# Patient Record
Sex: MALE | Race: WHITE | NOT HISPANIC OR LATINO | Employment: OTHER | URBAN - METROPOLITAN AREA
[De-identification: names, ages, dates, MRNs, and addresses within clinical notes are randomized per-mention and may not be internally consistent; named-entity substitution may affect disease eponyms.]

---

## 2017-04-19 ENCOUNTER — TELEPHONE (OUTPATIENT)
Dept: FAMILY MEDICINE | Facility: OTHER | Age: 62
End: 2017-04-19

## 2017-04-19 ENCOUNTER — OFFICE VISIT (OUTPATIENT)
Dept: FAMILY MEDICINE | Facility: OTHER | Age: 62
End: 2017-04-19
Payer: COMMERCIAL

## 2017-04-19 VITALS
BODY MASS INDEX: 34.84 KG/M2 | TEMPERATURE: 98 F | RESPIRATION RATE: 16 BRPM | HEIGHT: 67 IN | DIASTOLIC BLOOD PRESSURE: 77 MMHG | WEIGHT: 222 LBS | SYSTOLIC BLOOD PRESSURE: 145 MMHG

## 2017-04-19 DIAGNOSIS — S01.01XA LACERATION OF SCALP WITHOUT FOREIGN BODY, INITIAL ENCOUNTER: ICD-10-CM

## 2017-04-19 DIAGNOSIS — S09.90XA HEAD INJURY, INITIAL ENCOUNTER: Primary | ICD-10-CM

## 2017-04-19 PROCEDURE — 99207 ZZC NO BILLABLE SERVICE THIS VISIT: CPT | Performed by: FAMILY MEDICINE

## 2017-04-19 PROCEDURE — 12001 RPR S/N/AX/GEN/TRNK 2.5CM/<: CPT | Performed by: FAMILY MEDICINE

## 2017-04-19 ASSESSMENT — PAIN SCALES - GENERAL: PAINLEVEL: NO PAIN (0)

## 2017-04-19 NOTE — PATIENT INSTRUCTIONS
Wound Care Instructions    1.  Keep Vaseline or antibiotic ointment on wound for a few days until the skin edges have come together.  Do not let it dry out and form a scab as this will make the resulting scar more noticeable.    2.  After 24 hours, may wash gently with soap and water.  After 48 hours, you can soak it, if needed.    3.  If desired, vitamin E oil for 2 weeks after antibiotic ointment may help to decrease scarring.    4.  Protect the area from sun for up to one year afterward as the scar is continuing to remodel.  Sun exposure will also make the resulting scar more noticeable.    5.  Call if the area is very red, tender, has a discharge or is very itchy while healing, or if you have any other questions.  These may be signs of early infection or allergy.    6.  Return for staple removal in 7-10 days.

## 2017-04-19 NOTE — MR AVS SNAPSHOT
After Visit Summary   4/19/2017    Bhavesh Lanedros    MRN: [id number]           Patient Information     Date Of Birth          1955        Visit Information        Provider Department      4/19/2017 3:30 PM López Mohr MD [facility]        Care Instructions    Wound Care Instructions    1.  Keep Vaseline or antibiotic ointment on wound for a few days until the skin edges have come together.  Do not let it dry out and form a scab as this will make the resulting scar more noticeable.    2.  After 24 hours, may wash gently with soap and water.  After 48 hours, you can soak it, if needed.    3.  If desired, vitamin E oil for 2 weeks after antibiotic ointment may help to decrease scarring.    4.  Protect the area from sun for up to one year afterward as the scar is continuing to remodel.  Sun exposure will also make the resulting scar more noticeable.    5.  Call if the area is very red, tender, has a discharge or is very itchy while healing, or if you have any other questions.  These may be signs of early infection or allergy.    6.  Return for staple removal in 7-10 days.             Follow-ups after your visit        Who to contact     If you have questions or need follow up information about today's clinic visit or your schedule please contact [facility] directly at [phone].  Normal or non-critical lab and imaging results will be communicated to you by MyChart, letter or phone within 4 business days after the clinic has received the results. If you do not hear from us within 7 days, please contact the clinic through MyChart or phone. If you have a critical or abnormal lab result, we will notify you by phone as soon as possible.  Submit refill requests through [employer] or call your pharmacy and they will forward the refill request to us. Please allow 3 business days for your refill to be completed.          Additional Information About Your Visit       "  MyChart Information     [employer] lets you send messages to your doctor, view your test results, renew your prescriptions, schedule appointments and more. To sign up, go to [email or web address] . Click on \"Log in\" on the left side of the screen, which will take you to the Welcome page. Then click on \"Sign up Now\" on the right side of the page.     You will be asked to enter the access code listed below, as well as some personal information. Please follow the directions to create your username and password.     Your access code is: [email or web address]  Expires: 2017  4:17 PM     Your access code will  in 90 days. If you need help or a new code, please call your [place] clinic or [phone].        Care EveryWhere ID     This is your Care EveryWhere ID. This could be used by other organizations to access your [place] medical records  [id number]        Your Vitals Were     Temperature Respirations Height BMI (Body Mass Index)          98  F (36.7  C) (Oral) 16 5' 7.25\" (1.708 m) 34.51 kg/m2         Blood Pressure from Last 3 Encounters:   17 145/77   16 137/89   16 128/80    Weight from Last 3 Encounters:   17 222 lb (100.7 kg)   16 218 lb 1.6 oz (98.9 kg)   10/27/16 215 lb (97.5 kg)              Today, you had the following     No orders found for display       Primary Care Provider Office Phone #    [facility] [phone]       No address on file        Thank you!     Thank you for choosing [facility]  for your care. Our goal is always to provide you with excellent care. Hearing back from our patients is one way we can continue to improve our services. Please take a few minutes to complete the written survey that you may receive in the mail after your visit with us. Thank you!             Your Updated Medication List - Protect others around you: Learn how to safely use, store and throw away your medicines at www.disposemymeds.org.        "   This list is accurate as of: 4/19/17  4:17 PM.  Always use your most recent med list.                   Brand Name Dispense Instructions for use    albuterol 108 (90 BASE) MCG/ACT Inhaler    PROAIR HFA/PROVENTIL HFA/VENTOLIN HFA    1 Inhaler    Inhale 2 puffs into the lungs every 6 hours as needed for shortness of breath / dyspnea or wheezing       IBUPROFEN PO          omeprazole 20 MG CR capsule    priLOSEC    90 capsule    Take 1 capsule (20 mg) by mouth daily

## 2017-04-19 NOTE — TELEPHONE ENCOUNTER
Spoke with daughter.  Working in motor home and hit head on metal.  Stopped bleeding. About an hour. On top of head. Slice with a little open spot.  Maybe two inches long.   Huddled with DJ.  Ok to add on end of day.    Felix Shen RN

## 2017-04-19 NOTE — PROGRESS NOTES
"  SUBJECTIVE:                                                    Bhavesh Landeros is a 61 year old male who presents to clinic today for the following health issues:    Laceration.    Head laceration      Duration: 3 hours    Description (location/character/radiation): Non-painful laceration of upper left head    Intensity:  mild    Accompanying signs and symptoms: None    History (similar episodes/previous evaluation): None    Precipitating or alleviating factors: None    Therapies tried and outcome: None     Mr. Landeros presents to clinic for a head laceration. This happened about 3 hours ago. He was taking the motor out of a motor home and hit his head on a grill as he took it out. He denies pain. He did not lose consciousness. He has not taken anything for it. He has not hurt his head before.    Reviewed and updated as needed this visit by clinical staff       Reviewed and updated as needed this visit by Provider       ROS:  Constitutional, HEENT, cardiovascular, pulmonary, , musculoskeletal, neuro, skin, endocrine and psych systems are negative, except as otherwise noted. He noticed some heartburn this morning.    OBJECTIVE:                                                    /77 (BP Location: Left arm, Patient Position: Chair, Cuff Size: Adult Large)  Temp 98  F (36.7  C) (Oral)  Resp 16  Ht 5' 7.25\" (1.708 m)  Wt 222 lb (100.7 kg)  BMI 34.51 kg/m2  Body mass index is 34.51 kg/(m^2).     GENERAL: healthy, alert and no distress  EYES: Eyes grossly normal to inspection, PERRL and conjunctivae and sclerae normal  HENT: 1.5-inch mildly bloody, nontender, transverse laceration on left parietal head       ASSESSMENT/PLAN:                                                      1. Head injury, initial encounter  - The patient is a 61-year-old man who presented with a 1.5-inch transverse laceration on left side of the top of his occiput. This occurred a few hours prior to presentation as he took the motor out " of a motor home and hit his head on the motor home grill. He denied loss of consciousness, denied pain and, in fact, did not have tenderness to the area on exam. However, it was bloody and the cut was felt to be deep, warranting closure. After cleaning the area with chlorhexidine and water, we injected 1% lidocaine at the laceration site. We closed about 1 inch of the deepest part of the laceration using 5 staples with a skin stapler, achieving good closure. We provided the patient with information on wound care before he went home and advised him to return in 7-10 days for staple removal. He was not due for another tetanus shot for another four years.    Wound Care Instructions    1.  Keep Vaseline or antibiotic ointment on wound for a few days until the skin edges have come together.  Do not let it dry out and form a scab as this will make the resulting scar more noticeable.    2.  After 24 hours, may wash gently with soap and water.  After 48 hours, you can soak it, if needed.    3.  If desired, vitamin E oil for 2 weeks after antibiotic ointment may help to decrease scarring.    4.  Protect the area from sun for up to one year afterward as the scar is continuing to remodel.  Sun exposure will also make the resulting scar more noticeable.    5.  Call if the area is very red, tender, has a discharge or is very itchy while healing, or if you have any other questions.  These may be signs of early infection or allergy.    6.  Return for staple removal in 7-10 days.    Scribed by Jeff Shaffer MS3.    This patient was seen and examined by myself as well as the medical student.  The medical student has scribed the note and I have reviewed it, edited it appropriately and agree with the final documentation. Electronically signed by MD López Nicholas MD, MD  [facility]

## 2017-04-19 NOTE — NURSING NOTE
"Chief Complaint   Patient presents with     Laceration     Panel Management       Initial /77 (BP Location: Left arm, Patient Position: Chair, Cuff Size: Adult Large)  Temp 98  F (36.7  C) (Oral)  Resp 16  Ht 5' 7.25\" (1.708 m)  Wt 222 lb (100.7 kg)  BMI 34.51 kg/m2 Estimated body mass index is 34.51 kg/(m^2) as calculated from the following:    Height as of this encounter: 5' 7.25\" (1.708 m).    Weight as of this encounter: 222 lb (100.7 kg).  Medication Reconciliation: complete  Milton Reardon, LUCY    "

## 2017-04-27 ENCOUNTER — OFFICE VISIT (OUTPATIENT)
Dept: FAMILY MEDICINE | Facility: OTHER | Age: 62
End: 2017-04-27
Payer: COMMERCIAL

## 2017-04-27 DIAGNOSIS — Z48.02 REMOVAL OF STAPLES: Primary | ICD-10-CM

## 2017-04-27 PROCEDURE — 99207 ZZC NO CHARGE NURSE ONLY: CPT

## 2017-04-27 NOTE — PROGRESS NOTES
Bhavesh Landeros presents to the clinic today for suture/staple removal.    The patient has had the sutures/staples placed on 04/19/2017  There has been no history of infection or drainage.  Tetanus status is up to date.     OBJECTIVE:   5 sutures/staples are seen located on the head.    The wound is healing well with no signs of infection.      ASSESSMENT:   Suture/Staple Removal.    PLAN:    All sutures were easily removed today. Routine wound care discussed.  The patient will follow up as needed.    Felix Shen RN

## 2017-04-27 NOTE — MR AVS SNAPSHOT
"              After Visit Summary   2017    Bhavesh Landeros    MRN: [id number]           Patient Information     Date Of Birth          1955        Visit Information        Provider Department      2017 3:00 PM NL RN [facility]        Today's Diagnoses     Removal of staples    -  1       Follow-ups after your visit        Who to contact     If you have questions or need follow up information about today's clinic visit or your schedule please contact [facility] directly at [phone].  Normal or non-critical lab and imaging results will be communicated to you by [employer], letter or phone within 4 business days after the clinic has received the results. If you do not hear from us within 7 days, please contact the clinic through [employer] or phone. If you have a critical or abnormal lab result, we will notify you by phone as soon as possible.  Submit refill requests through [employer] or call your pharmacy and they will forward the refill request to us. Please allow 3 business days for your refill to be completed.          Additional Information About Your Visit        MyChart Information     [employer] lets you send messages to your doctor, view your test results, renew your prescriptions, schedule appointments and more. To sign up, go to [email or web address] . Click on \"Log in\" on the left side of the screen, which will take you to the Welcome page. Then click on \"Sign up Now\" on the right side of the page.     You will be asked to enter the access code listed below, as well as some personal information. Please follow the directions to create your username and password.     Your access code is: [email or web address]  Expires: 2017  4:17 PM     Your access code will  in 90 days. If you need help or a new code, please call your [facility]3] or [phone].        Care EveryWhere ID     This is your Care EveryWhere ID. This could be used by other organizations to " access your [place] medical records  [id number]         Blood Pressure from Last 3 Encounters:   04/19/17 145/77   12/30/16 137/89   12/28/16 128/80    Weight from Last 3 Encounters:   04/19/17 222 lb (100.7 kg)   12/28/16 218 lb 1.6 oz (98.9 kg)   10/27/16 215 lb (97.5 kg)              Today, you had the following     No orders found for display       Primary Care Provider Office Phone #    [facility] [phone]       No address on file        Thank you!     Thank you for choosing [facility]  for your care. Our goal is always to provide you with excellent care. Hearing back from our patients is one way we can continue to improve our services. Please take a few minutes to complete the written survey that you may receive in the mail after your visit with us. Thank you!             Your Updated Medication List - Protect others around you: Learn how to safely use, store and throw away your medicines at www.disposemymeds.org.          This list is accurate as of: 4/27/17  3:01 PM.  Always use your most recent med list.                   Brand Name Dispense Instructions for use    albuterol 108 (90 BASE) MCG/ACT Inhaler    PROAIR HFA/PROVENTIL HFA/VENTOLIN HFA    1 Inhaler    Inhale 2 puffs into the lungs every 6 hours as needed for shortness of breath / dyspnea or wheezing       IBUPROFEN PO          omeprazole 20 MG CR capsule    priLOSEC    90 capsule    Take 1 capsule (20 mg) by mouth daily

## 2017-07-03 ENCOUNTER — OFFICE VISIT (OUTPATIENT)
Dept: FAMILY MEDICINE | Facility: OTHER | Age: 62
End: 2017-07-03
Payer: COMMERCIAL

## 2017-07-03 VITALS
HEART RATE: 84 BPM | BODY MASS INDEX: 34.69 KG/M2 | WEIGHT: 221 LBS | HEIGHT: 67 IN | RESPIRATION RATE: 16 BRPM | TEMPERATURE: 99.2 F | SYSTOLIC BLOOD PRESSURE: 134 MMHG | DIASTOLIC BLOOD PRESSURE: 76 MMHG

## 2017-07-03 DIAGNOSIS — L60.0 INGROWING LEFT GREAT TOENAIL: Primary | ICD-10-CM

## 2017-07-03 PROCEDURE — 99213 OFFICE O/P EST LOW 20 MIN: CPT | Performed by: FAMILY MEDICINE

## 2017-07-03 RX ORDER — CEPHALEXIN 500 MG/1
500 CAPSULE ORAL 4 TIMES DAILY
Qty: 40 CAPSULE | Refills: 0 | Status: SHIPPED | OUTPATIENT
Start: 2017-07-03 | End: 2018-11-07

## 2017-07-03 ASSESSMENT — PAIN SCALES - GENERAL: PAINLEVEL: NO PAIN (1)

## 2017-07-03 NOTE — PROGRESS NOTES
SUBJECTIVE:                                                    Bhavesh Landeros is a 61 year old male who presents to clinic today for the following health issues:      Concern - ingrown toe nail   Onset: 3 days ago     Description:   Patient has possible infection on ingrown toe nail on Left great toe    Intensity: moderate    Progression of Symptoms:  worsening    Accompanying Signs & Symptoms:  Swelling  Painful when toe is bumped    Previous history of similar problem:   Yes- many years ago     Precipitating factors:   Worsened by: bumping it, applying pressure    Alleviating factors:  Improved by: none    Therapies Tried and outcome: none        Problem list and histories reviewed & adjusted, as indicated.  Additional history: as documented    Patient Active Problem List   Diagnosis     Benign neoplasm of testis     GERD (gastroesophageal reflux disease)     CARDIOVASCULAR SCREENING; LDL GOAL LESS THAN 160     Cellulitis of leg     Leg edema, right     Advanced directives, counseling/discussion     SOB (shortness of breath)     Past Surgical History:   Procedure Laterality Date     COLONOSCOPY N/A 1/22/2016    Procedure: COLONOSCOPY;  Surgeon: Pb Rodriguez MD;  Location: [facility]     HC COLONOSCOPY W/WO BRUSH/WASH  03/27/06     HC KNEE SCOPE,MED/LAT MENISECTOMY  07/08/1999     HC REVISE MEDIAN N/CARPAL TUNNEL SURG  1986      UGI ENDOSCOPY DIAG W BIOPSY  10/26/09       Social History   Substance Use Topics     Smoking status: Never Smoker     Smokeless tobacco: Never Used     Alcohol use 0.0 oz/week     0 Standard drinks or equivalent per week      Comment: occ     Family History   Problem Relation Age of Onset     DIABETES Brother      Alzheimer Disease Father      DIABETES Sister          Current Outpatient Prescriptions   Medication Sig Dispense Refill     cephALEXin (KEFLEX) 500 MG capsule Take 1 capsule (500 mg) by mouth 4 times daily 40 capsule 0     omeprazole (PRILOSEC) 20 MG CR capsule Take 1 capsule  "(20 mg) by mouth daily (Patient not taking: Reported on 4/19/2017) 90 capsule 3     IBUPROFEN PO        albuterol (PROAIR HFA, PROVENTIL HFA, VENTOLIN HFA) 108 (90 BASE) MCG/ACT inhaler Inhale 2 puffs into the lungs every 6 hours as needed for shortness of breath / dyspnea or wheezing (Patient not taking: Reported on 4/19/2017) 1 Inhaler 1     Allergies   Allergen Reactions     No Known Allergies      BP Readings from Last 3 Encounters:   07/03/17 134/76   04/19/17 145/77   12/30/16 137/89    Wt Readings from Last 3 Encounters:   07/03/17 221 lb (100.2 kg)   04/19/17 222 lb (100.7 kg)   12/28/16 218 lb 1.6 oz (98.9 kg)                  Labs reviewed in EPIC    Reviewed and updated as needed this visit by clinical staff       Reviewed and updated as needed this visit by Provider         ROS:  C: NEGATIVE for fever, chills, change in weight  INTEGUMENTARY/SKIN: POSITIVE for left ingrown toenail   E/M: NEGATIVE for ear, mouth and throat problems  R: NEGATIVE for significant cough or SOB  CV: NEGATIVE for chest pain, palpitations or peripheral edema    OBJECTIVE:                                                    /76  Pulse 84  Temp 99.2  F (37.3  C) (Temporal)  Resp 16  Ht 5' 7.25\" (1.708 m)  Wt 221 lb (100.2 kg)  BMI 34.36 kg/m2  Body mass index is 34.36 kg/(m^2).       EXAM:  GENERAL:  Bhavesh Landeros presents in no apparent distress  VITALS: Blood pressure 134/76, pulse 84, temperature 99.2  F (37.3  C), temperature source Temporal, resp. rate 16, height 5' 7.25\" (1.708 m), weight 221 lb (100.2 kg).  Great Toe:  Inflammation is seen along the lateral aspect of the great toe with mild to moderate drainage and an obvious ingrown toenail.  No fluctuance is seen.  Slightly tender to palpation.  Diagnostic test results:  Diagnostic Test Results:  none      ASSESSMENT/PLAN:                                                    1. Ingrowing left great toenail  Plan:  Options were discussed with the " patient.  Keflex 500mg po qid x 10 days.  Warm water soaks recommended.  Patient to schedule partial toenail removal in the future if symptoms do not improve.    - cephALEXin (KEFLEX) 500 MG capsule; Take 1 capsule (500 mg) by mouth 4 times daily  Dispense: 40 capsule; Refill: 0  - PODIATRY/FOOT & ANKLE SURGERY REFERRAL      Follow up with Provider - lowell Castro MD, MD  [facility]    Patient Instructions     Ingrown Toenail, Not Infected (Home Treatment)  An ingrown toenail occurs when the nail grows sideways into the skin next to the nail. This can cause pain, especially when wearing tight shoes. It can also lead to an infection with redness, swelling, and pus drainage. Most people respond to the treatments described here. Sometimes surgery is needed, however.  Home care  The following guidelines will help you care for your toenail at home:    Soak the painful toe in warm water twice a day for 10 to 20 minutes each time. Wash the entire foot with an antibacterial soap.    If there is redness or swelling around the toenail, apply an antibiotic ointment three times a day.    Insert a small piece of rolled-up cotton under the corner of the nail to promote growth of the nail outward, away from the cuticle.    Wear shoes that do not put pressure on the toes, such as a sandal or open shoe. Closed shoes should be big enough in the toes so that there is no pressure on the painful toe.    You may use acetaminophen or ibuprofen for pain, unless another pain medicine was prescribed. Talk with your healthcare provider before using these medicines if you have chronic liver or kidney disease. Also tell your healthcare provider if you have ever had a stomach ulcer or GI bleeding.  Prevention  The following tips will help you prevent ingrown toenails:    Avoid pointed, tight, or narrow shoes.    Trim toenails once a month so they don t grow too long. Cut the nail straight across.  Follow-up  care  Follow up with your healthcare provider or as advised.  When to seek medical advice  Call your health care provider right away if any of these occur:    Increasing redness, pain, or swelling of the toe    Tender red streaks in the skin leading toward the ankle    Pus or fluid drainage from the toe    Fever of 100.4 F (38 C) or higher  Date Last Reviewed: 5/14/2015 2000-2017 [employer]. [address] [place] [address], [place] [address]. All rights reserved. This information is not intended as a substitute for professional medical care. Always follow your healthcare professional's instructions.        Ingrown Toenail, Not Infected (Home Treatment)  An ingrown toenail occurs when the nail grows sideways into the skin next to the nail. This can cause pain, especially when wearing tight shoes. It can also lead to an infection with redness, swelling, and pus drainage. Most people respond to the treatments described here. Sometimes surgery is needed, however.  Home care  The following guidelines will help you care for your toenail at home:    Soak the painful toe in warm water twice a day for 10 to 20 minutes each time. Wash the entire foot with an antibacterial soap.    If there is redness or swelling around the toenail, apply an antibiotic ointment three times a day.    Insert a small piece of rolled-up cotton under the corner of the nail to promote growth of the nail outward, away from the cuticle.    Wear shoes that do not put pressure on the toes, such as a sandal or open shoe. Closed shoes should be big enough in the toes so that there is no pressure on the painful toe.    You may use acetaminophen or ibuprofen for pain, unless another pain medicine was prescribed. Talk with your healthcare provider before using these medicines if you have chronic liver or kidney disease. Also tell your healthcare provider if you have ever had a stomach ulcer or GI bleeding.  Prevention  The following tips will help  you prevent ingrown toenails:    Avoid pointed, tight, or narrow shoes.    Trim toenails once a month so they don t grow too long. Cut the nail straight across.  Follow-up care  Follow up with your healthcare provider or as advised.  When to seek medical advice  Call your health care provider right away if any of these occur:    Increasing redness, pain, or swelling of the toe    Tender red streaks in the skin leading toward the ankle    Pus or fluid drainage from the toe    Fever of 100.4 F (38 C) or higher  Date Last Reviewed: 5/14/2015 2000-2017 [employer]. [address] [place] [address]. All rights reserved. This information is not intended as a substitute for professional medical care. Always follow your healthcare professional's instructions.

## 2017-07-03 NOTE — NURSING NOTE
"Chief Complaint   Patient presents with     Ingrown Toenail     Panel Management       Initial /76  Pulse 84  Temp 99.2  F (37.3  C) (Temporal)  Resp 16  Ht 5' 7.25\" (1.708 m)  Wt 221 lb (100.2 kg)  BMI 34.36 kg/m2 Estimated body mass index is 34.36 kg/(m^2) as calculated from the following:    Height as of this encounter: 5' 7.25\" (1.708 m).    Weight as of this encounter: 221 lb (100.2 kg).  Medication Reconciliation: complete     Tessie Siddiqui MA    "

## 2017-07-03 NOTE — PATIENT INSTRUCTIONS
Ingrown Toenail, Not Infected (Home Treatment)  An ingrown toenail occurs when the nail grows sideways into the skin next to the nail. This can cause pain, especially when wearing tight shoes. It can also lead to an infection with redness, swelling, and pus drainage. Most people respond to the treatments described here. Sometimes surgery is needed, however.  Home care  The following guidelines will help you care for your toenail at home:    Soak the painful toe in warm water twice a day for 10 to 20 minutes each time. Wash the entire foot with an antibacterial soap.    If there is redness or swelling around the toenail, apply an antibiotic ointment three times a day.    Insert a small piece of rolled-up cotton under the corner of the nail to promote growth of the nail outward, away from the cuticle.    Wear shoes that do not put pressure on the toes, such as a sandal or open shoe. Closed shoes should be big enough in the toes so that there is no pressure on the painful toe.    You may use acetaminophen or ibuprofen for pain, unless another pain medicine was prescribed. Talk with your healthcare provider before using these medicines if you have chronic liver or kidney disease. Also tell your healthcare provider if you have ever had a stomach ulcer or GI bleeding.  Prevention  The following tips will help you prevent ingrown toenails:    Avoid pointed, tight, or narrow shoes.    Trim toenails once a month so they don t grow too long. Cut the nail straight across.  Follow-up care  Follow up with your healthcare provider or as advised.  When to seek medical advice  Call your health care provider right away if any of these occur:    Increasing redness, pain, or swelling of the toe    Tender red streaks in the skin leading toward the ankle    Pus or fluid drainage from the toe    Fever of 100.4 F (38 C) or higher  Date Last Reviewed: 5/14/2015 2000-2017 [employer]. [address] [place] [address], [place]  [address]. All rights reserved. This information is not intended as a substitute for professional medical care. Always follow your healthcare professional's instructions.        Ingrown Toenail, Not Infected (Home Treatment)  An ingrown toenail occurs when the nail grows sideways into the skin next to the nail. This can cause pain, especially when wearing tight shoes. It can also lead to an infection with redness, swelling, and pus drainage. Most people respond to the treatments described here. Sometimes surgery is needed, however.  Home care  The following guidelines will help you care for your toenail at home:    Soak the painful toe in warm water twice a day for 10 to 20 minutes each time. Wash the entire foot with an antibacterial soap.    If there is redness or swelling around the toenail, apply an antibiotic ointment three times a day.    Insert a small piece of rolled-up cotton under the corner of the nail to promote growth of the nail outward, away from the cuticle.    Wear shoes that do not put pressure on the toes, such as a sandal or open shoe. Closed shoes should be big enough in the toes so that there is no pressure on the painful toe.    You may use acetaminophen or ibuprofen for pain, unless another pain medicine was prescribed. Talk with your healthcare provider before using these medicines if you have chronic liver or kidney disease. Also tell your healthcare provider if you have ever had a stomach ulcer or GI bleeding.  Prevention  The following tips will help you prevent ingrown toenails:    Avoid pointed, tight, or narrow shoes.    Trim toenails once a month so they don t grow too long. Cut the nail straight across.  Follow-up care  Follow up with your healthcare provider or as advised.  When to seek medical advice  Call your health care provider right away if any of these occur:    Increasing redness, pain, or swelling of the toe    Tender red streaks in the skin leading toward the ankle    Pus or  fluid drainage from the toe    Fever of 100.4 F (38 C) or higher  Date Last Reviewed: 5/14/2015 2000-2017 [employer]. [address] [place] [address], [place] [address]. All rights reserved. This information is not intended as a substitute for professional medical care. Always follow your healthcare professional's instructions.

## 2017-07-03 NOTE — MR AVS SNAPSHOT
After Visit Summary   7/3/2017    Bhavesh Landeros    MRN: [id number]           Patient Information     Date Of Birth          1955        Visit Information        Provider Department      7/3/2017 1:40 PM Meme Castro MD [facility]'s Diagnoses     Ingrowing left great toenail    -  1      Care Instructions      Ingrown Toenail, Not Infected (Home Treatment)  An ingrown toenail occurs when the nail grows sideways into the skin next to the nail. This can cause pain, especially when wearing tight shoes. It can also lead to an infection with redness, swelling, and pus drainage. Most people respond to the treatments described here. Sometimes surgery is needed, however.  Home care  The following guidelines will help you care for your toenail at home:    Soak the painful toe in warm water twice a day for 10 to 20 minutes each time. Wash the entire foot with an antibacterial soap.    If there is redness or swelling around the toenail, apply an antibiotic ointment three times a day.    Insert a small piece of rolled-up cotton under the corner of the nail to promote growth of the nail outward, away from the cuticle.    Wear shoes that do not put pressure on the toes, such as a sandal or open shoe. Closed shoes should be big enough in the toes so that there is no pressure on the painful toe.    You may use acetaminophen or ibuprofen for pain, unless another pain medicine was prescribed. Talk with your healthcare provider before using these medicines if you have chronic liver or kidney disease. Also tell your healthcare provider if you have ever had a stomach ulcer or GI bleeding.  Prevention  The following tips will help you prevent ingrown toenails:    Avoid pointed, tight, or narrow shoes.    Trim toenails once a month so they don t grow too long. Cut the nail straight across.  Follow-up care  Follow up with your healthcare provider or as advised.  When to seek  medical advice  Call your health care provider right away if any of these occur:    Increasing redness, pain, or swelling of the toe    Tender red streaks in the skin leading toward the ankle    Pus or fluid drainage from the toe    Fever of 100.4 F (38 C) or higher  Date Last Reviewed: 5/14/2015 2000-2017 [employer]. [address] [place] [address]. All rights reserved. This information is not intended as a substitute for professional medical care. Always follow your healthcare professional's instructions.        Ingrown Toenail, Not Infected (Home Treatment)  An ingrown toenail occurs when the nail grows sideways into the skin next to the nail. This can cause pain, especially when wearing tight shoes. It can also lead to an infection with redness, swelling, and pus drainage. Most people respond to the treatments described here. Sometimes surgery is needed, however.  Home care  The following guidelines will help you care for your toenail at home:    Soak the painful toe in warm water twice a day for 10 to 20 minutes each time. Wash the entire foot with an antibacterial soap.    If there is redness or swelling around the toenail, apply an antibiotic ointment three times a day.    Insert a small piece of rolled-up cotton under the corner of the nail to promote growth of the nail outward, away from the cuticle.    Wear shoes that do not put pressure on the toes, such as a sandal or open shoe. Closed shoes should be big enough in the toes so that there is no pressure on the painful toe.    You may use acetaminophen or ibuprofen for pain, unless another pain medicine was prescribed. Talk with your healthcare provider before using these medicines if you have chronic liver or kidney disease. Also tell your healthcare provider if you have ever had a stomach ulcer or GI bleeding.  Prevention  The following tips will help you prevent ingrown toenails:    Avoid pointed, tight, or narrow  shoes.    Trim toenails once a month so they don t grow too long. Cut the nail straight across.  Follow-up care  Follow up with your healthcare provider or as advised.  When to seek medical advice  Call your health care provider right away if any of these occur:    Increasing redness, pain, or swelling of the toe    Tender red streaks in the skin leading toward the ankle    Pus or fluid drainage from the toe    Fever of 100.4 F (38 C) or higher  Date Last Reviewed: 5/14/2015 2000-2017 [employer]. [address] [place] [address]. All rights reserved. This information is not intended as a substitute for professional medical care. Always follow your healthcare professional's instructions.                Follow-ups after your visit        Additional Services     PODIATRY/FOOT & ANKLE SURGERY REFERRAL       Your provider has referred you to: [facility]: [facility] [phone]   [email or web address]/    Please be aware that coverage of these services is subject to the terms and limitations of your health insurance plan.  Call member services at your health plan with any benefit or coverage questions.      Please bring the following to your appointment:  >>   Any x-rays, CTs or MRIs which have been performed.  Contact the facility where they were done to arrange for  prior to your scheduled appointment.    >>   List of current medications   >>   This referral request   >>   Any documents/labs given to you for this referral                  Who to contact     If you have questions or need follow up information about today's clinic visit or your schedule please contact [facility] directly at [phone].  Normal or non-critical lab and imaging results will be communicated to you by MyChart, letter or phone within 4 business days after the clinic has received the results. If you do not hear from us within 7 days, please contact the  "clinic through MyUnfoldt or phone. If you have a critical or abnormal lab result, we will notify you by phone as soon as possible.  Submit refill requests through [employer] or call your pharmacy and they will forward the refill request to us. Please allow 3 business days for your refill to be completed.          Additional Information About Your Visit        [employer] Information     Gravie lets you send messages to your doctor, view your test results, renew your prescriptions, schedule appointments and more. To sign up, go to [email or web address] . Click on \"Log in\" on the left side of the screen, which will take you to the Welcome page. Then click on \"Sign up Now\" on the right side of the page.     You will be asked to enter the access code listed below, as well as some personal information. Please follow the directions to create your username and password.     Your access code is: [email or web address]  Expires: 2017  4:17 PM     Your access code will  in 90 days. If you need help or a new code, please call your [facility] clinic or [phone].        Care EveryWhere ID     This is your Care EveryWhere ID. This could be used by other organizations to access your [facility] medical records  [id number]        Your Vitals Were     Pulse Temperature Respirations Height BMI (Body Mass Index)       84 99.2  F (37.3  C) (Temporal) 16 5' 7.25\" (1.708 m) 34.36 kg/m2        Blood Pressure from Last 3 Encounters:   17 134/76   17 145/77   16 137/89    Weight from Last 3 Encounters:   17 221 lb (100.2 kg)   17 222 lb (100.7 kg)   16 218 lb 1.6 oz (98.9 kg)              We Performed the Following     PODIATRY/FOOT & ANKLE SURGERY REFERRAL          Today's Medication Changes          These changes are accurate as of: 7/3/17  2:00 PM.  If you have any questions, ask your nurse or doctor.               Start taking these medicines.        Dose/Directions    cephALEXin 500 MG capsule   Commonly " known as:  KEFLEX   Used for:  Ingrowing left great toenail   Started by:  Meme Castro MD        Dose:  500 mg   Take 1 capsule (500 mg) by mouth 4 times daily   Quantity:  40 capsule   Refills:  0            Where to get your medicines      These medications were sent to [facility] [place], [place] - [address]   [address] [address]     Phone:  [phone]     cephALEXin 500 MG capsule                Primary Care Provider Office Phone #    [facility] [phone]       No address on file        Equal Access to Services     [facility]: Hadii aad ku hadasho Soomaali, waaxda luqadaha, qaybta kaalmada adeegyada, kristin gallegos . So [facility] [phone].    ATENCIÓN: Si habla español, tiene a moreno disposición servicios gratuitos de asistencia lingüística. Llame al [phone].    We comply with applicable federal civil rights laws and [place] laws. We do not discriminate on the basis of race, color, national origin, age, disability sex, sexual orientation or gender identity.            Thank you!     Thank you for choosing [facility]  for your care. Our goal is always to provide you with excellent care. Hearing back from our patients is one way we can continue to improve our services. Please take a few minutes to complete the written survey that you may receive in the mail after your visit with us. Thank you!             Your Updated Medication List - Protect others around you: Learn how to safely use, store and throw away your medicines at www.disposemymeds.org.          This list is accurate as of: 7/3/17  2:00 PM.  Always use your most recent med list.                   Brand Name Dispense Instructions for use Diagnosis    albuterol 108 (90 BASE) MCG/ACT Inhaler    PROAIR HFA/PROVENTIL HFA/VENTOLIN HFA    1 Inhaler    Inhale 2 puffs into the lungs every 6 hours as needed for shortness of breath / dyspnea  or wheezing    SOB (shortness of breath)       cephALEXin 500 MG capsule    KEFLEX    40 capsule    Take 1 capsule (500 mg) by mouth 4 times daily    Ingrowing left great toenail       IBUPROFEN PO           omeprazole 20 MG CR capsule    priLOSEC    90 capsule    Take 1 capsule (20 mg) by mouth daily    Gastroesophageal reflux disease with esophagitis

## 2017-07-06 ENCOUNTER — OFFICE VISIT (OUTPATIENT)
Dept: PODIATRY | Facility: CLINIC | Age: 62
End: 2017-07-06
Payer: COMMERCIAL

## 2017-07-06 VITALS — WEIGHT: 221 LBS | BODY MASS INDEX: 34.69 KG/M2 | HEIGHT: 67 IN | TEMPERATURE: 98 F

## 2017-07-06 DIAGNOSIS — L60.0 INGROWING NAIL: Primary | ICD-10-CM

## 2017-07-06 PROCEDURE — 99203 OFFICE O/P NEW LOW 30 MIN: CPT | Mod: 25 | Performed by: PODIATRIST

## 2017-07-06 PROCEDURE — 11750 EXCISION NAIL&NAIL MATRIX: CPT | Mod: TA | Performed by: PODIATRIST

## 2017-07-06 ASSESSMENT — PAIN SCALES - GENERAL: PAINLEVEL: NO PAIN (0)

## 2017-07-06 NOTE — LETTER
7/6/2017         RE: Bhavesh Landeros  [address] [place] [id number]        Dear Colleague,    Thank you for referring your patient, Bhavesh Landeros, to the [facility]. Please see a copy of my visit note below.    HPI:  Bhavesh Landeros is a 61 year old male who is seen in consultation at the request of Dr. Gloria MD.    Pt presents for eval of:   (Onset, Location, L/R, Character, Treatments, Injury if yes)     Mid June 2017, patient bumped Left great toe on a board, Ingrown medial Left great toenail. Sharp and stabbing pain if bumped or w/pressure, swelling, redness, drainage  7/3/2017 started Keflex 500mg 4 times daily for 10 days, soaking    Works as cement w/construction.    Weight management plan: Patient was referred to their PCP to discuss a diet and exercise plan.       Review of Systems:  Patient denies fever, chills, rash, wound, stiffness, limping, numbness, weakness, heart burn, blood in stool, chest pain with activity, calf pain when walking, shortness of breath with activity, chronic cough, easy bleeding/bruising, swelling of ankles, excessive thirst, fatigue, depression, anxiety.  Pt admits to the symptoms noted in history above.     PAST MEDICAL HISTORY:   Past Medical History:   Diagnosis Date     MEDICAL HISTORY OF -     Leg & Wrist fxs     Multiple sclerosis (H)         PAST SURGICAL HISTORY:   Past Surgical History:   Procedure Laterality Date     COLONOSCOPY N/A 1/22/2016    Procedure: COLONOSCOPY;  Surgeon: Pb Rodriguez MD;  Location:  GI     HC COLONOSCOPY W/WO BRUSH/WASH  03/27/06     HC KNEE SCOPE,MED/LAT MENISECTOMY  07/08/1999     HC REVISE MEDIAN N/CARPAL TUNNEL SURG  1986      UGI ENDOSCOPY DIAG W BIOPSY  10/26/09        MEDICATIONS:   Current Outpatient Prescriptions:      cephALEXin (KEFLEX) 500 MG capsule, Take 1 capsule (500 mg) by mouth 4 times daily, Disp: 40 capsule, Rfl: 0     omeprazole (PRILOSEC) 20 MG CR capsule, Take 1 capsule (20 mg)  "by mouth daily, Disp: 90 capsule, Rfl: 3     IBUPROFEN PO, , Disp: , Rfl:      albuterol (PROAIR HFA, PROVENTIL HFA, VENTOLIN HFA) 108 (90 BASE) MCG/ACT inhaler, Inhale 2 puffs into the lungs every 6 hours as needed for shortness of breath / dyspnea or wheezing, Disp: 1 Inhaler, Rfl: 1     ALLERGIES:    Allergies   Allergen Reactions     No Known Allergies         SOCIAL HISTORY:   Social History     Social History     Marital status:      Spouse name: Fred     Number of children: Maira     Years of education: 12     Occupational History      [employer]     Social History Main Topics     Smoking status: Never Smoker     Smokeless tobacco: Never Used     Alcohol use 0.0 oz/week     0 Standard drinks or equivalent per week      Comment: occ     Drug use: No     Sexual activity: Not Currently     Partners: Female     Other Topics Concern     Caffeine Concern No     Sleep Concern No     Stress Concern No     Weight Concern No     Exercise Yes     Seat Belt Yes     Social History Narrative        FAMILY HISTORY:   Family History   Problem Relation Age of Onset     DIABETES Brother      Alzheimer Disease Father      DIABETES Sister         EXAM:Vitals: Temp 98  F (36.7  C) (Temporal)  Ht 5' 7.25\" (1.708 m)  Wt 221 lb (100.2 kg)  BMI 34.36 kg/m2  BMI= Body mass index is 34.36 kg/(m^2).    General appearance: Patient is alert and fully cooperative with history & exam.  No sign of distress is noted during the visit.     Psychiatric: Affect is pleasant & appropriate.  Patient appears motivated to improve health.     Respiratory: Breathing is regular & unlabored while sitting.     HEENT: Hearing is intact to spoken word.  Speech is clear.  No gross evidence of visual impairment that would impact ambulation.     Vascular: DP & PT pulses are intact & regular, CFT immediate, positive digital hair growth bilaterally.  No significant edema or varicosities noted and skin temperature is normal to both lower " extremities.     Neurologic: Lower extremity sensation is intact to light touch.  No evidence of weakness or contracture in the lower extremities.  No evidence of neuropathy.    Dermatologic: Adequate texture, turgor and tone about the integument.  No discoloration or thickening of the toenail however the left lateral hallux nail border(s) are ingrown with localized erythema, discomfort and purulent drainage.     Musculoskeletal: Patient is ambulatory without assistive device or brace.  No gross ankle deformity noted.  No foot or ankle joint effusion is noted.     ASSESSMENT:     No diagnosis found.    Plan: We reviewed medical history and EPIC chart.  After obtaining informed consent to permanently remove the left lateral hallux nail(s), I utilized 3 cc of lidocaine plain to achieve local anesthesia per digit.  The toenails were then prepped with Betadine in usual fashion.  A quarter inch Penrose drain was then utilized for hemostasis.  100% of the toenail border was avulsed utilizing a nail elevator, english anvil, 6100 blade and hemostat.  The proximal root portion of the nail was confirmed to be removed atraumatically.  Three applications of 89% phenol were applied to the surgical site for 30 seconds each followed by a curette after each application.  Surgical site was then flushed with alcohol and dressed with bacitracin and a nonadherent compression dressing.  Tourniquet was removed after approximately 3 minutes followed by immediate hyperemia to the distal aspect of the hallux.  Written postoperative instructions were dispensed and patient instructed to follow up in 1-2 weeks with any questions, pain,drainage, delayed healing or concerns.  I answered all questions to patients satisfaction.     If patient calls in the next 1-3 weeks with continued redness, pain or drainage I would recommend beginning oral Keflex 500 mg, 4 times a day ×10 days, after confirming there is no allergy.      Francisco Rowland,  HORTENCIA      Again, thank you for allowing me to participate in the care of your patient.        Sincerely,        Francisco Rowland DPM

## 2017-07-06 NOTE — MR AVS SNAPSHOT
After Visit Summary   7/6/2017    Bhavesh Landeros    MRN: [id number]           Patient Information     Date Of Birth          1955        Visit Information        Provider Department      7/6/2017 2:00 PM Francisco Rowland DPM [facility]        Today's Diagnoses     Ingrowing nail    -  1      Care Instructions    INGROWN TOENAIL POSTOPERATIVE INSTRUCTIONS   (Nail avulsion or chemical matrixectomy)   1.  Go directly home and elevate the affected foot on one or two pillows for the remainder of the day & evening. Your toe may stay numb for 2-8 hours.   2.  Take Tylenol, ibuprofen or another anti-inflammatory as needed for pain.   3.  Use oral antibiotic if that was prescribed at your doctor visit. Take the entire prescription even if your symptoms have improved.   4.  Keep dressing dry and intact the day of the procedure. The morning after the procedure, remove entire dressing and soak or wash the affected area in lukewarm water for 5-10 minutes.  You may add Epsom salt to soothe the area and help it become drier. Do this twice a day or more until the surgical site remains dry without drainage.  This may take 1-2 weeks if a small part of your nail was removed or 4-8 weeks if the entire nail was removed. You may count showering or bathing as one soak.  After each soak, pat the area dry with a clean towel or gauze and then allow to air dry for a few minutes. You may apply topical antibiotics, 3-4 drops of Cortisporin if it was prescribed at your visit or apply a very small amount of ointment like Bacitracin or Neosporin to the area.  Then cover with a cloth or fabric bandaid.    5.  You may walk and pursue everyday activities as tolerated with either an open toe shoe or cut-out shoe as needed. You may wear regular roomy shoes if no pain is noted.  No swimming in the lake, river, pool or hot tub until the wound has been dry for 3 days.   7.  Watch for any signs or symptoms of  "infection such as: red streaks going up the foot/leg, swelling, pus or foul odor. For patients that have had a phenol procedure, the toe will drain longer and may look similar to infection because it is a chemical burn.  Please call with questions.  8.  Follow up in my office in 1-2 weeks if the surgical site has not become dry or if other complications occur.  9.  There is 5-10% chance of complications such as infection or formation of another nail or a thick scared nail.              Follow-ups after your visit        Who to contact     If you have questions or need follow up information about today's clinic visit or your schedule please contact [facility] directly at [phone].  Normal or non-critical lab and imaging results will be communicated to you by Certushart, letter or phone within 4 business days after the clinic has received the results. If you do not hear from us within 7 days, please contact the clinic through Certushart or phone. If you have a critical or abnormal lab result, we will notify you by phone as soon as possible.  Submit refill requests through TellmeGen or call your pharmacy and they will forward the refill request to us. Please allow 3 business days for your refill to be completed.          Additional Information About Your Visit        CertusharCrowdZone Information     TellmeGen lets you send messages to your doctor, view your test results, renew your prescriptions, schedule appointments and more. To sign up, go to [email or web address] . Click on \"Log in\" on the left side of the screen, which will take you to the Welcome page. Then click on \"Sign up Now\" on the right side of the page.     You will be asked to enter the access code listed below, as well as some personal information. Please follow the directions to create your username and password.     Your access code is: [email or web address]  Expires: 2017  4:17 PM     Your access code will  in 90 days. If you need help or a new " "code, please call your [place] clinic or [phone].        Care EveryWhere ID     This is your Care EveryWhere ID. This could be used by other organizations to access your [facility] medical records  [id number]        Your Vitals Were     Temperature Height BMI (Body Mass Index)             98  F (36.7  C) (Temporal) 5' 7.25\" (1.708 m) 34.36 kg/m2          Blood Pressure from Last 3 Encounters:   07/03/17 134/76   04/19/17 145/77   12/30/16 137/89    Weight from Last 3 Encounters:   07/06/17 221 lb (100.2 kg)   07/03/17 221 lb (100.2 kg)   04/19/17 222 lb (100.7 kg)              We Performed the Following     REMOVAL NAIL/NAIL BED, PARTIAL OR COMPLETE        Primary Care Provider Office Phone #    [facility] [phone]       No address on file        Equal Access to Services     CHAO VILLAVICENCIO : Hadii chu skinnero Sopanda, waaxda luqadaha, qaybta kaalmada adeegyada, kristin gallegos . So [facility] [phone].    ATENCIÓN: Si habla español, tiene a moreno disposición servicios gratuitos de asistencia lingüística. [facility] [phone].    We comply with applicable federal civil rights laws and [place] laws. We do not discriminate on the basis of race, color, national origin, age, disability sex, sexual orientation or gender identity.            Thank you!     Thank you for choosing [facility]  for your care. Our goal is always to provide you with excellent care. Hearing back from our patients is one way we can continue to improve our services. Please take a few minutes to complete the written survey that you may receive in the mail after your visit with us. Thank you!             Your Updated Medication List - Protect others around you: Learn how to safely use, store and throw away your medicines at www.disposemymeds.org.          This list is accurate as of: 7/6/17  2:04 PM.  Always use your most recent med list.                   Brand Name Dispense " Instructions for use Diagnosis    albuterol 108 (90 BASE) MCG/ACT Inhaler    PROAIR HFA/PROVENTIL HFA/VENTOLIN HFA    1 Inhaler    Inhale 2 puffs into the lungs every 6 hours as needed for shortness of breath / dyspnea or wheezing    SOB (shortness of breath)       cephALEXin 500 MG capsule    KEFLEX    40 capsule    Take 1 capsule (500 mg) by mouth 4 times daily    Ingrowing left great toenail       IBUPROFEN PO           omeprazole 20 MG CR capsule    priLOSEC    90 capsule    Take 1 capsule (20 mg) by mouth daily    Gastroesophageal reflux disease with esophagitis

## 2017-07-06 NOTE — PROGRESS NOTES
HPI:  Bhavesh Landeros is a 61 year old male who is seen in consultation at the request of Dr. Gloria MD.    Pt presents for eval of:   (Onset, Location, L/R, Character, Treatments, Injury if yes)     Mid June 2017, patient bumped Left great toe on a board, Ingrown medial Left great toenail. Sharp and stabbing pain if bumped or w/pressure, swelling, redness, drainage  7/3/2017 started Keflex 500mg 4 times daily for 10 days, soaking    Works as cement w/construction.    Weight management plan: Patient was referred to their PCP to discuss a diet and exercise plan.       Review of Systems:  Patient denies fever, chills, rash, wound, stiffness, limping, numbness, weakness, heart burn, blood in stool, chest pain with activity, calf pain when walking, shortness of breath with activity, chronic cough, easy bleeding/bruising, swelling of ankles, excessive thirst, fatigue, depression, anxiety.  Pt admits to the symptoms noted in history above.     PAST MEDICAL HISTORY:   Past Medical History:   Diagnosis Date     MEDICAL HISTORY OF -     Leg & Wrist fxs     Multiple sclerosis (H)         PAST SURGICAL HISTORY:   Past Surgical History:   Procedure Laterality Date     COLONOSCOPY N/A 1/22/2016    Procedure: COLONOSCOPY;  Surgeon: Pb Rodriguez MD;  Location: [facility]     HC COLONOSCOPY W/WO BRUSH/WASH  03/27/06     HC KNEE SCOPE,MED/LAT MENISECTOMY  07/08/1999     HC REVISE MEDIAN N/CARPAL TUNNEL SURG  1986      UGI ENDOSCOPY DIAG W BIOPSY  10/26/09        MEDICATIONS:   Current Outpatient Prescriptions:      cephALEXin (KEFLEX) 500 MG capsule, Take 1 capsule (500 mg) by mouth 4 times daily, Disp: 40 capsule, Rfl: 0     omeprazole (PRILOSEC) 20 MG CR capsule, Take 1 capsule (20 mg) by mouth daily, Disp: 90 capsule, Rfl: 3     IBUPROFEN PO, , Disp: , Rfl:      albuterol (PROAIR HFA, PROVENTIL HFA, VENTOLIN HFA) 108 (90 BASE) MCG/ACT inhaler, Inhale 2 puffs into the lungs every 6 hours as needed for shortness of breath /  "dyspnea or wheezing, Disp: 1 Inhaler, Rfl: 1     ALLERGIES:    Allergies   Allergen Reactions     No Known Allergies         SOCIAL HISTORY:   Social History     Social History     Marital status:      Spouse name: Fred     Number of children: 1     Years of education: 12     Occupational History      [employer]     Social History Main Topics     Smoking status: Never Smoker     Smokeless tobacco: Never Used     Alcohol use 0.0 oz/week     0 Standard drinks or equivalent per week      Comment: occ     Drug use: No     Sexual activity: Not Currently     Partners: Female     Other Topics Concern     Caffeine Concern No     Sleep Concern No     Stress Concern No     Weight Concern No     Exercise Yes     Seat Belt Yes     Social History Narrative        FAMILY HISTORY:   Family History   Problem Relation Age of Onset     DIABETES Brother      Alzheimer Disease Father      DIABETES Sister         EXAM:Vitals: Temp 98  F (36.7  C) (Temporal)  Ht 5' 7.25\" (1.708 m)  Wt 221 lb (100.2 kg)  BMI 34.36 kg/m2  BMI= Body mass index is 34.36 kg/(m^2).    General appearance: Patient is alert and fully cooperative with history & exam.  No sign of distress is noted during the visit.     Psychiatric: Affect is pleasant & appropriate.  Patient appears motivated to improve health.     Respiratory: Breathing is regular & unlabored while sitting.     HEENT: Hearing is intact to spoken word.  Speech is clear.  No gross evidence of visual impairment that would impact ambulation.     Vascular: DP & PT pulses are intact & regular, CFT immediate, positive digital hair growth bilaterally.  No significant edema or varicosities noted and skin temperature is normal to both lower extremities.     Neurologic: Lower extremity sensation is intact to light touch.  No evidence of weakness or contracture in the lower extremities.  No evidence of neuropathy.    Dermatologic: Adequate texture, turgor and tone about the integument.  " No discoloration or thickening of the toenail however the left lateral hallux nail border(s) are ingrown with localized erythema, discomfort and purulent drainage.     Musculoskeletal: Patient is ambulatory without assistive device or brace.  No gross ankle deformity noted.  No foot or ankle joint effusion is noted.     ASSESSMENT:     No diagnosis found.    Plan: We reviewed medical history and EPIC chart.  After obtaining informed consent to permanently remove the left lateral hallux nail(s), I utilized 3 cc of lidocaine plain to achieve local anesthesia per digit.  The toenails were then prepped with Betadine in usual fashion.  A quarter inch Penrose drain was then utilized for hemostasis.  100% of the toenail border was avulsed utilizing a nail elevator, english anvil, 6100 blade and hemostat.  The proximal root portion of the nail was confirmed to be removed atraumatically.  Three applications of 89% phenol were applied to the surgical site for 30 seconds each followed by a curette after each application.  Surgical site was then flushed with alcohol and dressed with bacitracin and a nonadherent compression dressing.  Tourniquet was removed after approximately 3 minutes followed by immediate hyperemia to the distal aspect of the hallux.  Written postoperative instructions were dispensed and patient instructed to follow up in 1-2 weeks with any questions, pain,drainage, delayed healing or concerns.  I answered all questions to patients satisfaction.     If patient calls in the next 1-3 weeks with continued redness, pain or drainage I would recommend beginning oral Keflex 500 mg, 4 times a day ×10 days, after confirming there is no allergy.      Francisco Rowland DPM

## 2017-07-06 NOTE — NURSING NOTE
"Chief Complaint   Patient presents with     Consult     Ingrown medial Left great toenail, onset mid June 2017; new pt       Initial Temp 98  F (36.7  C) (Temporal)  Ht 5' 7.25\" (1.708 m)  Wt 221 lb (100.2 kg)  BMI 34.36 kg/m2 Estimated body mass index is 34.36 kg/(m^2) as calculated from the following:    Height as of this encounter: 5' 7.25\" (1.708 m).    Weight as of this encounter: 221 lb (100.2 kg).  BP completed using cuff size: NA (Not Taken)  Medication Reconciliation: complete    Paris Ng CMA, July 6, 2017    "

## 2017-12-20 ENCOUNTER — NURSE TRIAGE (OUTPATIENT)
Dept: NURSING | Facility: CLINIC | Age: 62
End: 2017-12-20

## 2017-12-20 NOTE — TELEPHONE ENCOUNTER
"Patient calling reporting his dog had a seizure 3 days ago. Stating while holding him his tooth hit into patient's jaw \"hard.\" Reporting area bled initially. \"1/2 inch\" cut that has closed over. Reporting new numbness in jaw today.   Patient stating he plans to talk to the dentist first stating he had a toothache after it happened.     Reason for Disposition    [1] Cut (length > 1/8 inch or 3 mm) or skin tear AND[2] any animal    Additional Information    Negative: [1] Major bleeding (e.g., actively dripping or spurting) AND [2] can't be stopped    Negative: Sounds like a life-threatening emergency to the triager    Negative: Snake bite    Negative: Bite, wound, or sting from fish    Negative: [1] Any break in skin (e.g., cut, puncture or scratch) AND[2] wild animal at risk for RABIES (e.g., bat, raccoon, kelly, skunk, coyote, other carnivores)    Negative: [1] Any break in skin (e.g., cut, puncture or scratch) AND[2] dog, cat, or ferret at risk for RABIES (e.g., sick, stray, unprovoked bite, developing country)    Negative: [1] Any break in skin (e.g., cut, puncture or scratch) AND[2] monkey    Protocols used: ANIMAL BITE-ADULT-    "

## 2018-01-23 ENCOUNTER — TELEPHONE (OUTPATIENT)
Dept: FAMILY MEDICINE | Facility: OTHER | Age: 63
End: 2018-01-23

## 2018-01-23 NOTE — TELEPHONE ENCOUNTER
Influenza-Like Illness (SHELLI) Protocol    Bhavesh Landeros      Age: 62 year old     YOB: 1955    Are you currently sick or have you had close contact with someone who is currently sick?   Yes, this patient is currently sick.     Adult Clinical Evaluation    Is this patient experiencing ANY of the following?  Unconsciousness or unresponsiveness No   Difficulty breathing or swallowing No   Blue or dusky lips, skin, or nail beds No   Chest pain No   Severe confusion or delirium No   Seizure activity: ongoing or stopped No   Severe dehydration or signs of shock No   Patient sounds very sick on the phone No     Is this patient experiencing ANY of the following?  Fever > 104 or shaking chills No   Wheezing with minimal response to usual wheezing medications or new wheezing No   Repeated vomiting or diarrhea with signs of dehydration (no urination within last 12 hours) No   Flu-like symptoms that initially improved but returned with fever and a worse cough No   Stiff or painful neck No   Severe headache No     Does the patient have any of the following?  Measured fever > 100 degrees Yes   Chills or feels very warm to the touch Yes   Cough Yes   Sore throat No   Muscle/ body aches Yes   Headaches No   Fatigue (tiredness) Yes     Nursing Plan      Below are conditions which place adults at increased risk for the more severe complications of influenza.    Does this patient have ANY of the following conditions?  Chronic pulmonary disease such as asthma or COPD No   Heart disease (CHF, CAD, anticoag due to arrhythmia) No   Liver disease (hepatitis, liver failure, cirrhosis) No   Kidney disease (renal failure, insufficiency or dialysis) No   Metabolic disorder (e.g. diabetes) No   Neuromuscular disorder (SHANI BAINS MD, myasthenia gravis) No   Compromised ability to handle respiratory secretions No   Hematologic disorder (e.g. sickle cell disease) No   HIV / AIDS No   Chemotherapy or radiation within the last 3 months  No   Received an organ or a bone marrow transplant No   Taking Prednisone in excess of 20mg daily No   Is age 65 years or older No   Is pregnant, thinks she may be pregnant or is within two weeks after delivery No   Is a resident of a chronic care facility No   Is patient  or [place] native  No     Patient does not fall into high risk category.  Offered Home Care Education.  If no improvement in 3-5 days, patient should be seen by a provider.    If further questions/concerns or if new symptoms develop, call your PCP or [facility] as soon as possible.        Provided home care instructions    General home care instruction:      Avoid contact with people in your household who are at increased risk for more severe complications of influenza (such as pregnant women or people who have a chronic health condition, for example diabetes, heart disease, asthma, or emphysema).    Stay home from work, school, childcare or other public places until your fever (37.8 degrees Celsius [100 degrees Fahrenheit]) has been gone for at least 24 hours, except to seek medical care. (Fever should be gone without the use of fever-reducing medications.) Use a surgical mask if available, or cover your mouth and nose with a tissue if possible if you need to seek medical care. Contact your work place, school, or  as they may have longer exclusion times.    You may continue to shed virus after your fever is gone. Limit your contact with high-risk individuals for 10 days after your symptoms started and be especially careful to cover your coughs/sneezes and wash your hands.    Cover your cough and wash your hands often, and especially after coughing, sneezing, blowing your nose.    Drink plenty of fluids (such as water, broth, sports drinks, electrolyte beverages for children) to prevent dehydration.    Avoid tobacco and second hand smoke.    Get plenty of rest.    Use over-the-counter pain relievers as needed per   instructions.    Do not give aspirin (acetylsalicylic acid) or products that contain aspirin (e.g. bismuth subsalicylate - Pepto Bismol) to children or teenagers 18 years or younger.    A small number of people with influenza do not have fever. If you have respiratory symptoms and are at increased risk for complications of influenza, contact your health care provider to discuss these symptoms.    For parents of infants:    If possible, only family members who are not sick should care for infants.    Wash your hands with soap and water, or an alcohol-based hand rub (if your hands are not visibly soiled) before caring for your infant.    Cover your mouth and nose with a tissue when coughing or sneezing, and clean your hands.    Contact a health care provider to discuss your illness within 1-2 days if you are    Pregnant    Immunocompromised    Call 911 if you experience:    Difficulty breathing or shortness of breath    Pain or pressure in the chest    Confusion or less responsive than normal    Seizure activity: ongoing or stopped    Severe dehydration or signs of shock     Blue or dusky lips, skin, or nail beds    If further questions/concerns or if new symptoms develop, call your PCP or [facility] as soon as possible.    When to seek medical attention    Contact your health care provider right away if you experience:    A painful sore throat accompanied by fever persists for more than 48 hours    Ear pain, sinus pain, persistent vomiting and/or diarrhea    Oral temperature greater than 104  Fahrenheit (40  Celsius)    Dehydration (e.g., mouth feeling dry, dizzy when sitting/standing, decreased urine output)    Severe or persistent vomiting; unable to keep fluids down    Improvement in flu-like symptoms (fever and cough or sore throat) but then return of fever and worse cough or sore throat    Not drinking enough fluid  Any other concerns not stated above      Additional educational  resources include:    http://www.[employer].com    http://www.cdc.gov/flu/  Nory Broderick RN

## 2018-01-23 NOTE — TELEPHONE ENCOUNTER
Reason for call:  Patient reporting a symptom    Symptom or request: cough, fever and body aches    Duration (how long have symptoms been present): yesterday    Have you been treated for this before? No    Additional comments: Patient thinks he might have the flu    Phone Number patient can be reached at:  Home number on file [phone] (home)    Best Time:  any    Can we leave a detailed message on this number:  YES    Call taken on 1/23/2018 at 8:01 AM by Franchesca Bates

## 2018-01-24 ENCOUNTER — TELEPHONE (OUTPATIENT)
Dept: FAMILY MEDICINE | Facility: OTHER | Age: 63
End: 2018-01-24

## 2018-01-24 NOTE — TELEPHONE ENCOUNTER
Reason for call:  Symptom  Reason for call:  Patient reporting a symptom    Symptom or request: coughing, fever, shakes    Duration (how long have symptoms been present): 2 days    Have you been treated for this before? No    Additional comments: pt would like something called into the [facility] pharmacy    Phone Number patient can be reached at:  Home number on file [phone] (home)    Best Time:  asap    Can we leave a detailed message on this number:  YES    Call taken on 1/24/2018 at 9:54 AM by Renetta Crawford

## 2018-01-24 NOTE — TELEPHONE ENCOUNTER
Bhavesh Landeros is a 62 year old male who calls with cough.    NURSING ASSESSMENT:  Description:  Pt wants something prescribed to him for his cough.  He is asking about Tamiflu.  I offered to go through the influenza questions with him to see if he is at risk where we can prescribe him Tamiflu.  He states he answered these questions already.  Spoke with pt yesterday for the same symptoms and we determined he is not at risk.  Onset/duration:  Pt states symptoms started yesterday but yesterday he states he had it the day prior.  Precip. factors:  none  Associated symptoms:  Cough, achey, fever. Denies runny nose, sore throat.  Improves/worsens symptoms:  none  Pain scale (0-10)   0/10    Allergies:   Allergies   Allergen Reactions     No Known Allergies          RECOMMENDED DISPOSITION:  Home care advice - fluids, rest.  Will comply with recommendation: Yes  If further questions/concerns or if symptoms do not improve, worsen or new symptoms develop, call your PCP or [facility] as soon as possible.      Guideline used: cough  Telephone Triage Protocols for Nurses, Fifth Edition, Shandra Broderick RN

## 2018-11-06 ENCOUNTER — TELEPHONE (OUTPATIENT)
Dept: FAMILY MEDICINE | Facility: OTHER | Age: 63
End: 2018-11-06

## 2018-11-06 NOTE — TELEPHONE ENCOUNTER
Spoke with patient.      Hx of hiatal hernia. Started about a week ago. Lower right below ribs are tender.  Little SOB>  Little tender with deep breath  No issues sleeping.  Stomach feels bubbly.  Has all kinds of pains: hips, knees for long time.     Has been taking aleve.  Helps a little.      RECOMMENDED DISPOSITION:  See in 24 hours -   Will comply with recommendation: yes   If further questions/concerns or if Sx do not improve, worsen or new Sx develop, call your PCP or [facility] as soon as possible.    NOTES:  Disposition was determined by the first positive assessment question, therefore all previous assessment questions were negative.     Guideline used:  Telephone Triage Protocols for Nurses, Fifth Edition, Shandra Shen, GILLIAN, BSN

## 2018-11-06 NOTE — PROGRESS NOTES
SUBJECTIVE:   Bhavesh Landeros is a 63 year old male who presents to clinic today for the following health issues:    HPI     Patient is here today to discuss discomfort and tightening in the center of his chest that occurs every morning.  When he wakes up he feels like he cannot take a deep breath.  The discomfort radiates into the right side of his ribs.  The discomfort is worsened by lying on his right side and lessened when he lays on his left side. Once he gets up and moving the discomfort goes away.  He does not have overt symptoms of reflux and stopped taking omeprazole a long time ago.  He does not have these symptoms throughout the day or with activity.  They only occur at night when he is lying down in bed.  He has had a couple times where he wakes up and it is difficult to catch his breath.  He states this is been going on for a long time and his daughter strongly encouraged him to see someone about it.  He had a stress echocardiogram 2 years ago that was normal.  He is a non-smoker.  His wife  6 years ago and he lives alone.  He is retired.      GERD/Heartburn  Onset: 2 weeks    Description:     Burning in chest: no    Intensity: 1/10    Progression of Symptoms: same    Accompanying Signs & Symptoms:  Does it feel like food gets stuck: no  Nausea: no  Vomiting (bloody?): no  Abdominal Pain: YES  Black-Tarry stools: no:  Bloody stools: no    History:   Previous ulcers: no    Precipitating factors:   Caffeine use: no  Alcohol use: no  NSAID/Aspirin use: no  Tobacco use: no  Worse with sugar.    Alleviating factors:  Water, milk    Therapies Tried and outcome:none    Joint Pain    Onset: 2 weeks    Description:   Location: Right side  Character: Dull ache    Intensity: 1/10    Progression of Symptoms: same    Accompanying Signs & Symptoms:  Other symptoms: none    History:   Previous similar pain: no       Precipitating factors:   Trauma or overuse: no     Alleviating factors:  Improved by:  "nothing    Therapies Tried and outcome: none      Concern - Shortness of breath  Onset: 1 month    Description:    shortness of breath when taking a deep breath    Intensity: mild    Progression of Symptoms:  same    Accompanying Signs & Symptoms:  Little cough at night. \"I feel tight, I can a live with it, but I feel tight\",    Previous history of similar problem:   None    Precipitating factors:   Worsened by: Mornings, taking deep breath    Alleviating factors:  Improved by: None    Therapies Tried and outcome: None    Patient states he had a hiatal hernia 6 years ago.      Problem list and histories reviewed & adjusted, as indicated.  Additional history: as documented    Patient Active Problem List   Diagnosis     Benign neoplasm of testis     GERD (gastroesophageal reflux disease)     CARDIOVASCULAR SCREENING; LDL GOAL LESS THAN 160     Cellulitis of leg     Leg edema, right     Advanced directives, counseling/discussion     SOB (shortness of breath)     Newly recognized murmur     Past Surgical History:   Procedure Laterality Date     COLONOSCOPY N/A 1/22/2016    Procedure: COLONOSCOPY;  Surgeon: Pb Rodriguez MD;  Location: [facility]     HC COLONOSCOPY W/WO BRUSH/WASH  03/27/06     HC KNEE SCOPE,MED/LAT MENISECTOMY  07/08/1999     HC REVISE MEDIAN N/CARPAL TUNNEL SURG  1986      UGI ENDOSCOPY DIAG W BIOPSY  10/26/09       Social History   Substance Use Topics     Smoking status: Never Smoker     Smokeless tobacco: Never Used     Alcohol use 0.0 oz/week     0 Standard drinks or equivalent per week      Comment: occ     Family History   Problem Relation Age of Onset     Diabetes Brother      Alzheimer Disease Father      Diabetes Sister          Current Outpatient Prescriptions   Medication Sig Dispense Refill     aspirin 81 MG chewable tablet Take 1 tablet (81 mg) by mouth daily       IBUPROFEN PO        Allergies   Allergen Reactions     No Known Allergies      Recent Labs   Lab Test  12/28/16   0912  " "01/12/16   0920 09/14/14 09/12/14   0843   LDL  116*  97   --   133*   HDL  60  62   --   64   TRIG  95  109   --   66   ALT  25  28  28   --    CR  0.88  0.79  0.84   --    GFRESTIMATED  88  >90  Non  GFR Calc    >60   --    GFRESTBLACK  >90   GFR Calc    >90   GFR Calc     --    --    POTASSIUM  4.3  4.4  4.0   --       BP Readings from Last 3 Encounters:   11/07/18 142/76   07/03/17 134/76   04/19/17 145/77    Wt Readings from Last 3 Encounters:   11/07/18 214 lb (97.1 kg)   07/06/17 221 lb (100.2 kg)   07/03/17 221 lb (100.2 kg)               Labs reviewed in EPIC    ROS:  Constitutional, HEENT, cardiovascular, pulmonary, GI, , musculoskeletal, neuro, skin, endocrine and psych systems are negative, except as otherwise noted.    OBJECTIVE:     /76  Pulse 58  Temp 97.4  F (36.3  C) (Temporal)  Resp 12  Ht 5' 6.14\" (1.68 m)  Wt 214 lb (97.1 kg)  SpO2 99%  BMI 34.39 kg/m2  Body mass index is 34.39 kg/(m^2).  GENERAL: alert and no acute distress  EYES: Eyes grossly normal to inspection, PERRL and conjunctivae and sclerae normal  HENT: ear canals and TM's normal, nose and mouth without ulcers or lesions  NECK: no adenopathy, no asymmetry, masses, or scars and thyroid normal to palpation  RESP: lungs clear to auscultation - no rales, rhonchi or wheezes  CV: crescendoing systolic murmur over aortic region 2/6, regular rate and rhythm, normal S1 S2, no S3 or S4, no click or rub, +1 pitting edema bilaterally  ABDOMEN: soft, nontender, no hepatosplenomegaly, no masses and bowel sounds normal  MS: no gross musculoskeletal defects noted, no edema  SKIN: no suspicious lesions or rashes  NEURO: Normal strength and tone, mentation intact and speech normal  PSYCH: mentation appears normal, affect normal/bright  LYMPH: no cervical, supraclavicular adenopathy    Diagnostic Test Results:  No results found for this or any previous visit (from the past 24 " hour(s)).  Echocardiogram scheduled  CXR - pending  EKG - unremarkable    ASSESSMENT/PLAN:     1. Newly recognized murmur  Patient has new murmur in the area of the aortic valve and arch.  This does coincide with the area that he is experiencing discomfort every morning.  His EKG is normal.  We scheduled him for an echocardiogram tomorrow.  We will get some basic blood work today and also check TSH, a troponin and CK.  I recommended that he sleep propped up on several pillows at night until we know exactly what is causing the symptoms.  I also recommended that he start a baby aspirin every day.  He does have a history of GERD and stopped taking omeprazole couple years ago.  He does not have overt symptoms of reflux but may have some underlying reflux that is contributing to his symptoms.  I will see him back in clinic on Friday to discuss the results.  - Echocardiogram Complete; Future  - EKG 12-lead complete w/read - Clinics    2. SOB (shortness of breath)  - XR Chest 2 Views; Future  - Echocardiogram Complete; Future  - EKG 12-lead complete w/read - Clinics    3. Chest pain, unspecified type  - Comprehensive metabolic panel (BMP + Alb, Alk Phos, ALT, AST, Total. Bili, TP)  - CBC with platelets and differential  - Troponin I  - CK total  - TSH with free T4 reflex    4. Preventive measure  - aspirin 81 MG chewable tablet; Take 1 tablet (81 mg) by mouth daily    PERLA [facility]

## 2018-11-07 ENCOUNTER — RADIANT APPOINTMENT (OUTPATIENT)
Dept: GENERAL RADIOLOGY | Facility: OTHER | Age: 63
End: 2018-11-07
Attending: STUDENT IN AN ORGANIZED HEALTH CARE EDUCATION/TRAINING PROGRAM
Payer: COMMERCIAL

## 2018-11-07 ENCOUNTER — OFFICE VISIT (OUTPATIENT)
Dept: FAMILY MEDICINE | Facility: OTHER | Age: 63
End: 2018-11-07
Payer: COMMERCIAL

## 2018-11-07 VITALS
BODY MASS INDEX: 34.39 KG/M2 | OXYGEN SATURATION: 99 % | HEIGHT: 66 IN | DIASTOLIC BLOOD PRESSURE: 76 MMHG | TEMPERATURE: 97.4 F | WEIGHT: 214 LBS | SYSTOLIC BLOOD PRESSURE: 142 MMHG | HEART RATE: 58 BPM | RESPIRATION RATE: 12 BRPM

## 2018-11-07 DIAGNOSIS — Z29.9 PREVENTIVE MEASURE: ICD-10-CM

## 2018-11-07 DIAGNOSIS — R06.02 SOB (SHORTNESS OF BREATH): ICD-10-CM

## 2018-11-07 DIAGNOSIS — R07.9 CHEST PAIN, UNSPECIFIED TYPE: ICD-10-CM

## 2018-11-07 DIAGNOSIS — K21.00 GASTROESOPHAGEAL REFLUX DISEASE WITH ESOPHAGITIS: ICD-10-CM

## 2018-11-07 DIAGNOSIS — R01.1 NEWLY RECOGNIZED MURMUR: Primary | ICD-10-CM

## 2018-11-07 LAB
ALBUMIN SERPL-MCNC: 3.7 G/DL (ref 3.4–5)
ALP SERPL-CCNC: 70 U/L (ref 40–150)
ALT SERPL W P-5'-P-CCNC: 26 U/L (ref 0–70)
ANION GAP SERPL CALCULATED.3IONS-SCNC: 9 MMOL/L (ref 3–14)
AST SERPL W P-5'-P-CCNC: 16 U/L (ref 0–45)
BASOPHILS # BLD AUTO: 0 10E9/L (ref 0–0.2)
BASOPHILS NFR BLD AUTO: 0.4 %
BILIRUB SERPL-MCNC: 0.5 MG/DL (ref 0.2–1.3)
BUN SERPL-MCNC: 18 MG/DL (ref 7–30)
CALCIUM SERPL-MCNC: 9.2 MG/DL (ref 8.5–10.1)
CHLORIDE SERPL-SCNC: 108 MMOL/L (ref 94–109)
CK SERPL-CCNC: 101 U/L (ref 30–300)
CO2 SERPL-SCNC: 24 MMOL/L (ref 20–32)
CREAT SERPL-MCNC: 0.8 MG/DL (ref 0.66–1.25)
DIFFERENTIAL METHOD BLD: NORMAL
EOSINOPHIL # BLD AUTO: 0.3 10E9/L (ref 0–0.7)
EOSINOPHIL NFR BLD AUTO: 5 %
ERYTHROCYTE [DISTWIDTH] IN BLOOD BY AUTOMATED COUNT: 13.4 % (ref 10–15)
GFR SERPL CREATININE-BSD FRML MDRD: >90 ML/MIN/1.7M2
GLUCOSE SERPL-MCNC: 87 MG/DL (ref 70–99)
HCT VFR BLD AUTO: 42.9 % (ref 40–53)
HGB BLD-MCNC: 14.5 G/DL (ref 13.3–17.7)
LYMPHOCYTES # BLD AUTO: 1.8 10E9/L (ref 0.8–5.3)
LYMPHOCYTES NFR BLD AUTO: 35.9 %
MCH RBC QN AUTO: 28.7 PG (ref 26.5–33)
MCHC RBC AUTO-ENTMCNC: 33.8 G/DL (ref 31.5–36.5)
MCV RBC AUTO: 85 FL (ref 78–100)
MONOCYTES # BLD AUTO: 0.6 10E9/L (ref 0–1.3)
MONOCYTES NFR BLD AUTO: 11.8 %
NEUTROPHILS # BLD AUTO: 2.3 10E9/L (ref 1.6–8.3)
NEUTROPHILS NFR BLD AUTO: 46.9 %
PLATELET # BLD AUTO: 204 10E9/L (ref 150–450)
POTASSIUM SERPL-SCNC: 4.3 MMOL/L (ref 3.4–5.3)
PROT SERPL-MCNC: 7.2 G/DL (ref 6.8–8.8)
RBC # BLD AUTO: 5.06 10E12/L (ref 4.4–5.9)
SODIUM SERPL-SCNC: 141 MMOL/L (ref 133–144)
TROPONIN I SERPL-MCNC: <0.015 UG/L (ref 0–0.04)
TSH SERPL DL<=0.005 MIU/L-ACNC: 2.59 MU/L (ref 0.4–4)
WBC # BLD AUTO: 5 10E9/L (ref 4–11)

## 2018-11-07 PROCEDURE — 99214 OFFICE O/P EST MOD 30 MIN: CPT | Performed by: STUDENT IN AN ORGANIZED HEALTH CARE EDUCATION/TRAINING PROGRAM

## 2018-11-07 PROCEDURE — 36415 COLL VENOUS BLD VENIPUNCTURE: CPT | Performed by: STUDENT IN AN ORGANIZED HEALTH CARE EDUCATION/TRAINING PROGRAM

## 2018-11-07 PROCEDURE — 82550 ASSAY OF CK (CPK): CPT | Performed by: STUDENT IN AN ORGANIZED HEALTH CARE EDUCATION/TRAINING PROGRAM

## 2018-11-07 PROCEDURE — 84443 ASSAY THYROID STIM HORMONE: CPT | Performed by: STUDENT IN AN ORGANIZED HEALTH CARE EDUCATION/TRAINING PROGRAM

## 2018-11-07 PROCEDURE — 85025 COMPLETE CBC W/AUTO DIFF WBC: CPT | Performed by: STUDENT IN AN ORGANIZED HEALTH CARE EDUCATION/TRAINING PROGRAM

## 2018-11-07 PROCEDURE — 84484 ASSAY OF TROPONIN QUANT: CPT | Performed by: STUDENT IN AN ORGANIZED HEALTH CARE EDUCATION/TRAINING PROGRAM

## 2018-11-07 PROCEDURE — 71046 X-RAY EXAM CHEST 2 VIEWS: CPT | Mod: FY

## 2018-11-07 PROCEDURE — 80053 COMPREHEN METABOLIC PANEL: CPT | Performed by: STUDENT IN AN ORGANIZED HEALTH CARE EDUCATION/TRAINING PROGRAM

## 2018-11-07 PROCEDURE — 93000 ELECTROCARDIOGRAM COMPLETE: CPT | Performed by: STUDENT IN AN ORGANIZED HEALTH CARE EDUCATION/TRAINING PROGRAM

## 2018-11-07 RX ORDER — ASPIRIN 81 MG/1
81 TABLET, CHEWABLE ORAL DAILY
COMMUNITY
Start: 2018-11-07

## 2018-11-07 ASSESSMENT — PAIN SCALES - GENERAL: PAINLEVEL: NO PAIN (1)

## 2018-11-07 NOTE — PROGRESS NOTES
"  SUBJECTIVE:   Bhavesh Landeros is a 63 year old male who presents to clinic today for the following health issues:      HPI   Patient is here to follow up on echo results and heart murmur that was discovered on his last visit.  The echocardiogram showed mild aortic valve stenosis, normal ejection fraction.  He continues to have some substernal pain in the mornings.  He states the pain is different than when he drinks a Pepsi which causes more of a burning sensation.  The past 2 nights he has not woken up in the morning with the pain in his chest.    Blood pressure -he does not check his blood pressure outside of the clinic.  He is not currently taking any medication.  His wife  6 years ago.  She was a  and the cook for their household.  Since she passed away he does not eat as healthy.  He states he will go to the Chinese restaurant to get some food with vegetables and it occasionally.  He also gives his kids [employer] card to buy groceries for him.  He reports his mother had heart problems and \"did just fine for 20 years with her heart problems\".    He is also like to discuss bilateral knee pain and right hip pain that intermittently flares.  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  He is wondering about cortisone injections as a possible treatment option.    Hernia - he has a hernia on mid-abdomen that he had evaluated in the past and was told he did not have to have repaired unless it became bothersome. He is now interested in having it repaired.     Problem list and histories reviewed & adjusted, as indicated.  Additional history: as documented    Patient Active Problem List   Diagnosis     Benign neoplasm of testis     GERD (gastroesophageal reflux disease)     CARDIOVASCULAR SCREENING; LDL GOAL LESS THAN 160     Cellulitis of leg     Leg edema, right     Advanced directives, counseling/discussion     SOB (shortness of breath)     Newly recognized murmur     Past Surgical History: " "  Procedure Laterality Date     COLONOSCOPY N/A 1/22/2016    Procedure: COLONOSCOPY;  Surgeon: Pb Rodriguez MD;  Location: [facility]     HC COLONOSCOPY W/WO BRUSH/WASH  03/27/06     HC KNEE SCOPE,MED/LAT MENISECTOMY  07/08/1999     HC REVISE MEDIAN N/CARPAL TUNNEL SURG  1986     HC UGI ENDOSCOPY DIAG W BIOPSY  10/26/09       Social History   Substance Use Topics     Smoking status: Never Smoker     Smokeless tobacco: Never Used     Alcohol use 0.0 oz/week     0 Standard drinks or equivalent per week      Comment: occ     Family History   Problem Relation Age of Onset     Diabetes Brother      Alzheimer Disease Father      Diabetes Sister          Current Outpatient Prescriptions   Medication Sig Dispense Refill     aspirin 81 MG chewable tablet Take 1 tablet (81 mg) by mouth daily       IBUPROFEN PO        Recent Labs   Lab Test  11/07/18   0929  12/28/16   0912 01/12/16   0920   09/12/14   0843   LDL   --   116*  97   --   133*   HDL   --   60  62   --   64   TRIG   --   95  109   --   66   ALT  26  25  28   < >   --    CR  0.80  0.88  0.79   < >   --    GFRESTIMATED  >90  88  >90  Non  GFR Calc     < >   --    GFRESTBLACK  >90  >90  African American GFR Calc    >90   GFR Calc     --    --    POTASSIUM  4.3  4.3  4.4   < >   --    TSH  2.59   --    --    --    --     < > = values in this interval not displayed.      BP Readings from Last 3 Encounters:   11/09/18 152/84   11/07/18 142/76   07/03/17 134/76    Wt Readings from Last 3 Encounters:   11/09/18 215 lb 3.2 oz (97.6 kg)   11/07/18 214 lb (97.1 kg)   07/06/17 221 lb (100.2 kg)                  Labs reviewed in EPIC    ROS:  Constitutional, HEENT, cardiovascular, pulmonary, GI, , musculoskeletal, neuro, skin, endocrine and psych systems are negative, except as otherwise noted.    OBJECTIVE:     /84  Pulse 64  Temp 97.2  F (36.2  C) (Temporal)  Resp 12  Ht 5' 6.14\" (1.68 m)  Wt 215 lb 3.2 oz (97.6 kg)  BMI 34.59 " kg/m2  Body mass index is 34.59 kg/(m^2).  GENERAL: alert and no distress  EYES: Eyes grossly normal to inspection, PERRL and conjunctivae and sclerae normal  RESP: lungs clear to auscultation - no rales, rhonchi or wheezes  CV: regular rate and rhythm, normal S1 S2, no S3 or S4, no murmur, click or rub, +1  bilateral pitting edema  ABD: ventral hernia upper mid abdomen that is reducible  MS: no tenderness to palpation of knees, crepitus appreciated bilaterally, FROM, mildly antalgic gait  SKIN: no suspicious lesions or rashes  NEURO: Normal strength and tone, mentation intact and speech normal  PSYCH: mentation appears normal, affect normal/bright    Diagnostic Test Results:  Lab Results   Component Value Date    WBC 5.0 11/07/2018     Lab Results   Component Value Date    RBC 5.06 11/07/2018     Lab Results   Component Value Date    HGB 14.5 11/07/2018     Lab Results   Component Value Date    HCT 42.9 11/07/2018     Lab Results   Component Value Date    MCV 85 11/07/2018     Lab Results   Component Value Date    MCH 28.7 11/07/2018     Lab Results   Component Value Date    MCHC 33.8 11/07/2018     Lab Results   Component Value Date    RDW 13.4 11/07/2018     Lab Results   Component Value Date     11/07/2018     Lab Results   Component Value Date    TSH 2.59 11/07/2018     Troponin I ES   Date Value Ref Range Status   11/07/2018 <0.015 0.000 - 0.045 ug/L Final     Comment:     The 99th percentile for upper reference range is 0.045 ug/L.  Troponin values   in the range of 0.045 - 0.120 ug/L may be associated with risks of adverse   clinical events.       Last Comprehensive Metabolic Panel:  Sodium   Date Value Ref Range Status   11/07/2018 141 133 - 144 mmol/L Final     Potassium   Date Value Ref Range Status   11/07/2018 4.3 3.4 - 5.3 mmol/L Final     Chloride   Date Value Ref Range Status   11/07/2018 108 94 - 109 mmol/L Final     Carbon Dioxide   Date Value Ref Range Status   11/07/2018 24 20 - 32 mmol/L  Final     Anion Gap   Date Value Ref Range Status   2018 9 3 - 14 mmol/L Final     Glucose   Date Value Ref Range Status   2018 87 70 - 99 mg/dL Final     Urea Nitrogen   Date Value Ref Range Status   2018 18 7 - 30 mg/dL Final     Creatinine   Date Value Ref Range Status   2018 0.80 0.66 - 1.25 mg/dL Final     GFR Estimate   Date Value Ref Range Status   2018 >90 >60 mL/min/1.7m2 Final     Comment:     Non  GFR Calc     Calcium   Date Value Ref Range Status   2018 9.2 8.5 - 10.1 mg/dL Final     Bilirubin Total   Date Value Ref Range Status   2018 0.5 0.2 - 1.3 mg/dL Final     Alkaline Phosphatase   Date Value Ref Range Status   2018 70 40 - 150 U/L Final     ALT   Date Value Ref Range Status   2018 26 0 - 70 U/L Final     AST   Date Value Ref Range Status   2018 16 0 - 45 U/L Final         Results for orders placed or performed during the hospital encounter of 18   ECHO COMPLETE WITH OPTISON    Narrative    [id number]  [id number]  [id number]  [id number]^[email or web address]^PRASANTH           [facility]  Echocardiography Laboratory  [address] [place] [address]        Name: TOM TAYLOR  MRN: [id number]  : 1955  Study Date: 2018 10:13 AM  Age: 63 yrs  Gender: Male  Patient Location: [facility]  Reason For Study: Newly recognized murmur, SOB (shortness of breath)  History: Obesity  Ordering Physician: RAFAEL MAJANO  Referring Physician: RAFAEL MAJANO  Performed By: Lora Euceda     BSA: 2.1 m2  Height: 67 in  Weight: 212 lb  HR: 59  BP: 152/90 mmHg  _____________________________________________________________________________  __        Procedure  Complete Echo Adult. Contrast Optison.  _____________________________________________________________________________  __        Interpretation Summary     Technically difficult study with some improvement after contrast use.     The visual  ejection fraction is estimated at 55-60%.  Mild valvular aortic stenosis.  IVC size is upper normal at 2.1 cms but collapsible.  _____________________________________________________________________________  __        Left Ventricle  The left ventricle is normal in size. Left ventricular systolic function is  normal. The visual ejection fraction is estimated at 55-60%. Left ventricular  diastolic function is indeterminate. Normal left ventricular wall motion.     Right Ventricle  The right ventricle is normal in size and function.     Atria  The left atrium is mildly dilated. Right atrial size is normal.     Mitral Valve  There is physiologic mitral regurgitation.        Tricuspid Valve  There is physiologic tricuspid regurgitation. Right ventricular systolic  pressure could not be approximated due to inadequate tricuspid regurgitation.     Aortic Valve  The aortic valve is not well visualized. There is trace aortic regurgitation.  Mild valvular aortic stenosis.     Pulmonic Valve  The pulmonic valve is not well visualized. There is trace pulmonic valvular  regurgitation.     Vessels  The aortic root is normal size. The IVC is normal in size and reactivity with  respiration, suggesting normal central venous pressure.     Pericardium  There is no pericardial effusion.        Rhythm  Sinus rhythm was noted.  _____________________________________________________________________________  __  MMode/2D Measurements & Calculations  IVSd: 1.0 cm     LVIDd: 4.8 cm  LVIDs: 3.1 cm  LVPWd: 0.96 cm  FS: 36.5 %  LV mass(C)d: 169.9 grams  LV mass(C)dI: 82.0 grams/m2  Ao root diam: 3.2 cm  LA dimension: 3.7 cm  asc Aorta Diam: 3.6 cm  LA/Ao: 1.2  LVOT diam: 2.2 cm  LVOT area: 3.8 cm2  LA Volume (BP): 76.9 ml  LA Volume Index (BP): 37.1 ml/m2  RWT: 0.40           Doppler Measurements & Calculations  MV E max perla: 66.2 cm/sec  MV A max perla: 89.2 cm/sec  MV E/A: 0.74  MV dec time: 0.24 sec  Ao V2 max: 244.9 cm/sec  Ao max P.0  mmHg  Ao V2 mean: 157.3 cm/sec  Ao mean P.3 mmHg  Ao V2 VTI: 53.2 cm  J CARLOS(I,D): 1.6 cm2  J CARLOS(V,D): 1.6 cm2  LV V1 max P.5 mmHg  LV V1 max: 106.1 cm/sec  LV V1 VTI: 22.3 cm  SV(LVOT): 84.3 ml  SI(LVOT): 40.7 ml/m2  PA acc time: 0.15 sec  TR max basim: 228.0 cm/sec  TR max P.8 mmHg  AV Basim Ratio (DI): 0.43  J CARLOS Index (cm2/m2): 0.76  E/E' av.8  Lateral E/e': 7.3  Medial E/e': 8.2              _____________________________________________________________________________  __        Report approved by: Stevie aFrfan 2018 12:02 PM          ASSESSMENT/PLAN:       1. Benign essential hypertension  We will start losartan daily and see patient back in a week and a half for recheck of blood pressure.  Reviewed possible side effects of medication and to call if any concerns.  - losartan (COZAAR) 25 MG tablet; Take 1 tablet (25 mg) by mouth daily  Dispense: 30 tablet; Refill: 0    2. Aortic valve stenosis, etiology of cardiac valve disease unspecified  Cardiology consult  to discuss substernal chest pain in the mornings and murmur with finding of mild aortic valve stenosis on echocardiogram.  Patient scheduled with Dr. Santos in December.  - CARDIOLOGY EVAL ADULT REFERRAL  - losartan (COZAAR) 25 MG tablet; Take 1 tablet (25 mg) by mouth daily  Dispense: 30 tablet; Refill: 0    3. Primary osteoarthritis of both knees  6. Hip pain, right  Consult with orthopedics for possible cortisone injection as treatment option.  He is going to hold off on seeing orthopedics for now but knows that the referral is in place he can make an appointment in the future if he decides to do so.  - ORTHO  REFERRAL    4. Newly recognized murmur  - CARDIOLOGY EVAL ADULT REFERRAL    5. SOB (shortness of breath)  - CARDIOLOGY EVAL ADULT REFERRAL    7. Ventral hernia without obstruction or gangrene  Patient would like to consult on surgical repair of hernia. Referral placed.  - GENERAL SURG ADULT REFERRAL        Radha  PERLA [facility]

## 2018-11-07 NOTE — MR AVS SNAPSHOT
After Visit Summary   11/7/2018    Bhavesh Landeros    MRN: [id number]           Patient Information     Date Of Birth          1955        Visit Information        Provider Department      11/7/2018 8:40 AM Radha Gonzalez APRN CNP [facility]        Today's Diagnoses     SOB (shortness of breath)    -  1    Newly recognized murmur        Chest pain, unspecified type        Preventive measure          Care Instructions    You have a new heart murmur so we are going to evaluate this further with an echocardiogram, EKG, blood work and chest x-ray.     I will see you back on Friday to discuss the findings. In the meantime I recommend sleeping propped up on a couple of pillows and avoiding any overexertion until we have evaluated this further.    I also want you to start taking a baby aspirin 81 mg every day.  You can  a bottle of aspirin at the pharmacy or grocery store.    DANYA Torres           Follow-ups after your visit        Follow-up notes from your care team     Return in about 3 days (around 11/10/2018) for follow up on echocardiogram.      Your next 10 appointments already scheduled     Nov 08, 2018 10:00 AM CST   Ech Complete with [address] [facility] ([facility])    [address] [place] [address] Dr Fuad [place]BY [address] [phone]           1.  Please bring or wear a comfortable two-piece outfit. 2.  You may eat, drink and take your normal medicines. 3.  For any questions that cannot be answered, please contact the ordering physician 4.  Please do not wear perfumes or scented lotions on the day of your exam.            Nov 09, 2018  7:40 AM CST   [facility] with PERLA Armstrong CNP   [facility]    [address]  [place] [address] [phone]           Bring a current list of meds and any records pertaining to this visit. For Physicals,  "please bring immunization records and any forms needing to be filled out. Please arrive 10 minutes early to complete paperwork.              Future tests that were ordered for you today     Open Future Orders        Priority Expected Expires Ordered    Echocardiogram Complete Routine  2019            Who to contact     If you have questions or need follow up information about today's clinic visit or your schedule please contact [facility] directly at [phone].  Normal or non-critical lab and imaging results will be communicated to you by [employer], letter or phone within 4 business days after the clinic has received the results. If you do not hear from us within 7 days, please contact the clinic through [employer] or phone. If you have a critical or abnormal lab result, we will notify you by phone as soon as possible.  Submit refill requests through [employer] or call your pharmacy and they will forward the refill request to us. Please allow 3 business days for your refill to be completed.          Additional Information About Your Visit        [employer] Information     [employer] lets you send messages to your doctor, view your test results, renew your prescriptions, schedule appointments and more. To sign up, go to [email or web address] . Click on \"Log in\" on the left side of the screen, which will take you to the Welcome page. Then click on \"Sign up Now\" on the right side of the page.     You will be asked to enter the access code listed below, as well as some personal information. Please follow the directions to create your username and password.     Your access code is: [email or web address]  Expires: 2019 11:15 AM     Your access code will  in 90 days. If you need help or a new code, please call your [facility] or [phone].        Care EveryWhere ID     This is your Care EveryWhere ID. This could be used by other organizations to access your [place] medical " "records  [id number]        Your Vitals Were     Pulse Temperature Respirations Height Pulse Oximetry BMI (Body Mass Index)    58 97.4  F (36.3  C) (Temporal) 12 5' 6.14\" (1.68 m) 99% 34.39 kg/m2       Blood Pressure from Last 3 Encounters:   11/07/18 142/76   07/03/17 134/76   04/19/17 145/77    Weight from Last 3 Encounters:   11/07/18 214 lb (97.1 kg)   07/06/17 221 lb (100.2 kg)   07/03/17 221 lb (100.2 kg)              We Performed the Following     CBC with platelets and differential     CK total     Comprehensive metabolic panel (BMP + Alb, Alk Phos, ALT, AST, Total. Bili, TP)     EKG 12-lead complete w/read - Clinics     Troponin I     TSH with free T4 reflex          Today's Medication Changes          These changes are accurate as of 11/7/18  9:29 AM.  If you have any questions, ask your nurse or doctor.               Start taking these medicines.        Dose/Directions    aspirin 81 MG chewable tablet   Used for:  Preventive measure   Started by:  Radha Gonzalez APRN CNP        Dose:  81 mg   Take 1 tablet (81 mg) by mouth daily   Refills:  0            Where to get your medicines      Some of these will need a paper prescription and others can be bought over the counter.  Ask your nurse if you have questions.     You don't need a prescription for these medications     aspirin 81 MG chewable tablet                Primary Care Provider Office Phone # Fax #    [facility] [phone] [address] [facility] [address]        Equal Access to Services     MARGIE VILLAVICENCIO AH: Hadii chu pratt Sopanda, waaxda luqadaha, qaybta kaalmada adejael, waxay idiekaterina hylton. So [facility] [phone].    ATENCIÓN: Si habla español, tiene a moreno disposición servicios gratuitos de asistencia lingüística. Llame al [phone].    We comply with applicable federal civil rights laws and [place] laws. We do not discriminate on the basis of race, color, " national origin, age, disability, sex, sexual orientation, or gender identity.            Thank you!     Thank you for choosing [facility]  for your care. Our goal is always to provide you with excellent care. Hearing back from our patients is one way we can continue to improve our services. Please take a few minutes to complete the written survey that you may receive in the mail after your visit with us. Thank you!             Your Updated Medication List - Protect others around you: Learn how to safely use, store and throw away your medicines at www.disposemymeds.org.          This list is accurate as of 11/7/18  9:29 AM.  Always use your most recent med list.                   Brand Name Dispense Instructions for use Diagnosis    albuterol 108 (90 Base) MCG/ACT inhaler    PROAIR HFA/PROVENTIL HFA/VENTOLIN HFA    1 Inhaler    Inhale 2 puffs into the lungs every 6 hours as needed for shortness of breath / dyspnea or wheezing    SOB (shortness of breath)       aspirin 81 MG chewable tablet      Take 1 tablet (81 mg) by mouth daily    Preventive measure       IBUPROFEN PO           omeprazole 20 MG CR capsule    priLOSEC    90 capsule    Take 1 capsule (20 mg) by mouth daily    Gastroesophageal reflux disease with esophagitis

## 2018-11-08 ENCOUNTER — HOSPITAL ENCOUNTER (OUTPATIENT)
Dept: CARDIOLOGY | Facility: CLINIC | Age: 63
Discharge: HOME | End: 2018-11-08
Attending: STUDENT IN AN ORGANIZED HEALTH CARE EDUCATION/TRAINING PROGRAM | Admitting: STUDENT IN AN ORGANIZED HEALTH CARE EDUCATION/TRAINING PROGRAM
Payer: COMMERCIAL

## 2018-11-08 DIAGNOSIS — R01.1 NEWLY RECOGNIZED MURMUR: ICD-10-CM

## 2018-11-08 DIAGNOSIS — R06.02 SOB (SHORTNESS OF BREATH): ICD-10-CM

## 2018-11-08 PROCEDURE — 93306 TTE W/DOPPLER COMPLETE: CPT | Mod: 26 | Performed by: INTERNAL MEDICINE

## 2018-11-08 PROCEDURE — 93306 TTE W/DOPPLER COMPLETE: CPT

## 2018-11-08 PROCEDURE — 25500064 ZZH RX 255 OP 636: Performed by: INTERNAL MEDICINE

## 2018-11-08 RX ADMIN — HUMAN ALBUMIN MICROSPHERES AND PERFLUTREN 3 ML: 10; .22 INJECTION, SOLUTION INTRAVENOUS at 10:43

## 2018-11-09 ENCOUNTER — TELEPHONE (OUTPATIENT)
Dept: FAMILY MEDICINE | Facility: OTHER | Age: 63
End: 2018-11-09

## 2018-11-09 ENCOUNTER — OFFICE VISIT (OUTPATIENT)
Dept: FAMILY MEDICINE | Facility: OTHER | Age: 63
End: 2018-11-09
Payer: COMMERCIAL

## 2018-11-09 VITALS
WEIGHT: 215.2 LBS | HEIGHT: 66 IN | SYSTOLIC BLOOD PRESSURE: 152 MMHG | RESPIRATION RATE: 12 BRPM | HEART RATE: 64 BPM | DIASTOLIC BLOOD PRESSURE: 84 MMHG | TEMPERATURE: 97.2 F | BODY MASS INDEX: 34.58 KG/M2

## 2018-11-09 DIAGNOSIS — R01.1 NEWLY RECOGNIZED MURMUR: ICD-10-CM

## 2018-11-09 DIAGNOSIS — M17.0 PRIMARY OSTEOARTHRITIS OF BOTH KNEES: ICD-10-CM

## 2018-11-09 DIAGNOSIS — I35.0 AORTIC VALVE STENOSIS, ETIOLOGY OF CARDIAC VALVE DISEASE UNSPECIFIED: ICD-10-CM

## 2018-11-09 DIAGNOSIS — K43.9 VENTRAL HERNIA WITHOUT OBSTRUCTION OR GANGRENE: ICD-10-CM

## 2018-11-09 DIAGNOSIS — I10 BENIGN ESSENTIAL HYPERTENSION: Primary | ICD-10-CM

## 2018-11-09 DIAGNOSIS — R06.02 SOB (SHORTNESS OF BREATH): ICD-10-CM

## 2018-11-09 DIAGNOSIS — M25.551 HIP PAIN, RIGHT: ICD-10-CM

## 2018-11-09 PROCEDURE — 99214 OFFICE O/P EST MOD 30 MIN: CPT | Performed by: STUDENT IN AN ORGANIZED HEALTH CARE EDUCATION/TRAINING PROGRAM

## 2018-11-09 RX ORDER — LOSARTAN POTASSIUM 25 MG/1
25 TABLET ORAL DAILY
Qty: 30 TABLET | Refills: 0 | Status: SHIPPED | OUTPATIENT
Start: 2018-11-09 | End: 2018-11-19

## 2018-11-09 ASSESSMENT — PAIN SCALES - GENERAL: PAINLEVEL: NO PAIN (0)

## 2018-11-09 NOTE — PATIENT INSTRUCTIONS
Start losartan 25 mg daily for blood pressure.     Schedule appointment with cardiologist to discuss your symptoms.    DANYA Torres

## 2018-11-09 NOTE — TELEPHONE ENCOUNTER
Reason for call:  Pt was wanting to speak with EM in regards to what they recently talked about the hernia procedure. He would ivana to go over things before the end of the year when his benefits end.

## 2018-11-09 NOTE — TELEPHONE ENCOUNTER
Spoke to patient, patient would like hernia procedure done before the end of the year. Will flag for provider to review. Please advise.

## 2018-11-09 NOTE — MR AVS SNAPSHOT
After Visit Summary   11/9/2018    Bhavesh Landeros    MRN: [id number]           Patient Information     Date Of Birth          1955        Visit Information        Provider Department      11/9/2018 7:40 AM Radha Gonzalez APRN CNP [facility]        Today's Diagnoses     Newly recognized murmur    -  1    Need for prophylactic vaccination and inoculation against influenza        SOB (shortness of breath)        Benign essential hypertension        Aortic valve stenosis, etiology of cardiac valve disease unspecified          Care Instructions    Start losartan 25 mg daily for blood pressure.     Schedule appointment with cardiologist to discuss your symptoms.    TYESHA TorresC              Follow-ups after your visit        Additional Services     CARDIOLOGY EVAL ADULT REFERRAL       Preferred location:  [facility] [phone]   [email or web address]    Please be aware that coverage of these services is subject to the terms and limitations of your health insurance plan.  Call member services at your health plan with any benefit or coverage questions.      Please bring the following to your appointment:  Any x-rays, CTs or MRIs which have been performed. Contact the facility where they were done to arrange for  prior to your scheduled appointment.    List of current medications  This referral request   Any documents/labs given to you for this referral                  Follow-up notes from your care team     Return in about 2 weeks (around 11/23/2018) for BP Recheck.      Your next 10 appointments already scheduled     Nov 19, 2018  7:20 AM CST   [facility] with PERLA Armstrong CNP   [facility]    [address] [facility] [address] [phone]            Dec 11, 2018 10:00 AM CST   New Visit with Candelario TORRES  "MD Tom   [facility] ([facility])    [address] [place] [address] [phone]              Future tests that were ordered for you today     Open Future Orders        Priority Expected Expires Ordered    ECHO COMPLETE WITH OPTISON Routine  2019            Who to contact     If you have questions or need follow up information about today's clinic visit or your schedule please contact [facility] directly at [phone].  Normal or non-critical lab and imaging results will be communicated to you by Pepscanhart, letter or phone within 4 business days after the clinic has received the results. If you do not hear from us within 7 days, please contact the clinic through [employer] or phone. If you have a critical or abnormal lab result, we will notify you by phone as soon as possible.  Submit refill requests through [email or web address] or call your pharmacy and they will forward the refill request to us. Please allow 3 business days for your refill to be completed.          Additional Information About Your Visit        [email or web address] Information     [email or web address] lets you send messages to your doctor, view your test results, renew your prescriptions, schedule appointments and more. To sign up, go to [email or web address] . Click on \"Log in\" on the left side of the screen, which will take you to the Welcome page. Then click on \"Sign up Now\" on the right side of the page.     You will be asked to enter the access code listed below, as well as some personal information. Please follow the directions to create your username and password.     Your access code is: [email or web address]  Expires: 2019 11:15 AM     Your access code will  in 90 days. If you need help or a new code, please call your [facility] or [phone].        Care EveryWhere ID     This is your Care EveryWhere ID. This could be used by other organizations to access your " "[place] medical records  [id number]        Your Vitals Were     Pulse Temperature Respirations Height BMI (Body Mass Index)       64 97.2  F (36.2  C) (Temporal) 12 5' 6.14\" (1.68 m) 34.59 kg/m2        Blood Pressure from Last 3 Encounters:   11/09/18 152/84   11/07/18 142/76   07/03/17 134/76    Weight from Last 3 Encounters:   11/09/18 215 lb 3.2 oz (97.6 kg)   11/07/18 214 lb (97.1 kg)   07/06/17 221 lb (100.2 kg)              We Performed the Following     CARDIOLOGY EVAL ADULT REFERRAL          Today's Medication Changes          These changes are accurate as of 11/9/18  8:10 AM.  If you have any questions, ask your nurse or doctor.               Start taking these medicines.        Dose/Directions    losartan 25 MG tablet   Commonly known as:  COZAAR   Used for:  Benign essential hypertension, Aortic valve stenosis, etiology of cardiac valve disease unspecified   Started by:  Radha Gonzalez APRN CNP        Dose:  25 mg   Take 1 tablet (25 mg) by mouth daily   Quantity:  30 tablet   Refills:  0            Where to get your medicines      These medications were sent to [facility] - [address]   [address] [place] [address]     Phone:  [phone]     losartan 25 MG tablet                Primary Care Provider Office Phone # Fax #    PERLA Armstrong [phone] [address] [place] [address]        Equal Access to Services     MARGIE VILLAVICENCIO AH: Hadii chu ku hadasho Soomaali, waaxda luqadaha, qaybta kaalmada adeegyada, kristin idiin hayaan adefemi hylton. So [facility] [phone].    ATENCIÓN: Si habla español, tiene a moreno disposición servicios gratuitos de asistencia lingüística. Llame al [phone].    We comply with applicable federal civil rights laws and [place] laws. We do not discriminate on the basis of race, color, national origin, age, disability, sex, sexual orientation, or gender " identity.            Thank you!     Thank you for choosing [facility]  for your care. Our goal is always to provide you with excellent care. Hearing back from our patients is one way we can continue to improve our services. Please take a few minutes to complete the written survey that you may receive in the mail after your visit with us. Thank you!             Your Updated Medication List - Protect others around you: Learn how to safely use, store and throw away your medicines at www.disposemymeds.org.          This list is accurate as of 11/9/18  8:10 AM.  Always use your most recent med list.                   Brand Name Dispense Instructions for use Diagnosis    aspirin 81 MG chewable tablet      Take 1 tablet (81 mg) by mouth daily    Preventive measure       IBUPROFEN PO           losartan 25 MG tablet    COZAAR    30 tablet    Take 1 tablet (25 mg) by mouth daily    Benign essential hypertension, Aortic valve stenosis, etiology of cardiac valve disease unspecified

## 2018-11-11 NOTE — TELEPHONE ENCOUNTER
I placed a general surgery referral so he can call [phone] to set up a consult.  TYESHA TorresC

## 2018-11-12 NOTE — PROGRESS NOTES
SUBJECTIVE:   Bhavesh Landeros is a 63 year old male who presents to clinic today for the following health issues:    The 10-year ASCVD risk score (Mirian DC Jr, et al., 2013) is: 16.1%    Values used to calculate the score:      Age: 63 years      Sex: Male      Is Non- : No      Diabetic: No      Tobacco smoker: No      Systolic Blood Pressure: 152 mmHg      Is BP treated: Yes      HDL Cholesterol: 60 mg/dL      Total Cholesterol: 195 mg/dL  Patient is eligible for use of low-dose aspirin for primary prevention of heart attack and stroke.  Provider has discussed aspirin with patient and our decision was:     Prescribe:  Daily low-dose aspirin recommended for primary prevention, patient agrees with plan.    Losartan 25 mg started 11//9/18 and it is going well. No side effects. He would like to know more about the results of the echo which showed aortic valve stenosis, mild and mild mitral regurgitation. He has no chest pain, shortness of breath or activity intolerance.     He would also like to cancel the appointment with the surgeon to discuss his hernia. It is not bothering him and he has had it for years. He would like to hold off on fixing it.    HPI  Hypertension Follow-up      Outpatient blood pressures are not being checked.    Low Salt Diet: not monitoring salt      Problem list and histories reviewed & adjusted, as indicated.  Additional history: as documented    Patient Active Problem List   Diagnosis     Benign neoplasm of testis     GERD (gastroesophageal reflux disease)     CARDIOVASCULAR SCREENING; LDL GOAL LESS THAN 160     Cellulitis of leg     Leg edema, right     Advanced directives, counseling/discussion     SOB (shortness of breath)     Newly recognized murmur     Ventral hernia without obstruction or gangrene     Past Surgical History:   Procedure Laterality Date     COLONOSCOPY N/A 1/22/2016    Procedure: COLONOSCOPY;  Surgeon: Pb Rodriguez MD;  [facility]: [facility]  "COLONOSCOPY W/WO BRUSH/WASH  03/27/06     HC KNEE SCOPE,MED/LAT MENISECTOMY  07/08/1999     HC REVISE MEDIAN N/CARPAL TUNNEL SURG  1986     HC UGI ENDOSCOPY DIAG W BIOPSY  10/26/09       Social History   Substance Use Topics     Smoking status: Never Smoker     Smokeless tobacco: Never Used     Alcohol use 0.0 oz/week     0 Standard drinks or equivalent per week      Comment: occ     Family History   Problem Relation Age of Onset     Diabetes Brother      Alzheimer Disease Father      Diabetes Sister          Current Outpatient Prescriptions   Medication Sig Dispense Refill     aspirin 81 MG chewable tablet Take 1 tablet (81 mg) by mouth daily       IBUPROFEN PO        losartan (COZAAR) 25 MG tablet Take 1 tablet (25 mg) by mouth daily 90 tablet 3     [DISCONTINUED] losartan (COZAAR) 25 MG tablet Take 1 tablet (25 mg) by mouth daily 30 tablet 0     Allergies   Allergen Reactions     No Known Allergies      BP Readings from Last 3 Encounters:   11/19/18 130/72   11/13/18 147/85   11/09/18 152/84    Wt Readings from Last 3 Encounters:   11/19/18 213 lb 4.8 oz (96.8 kg)   11/13/18 215 lb (97.5 kg)   11/09/18 215 lb 3.2 oz (97.6 kg)                  Labs reviewed in EPIC    ROS:  Constitutional, HEENT, cardiovascular, pulmonary, gi and gu systems are negative, except as otherwise noted.    OBJECTIVE:     /72  Pulse 56  Temp 97.7  F (36.5  C) (Temporal)  Resp 12  Ht 5' 6.14\" (1.68 m)  Wt 213 lb 4.8 oz (96.8 kg)  BMI 34.28 kg/m2  Body mass index is 34.28 kg/(m^2).  GENERAL: healthy, alert and no distress  RESP: lungs clear to auscultation - no rales, rhonchi or wheezes  CV: regular rate and rhythm, normal S1 S2, no S3 or S4, systolic 2/6 murmur, click or rub, no peripheral edema  MS: no gross musculoskeletal defects noted, no edema  PSYCH: mentation appears normal, affect normal/bright    Diagnostic Test Results:  Future fasting lipids    ASSESSMENT/PLAN:     1. Benign essential hypertension  Improved. " Continue current medication. Follow up in 6-12 months.   - losartan (COZAAR) 25 MG tablet; Take 1 tablet (25 mg) by mouth daily  Dispense: 90 tablet; Refill: 3    2. Aortic valve stenosis, etiology of cardiac valve disease unspecified  4. Mitral regurgitation and aortic stenosis  Discussed the results and that because it is mild, we will continue to monitor and follow up closer if he becomes symptomatic.  - losartan (COZAAR) 25 MG tablet; Take 1 tablet (25 mg) by mouth daily  Dispense: 90 tablet; Refill: 3    3. Mixed hyperlipidemia  Recommended starting statin given ASCVD risk is 16%. He is hesitant to start another medication. We discussed the potential side effects, benefits and risks of statins. He will decide after he gets updated fasting cholesterol.   - Lipid panel reflex to direct LDL Fasting; Future    PERLA [facility]

## 2018-11-13 ENCOUNTER — RADIANT APPOINTMENT (OUTPATIENT)
Dept: GENERAL RADIOLOGY | Facility: CLINIC | Age: 63
End: 2018-11-13
Attending: FAMILY MEDICINE
Payer: COMMERCIAL

## 2018-11-13 ENCOUNTER — OFFICE VISIT (OUTPATIENT)
Dept: ORTHOPEDICS | Facility: CLINIC | Age: 63
End: 2018-11-13
Payer: COMMERCIAL

## 2018-11-13 VITALS
OXYGEN SATURATION: 98 % | BODY MASS INDEX: 34.55 KG/M2 | DIASTOLIC BLOOD PRESSURE: 85 MMHG | HEIGHT: 66 IN | WEIGHT: 215 LBS | HEART RATE: 60 BPM | SYSTOLIC BLOOD PRESSURE: 147 MMHG

## 2018-11-13 DIAGNOSIS — M25.561 CHRONIC PAIN OF BOTH KNEES: Primary | ICD-10-CM

## 2018-11-13 DIAGNOSIS — M25.561 CHRONIC PAIN OF BOTH KNEES: ICD-10-CM

## 2018-11-13 DIAGNOSIS — M25.562 CHRONIC PAIN OF BOTH KNEES: ICD-10-CM

## 2018-11-13 DIAGNOSIS — M25.551 RIGHT HIP PAIN: ICD-10-CM

## 2018-11-13 DIAGNOSIS — G89.29 CHRONIC PAIN OF BOTH KNEES: ICD-10-CM

## 2018-11-13 DIAGNOSIS — M25.562 CHRONIC PAIN OF BOTH KNEES: Primary | ICD-10-CM

## 2018-11-13 DIAGNOSIS — G89.29 CHRONIC PAIN OF BOTH KNEES: Primary | ICD-10-CM

## 2018-11-13 PROCEDURE — 73562 X-RAY EXAM OF KNEE 3: CPT | Mod: RT | Performed by: RADIOLOGY

## 2018-11-13 PROCEDURE — 20610 DRAIN/INJ JOINT/BURSA W/O US: CPT | Mod: 50 | Performed by: FAMILY MEDICINE

## 2018-11-13 PROCEDURE — 99213 OFFICE O/P EST LOW 20 MIN: CPT | Mod: 25 | Performed by: FAMILY MEDICINE

## 2018-11-13 PROCEDURE — 73522 X-RAY EXAM HIPS BI 3-4 VIEWS: CPT | Performed by: RADIOLOGY

## 2018-11-13 RX ORDER — TRIAMCINOLONE ACETONIDE 40 MG/ML
40 INJECTION, SUSPENSION INTRA-ARTICULAR; INTRAMUSCULAR ONCE
Qty: 1 ML | Refills: 0 | OUTPATIENT
Start: 2018-11-13 | End: 2018-11-13

## 2018-11-13 ASSESSMENT — PAIN SCALES - GENERAL: PAINLEVEL: MILD PAIN (2)

## 2018-11-13 NOTE — NURSING NOTE
"Bhavesh Landeros's chief complaint for this visit includes:  Chief Complaint   Patient presents with     Consult     bilateral knee weakness with slight pain. pt is requesting injection. right hip pain.      PCP: Radha Gonzalez    Referring Provider:  Radha Gonzalez, PERLA CNP  [address] [place] [address]    /85  Pulse 60  Ht 1.68 m (5' 6.14\")  Wt 97.5 kg (215 lb)  SpO2 98%  BMI 34.55 kg/m2  Mild Pain (2)     Do you need any medication refills at today's visit? No        "

## 2018-11-13 NOTE — LETTER
11/13/2018         RE: Bhavesh Landeros  [address] [facility] [address]        Dear Colleague,    Thank you for referring your patient, Bhavesh Landeros, to the [facility]. Please see a copy of my visit note below.    CHIEF COMPLAINT:  No chief complaint on file.       HISTORY OF PRESENT ILLNESS  Mr. Landeros is a pleasant 63 year old year old male who presents to clinic today with bilateral knee pain.  He's seen at the request of Dr. Gonzalez.    Bhavesh explains that his knees have been bothering him for a couple of years, possibly more.  No clear event that he can recall that incited his pain.  His right knee bothers him slightly more than his left, although both hurt him throughout the day.  They hurt him initially in the morning but bother him less as the day goes on.  He feels an achy pain, sharp, feels like it is deep inside the knee.  He has tried relative rest and over-the-counter analgesics.    His right hip has also been a problem for a couple of years.  He points to the lateral and anterior portion of his right hip.  Pain is not in the groin, pain does not radiate down the leg.  He has no numbness, tingling, or weakness.  This pain is also worse with standing, will subside as he initiates walking and is absent while walking    Additional history: as documented    MEDICAL HISTORY  Patient Active Problem List   Diagnosis     Benign neoplasm of testis     GERD (gastroesophageal reflux disease)     CARDIOVASCULAR SCREENING; LDL GOAL LESS THAN 160     Cellulitis of leg     Leg edema, right     Advanced directives, counseling/discussion     SOB (shortness of breath)     Newly recognized murmur     Ventral hernia without obstruction or gangrene       Current Outpatient Prescriptions   Medication Sig Dispense Refill     aspirin 81 MG chewable tablet Take 1 tablet (81 mg) by mouth daily       IBUPROFEN PO        losartan (COZAAR) 25 MG tablet Take 1 tablet (25 mg) by mouth daily 30  "tablet 0       Allergies   Allergen Reactions     No Known Allergies        Family History   Problem Relation Age of Onset     Diabetes Brother      Alzheimer Disease Father      Diabetes Sister        Additional medical/Social/Surgical histories reviewed in [facility] and updated as appropriate.     REVIEW OF SYSTEMS (11/13/2018)  CONSTITUTIONAL: Denies fever and weight loss  EYES: Denies acute vision changes  ENT: Denies hearing changes or difficulty swallowing  CARDIAC: Denies chest pain or edema  RESPIRATORY: Denies dyspnea, cough or wheeze  GASTROINTESTINAL: Denies abdominal pain, nausea, vomiting  MUSCULOSKELETAL: See HPI  SKIN: Denies any recent rash or lesion  NEUROLOGICAL: Denies numbness or focal weakness  PSYCHIATRIC: No history of psychiatric symptoms or problems  ENDOCRINE: Denies current diagnosis of diabetes  HEMATOLOGY: Denies episodes of easy bleeding      PHYSICAL EXAM  Vitals:    11/13/18 1045   BP: 147/85   Pulse: 60   SpO2: 98%   Weight: 97.5 kg (215 lb)   Height: 1.68 m (5' 6.14\")     General  - normal appearance, in no obvious distress  CV  - normal popliteal pulse  Pulm  - normal respiratory pattern, non-labored  Musculoskeletal - right and left knee  - stance: mildly antalgic gait, genu varum  - inspection: generalized swelling, trace effusion  - palpation: medial joint line tenderness, patella and patellar tendon non-tender, normal popliteal pulse  - ROM: 100 degrees flexion, 0 degrees extension, painful active ROM    Musculoskeletal - right hip  - inspection: no swelling or effusion, normal bone and joint alignment, no obvious deformity  - palpation: mildly tender over the anterior hip at the ASIS  - ROM: normal and painless flexion, extension, IR, ER, adduction  - strength: 5/5 in all planes  - special tests:  (-) ZORA  (-) FADIR    Neuro  - no sensory or motor deficit, grossly normal coordination, normal muscle tone  Skin  - no ecchymosis, erythema, warmth, or induration, no obvious " rash  Psych  - interactive, appropriate, normal mood and affect             ASSESSMENT & PLAN  Mr. Landeros is a 63 year old year old male who presents to clinic today with bilateral knee and right hip pain.    Ordered and reviewed plain x-rays of his knees, hips, and pelvis.  His knee x-rays do show bilateral osteoarthritis which is tricompartmental and worse in the medial compartment.  His hip x-rays do show mild osteoarthritis with enthesopathic changes of the iliac crest region.    Bhavesh and I had a good discussion centering around the spectrum of treatment options for osteoarthritis that preclude surgery.  We discussed nonoperative treatment with pain relievers, icing, low impact exercise, and weight loss.  We also talked about the role of injection therapy with corticosteroids and hyaluronic acid, as well as the potential role of biologics.    I did inject his knees today (see procedure note).  If his injections do not help his pain we should revisit in the next few weeks.    We also discussed physical activity which will benefit him greatly, I gave him some home exercises to do.    It was a pleasure seeing Bhavesh today.    PROCEDURE    Knee Injection - Intraarticular  The patient was informed of the risks and the benefits of the procedure and a written consent was signed.  The patient s left knee was prepped with chlorhexidine in sterile fashion.   40 mg of triamcinolone suspension was drawn up into a 5 mL syringe with 2 mL of 1% lidocaine and 2 mL of 0.5% marcaine.  Injection was performed using substerile technique.  A 1.5-inch 25-gauge needle was used to enter the lateral aspect of the left knee.  Injection performed successfully without difficulty.  There were no complications. The patient tolerated the procedure well. There was negligible bleeding.   The patient s right knee was prepped with chlorhexidine in sterile fashion.   40 mg of triamcinolone suspension was drawn up into a 5 mL syringe with 2 mL of  1% lidocaine and 2 mL of 0.5% marcaine.  Injection was performed using substerile technique.  A 1.5-inch 25-gauge needle was used to enter the lateral aspect of the right knee.  Injection performed successfully without difficulty.  There were no complications. The patient tolerated the procedure well. There was negligible bleeding.   The patient was instructed to ice the knee upon leaving clinic and refrain from overuse over the next 3 days.   The patient was instructed to call or go to the emergency room with any unusual pain, swelling, redness, or if otherwise concerned.  Kenalog: NDC 0971-6948-63        Carlos Gutierrez DO, [facility]  Primary Care Sports Medicine      Again, thank you for allowing me to participate in the care of your patient.        Sincerely,        Carlos Gutierrez DO

## 2018-11-13 NOTE — MR AVS SNAPSHOT
After Visit Summary   2018    Bhavesh Landeros    MRN: [id number]           Patient Information     Date Of Birth          1955        Visit Information        Provider Department      2018 11:00 AM Carlos Gutierrez DO [facility]        Today's Diagnoses     Chronic pain of both knees    -  1    Right hip pain          Care Instructions    Thanks for coming today.  Ortho/Sports Medicine Clinic  [address] [place] [address]    To schedule future appointments in Ortho Clinic, you may call [phone].    To schedule ordered imaging by your provider:   Call Central Imaging [phone]    To schedule an injection ordered by your provider:  Call Central Imaging Injection scheduling line: [phone]  MyChart available online at:  [email or web address]    Please call if any further questions or concerns ([phone]).  Clinic hours 8 am to 5 pm.    Return to clinic (call) if symptoms worsen or fail to improve.            Follow-ups after your visit        Additional Services     PHYSICAL THERAPY REFERRAL (Internal)       Physical Therapy Referral                  Your next 10 appointments already scheduled     2018  7:20 AM CST   SHORT with [facility] ([facility])    [address] [address] [phone]            2018  8:00 AM CST   New Visit with Chad Lomax MD   [facility]    [address] [address] [phone]            Dec 11, 2018 10:00 AM CST   New Visit with Candelario Santos MD   [facility]    [address] [id number] [phone]              Future tests that were ordered for you today     Open Future Orders        Priority Expected Expires Ordered     "PHYSICAL THERAPY REFERRAL (Internal) Routine  2019            Who to contact     If you have questions or need follow up information about today's clinic visit or your schedule please contact [facility] directly at [phone].  Normal or non-critical lab and imaging results will be communicated to you by [employer] Inchart, letter or phone within 4 business days after the clinic has received the results. If you do not hear from us within 7 days, please contact the clinic through [employer] Inchart or phone. If you have a critical or abnormal lab result, we will notify you by phone as soon as possible.  Submit refill requests through [employer] or call your pharmacy and they will forward the refill request to us. Please allow 3 business days for your refill to be completed.          Additional Information About Your Visit        [employer] Information     [employer] is an electronic gateway that provides easy, online access to your medical records. With [employer], you can request a clinic appointment, read your test results, renew a prescription or communicate with your care team.     To sign up for [employer] visit the website at [email or web address]   You will be asked to enter the access code listed below, as well as some personal information. Please follow the directions to create your username and password.     Your access code is: [email or web address]  Expires: 2019 11:15 AM     Your access code will  in 90 days. If you need help or a new code, please contact your [facility] or call [phone] for assistance.        Care EveryWhere ID     This is your Care EveryWhere ID. This could be used by other organizations to access your [place] medical records  [id number]        Your Vitals Were     Pulse Height Pulse Oximetry BMI (Body Mass Index)          60 1.68 m (5' 6.14\") 98% 34.55 kg/m2         Blood Pressure from Last 3 Encounters:   18 147/85   18 152/84 "   11/07/18 142/76    Weight from Last 3 Encounters:   11/13/18 97.5 kg (215 lb)   11/09/18 97.6 kg (215 lb 3.2 oz)   11/07/18 97.1 kg (214 lb)              We Performed the Following     DRAIN/INJECT LARGE JOINT/BURSA     DRAIN/INJECT LARGE JOINT/BURSA          Today's Medication Changes          These changes are accurate as of 11/13/18  4:54 PM.  If you have any questions, ask your nurse or doctor.               Start taking these medicines.        Dose/Directions    * triamcinolone acetonide 40 MG/ML injection   Commonly known as:  KENALOG   Used for:  Chronic pain of both knees   Started by:  Carlos Gutierrez DO        Dose:  40 mg   1 mL (40 mg) by INTRA-ARTICULAR route once for 1 dose   Quantity:  1 mL   Refills:  0       * triamcinolone acetonide 40 MG/ML injection   Commonly known as:  KENALOG   Used for:  Chronic pain of both knees   Started by:  Carlos Gutierrez DO        Dose:  40 mg   1 mL (40 mg) by INTRA-ARTICULAR route once for 1 dose   Quantity:  1 mL   Refills:  0       * triamcinolone acetonide 40 MG/ML injection   Commonly known as:  KENALOG   Used for:  Chronic pain of both knees   Started by:  Carlos Gutierrez DO        Dose:  40 mg   1 mL (40 mg) by INTRA-ARTICULAR route once for 1 dose   Quantity:  1 mL   Refills:  0       * triamcinolone acetonide 40 MG/ML injection   Commonly known as:  KENALOG   Used for:  Chronic pain of both knees   Started by:  Carlos Gutierrez DO        Dose:  40 mg   1 mL (40 mg) by INTRA-ARTICULAR route once for 1 dose   Quantity:  1 mL   Refills:  0       * Notice:  This list has 4 medication(s) that are the same as other medications prescribed for you. Read the directions carefully, and ask your doctor or other care provider to review them with you.         Where to get your medicines      Some of these will need a paper prescription and others can be bought over the counter.  Ask your nurse if you have questions.     You don't need a prescription for these  medications     triamcinolone acetonide 40 MG/ML injection    triamcinolone acetonide 40 MG/ML injection    triamcinolone acetonide 40 MG/ML injection    triamcinolone acetonide 40 MG/ML injection                Primary Care Provider Office Phone # Fax #    Radha Gonzalez, PERLA [phone]       [address] [place] [address]        Equal Access to Services     [facility]: Hadii aad ku hadasho Soomaali, waaxda luqadaha, qaybta kaalmada adeegyada, waxay idiin hayaan adeeg kharash la'aan . So [facility] [phone].    ATENCIÓN: Si habla español, tiene a moreno disposición servicios gratuitos de asistencia lingüística. Llame al [phone].    We comply with applicable federal civil rights laws and [place] laws. We do not discriminate on the basis of race, color, national origin, age, disability, sex, sexual orientation, or gender identity.            Thank you!     Thank you for choosing [facility]  for your care. Our goal is always to provide you with excellent care. Hearing back from our patients is one way we can continue to improve our services. Please take a few minutes to complete the written survey that you may receive in the mail after your visit with us. Thank you!             Your Updated Medication List - Protect others around you: Learn how to safely use, store and throw away your medicines at www.disposemymeds.org.          This list is accurate as of 11/13/18  4:54 PM.  Always use your most recent med list.                   Brand Name Dispense Instructions for use Diagnosis    aspirin 81 MG chewable tablet      Take 1 tablet (81 mg) by mouth daily    Preventive measure       IBUPROFEN PO           losartan 25 MG tablet    COZAAR    30 tablet    Take 1 tablet (25 mg) by mouth daily    Benign essential hypertension, Aortic valve stenosis, etiology of cardiac valve disease unspecified       * triamcinolone acetonide 40 MG/ML injection    KENALOG    1 mL     1 mL (40 mg) by INTRA-ARTICULAR route once for 1 dose    Chronic pain of both knees       * triamcinolone acetonide 40 MG/ML injection    KENALOG    1 mL    1 mL (40 mg) by INTRA-ARTICULAR route once for 1 dose    Chronic pain of both knees       * triamcinolone acetonide 40 MG/ML injection    KENALOG    1 mL    1 mL (40 mg) by INTRA-ARTICULAR route once for 1 dose    Chronic pain of both knees       * triamcinolone acetonide 40 MG/ML injection    KENALOG    1 mL    1 mL (40 mg) by INTRA-ARTICULAR route once for 1 dose    Chronic pain of both knees       * Notice:  This list has 4 medication(s) that are the same as other medications prescribed for you. Read the directions carefully, and ask your doctor or other care provider to review them with you.

## 2018-11-13 NOTE — PROGRESS NOTES
CHIEF COMPLAINT:  No chief complaint on file.       HISTORY OF PRESENT ILLNESS  Mr. Landeros is a pleasant 63 year old year old male who presents to clinic today with bilateral knee pain.  He's seen at the request of Dr. Gonzalez.    Bhavesh explains that his knees have been bothering him for a couple of years, possibly more.  No clear event that he can recall that incited his pain.  His right knee bothers him slightly more than his left, although both hurt him throughout the day.  They hurt him initially in the morning but bother him less as the day goes on.  He feels an achy pain, sharp, feels like it is deep inside the knee.  He has tried relative rest and over-the-counter analgesics.    His right hip has also been a problem for a couple of years.  He points to the lateral and anterior portion of his right hip.  Pain is not in the groin, pain does not radiate down the leg.  He has no numbness, tingling, or weakness.  This pain is also worse with standing, will subside as he initiates walking and is absent while walking    Additional history: as documented    MEDICAL HISTORY  Patient Active Problem List   Diagnosis     Benign neoplasm of testis     GERD (gastroesophageal reflux disease)     CARDIOVASCULAR SCREENING; LDL GOAL LESS THAN 160     Cellulitis of leg     Leg edema, right     Advanced directives, counseling/discussion     SOB (shortness of breath)     Newly recognized murmur     Ventral hernia without obstruction or gangrene       Current Outpatient Prescriptions   Medication Sig Dispense Refill     aspirin 81 MG chewable tablet Take 1 tablet (81 mg) by mouth daily       IBUPROFEN PO        losartan (COZAAR) 25 MG tablet Take 1 tablet (25 mg) by mouth daily 30 tablet 0       Allergies   Allergen Reactions     No Known Allergies        Family History   Problem Relation Age of Onset     Diabetes Brother      Alzheimer Disease Father      Diabetes Sister        Additional medical/Social/Surgical histories  "reviewed in [facility] and updated as appropriate.     REVIEW OF SYSTEMS (11/13/2018)  CONSTITUTIONAL: Denies fever and weight loss  EYES: Denies acute vision changes  ENT: Denies hearing changes or difficulty swallowing  CARDIAC: Denies chest pain or edema  RESPIRATORY: Denies dyspnea, cough or wheeze  GASTROINTESTINAL: Denies abdominal pain, nausea, vomiting  MUSCULOSKELETAL: See HPI  SKIN: Denies any recent rash or lesion  NEUROLOGICAL: Denies numbness or focal weakness  PSYCHIATRIC: No history of psychiatric symptoms or problems  ENDOCRINE: Denies current diagnosis of diabetes  HEMATOLOGY: Denies episodes of easy bleeding      PHYSICAL EXAM  Vitals:    11/13/18 1045   BP: 147/85   Pulse: 60   SpO2: 98%   Weight: 97.5 kg (215 lb)   Height: 1.68 m (5' 6.14\")     General  - normal appearance, in no obvious distress  CV  - normal popliteal pulse  Pulm  - normal respiratory pattern, non-labored  Musculoskeletal - right and left knee  - stance: mildly antalgic gait, genu varum  - inspection: generalized swelling, trace effusion  - palpation: medial joint line tenderness, patella and patellar tendon non-tender, normal popliteal pulse  - ROM: 100 degrees flexion, 0 degrees extension, painful active ROM    Musculoskeletal - right hip  - inspection: no swelling or effusion, normal bone and joint alignment, no obvious deformity  - palpation: mildly tender over the anterior hip at the ASIS  - ROM: normal and painless flexion, extension, IR, ER, adduction  - strength: 5/5 in all planes  - special tests:  (-) ZORA  (-) FADIR    Neuro  - no sensory or motor deficit, grossly normal coordination, normal muscle tone  Skin  - no ecchymosis, erythema, warmth, or induration, no obvious rash  Psych  - interactive, appropriate, normal mood and affect             ASSESSMENT & PLAN  Mr. Landeros is a 63 year old year old male who presents to clinic today with bilateral knee and right hip pain.    Ordered and reviewed plain x-rays of his " knees, hips, and pelvis.  His knee x-rays do show bilateral osteoarthritis which is tricompartmental and worse in the medial compartment.  His hip x-rays do show mild osteoarthritis with enthesopathic changes of the iliac crest region.    Bhavesh and I had a good discussion centering around the spectrum of treatment options for osteoarthritis that preclude surgery.  We discussed nonoperative treatment with pain relievers, icing, low impact exercise, and weight loss.  We also talked about the role of injection therapy with corticosteroids and hyaluronic acid, as well as the potential role of biologics.    I did inject his knees today (see procedure note).  If his injections do not help his pain we should revisit in the next few weeks.    We also discussed physical activity which will benefit him greatly, I gave him some home exercises to do.    It was a pleasure seeing Bhavesh today.    PROCEDURE    Knee Injection - Intraarticular  The patient was informed of the risks and the benefits of the procedure and a written consent was signed.  The patient s left knee was prepped with chlorhexidine in sterile fashion.   40 mg of triamcinolone suspension was drawn up into a 5 mL syringe with 2 mL of 1% lidocaine and 2 mL of 0.5% marcaine.  Injection was performed using substerile technique.  A 1.5-inch 25-gauge needle was used to enter the lateral aspect of the left knee.  Injection performed successfully without difficulty.  There were no complications. The patient tolerated the procedure well. There was negligible bleeding.   The patient s right knee was prepped with chlorhexidine in sterile fashion.   40 mg of triamcinolone suspension was drawn up into a 5 mL syringe with 2 mL of 1% lidocaine and 2 mL of 0.5% marcaine.  Injection was performed using substerile technique.  A 1.5-inch 25-gauge needle was used to enter the lateral aspect of the right knee.  Injection performed successfully without difficulty.  There were no  complications. The patient tolerated the procedure well. There was negligible bleeding.   The patient was instructed to ice the knee upon leaving clinic and refrain from overuse over the next 3 days.   The patient was instructed to call or go to the emergency room with any unusual pain, swelling, redness, or if otherwise concerned.  Kenalog: NDC 6447-5398-08        Carlos Gutierrez DO, [facility]  Primary Care Sports Medicine

## 2018-11-13 NOTE — PATIENT INSTRUCTIONS
Thanks for coming today.  Ortho/Sports Medicine Clinic  [address] [place] [address]    To schedule future appointments in Ortho Clinic, you may call [phone].    To schedule ordered imaging by your provider:   Call Central Imaging [phone]    To schedule an injection ordered by your provider:  Call Central Imaging Injection scheduling line: [phone]  [employer] available online at:  [email or web address]    Please call if any further questions or concerns ([phone]).  Clinic hours 8 am to 5 pm.    Return to clinic (call) if symptoms worsen or fail to improve.

## 2018-11-19 ENCOUNTER — OFFICE VISIT (OUTPATIENT)
Dept: FAMILY MEDICINE | Facility: OTHER | Age: 63
End: 2018-11-19
Payer: COMMERCIAL

## 2018-11-19 VITALS
BODY MASS INDEX: 34.28 KG/M2 | DIASTOLIC BLOOD PRESSURE: 72 MMHG | HEART RATE: 56 BPM | SYSTOLIC BLOOD PRESSURE: 130 MMHG | HEIGHT: 66 IN | TEMPERATURE: 97.7 F | WEIGHT: 213.3 LBS | RESPIRATION RATE: 12 BRPM

## 2018-11-19 DIAGNOSIS — I35.0 AORTIC VALVE STENOSIS, ETIOLOGY OF CARDIAC VALVE DISEASE UNSPECIFIED: ICD-10-CM

## 2018-11-19 DIAGNOSIS — E78.2 MIXED HYPERLIPIDEMIA: ICD-10-CM

## 2018-11-19 DIAGNOSIS — I10 BENIGN ESSENTIAL HYPERTENSION: Primary | ICD-10-CM

## 2018-11-19 DIAGNOSIS — I08.0 MITRAL REGURGITATION AND AORTIC STENOSIS: ICD-10-CM

## 2018-11-19 PROCEDURE — 99213 OFFICE O/P EST LOW 20 MIN: CPT | Performed by: STUDENT IN AN ORGANIZED HEALTH CARE EDUCATION/TRAINING PROGRAM

## 2018-11-19 RX ORDER — LOSARTAN POTASSIUM 25 MG/1
25 TABLET ORAL DAILY
Qty: 90 TABLET | Refills: 3 | Status: SHIPPED | OUTPATIENT
Start: 2018-11-19 | End: 2019-11-25

## 2018-11-19 NOTE — MR AVS SNAPSHOT
After Visit Summary   11/19/2018    Bhavesh Landeros    MRN: [id number]           Patient Information     Date Of Birth          1955        Visit Information        Provider Department      11/19/2018 7:20 AM Radha Gonzalez APRN [facility]        Today's Diagnoses     Mixed hyperlipidemia    -  1    Screening for HIV (human immunodeficiency virus)        Need for prophylactic vaccination and inoculation against influenza        Benign essential hypertension        Aortic valve stenosis, etiology of cardiac valve disease unspecified          Care Instructions    Come in for fasting cholesterol check with a lab-only appointment.     Continue losartan 25 mg daily. I sent in refills to the pharmacy    DANYA Torres    Below is detailed information on changes you can make to lower your cholesterol:    The best way to bring down your cholesterol is through exercise and watching your diet. Limit foods high in cholesterolsuch as egg yolks, cheese, fatty meats such as odom, ribs, steak, ground beef, hot dogs, sausage, butter, margarine, snack crackers, and fast food.     Increasing fiber in your diet. Foods high in water-soluble fiber are best for helping decreasing cholesterol and include food such as fruits, vegetables, seeds, oat bran, ground flaxseed and barley. If you have difficulty increasing fiber by changing your diet, you may consider adding a fiber supplement such as Metamucil to your daily routine.    Increasing Omega-3 fatty acids can also help decrease your cholesterol.  Omega-3 supplements are also available over-the-counter. Foods high in Omega-3 include fish, nuts, flax and soy.    Other foods that may lower cholesterol include garlic, artichokes, grapes, berries, and green or black tea.     DANYA Torres            Follow-ups after your visit        Your next 10 appointments already scheduled     Nov 20, 2018  8:45 AM CST   LAB with NL  "[facility]   [facility]    [address]  [place] [address] [phone]           Please do not eat 10-12 hours before your appointment if you are coming in fasting for labs on lipids, cholesterol, or glucose (sugar). This does not apply to pregnant women. Water, hot tea and black coffee (with nothing added) are okay. Do not drink other fluids, diet soda or chew gum.            Dec 11, 2018 10:00 AM CST   New Visit with Candelario Santos MD   [facility] ([facility])    [address] [address] [phone]              Future tests that were ordered for you today     Open Future Orders        Priority Expected Expires Ordered    Lipid panel reflex to direct LDL Fasting Routine 11/19/2018 11/19/2019 11/19/2018            Who to contact     If you have questions or need follow up information about today's clinic visit or your schedule please contact [facility] directly at [phone].  Normal or non-critical lab and imaging results will be communicated to you by Scimetrikahart, letter or phone within 4 business days after the clinic has received the results. If you do not hear from us within 7 days, please contact the clinic through Scimetrikahart or phone. If you have a critical or abnormal lab result, we will notify you by phone as soon as possible.  Submit refill requests through PEER or call your pharmacy and they will forward the refill request to us. Please allow 3 business days for your refill to be completed.          Additional Information About Your Visit        PEER Information     PEER lets you send messages to your doctor, view your test results, renew your prescriptions, schedule appointments and more. To sign up, go to [email or web address] . Click on \"Log in\" on the left side of the screen, which will take you to the Welcome page. Then click on \"Sign up " "Now\" on the right side of the page.     You will be asked to enter the access code listed below, as well as some personal information. Please follow the directions to create your username and password.     Your access code is: [email or web address]  Expires: 2019 11:15 AM     Your access code will  in 90 days. If you need help or a new code, please call your [facility] clinic or [phone].        Care EveryWhere ID     This is your Care EveryWhere ID. This could be used by other organizations to access your [facility] medical records  [id number]        Your Vitals Were     Pulse Temperature Respirations Height BMI (Body Mass Index)       56 97.7  F (36.5  C) (Temporal) 12 5' 6.14\" (1.68 m) 34.28 kg/m2        Blood Pressure from Last 3 Encounters:   18 130/72   18 147/85   18 152/84    Weight from Last 3 Encounters:   18 213 lb 4.8 oz (96.8 kg)   18 215 lb (97.5 kg)   18 215 lb 3.2 oz (97.6 kg)                 Where to get your medicines      These medications were sent to [facility] - [address]   [address] [place] [address]     Phone:  [phone]     losartan 25 MG tablet          Primary Care Provider Office Phone # Fax #    Radha Judieddy Gonzalez, APRN [phone] [address] [place] [address]        Equal Access to Services     [facility]: Hadii aad ku hadasho Soomaali, waaxda luqadaha, qaybta kaalmada babatundeegyakane, kristin hylton. So [facility] [phone].    ATENCIÓN: Si habla español, tiene a moreno disposición servicios gratuitos de asistencia lingüística. Llame al [phone].    We comply with applicable federal civil rights laws and [place] laws. We do not discriminate on the basis of race, color, national origin, age, disability, sex, sexual orientation, or gender identity.            Thank you!     Thank you for choosing [facility]  for your " care. Our goal is always to provide you with excellent care. Hearing back from our patients is one way we can continue to improve our services. Please take a few minutes to complete the written survey that you may receive in the mail after your visit with us. Thank you!             Your Updated Medication List - Protect others around you: Learn how to safely use, store and throw away your medicines at www.disposemymeds.org.          This list is accurate as of 11/19/18  7:50 AM.  Always use your most recent med list.                   Brand Name Dispense Instructions for use Diagnosis    aspirin 81 MG chewable tablet      Take 1 tablet (81 mg) by mouth daily    Preventive measure       IBUPROFEN PO           losartan 25 MG tablet    COZAAR    90 tablet    Take 1 tablet (25 mg) by mouth daily    Benign essential hypertension, Aortic valve stenosis, etiology of cardiac valve disease unspecified

## 2018-11-19 NOTE — PATIENT INSTRUCTIONS
Come in for fasting cholesterol check with a lab-only appointment.     Continue losartan 25 mg daily. I sent in refills to the pharmacy    DANYA Torres    Below is detailed information on changes you can make to lower your cholesterol:    The best way to bring down your cholesterol is through exercise and watching your diet. Limit foods high in cholesterolsuch as egg yolks, cheese, fatty meats such as odom, ribs, steak, ground beef, hot dogs, sausage, butter, margarine, snack crackers, and fast food.     Increasing fiber in your diet. Foods high in water-soluble fiber are best for helping decreasing cholesterol and include food such as fruits, vegetables, seeds, oat bran, ground flaxseed and barley. If you have difficulty increasing fiber by changing your diet, you may consider adding a fiber supplement such as Metamucil to your daily routine.    Increasing Omega-3 fatty acids can also help decrease your cholesterol.  Omega-3 supplements are also available over-the-counter. Foods high in Omega-3 include fish, nuts, flax and soy.    Other foods that may lower cholesterol include garlic, artichokes, grapes, berries, and green or black tea.     TYESHA TorresC

## 2018-11-20 DIAGNOSIS — E78.2 MIXED HYPERLIPIDEMIA: ICD-10-CM

## 2018-11-20 LAB
CHOLEST SERPL-MCNC: 186 MG/DL
HDLC SERPL-MCNC: 53 MG/DL
LDLC SERPL CALC-MCNC: 119 MG/DL
NONHDLC SERPL-MCNC: 133 MG/DL
TRIGL SERPL-MCNC: 71 MG/DL

## 2018-11-20 PROCEDURE — 36415 COLL VENOUS BLD VENIPUNCTURE: CPT | Performed by: STUDENT IN AN ORGANIZED HEALTH CARE EDUCATION/TRAINING PROGRAM

## 2018-11-20 PROCEDURE — 80061 LIPID PANEL: CPT | Performed by: STUDENT IN AN ORGANIZED HEALTH CARE EDUCATION/TRAINING PROGRAM

## 2018-11-21 ENCOUNTER — TELEPHONE (OUTPATIENT)
Dept: FAMILY MEDICINE | Facility: OTHER | Age: 63
End: 2018-11-21

## 2018-11-21 DIAGNOSIS — E78.2 MIXED HYPERLIPIDEMIA: Primary | ICD-10-CM

## 2018-11-21 RX ORDER — ATORVASTATIN CALCIUM 20 MG/1
20 TABLET, FILM COATED ORAL DAILY
Qty: 90 TABLET | Refills: 3 | Status: SHIPPED | OUTPATIENT
Start: 2018-11-21 | End: 2019-11-25

## 2018-11-21 NOTE — TELEPHONE ENCOUNTER
Spoke to patient, patient had concerns about how long his medication will be prescribed for as he will have to buy new insurance in the new year. Explained to patient that if provider wrote a prescription for a year, how he gets it at the pharmacy (30-day supply, 3-month supply) is up to his insurance.    Patient would like to try medication.

## 2018-11-21 NOTE — TELEPHONE ENCOUNTER
Spoke to patient and relayed message. Patient states he is willing to try medication.      Notes Recorded by Radha Gonzalez APRN CNP on 11/21/2018 at 8:54 AM  His cholesterol is similar to where it was last year. I re-calculated his risk for heart attack and stroke over the next 10 years and it is 13%. I recommend starting a cholesterol medication when this risk is over 7.5% and with his stiffened heart valve, I would recommend it even more as we discussed at his last office visit. I would start him on a low dose of atorvastatin if he would like to start this.  Flag for me if he is agreeable to starting.  Radha Gonzalez, JARROD-C

## 2018-11-21 NOTE — TELEPHONE ENCOUNTER
Reason for Call:  Other call back    Detailed comments: Patient called clinic again. He is asking to speak with Edna regarding what they were just speaking about. Please call back.     Phone Number Patient can be reached at: Cell number on file:    Telephone Information:   Mobile [phone]       Best Time: any    Can we leave a detailed message on this number? YES    Call taken on 11/21/2018 at 1:30 PM by Helen Gutierrez

## 2018-11-21 NOTE — TELEPHONE ENCOUNTER
Called and talked to patient. Sent in 90 day supply and reviewed potential side effects again with patient. He verbalized understanding.  Radha Gonzalez, JARROD-C

## 2018-11-24 ENCOUNTER — HOSPITAL ENCOUNTER (EMERGENCY)
Facility: CLINIC | Age: 63
Discharge: HOME | End: 2018-11-24
Attending: FAMILY MEDICINE | Admitting: FAMILY MEDICINE
Payer: COMMERCIAL

## 2018-11-24 ENCOUNTER — APPOINTMENT (OUTPATIENT)
Dept: GENERAL RADIOLOGY | Facility: CLINIC | Age: 63
End: 2018-11-24
Attending: FAMILY MEDICINE
Payer: COMMERCIAL

## 2018-11-24 VITALS
BODY MASS INDEX: 34.94 KG/M2 | SYSTOLIC BLOOD PRESSURE: 121 MMHG | OXYGEN SATURATION: 94 % | DIASTOLIC BLOOD PRESSURE: 73 MMHG | WEIGHT: 217.4 LBS | TEMPERATURE: 97.8 F | RESPIRATION RATE: 18 BRPM

## 2018-11-24 DIAGNOSIS — R10.13 EPIGASTRIC PAIN: ICD-10-CM

## 2018-11-24 DIAGNOSIS — R07.89 ATYPICAL CHEST PAIN: ICD-10-CM

## 2018-11-24 LAB
ALBUMIN SERPL-MCNC: 3.4 G/DL (ref 3.4–5)
ALP SERPL-CCNC: 77 U/L (ref 40–150)
ALT SERPL W P-5'-P-CCNC: 30 U/L (ref 0–70)
ANION GAP SERPL CALCULATED.3IONS-SCNC: 9 MMOL/L (ref 3–14)
AST SERPL W P-5'-P-CCNC: 15 U/L (ref 0–45)
BASOPHILS # BLD AUTO: 0.1 10E9/L (ref 0–0.2)
BASOPHILS NFR BLD AUTO: 0.8 %
BILIRUB SERPL-MCNC: 0.2 MG/DL (ref 0.2–1.3)
BUN SERPL-MCNC: 19 MG/DL (ref 7–30)
CALCIUM SERPL-MCNC: 8.5 MG/DL (ref 8.5–10.1)
CHLORIDE SERPL-SCNC: 107 MMOL/L (ref 94–109)
CO2 SERPL-SCNC: 26 MMOL/L (ref 20–32)
CREAT SERPL-MCNC: 1.11 MG/DL (ref 0.66–1.25)
DIFFERENTIAL METHOD BLD: NORMAL
EOSINOPHIL NFR BLD AUTO: 1.9 %
ERYTHROCYTE [DISTWIDTH] IN BLOOD BY AUTOMATED COUNT: 13 % (ref 10–15)
GFR SERPL CREATININE-BSD FRML MDRD: 67 ML/MIN/1.7M2
GLUCOSE SERPL-MCNC: 93 MG/DL (ref 70–99)
HCT VFR BLD AUTO: 40.2 % (ref 40–53)
HGB BLD-MCNC: 13.4 G/DL (ref 13.3–17.7)
IMM GRANULOCYTES # BLD: 0 10E9/L (ref 0–0.4)
IMM GRANULOCYTES NFR BLD: 0.4 %
LIPASE SERPL-CCNC: 119 U/L (ref 73–393)
LYMPHOCYTES # BLD AUTO: 2.4 10E9/L (ref 0.8–5.3)
LYMPHOCYTES NFR BLD AUTO: 31.9 %
MCH RBC QN AUTO: 29.1 PG (ref 26.5–33)
MCHC RBC AUTO-ENTMCNC: 33.3 G/DL (ref 31.5–36.5)
MCV RBC AUTO: 87 FL (ref 78–100)
MONOCYTES # BLD AUTO: 0.6 10E9/L (ref 0–1.3)
MONOCYTES NFR BLD AUTO: 7.7 %
NEUTROPHILS # BLD AUTO: 4.2 10E9/L (ref 1.6–8.3)
NEUTROPHILS NFR BLD AUTO: 57.3 %
NRBC # BLD AUTO: 0 10*3/UL
NRBC BLD AUTO-RTO: 0 /100
PLATELET # BLD AUTO: 212 10E9/L (ref 150–450)
POTASSIUM SERPL-SCNC: 4 MMOL/L (ref 3.4–5.3)
PROT SERPL-MCNC: 6.7 G/DL (ref 6.8–8.8)
RBC # BLD AUTO: 4.61 10E12/L (ref 4.4–5.9)
SODIUM SERPL-SCNC: 142 MMOL/L (ref 133–144)
TROPONIN I SERPL-MCNC: <0.015 UG/L (ref 0–0.04)
TROPONIN I SERPL-MCNC: <0.015 UG/L (ref 0–0.04)
WBC # BLD AUTO: 7.4 10E9/L (ref 4–11)

## 2018-11-24 PROCEDURE — 25000132 ZZH RX MED GY IP 250 OP 250 PS 637: Performed by: FAMILY MEDICINE

## 2018-11-24 PROCEDURE — 71046 X-RAY EXAM CHEST 2 VIEWS: CPT | Mod: TC

## 2018-11-24 PROCEDURE — 85025 COMPLETE CBC W/AUTO DIFF WBC: CPT | Performed by: FAMILY MEDICINE

## 2018-11-24 PROCEDURE — 80053 COMPREHEN METABOLIC PANEL: CPT | Performed by: FAMILY MEDICINE

## 2018-11-24 PROCEDURE — 99285 EMERGENCY DEPT VISIT HI MDM: CPT | Mod: 25 | Performed by: FAMILY MEDICINE

## 2018-11-24 PROCEDURE — 93005 ELECTROCARDIOGRAM TRACING: CPT | Performed by: FAMILY MEDICINE

## 2018-11-24 PROCEDURE — 93010 ELECTROCARDIOGRAM REPORT: CPT | Mod: Z6 | Performed by: FAMILY MEDICINE

## 2018-11-24 PROCEDURE — 84484 ASSAY OF TROPONIN QUANT: CPT | Performed by: FAMILY MEDICINE

## 2018-11-24 PROCEDURE — 83690 ASSAY OF LIPASE: CPT | Performed by: FAMILY MEDICINE

## 2018-11-24 PROCEDURE — 36415 COLL VENOUS BLD VENIPUNCTURE: CPT | Performed by: FAMILY MEDICINE

## 2018-11-24 RX ORDER — ASPIRIN 81 MG/1
324 TABLET, CHEWABLE ORAL ONCE
Status: COMPLETED | OUTPATIENT
Start: 2018-11-24 | End: 2018-11-24

## 2018-11-24 RX ADMIN — ASPIRIN 81 MG 324 MG: 81 TABLET ORAL at 19:24

## 2018-11-24 NOTE — ED AVS SNAPSHOT
[facility] Emergency Department    [address] [place] [address]    Phone:  [phone]    Fax:  [phone]                                       Bhavesh Landeros   MRN: [id number]    Department:  [facility] Emergency Department   Date of Visit:  11/24/2018           Patient Information     Date Of Birth          1955        Your diagnoses for this visit were:     Atypical chest pain     Epigastric pain        You were seen by Catracho May MD.      Follow-up Information     Follow up with Radha Gonzalez APRN CNP.    Specialty:  Nurse Practitioner - Family    Contact information:    [address] [place] [id number] [phone]          Discharge Instructions       Your EKG and troponin x2 were normal which is reassuring.  You do have several cardiac risk factors so I encourage you to keep your cardiology appointment on December 11, 2018 as scheduled.  Start the Prilosec 20 mg daily to help decrease acid in your stomach.  If this helps, continue on that for at least 6-8 weeks.  If you are still having problems, they may need to look more closely at your gallbladder.  Your primary provider can order that for you and follow-up on the results.  If you still have problems, they may need to look down your esophagus and stomach with a camera.  Recheck in the clinic in 1-2 weeks.  It was nice visiting with both of you this evening.  I hope this all settles down quickly for you.    Thank you for choosing [facility]. We appreciate the opportunity to meet your urgent medical needs. Please let us know if we could have done anything to make your stay more satisfying.    After discharge, please closely monitor for any new or worsening symptoms. Return to the Emergency Department if you develop any acute worsening signs or symptoms.    If you had lab work, cultures or imaging studies done during your stay, the final results may still be pending.  We will call you if your plan of care needs to change. However, if you are not improving as expected, please follow up with your primary care provider or clinic.     Start any prescription medications that were prescribed to you and take them as directed.     Please see additional handouts that may be pertinent to your condition.        24 Hour Appointment Hotline       To make an appointment at any [facility] clinic, call [phone] ([phone]). If you don't have a family doctor or clinic, we will help you find one. [facility] clinics are conveniently located to serve the needs of you and your family.             Review of your medicines      START taking        Dose / Directions Last dose taken    omeprazole 20 MG CR capsule   Commonly known as:  priLOSEC   Dose:  20 mg   Quantity:  30 capsule        Take 1 capsule (20 mg) by mouth daily   Refills:  0          Our records show that you are taking the medicines listed below. If these are incorrect, please call your family doctor or clinic.        Dose / Directions Last dose taken    aspirin 81 MG chewable tablet   Commonly known as:  ASA   Dose:  81 mg        Take 1 tablet (81 mg) by mouth daily   Refills:  0        atorvastatin 20 MG tablet   Commonly known as:  LIPITOR   Dose:  20 mg   Quantity:  90 tablet        Take 1 tablet (20 mg) by mouth daily   Refills:  3        IBUPROFEN PO        Refills:  0        losartan 25 MG tablet   Commonly known as:  COZAAR   Dose:  25 mg   Quantity:  90 tablet        Take 1 tablet (25 mg) by mouth daily   Refills:  3                Prescriptions were sent or printed at these locations (1 Prescription)                   [facility] - [address] [address]    Telephone:  [phone]   Fax:  [phone]   Hours:                  E-Prescribed (1 of 1)         omeprazole (PRILOSEC) 20 MG CR capsule                Procedures and tests performed during  your visit     Procedure/Test Number of Times Performed    CBC with platelets differential 1    Cardiac Continuous Monitoring 1    Comprehensive metabolic panel 1    EKG 12-lead, tracing only 1    Lipase 1    Peripheral IV: Standard 1    Pulse oximetry nursing 1    Troponin I 2    XR Chest 2 Views 1      Orders Needing Specimen Collection     None      Pending Results     No orders found from 11/22/2018 to 11/25/2018.            Pending Culture Results     No orders found from 11/22/2018 to 11/25/2018.            Pending Results Instructions     If you had any lab results that were not finalized at the time of your Discharge, you can call the ED Lab Result RN at [phone]. You will be contacted by this team for any positive Lab results or changes in treatment. The nurses are available 7 days a week from 10A to 6:30P.  You can leave a message 24 hours per day and they will return your call.        Thank you for choosing [facility]       Thank you for choosing [facility] for your care. Our goal is always to provide you with excellent care. Hearing back from our patients is one way we can continue to improve our services. Please take a few minutes to complete the written survey that you may receive in the mail after you visit with us. Thank you!        Care EveryWhere ID     This is your Care EveryWhere ID. This could be used by other organizations to access your [facility] medical records  [id number]        Equal Access to Services     CHAO VILLAVICENCIO : Francisco Javier Olsen, waroberto fuller, qaybta kaalmakane concepcion, kristin hylton. So [facility] [phone].    ATENCIÓN: Si habla español, tiene a moreno disposición servicios gratuitos de asistencia lingüística. Llame al [phone].    We comply with applicable federal civil rights laws and [place] laws. We do not discriminate on the basis of race, color, national origin, age, disability, sex, sexual orientation, or gender identity.             After Visit Summary       This is your record. Keep this with you and show to your community pharmacist(s) and doctor(s) at your next visit.

## 2018-11-24 NOTE — ED AVS SNAPSHOT
[facility] Emergency Department    [address] [place] [address]    Phone:  [phone]    Fax:  [phone]                                       Bhavesh Landeros   MRN: [id number]    Department:  [facility] Emergency Department   Date of Visit:  11/24/2018           After Visit Summary Signature Page     I have received my discharge instructions, and my questions have been answered. I have discussed any challenges I see with this plan with the nurse or doctor.    ..........................................................................................................................................  Patient/Patient Representative Signature      ..........................................................................................................................................  Patient Representative Print Name and Relationship to Patient    ..................................................               ................................................  Date                                   Time    ..........................................................................................................................................  Reviewed by Signature/Title    ...................................................              ..............................................  Date                                               Time          22EPIC Rev 08/18

## 2018-11-25 NOTE — DISCHARGE INSTRUCTIONS
Your EKG and troponin x2 were normal which is reassuring.  You do have several cardiac risk factors so I encourage you to keep your cardiology appointment on December 11, 2018 as scheduled.  Start the Prilosec 20 mg daily to help decrease acid in your stomach.  If this helps, continue on that for at least 6-8 weeks.  If you are still having problems, they may need to look more closely at your gallbladder.  Your primary provider can order that for you and follow-up on the results.  If you still have problems, they may need to look down your esophagus and stomach with a camera.  Recheck in the clinic in 1-2 weeks.  It was nice visiting with both of you this evening.  I hope this all settles down quickly for you.    Thank you for choosing [facility]. We appreciate the opportunity to meet your urgent medical needs. Please let us know if we could have done anything to make your stay more satisfying.    After discharge, please closely monitor for any new or worsening symptoms. Return to the Emergency Department if you develop any acute worsening signs or symptoms.    If you had lab work, cultures or imaging studies done during your stay, the final results may still be pending. We will call you if your plan of care needs to change. However, if you are not improving as expected, please follow up with your primary care provider or clinic.     Start any prescription medications that were prescribed to you and take them as directed.     Please see additional handouts that may be pertinent to your condition.

## 2018-11-25 NOTE — ED PROVIDER NOTES
"  History     Chief Complaint   Patient presents with     Chest Pain     The history is provided by the patient and a relative.     Bhavesh Landeros is a 63 year old male who presents to the emergency department with complaints of chest pain. The patient started to have some epigastric pain around 1600 tonight. He also felt lightheaded and dizzy at the onset of this pain. He states \"it feels like someone is taking a finger and jabbing me\". He thought that he had bad heart burn so went outside to get some fresh air and took some Pepto Bismol and Tums, but did not have much relief. He says these normally help for his heart burn. Patient has no history of heart problems. His mom has had heart problems for many years. Patient is not a smoker.    The patient reports being seen in the clinic a couple of weeks ago for similar chest pain. He had a normal troponin and EKG, had an Echo and is slated to see cardiology in a few weeks.    He was noted to have a heart murmur and an echo showed some mild aortic stenosis.  Ejection fraction normal at 55-60%.    The patient started taking Lipitor, Losartan, and Aspirin all within the last couple of weeks. His last blood pressure was 130/72 after starting his new medications. His daughter says that he has never taken any medications in his life until this last week. Patient is not diabetic.     Cardiac risk factors: No diabetes, recently diagnosed with hypertension and started meds, recently diagnosed with hyperlipidemia and started on meds, mom with heart disease but was a heavy smoker and lived into her 80s, he is a non-smoker.      Problem List:    Patient Active Problem List    Diagnosis Date Noted     Cellulitis of leg 08/16/2011     Priority: High     Aortic valve stenosis, etiology of cardiac valve disease unspecified 11/19/2018     Priority: Medium     Mitral regurgitation and aortic stenosis 11/19/2018     Priority: Medium     Ventral hernia without obstruction or gangrene " 11/09/2018     Priority: Medium     Newly recognized murmur 11/07/2018     Priority: Medium     Advanced directives, counseling/discussion 09/23/2014     Priority: Medium     Pt was given info       SOB (shortness of breath) 09/23/2014     Priority: Medium     Leg edema, right 05/16/2013     Priority: Medium     GERD (gastroesophageal reflux disease) 10/27/2009     Priority: Medium     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     Priority: Medium        Past Medical History:    Past Medical History:   Diagnosis Date     MEDICAL HISTORY OF -      Multiple sclerosis (H)        Past Surgical History:    Past Surgical History:   Procedure Laterality Date     COLONOSCOPY N/A 1/22/2016    Procedure: COLONOSCOPY;  Surgeon: Pb Rodriguez MD;  Location: [facility]     HC COLONOSCOPY W/WO BRUSH/WASH  03/27/06     HC KNEE SCOPE,MED/LAT MENISECTOMY  07/08/1999     HC REVISE MEDIAN N/CARPAL TUNNEL SURG  1986      UGI ENDOSCOPY DIAG W BIOPSY  10/26/09       Family History:    Family History   Problem Relation Age of Onset     Diabetes Brother      Alzheimer Disease Father      Diabetes Sister        Social History:  Marital Status:   [5]  Social History   Substance Use Topics     Smoking status: Never Smoker     Smokeless tobacco: Never Used     Alcohol use 0.0 oz/week     0 Standard drinks or equivalent per week      Comment: occ        Medications:      aspirin 81 MG chewable tablet   omeprazole (PRILOSEC) 20 MG CR capsule   atorvastatin (LIPITOR) 20 MG tablet   IBUPROFEN PO   losartan (COZAAR) 25 MG tablet         Review of Systems   All other systems reviewed and are negative.      Physical Exam   BP: 151/80  Heart Rate: 79  Temp: 97.8  F (36.6  C)  Resp: 16  Weight: 98.6 kg (217 lb 6.4 oz)  SpO2: 100 %      Physical Exam   Constitutional: He is oriented to person, place, and time. He appears well-developed and well-nourished. No distress.   HENT:   Mouth/Throat: Oropharynx is clear and moist.   Eyes: EOM are normal.    Neck: Neck supple.   Cardiovascular: Normal rate and regular rhythm.    Murmur heard.   Systolic murmur is present with a grade of 2/6   Pulmonary/Chest: Effort normal and breath sounds normal. No respiratory distress.   Abdominal: Soft. Bowel sounds are normal. There is no tenderness.   Musculoskeletal: Normal range of motion. He exhibits no edema or tenderness.   Neurological: He is alert and oriented to person, place, and time. No cranial nerve deficit.   Skin: Skin is warm and dry. No rash noted.   Psychiatric: He has a normal mood and affect.       ED Course  (with Medical Decision Making)    33-year-old with a 3-hour history of mid epigastric discomfort and he tried some Pepto-Bismol and Tums with little relief.  Recently started on statin and Cozaar for hyperlipidemia and hypertension.  EKG showed no acute ischemic changes.  Initial troponin was undetectable.  Chest x-ray was unremarkable.  After I was palpating his abdomen, he states that his pain resolved.  We will check a 2-hour delta troponin which will be about 5 hours after the onset of his pain just to be certain that he may need his gallbladder or stomach looked at.  It would certainly be reasonable to do a cardiac stress test at some time as well.  We will start on omeprazole and consider an outpatient right upper quadrant ultrasound.  He has an appointment to see cardiology.  He is not at all excited about another stress test.  He can discuss further evaluation with cardiology when he sees them next month.    Second troponin was also undetectable.  We will start him on Prilosec and if he does not respond to that, consider right upper quadrant ultrasound and possible EGD.  I encouraged him to keep his cardiology appointment.       ED Course     Procedures               EKG Interpretation:      Interpreted by Catracho May  Time reviewed: 1909  Symptoms at time of EKG: epigastric pain   Rhythm: normal sinus   Rate: 76  Axis: normal  Ectopy:  none  Conduction: normal  ST Segments/ T Waves: No ST-T wave changes  Q Waves: none  Comparison to prior: Unchanged from 11/7/2018    Clinical Impression: no acute ischemic changes          Critical Care time:  none               Results for orders placed or performed during the hospital encounter of 11/24/18 (from the past 24 hour(s))   CBC with platelets differential   Result Value Ref Range    WBC 7.4 4.0 - 11.0 10e9/L    RBC Count 4.61 4.4 - 5.9 10e12/L    Hemoglobin 13.4 13.3 - 17.7 g/dL    Hematocrit 40.2 40.0 - 53.0 %    MCV 87 78 - 100 fl    MCH 29.1 26.5 - 33.0 pg    MCHC 33.3 31.5 - 36.5 g/dL    RDW 13.0 10.0 - 15.0 %    Platelet Count 212 150 - 450 10e9/L    Diff Method Automated Method     % Neutrophils 57.3 %    % Lymphocytes 31.9 %    % Monocytes 7.7 %    % Eosinophils 1.9 %    % Basophils 0.8 %    % Immature Granulocytes 0.4 %    Nucleated RBCs 0 0 /100    Absolute Neutrophil 4.2 1.6 - 8.3 10e9/L    Absolute Lymphocytes 2.4 0.8 - 5.3 10e9/L    Absolute Monocytes 0.6 0.0 - 1.3 10e9/L    Absolute Basophils 0.1 0.0 - 0.2 10e9/L    Abs Immature Granulocytes 0.0 0 - 0.4 10e9/L    Absolute Nucleated RBC 0.0    Troponin I   Result Value Ref Range    Troponin I ES <0.015 0.000 - 0.045 ug/L   Comprehensive metabolic panel   Result Value Ref Range    Sodium 142 133 - 144 mmol/L    Potassium 4.0 3.4 - 5.3 mmol/L    Chloride 107 94 - 109 mmol/L    Carbon Dioxide 26 20 - 32 mmol/L    Anion Gap 9 3 - 14 mmol/L    Glucose 93 70 - 99 mg/dL    Urea Nitrogen 19 7 - 30 mg/dL    Creatinine 1.11 0.66 - 1.25 mg/dL    GFR Estimate 67 >60 mL/min/1.7m2    GFR Estimate If Black 81 >60 mL/min/1.7m2    Calcium 8.5 8.5 - 10.1 mg/dL    Bilirubin Total 0.2 0.2 - 1.3 mg/dL    Albumin 3.4 3.4 - 5.0 g/dL    Protein Total 6.7 (L) 6.8 - 8.8 g/dL    Alkaline Phosphatase 77 40 - 150 U/L    ALT 30 0 - 70 U/L    AST 15 0 - 45 U/L   Lipase   Result Value Ref Range    Lipase 119 73 - 393 U/L   XR Chest 2 Views    Narrative    CHEST TWO VIEWS   11/24/2018 7:51 PM     HISTORY: Lower chest/epigastric pain.     COMPARISON: 11/7/2018      Impression    IMPRESSION: Normal.    JALEN LOVE MD   Troponin I   Result Value Ref Range    Troponin I ES <0.015 0.000 - 0.045 ug/L       Medications   aspirin (ASA) chewable tablet 324 mg (324 mg Oral Given 11/24/18 1924)       Assessments & Plan     I have reviewed the nursing notes.    I have reviewed the findings, diagnosis, plan and need for follow up with the patient.       New Prescriptions    OMEPRAZOLE (PRILOSEC) 20 MG CR CAPSULE    Take 1 capsule (20 mg) by mouth daily       Final diagnoses:   Atypical chest pain   Epigastric pain     This document serves as a record of services personally performed by Catracho May,*. It was created on their behalf by Lary Cavazos, a trained medical scribe. The creation of this record is based on the provider's personal observations and the statements of the patient. This document has been checked and approved by the attending provider.  Note: Chart documentation done in part with Dragon Voice Recognition software. Although reviewed after completion, some word and grammatical errors may remain.    11/24/2018   [facility] EMERGENCY DEPARTMENT     Catracho May MD  11/24/18 6939

## 2018-11-25 NOTE — ED TRIAGE NOTES
Pt comes in with mid-epigastric pain that started about 2 hours ago. Pt denies n/v, diaphoresis, or radiation.

## 2018-12-19 ENCOUNTER — OFFICE VISIT (OUTPATIENT)
Dept: CARDIOLOGY | Facility: CLINIC | Age: 63
End: 2018-12-19
Payer: COMMERCIAL

## 2018-12-19 VITALS
HEART RATE: 68 BPM | HEIGHT: 66 IN | WEIGHT: 217.5 LBS | DIASTOLIC BLOOD PRESSURE: 78 MMHG | BODY MASS INDEX: 34.96 KG/M2 | OXYGEN SATURATION: 97 % | SYSTOLIC BLOOD PRESSURE: 120 MMHG

## 2018-12-19 DIAGNOSIS — R07.89 OTHER CHEST PAIN: ICD-10-CM

## 2018-12-19 DIAGNOSIS — I35.0 AORTIC VALVE STENOSIS, ETIOLOGY OF CARDIAC VALVE DISEASE UNSPECIFIED: ICD-10-CM

## 2018-12-19 DIAGNOSIS — K21.9 GASTROESOPHAGEAL REFLUX DISEASE WITHOUT ESOPHAGITIS: Primary | ICD-10-CM

## 2018-12-19 PROCEDURE — 99203 OFFICE O/P NEW LOW 30 MIN: CPT | Performed by: INTERNAL MEDICINE

## 2018-12-19 ASSESSMENT — MIFFLIN-ST. JEOR: SCORE: 1721.54

## 2018-12-19 NOTE — PROGRESS NOTES
Interpretation Summary     Technically difficult study with some improvement after contrast use.     The visual ejection fraction is estimated at 55-60%.  Mild valvular aortic stenosis.  IVC size is upper normal at 2.1 cms but collapsible.    Ao V2 max: 244.9 cm/sec  Ao max P.0 mmHg  Ao V2 mean: 157.3 cm/sec  Ao mean P.3 mmHg  Ao V2 VTI: 53.2 cm  J CARLOS(I,D): 1.6 cm2  J CARLOS(V,D): 1.6 cm2      HPI and Plan:   See dictation          No orders of the defined types were placed in this encounter.    No orders of the defined types were placed in this encounter.    There are no discontinued medications.      Encounter Diagnoses   Name Primary?     Gastroesophageal reflux disease without esophagitis Yes     Other chest pain      Aortic valve stenosis, etiology of cardiac valve disease unspecified        CURRENT MEDICATIONS:  Current Outpatient Medications   Medication Sig Dispense Refill     aspirin 81 MG chewable tablet Take 1 tablet (81 mg) by mouth daily       atorvastatin (LIPITOR) 20 MG tablet Take 1 tablet (20 mg) by mouth daily 90 tablet 3     losartan (COZAAR) 25 MG tablet Take 1 tablet (25 mg) by mouth daily 90 tablet 3     omeprazole (PRILOSEC) 20 MG CR capsule Take 1 capsule (20 mg) by mouth daily 30 capsule 0     IBUPROFEN PO          ALLERGIES     Allergies   Allergen Reactions     No Known Allergies        PAST MEDICAL HISTORY:  Past Medical History:   Diagnosis Date     MEDICAL HISTORY OF -     Leg & Wrist fxs     Multiple sclerosis (H)        PAST SURGICAL HISTORY:  Past Surgical History:   Procedure Laterality Date     COLONOSCOPY N/A 2016    Procedure: COLONOSCOPY;  Surgeon: Pb Rodriguez MD;  Location: [facility]     HC COLONOSCOPY W/WO BRUSH/WASH  06     HC KNEE SCOPE,MED/LAT MENISECTOMY  1999     HC REVISE MEDIAN N/CARPAL TUNNEL SURG        UGI ENDOSCOPY DIAG W BIOPSY  10/26/09       FAMILY HISTORY:  Family History   Problem Relation Age of Onset     Diabetes Brother       Alzheimer Disease Father      Diabetes Sister        SOCIAL HISTORY:  Social History     Socioeconomic History     Marital status:      Spouse name: Fred     Number of children: Maira     Years of education: 12     Highest education level: Not on file   Social Needs     Financial resource strain: Not on file     Food insecurity - worry: Not on file     Food insecurity - inability: Not on file     Transportation needs - medical: Not on file     Transportation needs - non-medical: Not on file   Occupational History     Employer: [employer]   Tobacco Use     Smoking status: Never Smoker     Smokeless tobacco: Never Used   Substance and Sexual Activity     Alcohol use: Yes     Alcohol/week: 0.0 oz     Comment: occ     Drug use: No     Sexual activity: Not Currently     Partners: Female   Other Topics Concern      Service Not Asked     Blood Transfusions Not Asked     Caffeine Concern No     Occupational Exposure Not Asked     Hobby Hazards Not Asked     Sleep Concern No     Stress Concern No     Weight Concern No     Special Diet Not Asked     Back Care Not Asked     Exercise Yes     Bike Helmet Not Asked     Seat Belt Yes     Self-Exams Not Asked     Parent/sibling w/ CABG, MI or angioplasty before 65F 55M? Not Asked   Social History Narrative     Not on file         Review of Systems:  Skin:  Negative       Eyes:  Positive for glasses    ENT:  Negative      Respiratory:  Positive for dyspnea on exertion     Cardiovascular:  Negative for;lightheadedness;dizziness;fatigue chest pain;Positive for little chest discomfort mid sternum  Gastroenterology: Positive for heartburn;reflux    Genitourinary:  Negative      Musculoskeletal:  Negative      Neurologic:  Negative      Psychiatric:  Negative      Heme/Lymph/Imm:  Negative      Endocrine:  Negative        Physical Exam:  Vitals: /78 (BP Location: Right arm, Patient Position: Fowlers, Cuff Size: Adult Large)   Pulse 68   Ht 1.68 m (5'  "6.14\")   Wt 98.7 kg (217 lb 8 oz)   SpO2 97%   BMI 34.96 kg/m      Constitutional:  cooperative;well developed;well nourished overweight      Skin:  warm and dry to the touch          Head:  normocephalic        Eyes:  pupils equal and round;sclera white        Lymph:      ENT:  no pallor or cyanosis        Neck:  carotid pulses are full and equal bilaterally;JVP normal;no carotid bruit        Respiratory:  clear to auscultation         Cardiac: regular rhythm       systolic ejection murmur;RUSB;grade 1        pulses full and equal             right dorsalis pedis artery;2+             left dorsalis pedis artery;2+            GI:  no masses;non-tender obese      Extremities and Muscular Skeletal:  no edema;no deformities, clubbing, cyanosis, erythema observed              Neurological:  affect appropriate        Psych:  oriented to time, person and place        Recent Lab Results:  LIPID RESULTS:  Lab Results   Component Value Date    CHOL 186 11/20/2018    HDL 53 11/20/2018     (H) 11/20/2018    TRIG 71 11/20/2018    CHOLHDLRATIO 3.3 09/12/2014       LIVER ENZYME RESULTS:  Lab Results   Component Value Date    AST 15 11/24/2018    ALT 30 11/24/2018       CBC RESULTS:  Lab Results   Component Value Date    WBC 7.4 11/24/2018    RBC 4.61 11/24/2018    HGB 13.4 11/24/2018    HCT 40.2 11/24/2018    MCV 87 11/24/2018    MCH 29.1 11/24/2018    MCHC 33.3 11/24/2018    RDW 13.0 11/24/2018     11/24/2018       BMP RESULTS:  Lab Results   Component Value Date     11/24/2018    POTASSIUM 4.0 11/24/2018    CHLORIDE 107 11/24/2018    CO2 26 11/24/2018    ANIONGAP 9 11/24/2018    GLC 93 11/24/2018    BUN 19 11/24/2018    CR 1.11 11/24/2018    GFRESTIMATED 67 11/24/2018    GFRESTBLACK 81 11/24/2018    SHANNON 8.5 11/24/2018        A1C RESULTS:  No results found for: A1C    INR RESULTS:  No results found for: INR        CC  Radha Gonzalez, APRN CNP  [address] [place] " [id number]

## 2018-12-19 NOTE — LETTER
2018      Radha Gonzalez, PERLA [facility]  [address] [place] [address]      RE: Bhavesh FAUSTIN Leti       Dear Colleague,    I had the pleasure of seeing Bhavesh Landeros in the [facility].    Service Date: 2018      HISTORY OF PRESENT ILLNESS:  I met Bhavesh Landeros today.  I have seen him in the past, I believe when I was involved in the care of his mother.  For all that, he looked familiar to me.      Bhavesh has been a relatively healthy doreen.  He has a history of mild low sternal discomfort that comes and goes unpredictably.  He had a stress echo 2 years ago that was normal and negative for inducible ischemia which pushed him towards a diagnosis of GERD.      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  He works mainly in the summers and does not work in the winter.      He denies any change in his breathing in the past 6 months to a year.  He denies any pattern of tightness, burning pressure, squeezing or heaviness in the chest with exertion.  No palpitations, dizziness or syncope.  He was noted to have a new cardiac murmur.  I also heard a soft systolic murmur in the aortic area.  This led to an echocardiogram dated 2018.  This revealed normal LV and RV systolic function.  There was no evidence of LVH.  EF was 55%-60%.  The left atrium was mildly dilated.  The right atrium normal.  Mitral valve was normal.  Tricuspid valve normal.  Aortic valve showed trace aortic insufficiency and mild aortic stenosis with a mean aortic transvalvular gradient of 11 mmHg and a peak of 24 mmHg with an aortic valve area approximated at 1.6 cm2.  This would indeed be mild AS.  His right heart pressures were normal at 20 mmHg.        SOCIAL HISTORY: Bhavesh is a .  His wife  of ovarian cancer.  He lives alone in a house.  He works during the summers when he wants to.  He is looking forward to possibly retiring  completely after this coming year.  He does not smoke.  He does not drink.  He is active but does not exercise.      Laboratory work done last month showed normal electrolytes, creatinine 1.1, BUN 19, albumin borderline at 3.4.  Liver profile normal.  Troponins were normal.  Total cholesterol 186, , HDL 53, triglycerides 171.      CURRENT MEDICATIONS:   1.  Omeprazole 20 mg a day for the low sternal discomfort.   2.  Losartan 25 mg a day.   3.  Atorvastatin 20 mg at bedtime for primary prevention.   4.  Aspirin 81 mg a day.      FAMILY HISTORY:  Positive for a sister and a brother with diabetes.      REVIEW OF SYSTEMS:  Chronicled in Epic.  It is positive for chronic stable dyspnea on exertion.  He has atypical low sternal chest discomfort that sounds noncardiac.  He has a history of heartburn which has been suggested by his negative stress test and the fact that he gets discomfort unpredictably.  It is low sternal and can last for hours.      PHYSICAL EXAMINATION:   GENERAL:  Well-developed, well-nourished, mild to moderately overweight male.  He is 5 feet 6 inches, weighs 217 pounds.   VITAL SIGNS:  Blood pressure 120/80, heart rate 70 and regular.   HEAD:  Normal.   NECK:  Free of neck vein distention, mass, bruit or goiter noted.  No nodes noted.   HEART:  Regular with a 1/6 soft systolic ejection murmur in aortic area without an S3.  No apical murmur heard.   LUNGS:  Clear to auscultation.   ABDOMEN:  Firm, rotund, nontender without pain, mass or guarding.   EXTREMITIES:  Show +2 pedal pulses, no edema.        Bhavesh Landeros is 63.  He has mild aortic stenosis by echo. I reviewed calcific aortic stenosis and its tendency to progress as years go by.  Currently there is nothing that needs to be done with this valve.  His valve area is 1.6.  His mean gradient 11 and peak gradient 24 mmHg.  I reviewed this and the mild extra burden that is placed on the heart in the context of the fact that his blood pressure  is normal.  I think maintaining normal blood pressure would have good long-term relevance.      I discussed briefly diet, weight loss, exercise.  I discussed his hyperlipidemia.      His medication program is really rather gentle with the Prilosec, losartan, atorvastatin and aspirin.      I discussed the fact that should the aortic stenosis become both severe and symptomatic, that aortic valve replacement might be required and I briefly reviewed surgery and TAVR approaches currently.  As years go by, we will get better at aortic valve replacement and I would predict that percutaneous placement will become a standard.      IMPRESSION:   1.  Mild aortic stenosis, currently asymptomatic as it should be.   2.  Hypertension.   3.  Hyperlipidemia, on atorvastatin.  LDLs have been in the 116-119 range recently.   4.  Low sternal discomfort.  I agree with the clinical impression that this is likely noncardiac discomfort and more likely related to musculoskeletal or GERD.        PLAN:  I ordered an echo and a visit in a year and will get a feeling for whether or not his valve is  progressing.  An echo every year or 2 might be of value in the long run.      I reviewed these issues with him.  He understands.      Over an hour was spent doing the consult.      cc:      Radha Gonzalez[place] [address]         PAT MILLER MD, [facility]             D: 2018   T: 2018   MT: JUWAN      Name:     TOM TAYLOR   MRN:      [id number]        Account:      [id number]   :      1955           Service Date: 2018      Document: [id number]         Outpatient Encounter Medications as of 2018   Medication Sig Dispense Refill     aspirin 81 MG chewable tablet Take 1 tablet (81 mg) by mouth daily       atorvastatin (LIPITOR) 20 MG tablet Take 1 tablet (20 mg) by mouth daily 90 tablet 3     losartan (COZAAR) 25 MG tablet Take 1 tablet (25 mg) by mouth daily  90 tablet 3     [] omeprazole (PRILOSEC) 20 MG CR capsule Take 1 capsule (20 mg) by mouth daily 30 capsule 0     IBUPROFEN PO        No facility-administered encounter medications on file as of 2018.        Again, thank you for allowing me to participate in the care of your patient.      Sincerely,    PAT MILLER MD     [facility]

## 2018-12-19 NOTE — PROGRESS NOTES
Service Date: 2018      HISTORY OF PRESENT ILLNESS:  I met Bhavesh Landeros today.  I have seen him in the past, I believe when I was involved in the care of his mother.  For all that, he looked familiar to me.      Bhavesh has been a relatively healthy doreen.  He has a history of mild low sternal discomfort that comes and goes unpredictably.  He had a stress echo 2 years ago that was normal and negative for inducible ischemia which pushed him towards a diagnosis of GERD.      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  He works mainly in the summers and does not work in the winter.      He denies any change in his breathing in the past 6 months to a year.  He denies any pattern of tightness, burning pressure, squeezing or heaviness in the chest with exertion.  No palpitations, dizziness or syncope.  He was noted to have a new cardiac murmur.  I also heard a soft systolic murmur in the aortic area.  This led to an echocardiogram dated 2018.  This revealed normal LV and RV systolic function.  There was no evidence of LVH.  EF was 55%-60%.  The left atrium was mildly dilated.  The right atrium normal.  Mitral valve was normal.  Tricuspid valve normal.  Aortic valve showed trace aortic insufficiency and mild aortic stenosis with a mean aortic transvalvular gradient of 11 mmHg and a peak of 24 mmHg with an aortic valve area approximated at 1.6 cm2.  This would indeed be mild AS.  His right heart pressures were normal at 20 mmHg.        SOCIAL HISTORY: Bhavesh is a .  His wife  of ovarian cancer.  He lives alone in a house.  He works during the summers when he wants to.  He is looking forward to possibly retiring completely after this coming year.  He does not smoke.  He does not drink.  He is active but does not exercise.      Laboratory work done last month showed normal electrolytes, creatinine 1.1, BUN 19, albumin borderline at 3.4.  Liver  profile normal.  Troponins were normal.  Total cholesterol 186, , HDL 53, triglycerides 171.      CURRENT MEDICATIONS:   1.  Omeprazole 20 mg a day for the low sternal discomfort.   2.  Losartan 25 mg a day.   3.  Atorvastatin 20 mg at bedtime for primary prevention.   4.  Aspirin 81 mg a day.      FAMILY HISTORY:  Positive for a sister and a brother with diabetes.      REVIEW OF SYSTEMS:  Chronicled in Epic.  It is positive for chronic stable dyspnea on exertion.  He has atypical low sternal chest discomfort that sounds noncardiac.  He has a history of heartburn which has been suggested by his negative stress test and the fact that he gets discomfort unpredictably.  It is low sternal and can last for hours.      PHYSICAL EXAMINATION:   GENERAL:  Well-developed, well-nourished, mild to moderately overweight male.  He is 5 feet 6 inches, weighs 217 pounds.   VITAL SIGNS:  Blood pressure 120/80, heart rate 70 and regular.   HEAD:  Normal.   NECK:  Free of neck vein distention, mass, bruit or goiter noted.  No nodes noted.   HEART:  Regular with a 1/6 soft systolic ejection murmur in aortic area without an S3.  No apical murmur heard.   LUNGS:  Clear to auscultation.   ABDOMEN:  Firm, rotund, nontender without pain, mass or guarding.   EXTREMITIES:  Show +2 pedal pulses, no edema.        Bhavesh Landeros is 63.  He has mild aortic stenosis by echo. I reviewed calcific aortic stenosis and its tendency to progress as years go by.  Currently there is nothing that needs to be done with this valve.  His valve area is 1.6.  His mean gradient 11 and peak gradient 24 mmHg.  I reviewed this and the mild extra burden that is placed on the heart in the context of the fact that his blood pressure is normal.  I think maintaining normal blood pressure would have good long-term relevance.      I discussed briefly diet, weight loss, exercise.  I discussed his hyperlipidemia.      His medication program is really rather gentle with  the Prilosec, losartan, atorvastatin and aspirin.      I discussed the fact that should the aortic stenosis become both severe and symptomatic, that aortic valve replacement might be required and I briefly reviewed surgery and TAVR approaches currently.  As years go by, we will get better at aortic valve replacement and I would predict that percutaneous placement will become a standard.      IMPRESSION:   1.  Mild aortic stenosis, currently asymptomatic as it should be.   2.  Hypertension.   3.  Hyperlipidemia, on atorvastatin.  LDLs have been in the 116-119 range recently.   4.  Low sternal discomfort.  I agree with the clinical impression that this is likely noncardiac discomfort and more likely related to musculoskeletal or GERD.        PLAN:  I ordered an echo and a visit in a year and will get a feeling for whether or not his valve is  progressing.  An echo every year or 2 might be of value in the long run.      I reviewed these issues with him.  He understands.      Over an hour was spent doing the consult.      cc:      Radha Gonzalez, CNP   [address] [address]         PAT MILLER MD, FACC             D: 2018   T: 2018   MT: JUWAN      Name:     TOM TAYLOR   MRN:      [id number]        Account:      [id number]   :      1955           Service Date: 2018      Document: [id number]

## 2018-12-19 NOTE — LETTER
2018    Radha Gonzalez, APRN CNP  [address] [place] [address]    RE: Bhavesh Landeros       Dear Colleague,    I had the pleasure of seeing Bhavesh Landeros in the [facility].    Interpretation Summary     Technically difficult study with some improvement after contrast use.     The visual ejection fraction is estimated at 55-60%.  Mild valvular aortic stenosis.  IVC size is upper normal at 2.1 cms but collapsible.    Ao V2 max: 244.9 cm/sec  Ao max P.0 mmHg  Ao V2 mean: 157.3 cm/sec  Ao mean P.3 mmHg  Ao V2 VTI: 53.2 cm  J CARLOS(I,D): 1.6 cm2  J CARLOS(V,D): 1.6 cm2      HPI and Plan:   See dictation          No orders of the defined types were placed in this encounter.    No orders of the defined types were placed in this encounter.    There are no discontinued medications.      Encounter Diagnoses   Name Primary?     Gastroesophageal reflux disease without esophagitis Yes     Other chest pain      Aortic valve stenosis, etiology of cardiac valve disease unspecified        CURRENT MEDICATIONS:  Current Outpatient Medications   Medication Sig Dispense Refill     aspirin 81 MG chewable tablet Take 1 tablet (81 mg) by mouth daily       atorvastatin (LIPITOR) 20 MG tablet Take 1 tablet (20 mg) by mouth daily 90 tablet 3     losartan (COZAAR) 25 MG tablet Take 1 tablet (25 mg) by mouth daily 90 tablet 3     omeprazole (PRILOSEC) 20 MG CR capsule Take 1 capsule (20 mg) by mouth daily 30 capsule 0     IBUPROFEN PO          ALLERGIES     Allergies   Allergen Reactions     No Known Allergies        PAST MEDICAL HISTORY:  Past Medical History:   Diagnosis Date     MEDICAL HISTORY OF -     Leg & Wrist fxs     Multiple sclerosis (H)        PAST SURGICAL HISTORY:  Past Surgical History:   Procedure Laterality Date     COLONOSCOPY N/A 2016    Procedure: COLONOSCOPY;  Surgeon: Pb Rodriguez MD;  Location: [facility]     HC COLONOSCOPY W/WO BRUSH/WASH  06     HC KNEE  SCOPE,MED/LAT MENISECTOMY  07/08/1999     HC REVISE MEDIAN N/CARPAL TUNNEL SURG  1986      UGI ENDOSCOPY DIAG W BIOPSY  10/26/09       FAMILY HISTORY:  Family History   Problem Relation Age of Onset     Diabetes Brother      Alzheimer Disease Father      Diabetes Sister        SOCIAL HISTORY:  Social History     Socioeconomic History     Marital status:      Spouse name: Fred     Number of children: 1     Years of education: 12     Highest education level: Not on file   Social Needs     Financial resource strain: Not on file     Food insecurity - worry: Not on file     Food insecurity - inability: Not on file     Transportation needs - medical: Not on file     Transportation needs - non-medical: Not on file   Occupational History     Employer: [employer]   Tobacco Use     Smoking status: Never Smoker     Smokeless tobacco: Never Used   Substance and Sexual Activity     Alcohol use: Yes     Alcohol/week: 0.0 oz     Comment: occ     Drug use: No     Sexual activity: Not Currently     Partners: Female   Other Topics Concern      Service Not Asked     Blood Transfusions Not Asked     Caffeine Concern No     Occupational Exposure Not Asked     Hobby Hazards Not Asked     Sleep Concern No     Stress Concern No     Weight Concern No     Special Diet Not Asked     Back Care Not Asked     Exercise Yes     Bike Helmet Not Asked     Seat Belt Yes     Self-Exams Not Asked     Parent/sibling w/ CABG, MI or angioplasty before 65F 55M? Not Asked   Social History Narrative     Not on file         Review of Systems:  Skin:  Negative       Eyes:  Positive for glasses    ENT:  Negative      Respiratory:  Positive for dyspnea on exertion     Cardiovascular:  Negative for;lightheadedness;dizziness;fatigue chest pain;Positive for little chest discomfort mid sternum  Gastroenterology: Positive for heartburn;reflux    Genitourinary:  Negative      Musculoskeletal:  Negative      Neurologic:  Negative     "  Psychiatric:  Negative      Heme/Lymph/Imm:  Negative      Endocrine:  Negative        Physical Exam:  Vitals: /78 (BP Location: Right arm, Patient Position: Fowlers, Cuff Size: Adult Large)   Pulse 68   Ht 1.68 m (5' 6.14\")   Wt 98.7 kg (217 lb 8 oz)   SpO2 97%   BMI 34.96 kg/m       Constitutional:  cooperative;well developed;well nourished overweight      Skin:  warm and dry to the touch          Head:  normocephalic        Eyes:  pupils equal and round;sclera white        Lymph:      ENT:  no pallor or cyanosis        Neck:  carotid pulses are full and equal bilaterally;JVP normal;no carotid bruit        Respiratory:  clear to auscultation         Cardiac: regular rhythm       systolic ejection murmur;RUSB;grade 1        pulses full and equal             right dorsalis pedis artery;2+             left dorsalis pedis artery;2+            GI:  no masses;non-tender obese      Extremities and Muscular Skeletal:  no edema;no deformities, clubbing, cyanosis, erythema observed              Neurological:  affect appropriate        Psych:  oriented to time, person and place        Recent Lab Results:  LIPID RESULTS:  Lab Results   Component Value Date    CHOL 186 11/20/2018    HDL 53 11/20/2018     (H) 11/20/2018    TRIG 71 11/20/2018    CHOLHDLRATIO 3.3 09/12/2014       LIVER ENZYME RESULTS:  Lab Results   Component Value Date    AST 15 11/24/2018    ALT 30 11/24/2018       CBC RESULTS:  Lab Results   Component Value Date    WBC 7.4 11/24/2018    RBC 4.61 11/24/2018    HGB 13.4 11/24/2018    HCT 40.2 11/24/2018    MCV 87 11/24/2018    MCH 29.1 11/24/2018    MCHC 33.3 11/24/2018    RDW 13.0 11/24/2018     11/24/2018       BMP RESULTS:  Lab Results   Component Value Date     11/24/2018    POTASSIUM 4.0 11/24/2018    CHLORIDE 107 11/24/2018    CO2 26 11/24/2018    ANIONGAP 9 11/24/2018    GLC 93 11/24/2018    BUN 19 11/24/2018    CR 1.11 11/24/2018    GFRESTIMATED 67 11/24/2018    " GFRESTBLACK 81 11/24/2018    SHANNON 8.5 11/24/2018        A1C RESULTS:  No results found for: A1C    INR RESULTS:  No results found for: INR        CC  PERLA Armstrong CNP  [address] [place] [address]                    Thank you for allowing me to participate in the care of your patient.      Sincerely,     PAT MILLER MD     [facility]    cc:   PERLA Armstrong CNP  [address] [place] [address]

## 2019-07-01 ENCOUNTER — ALLIED HEALTH/NURSE VISIT (OUTPATIENT)
Dept: FAMILY MEDICINE | Facility: OTHER | Age: 64
End: 2019-07-01
Payer: COMMERCIAL

## 2019-07-01 VITALS — DIASTOLIC BLOOD PRESSURE: 74 MMHG | SYSTOLIC BLOOD PRESSURE: 122 MMHG

## 2019-07-01 DIAGNOSIS — I10 BENIGN ESSENTIAL HYPERTENSION: Primary | ICD-10-CM

## 2019-07-01 PROCEDURE — 99207 ZZC NO CHARGE NURSE ONLY: CPT | Performed by: STUDENT IN AN ORGANIZED HEALTH CARE EDUCATION/TRAINING PROGRAM

## 2019-07-01 NOTE — PROGRESS NOTES
Bhavesh Mirandareinierjailyn was evaluated at [facility] on July 1, 2019 at which time his blood pressure was:    BP Readings from Last 3 Encounters:   07/01/19 122/74   12/19/18 120/78   11/24/18 121/73     Pulse Readings from Last 3 Encounters:   12/19/18 68   11/19/18 56   11/13/18 60       Reviewed lifestyle modifications for blood pressure control and reduction: including making healthy food choices, managing weight, getting regular exercise, smoking cessation, reducing alcohol consumption, monitoring blood pressure regularly.     Symptoms: None    BP Goal:< 140/90 mmHg    BP Assessment:  BP at goal    Potential Reasons for BP too high: NA - Not applicable    BP Follow-Up Plan: Recheck BP in 6 months at pharmacy    Recommendation to Provider: none    Note completed by: Wander Martell  [facility]   [phone]

## 2019-08-12 ENCOUNTER — OFFICE VISIT (OUTPATIENT)
Dept: FAMILY MEDICINE | Facility: OTHER | Age: 64
End: 2019-08-12

## 2019-08-12 ENCOUNTER — ALLIED HEALTH/NURSE VISIT (OUTPATIENT)
Dept: FAMILY MEDICINE | Facility: OTHER | Age: 64
End: 2019-08-12

## 2019-08-12 VITALS — SYSTOLIC BLOOD PRESSURE: 118 MMHG | DIASTOLIC BLOOD PRESSURE: 70 MMHG

## 2019-08-12 VITALS — DIASTOLIC BLOOD PRESSURE: 60 MMHG | SYSTOLIC BLOOD PRESSURE: 136 MMHG

## 2019-08-12 DIAGNOSIS — I10 BENIGN ESSENTIAL HYPERTENSION: Primary | ICD-10-CM

## 2019-08-12 DIAGNOSIS — I35.0 AORTIC VALVE STENOSIS, ETIOLOGY OF CARDIAC VALVE DISEASE UNSPECIFIED: ICD-10-CM

## 2019-08-12 DIAGNOSIS — I08.0 MITRAL REGURGITATION AND AORTIC STENOSIS: ICD-10-CM

## 2019-08-12 DIAGNOSIS — K21.00 GASTROESOPHAGEAL REFLUX DISEASE WITH ESOPHAGITIS: Primary | ICD-10-CM

## 2019-08-12 DIAGNOSIS — R10.13 EPIGASTRIC PAIN: ICD-10-CM

## 2019-08-12 PROCEDURE — 99207 ZZC NO CHARGE NURSE ONLY: CPT | Performed by: STUDENT IN AN ORGANIZED HEALTH CARE EDUCATION/TRAINING PROGRAM

## 2019-08-12 PROCEDURE — 99214 OFFICE O/P EST MOD 30 MIN: CPT | Performed by: NURSE PRACTITIONER

## 2019-08-12 PROCEDURE — 93000 ELECTROCARDIOGRAM COMPLETE: CPT | Performed by: NURSE PRACTITIONER

## 2019-08-12 RX ORDER — OMEPRAZOLE 20 MG/1
20 TABLET, DELAYED RELEASE ORAL DAILY
Qty: 90 TABLET | Refills: 1 | Status: SHIPPED | OUTPATIENT
Start: 2019-08-12 | End: 2019-12-28

## 2019-08-12 ASSESSMENT — MIFFLIN-ST. JEOR: SCORE: 1726.56

## 2019-08-12 NOTE — PROGRESS NOTES
"Subjective     Bhavesh Landeros is a 63 year old male who presents to clinic today for the following health issues:    HPI   GERD/Heartburn-possible hernia-fast belching, not currently having acid reflux.   Onset: about three days     Description:     Burning in chest: YES- when he is eating    Intensity: 4/10, not much for pain just annoying     Progression of Symptoms: same    Accompanying Signs & Symptoms:  Does it feel like food gets stuck: no   Nausea: no  Vomiting (bloody?): no  Abdominal Pain: YES-\"rolling\"  Black-Tarry stools: no:  Bloody stools: no  Dizziness/lightheadedness: when he looks down and then looks up    History:   Previous ulcers: no    Precipitating factors:   Caffeine use: YES- drinks about a pot a day   Alcohol use: no  NSAID/Aspirin use: YES- ASA  Tobacco use: no  Worse with fatty foods, spicy foods and coffee(caffeine doesn't really bother him).    Alleviating factors:  OTC Zantac, avoiding pepsi      Therapies Tried and outcome: See above, pepsi     Patient last seen by cardiology 12/18 with similar symptoms.  Has aortic stenosis last echo was 12/18.  He is on asa and a statin.  Did have a large dinner last night with a lee house steak.  He drinks coffee.  Has a hiatal hernia.  He did start ranitidine instead of hte omeprazole.  About a week or two ago.        Patient Active Problem List   Diagnosis     Benign neoplasm of testis     GERD (gastroesophageal reflux disease)     Cellulitis of leg     Leg edema, right     Advanced directives, counseling/discussion     SOB (shortness of breath)     Newly recognized murmur     Ventral hernia without obstruction or gangrene     Aortic valve stenosis, etiology of cardiac valve disease unspecified     Mitral regurgitation and aortic stenosis     Past Surgical History:   Procedure Laterality Date     COLONOSCOPY N/A 1/22/2016    Procedure: COLONOSCOPY;  Surgeon: Pb Rodriguez MD;  Location:  GI     HC COLONOSCOPY W/WO BRUSH/WASH  03/27/06     " "HC KNEE SCOPE,MED/LAT MENISECTOMY  07/08/1999     HC REVISE MEDIAN N/CARPAL TUNNEL SURG  1986     HC UGI ENDOSCOPY DIAG W BIOPSY  10/26/09       Social History     Tobacco Use     Smoking status: Never Smoker     Smokeless tobacco: Never Used   Substance Use Topics     Alcohol use: Not Currently     Alcohol/week: 0.0 oz     Comment: occ     Family History   Problem Relation Age of Onset     Diabetes Brother      Alzheimer Disease Father      Diabetes Sister          Current Outpatient Medications   Medication Sig Dispense Refill     aspirin 81 MG chewable tablet Take 1 tablet (81 mg) by mouth daily       atorvastatin (LIPITOR) 20 MG tablet Take 1 tablet (20 mg) by mouth daily 90 tablet 3     IBUPROFEN PO        losartan (COZAAR) 25 MG tablet Take 1 tablet (25 mg) by mouth daily 90 tablet 3     omeprazole (PRILOSEC OTC) 20 MG EC tablet Take 1 tablet (20 mg) by mouth daily 90 tablet 1     omeprazole (PRILOSEC) 20 MG DR capsule TAKE ONE CAPSULE BY MOUTH ONCE DAILY 90 capsule 3     Allergies   Allergen Reactions     No Known Allergies        Reviewed and updated as needed this visit by Provider  Tobacco  Meds  Problems  Med Hx  Surg Hx  Fam Hx         Review of Systems   ROS COMP: Constitutional, HEENT, cardiovascular, pulmonary, gi and gu systems are negative, except as otherwise noted.      Objective    /70   Pulse (P) 52   Temp (P) 98.3  F (36.8  C) (Temporal)   Resp (P) 14   Ht (P) 1.68 m (5' 6.14\")   Wt (P) 99.2 kg (218 lb 9.6 oz)   SpO2 (P) 98%   BMI (P) 35.13 kg/m    Body mass index is 35.13 kg/m  (pended).  Physical Exam   GENERAL: healthy, alert and no distress  EYES: Eyes grossly normal to inspection, PERRL and conjunctivae and sclerae normal  HENT: ear canals and TM's normal, nose and mouth without ulcers or lesions  NECK: no adenopathy, no asymmetry, masses, or scars and thyroid normal to palpation  RESP: lungs clear to auscultation - no rales, rhonchi or wheezes  CV: regular rate and " "rhythm, normal S1 S2, no S3 or S4, systolic murmur 2/6, click or rub, no peripheral edema and peripheral pulses strong  ABDOMEN: soft, nontender, no hepatosplenomegaly, no masses and bowel sounds normal  MS: no gross musculoskeletal defects noted, no edema    Diagnostic Test Results:  Labs reviewed in Epic        Assessment & Plan     1. Gastroesophageal reflux disease with esophagitis  Suspect symptoms are due to change from prilosec to ranitidine.  Will get him back on omeprazole.  Discussed dietary and lifestyle changes.  Follow up in clinic with PCP in two weeks for recheck- sooner if concerns.    - omeprazole (PRILOSEC OTC) 20 MG EC tablet; Take 1 tablet (20 mg) by mouth daily  Dispense: 90 tablet; Refill: 1    2. Epigastric pain  Did due EKG due to history.  This was normal and unchanged.    - EKG 12-lead complete w/read - Clinics    3. Mitral regurgitation and aortic stenosis  Last echo 12/18 and seen by cardiology- monitor    4. Aortic valve stenosis, etiology of cardiac valve disease unspecified  As above.         BMI:   Estimated body mass index is 35.13 kg/m  (pended) as calculated from the following:    Height as of this encounter: (P) 1.68 m (5' 6.14\").    Weight as of this encounter: (P) 99.2 kg (218 lb 9.6 oz).   Weight management plan: Discussed healthy diet and exercise guidelines        Return in about 4 weeks (around 9/9/2019).    Josy Duarte NP  [facility]    "

## 2019-08-12 NOTE — PROGRESS NOTES
Bhavesh Mirandareinierjailyn was evaluated at [facility] on August 12, 2019 at which time his blood pressure was:    BP Readings from Last 3 Encounters:   08/12/19 136/60   07/01/19 122/74   12/19/18 120/78     Pulse Readings from Last 3 Encounters:   12/19/18 68   11/19/18 56   11/13/18 60       Reviewed lifestyle modifications for blood pressure control and reduction: including making healthy food choices, managing weight, getting regular exercise, smoking cessation, reducing alcohol consumption, monitoring blood pressure regularly.     Symptoms: Lightheadedness    BP Goal:< 140/90 mmHg    BP Assessment:  BP at goal    Potential Reasons for BP too high: NA - Not applicable    BP Follow-Up Plan: Referral to PCP    Recommendation to Provider: No BP recommendation.  Patient complains of lightheadedness and upset stomach -- referred to clinic.

## 2019-08-12 NOTE — Clinical Note
No BP recommendation, but patient complains of lightheadedness and stomach upset.  Referred to clinic.

## 2019-10-23 ENCOUNTER — OFFICE VISIT (OUTPATIENT)
Dept: ORTHOPEDICS | Facility: CLINIC | Age: 64
End: 2019-10-23
Payer: COMMERCIAL

## 2019-10-23 ENCOUNTER — ANCILLARY PROCEDURE (OUTPATIENT)
Dept: GENERAL RADIOLOGY | Facility: CLINIC | Age: 64
End: 2019-10-23
Attending: ORTHOPAEDIC SURGERY
Payer: COMMERCIAL

## 2019-10-23 ENCOUNTER — TELEPHONE (OUTPATIENT)
Dept: ORTHOPEDICS | Facility: CLINIC | Age: 64
End: 2019-10-23

## 2019-10-23 VITALS
DIASTOLIC BLOOD PRESSURE: 80 MMHG | HEIGHT: 66 IN | SYSTOLIC BLOOD PRESSURE: 149 MMHG | WEIGHT: 218 LBS | BODY MASS INDEX: 35.03 KG/M2

## 2019-10-23 DIAGNOSIS — M17.12 PRIMARY OSTEOARTHRITIS OF LEFT KNEE: Primary | ICD-10-CM

## 2019-10-23 DIAGNOSIS — M25.562 LEFT KNEE PAIN: ICD-10-CM

## 2019-10-23 PROCEDURE — 20610 DRAIN/INJ JOINT/BURSA W/O US: CPT | Mod: LT | Performed by: ORTHOPAEDIC SURGERY

## 2019-10-23 PROCEDURE — 73564 X-RAY EXAM KNEE 4 OR MORE: CPT | Mod: TC

## 2019-10-23 PROCEDURE — 99203 OFFICE O/P NEW LOW 30 MIN: CPT | Mod: 25 | Performed by: ORTHOPAEDIC SURGERY

## 2019-10-23 RX ORDER — TRIAMCINOLONE ACETONIDE 40 MG/ML
40 INJECTION, SUSPENSION INTRA-ARTICULAR; INTRAMUSCULAR ONCE
Status: COMPLETED | OUTPATIENT
Start: 2019-10-23 | End: 2019-10-23

## 2019-10-23 RX ORDER — TRIAMCINOLONE ACETONIDE 40 MG/ML
40 INJECTION, SUSPENSION INTRA-ARTICULAR; INTRAMUSCULAR ONCE
Status: CANCELLED | OUTPATIENT
Start: 2019-10-23 | End: 2019-10-23

## 2019-10-23 RX ADMIN — TRIAMCINOLONE ACETONIDE 40 MG: 40 INJECTION, SUSPENSION INTRA-ARTICULAR; INTRAMUSCULAR at 13:53

## 2019-10-23 ASSESSMENT — MIFFLIN-ST. JEOR: SCORE: 1718.81

## 2019-10-23 NOTE — PROGRESS NOTES
ORTHOPEDIC CONSULT      Chief Complaint: Bhavesh Landeros is a 64 year old male who is being seen for   Chief Complaint   Patient presents with     Knee Pain     left knee pain     Consult       History of Present Illness:   Presents with 6 months worth of generalized left knee pain.  History of similar pain in the past.  He received a steroid injection a little over a year ago which helped until the summer.  No physical therapy.  Describes it as achy.  Weak when he tries to get up off the ground.  He takes occasional ibuprofen.  No specific injuries.  No swelling.  No traumas.        Patient's past medical, surgical, social and family histories reviewed.     Past Medical History:   Diagnosis Date     MEDICAL HISTORY OF -     Leg & Wrist fxs     Multiple sclerosis (H)        Past Surgical History:   Procedure Laterality Date     COLONOSCOPY N/A 1/22/2016    Procedure: COLONOSCOPY;  Surgeon: Pb Rodriguez MD;  Location:  GI     HC COLONOSCOPY W/WO BRUSH/WASH  03/27/06     HC KNEE SCOPE,MED/LAT MENISECTOMY  07/08/1999     HC REVISE MEDIAN N/CARPAL TUNNEL SURG  1986      UGI ENDOSCOPY DIAG W BIOPSY  10/26/09       Medications:  atorvastatin (LIPITOR) 20 MG tablet, Take 1 tablet (20 mg) by mouth daily  losartan (COZAAR) 25 MG tablet, Take 1 tablet (25 mg) by mouth daily  omeprazole (PRILOSEC OTC) 20 MG EC tablet, Take 1 tablet (20 mg) by mouth daily  aspirin 81 MG chewable tablet, Take 1 tablet (81 mg) by mouth daily  IBUPROFEN PO,   omeprazole (PRILOSEC) 20 MG DR capsule, TAKE ONE CAPSULE BY MOUTH ONCE DAILY    No current facility-administered medications on file prior to visit.       Allergies   Allergen Reactions     No Known Allergies        Social History     Occupational History     Employer: [employer]   Tobacco Use     Smoking status: Never Smoker     Smokeless tobacco: Never Used   Substance and Sexual Activity     Alcohol use: Not Currently     Alcohol/week: 0.0 standard drinks     Comment: occ  "    Drug use: No     Sexual activity: Not Currently     Partners: Female       Family History   Problem Relation Age of Onset     Diabetes Brother      Alzheimer Disease Father      Diabetes Sister        REVIEW OF SYSTEMS  10 point review systems performed otherwise negative as noted as per history of present illness.    Physical Exam:  Vitals: BP (!) 149/80   Ht 1.68 m (5' 6.14\")   Wt 98.9 kg (218 lb)   BMI 35.04 kg/m    BMI= Body mass index is 35.04 kg/m .  Constitutional: healthy, alert and no acute distress   Psychiatric: mentation appears normal and affect normal/bright  NEURO: no focal deficits  RESP: Normal with easy respirations and no use of accessory muscles to breathe, no audible wheezing or retractions  CV: LLE:  no edema         Regular rate and rhythm by palpation  SKIN: No erythema, rashes, excoriation, or breakdown. No evidence of infection.   JOINT/EXTREMITIES:left knee: 0-125 degrees active motion.  Pseudolaxity medial with valgus stressing.  Collateral ligaments are intact.  Small effusion.  Medial joint line tenderness.  Patella tracks midline.  Extensor mechanism intact     GAIT: not tested     Diagnostic Modalities:  left knee X-ray: Degenerative arthritis tricompartmental.  Bone-on-bone arthritis medial compartment.  Rimming osteophytes lateral compartment.  Independent visualization of the images was performed.      Impression: left knee primary osteoarthritis    Plan:  All of the above pertinent physical exam and imaging modalities findings was reviewed with Bhavesh.                                          CONSERVATIVE CARE:  I recommend conservative care for the patient to include focused self directed physical therapy, formal physical therapy, steroid injections. Today I provided or dispensed physical therapy.                                        INJECTION PROCEDURE:  The patient was counseled about an  injection, including discussion of risks (including infection), contents of the " injection, rationale for performing the injection, and expected benefits of the injection. The skin was prepped with alcohol and betadine and then utilizing sterile technique an injection of the left knee joint from the anterolateral approach in the seated position was performed. The injection consisted 1ml of Kenalog (40mg per 1 ml) mixed with 3ml of 0.5% Marcaine. The patient tolerated the injection well, and there were no complications. The injection site was covered with a Band-Aid. The injection was performed by PERLA Alarcon, CNP, DNP    Previous injection provided good relief.  Recommend repeat injection.  Also recommend adding physical therapy.    Return to clinic 4-6 , week(s), PRN, or sooner as needed for changes.  Re-x-ray on return: No    Marshal Holguin D.O.

## 2019-10-23 NOTE — TELEPHONE ENCOUNTER
Reason for Call:  Other call back    Detailed comments: pt declined to give a message, stated he would like to just speak with Dr. Holguin he has a quick questions. Thank You Cathy      Phone Number Patient can be reached at: Cell number on file:    Telephone Information:   Mobile [phone]       Best Time: any time     Can we leave a detailed message on this number? YES    Call taken on 10/23/2019 at 2:33 PM by Cathy Choe

## 2019-10-23 NOTE — PROGRESS NOTES
Clinic Administered Medication Documentation    MEDICATION LIST:   Injection Documentation     Injection was administered by provider (please see MAR for given by information). Please see MAR and medication order for additional information.     Site: Joint injection   Medication Used: Kenalog 40mg  Exp: 4/2021    The following medication was given by PERLA Bañuelos, CNP, DNP:     MEDICATION: Kenalog 40mg/1ml  ROUTE: Joint Injection  SITE: left knee  DOSE: 1 mL  LOT #: [id number]  : momondo  EXPIRATION DATE:  4/2021  NDC: [address]-2

## 2019-10-23 NOTE — LETTER
10/23/2019         RE: Bhavesh Landeros  [address] [facility] [address]        Dear Colleague,    Thank you for referring your patient, Bhavesh Landeros, to the [facility]. Please see a copy of my visit note below.    Clinic Administered Medication Documentation    MEDICATION LIST:   Injection Documentation     Injection was administered by provider (please see MAR for given by information). Please see MAR and medication order for additional information.     Site: Joint injection   Medication Used: Kenalog 40mg  Exp: 4/2021    The following medication was given by Carlos Pierre, APRN, CNP, DNP:     MEDICATION: Kenalog 40mg/1ml  ROUTE: Joint Injection  SITE: left knee  DOSE: 1 mL  LOT #: [id number]  : [employer]  EXPIRATION DATE:  4/2021  NDC: [address]-3                            ORTHOPEDIC CONSULT      Chief Complaint: Bhavesh Landeros is a 64 year old male who is being seen for   Chief Complaint   Patient presents with     Knee Pain     left knee pain     Consult       History of Present Illness:   Presents with 6 months worth of generalized left knee pain.  History of similar pain in the past.  He received a steroid injection a little over a year ago which helped until the summer.  No physical therapy.  Describes it as achy.  Weak when he tries to get up off the ground.  He takes occasional ibuprofen.  No specific injuries.  No swelling.  No traumas.        Patient's past medical, surgical, social and family histories reviewed.     Past Medical History:   Diagnosis Date     MEDICAL HISTORY OF -     Leg & Wrist fxs     Multiple sclerosis (H)        Past Surgical History:   Procedure Laterality Date     COLONOSCOPY N/A 1/22/2016    Procedure: COLONOSCOPY;  Surgeon: Pb Rodriguez MD;  Location: [facility]     HC COLONOSCOPY W/WO BRUSH/WASH  03/27/06     HC KNEE SCOPE,MED/LAT MENISECTOMY  07/08/1999     HC REVISE MEDIAN N/CARPAL TUNNEL SURG  1986     HC UGI  "ENDOSCOPY DIAG W BIOPSY  10/26/09       Medications:  atorvastatin (LIPITOR) 20 MG tablet, Take 1 tablet (20 mg) by mouth daily  losartan (COZAAR) 25 MG tablet, Take 1 tablet (25 mg) by mouth daily  omeprazole (PRILOSEC OTC) 20 MG EC tablet, Take 1 tablet (20 mg) by mouth daily  aspirin 81 MG chewable tablet, Take 1 tablet (81 mg) by mouth daily  IBUPROFEN PO,   omeprazole (PRILOSEC) 20 MG DR capsule, TAKE ONE CAPSULE BY MOUTH ONCE DAILY    No current facility-administered medications on file prior to visit.       Allergies   Allergen Reactions     No Known Allergies        Social History     Occupational History     Employer: [employer]   Tobacco Use     Smoking status: Never Smoker     Smokeless tobacco: Never Used   Substance and Sexual Activity     Alcohol use: Not Currently     Alcohol/week: 0.0 standard drinks     Comment: occ     Drug use: No     Sexual activity: Not Currently     Partners: Female       Family History   Problem Relation Age of Onset     Diabetes Brother      Alzheimer Disease Father      Diabetes Sister        REVIEW OF SYSTEMS  10 point review systems performed otherwise negative as noted as per history of present illness.    Physical Exam:  Vitals: BP (!) 149/80   Ht 1.68 m (5' 6.14\")   Wt 98.9 kg (218 lb)   BMI 35.04 kg/m     BMI= Body mass index is 35.04 kg/m .  Constitutional: healthy, alert and no acute distress   Psychiatric: mentation appears normal and affect normal/bright  NEURO: no focal deficits  RESP: Normal with easy respirations and no use of accessory muscles to breathe, no audible wheezing or retractions  CV: LLE:  no edema         Regular rate and rhythm by palpation  SKIN: No erythema, rashes, excoriation, or breakdown. No evidence of infection.   JOINT/EXTREMITIES:left knee: 0-125 degrees active motion.  Pseudolaxity medial with valgus stressing.  Collateral ligaments are intact.  Small effusion.  Medial joint line tenderness.  Patella tracks midline.  " Extensor mechanism intact     GAIT: not tested     Diagnostic Modalities:  left knee X-ray: Degenerative arthritis tricompartmental.  Bone-on-bone arthritis medial compartment.  Rimming osteophytes lateral compartment.  Independent visualization of the images was performed.      Impression: left knee primary osteoarthritis    Plan:  All of the above pertinent physical exam and imaging modalities findings was reviewed with Bhavesh.                                          CONSERVATIVE CARE:  I recommend conservative care for the patient to include focused self directed physical therapy, formal physical therapy, steroid injections. Today I provided or dispensed physical therapy.                                        INJECTION PROCEDURE:  The patient was counseled about an  injection, including discussion of risks (including infection), contents of the injection, rationale for performing the injection, and expected benefits of the injection. The skin was prepped with alcohol and betadine and then utilizing sterile technique an injection of the left knee joint from the anterolateral approach in the seated position was performed. The injection consisted 1ml of Kenalog (40mg per 1 ml) mixed with 3ml of 0.5% Marcaine. The patient tolerated the injection well, and there were no complications. The injection site was covered with a Band-Aid. The injection was performed by PERLA Alarcon, CNP, DNP    Previous injection provided good relief.  Recommend repeat injection.  Also recommend adding physical therapy.    Return to clinic 4-6 , week(s), PRN, or sooner as needed for changes.  Re-x-ray on return: No    Marshal Holguin D.O.    Again, thank you for allowing me to participate in the care of your patient.        Sincerely,        Candelario Holguin, DO

## 2019-10-24 NOTE — TELEPHONE ENCOUNTER
"I talked to Bhavesh and he is just wondering \"what is the end of the line look?\"  Is he going to end up with a knee replacement or can he just keep getting injections.    Chris/LEXII   "

## 2019-10-24 NOTE — TELEPHONE ENCOUNTER
Patient calling back anxious to speak with Dr. Holguin.  Again did not want to leave a message only speak with the doctor

## 2019-10-24 NOTE — TELEPHONE ENCOUNTER
Patient has bone on bone arthritis.  If the injections provide good relief and good quality of life for at least 3 months then I would proceed with injections.  Could do up to 3-4 injections a year.  If the injections are not working and having a poor quality of life due to the knee would consider knee replacement at that time. Will likely need a knee replacement at some point in the future     PERLA Bañuelos, CNP  Orthopedic Surgery

## 2019-11-20 ENCOUNTER — ANCILLARY PROCEDURE (OUTPATIENT)
Dept: GENERAL RADIOLOGY | Facility: CLINIC | Age: 64
End: 2019-11-20
Attending: ORTHOPAEDIC SURGERY
Payer: COMMERCIAL

## 2019-11-20 ENCOUNTER — OFFICE VISIT (OUTPATIENT)
Dept: ORTHOPEDICS | Facility: CLINIC | Age: 64
End: 2019-11-20
Payer: COMMERCIAL

## 2019-11-20 VITALS
HEIGHT: 66 IN | BODY MASS INDEX: 36 KG/M2 | SYSTOLIC BLOOD PRESSURE: 142 MMHG | DIASTOLIC BLOOD PRESSURE: 70 MMHG | WEIGHT: 224 LBS

## 2019-11-20 DIAGNOSIS — M17.11 PRIMARY OSTEOARTHRITIS OF RIGHT KNEE: Primary | ICD-10-CM

## 2019-11-20 DIAGNOSIS — M25.561 RIGHT KNEE PAIN: ICD-10-CM

## 2019-11-20 PROCEDURE — 73560 X-RAY EXAM OF KNEE 1 OR 2: CPT | Mod: TC

## 2019-11-20 PROCEDURE — 20610 DRAIN/INJ JOINT/BURSA W/O US: CPT | Mod: RT | Performed by: ORTHOPAEDIC SURGERY

## 2019-11-20 PROCEDURE — 99213 OFFICE O/P EST LOW 20 MIN: CPT | Mod: 25 | Performed by: ORTHOPAEDIC SURGERY

## 2019-11-20 RX ORDER — TRIAMCINOLONE ACETONIDE 40 MG/ML
40 INJECTION, SUSPENSION INTRA-ARTICULAR; INTRAMUSCULAR ONCE
Status: COMPLETED | OUTPATIENT
Start: 2019-11-20 | End: 2019-11-20

## 2019-11-20 RX ADMIN — TRIAMCINOLONE ACETONIDE 40 MG: 40 INJECTION, SUSPENSION INTRA-ARTICULAR; INTRAMUSCULAR at 12:05

## 2019-11-20 ASSESSMENT — MIFFLIN-ST. JEOR: SCORE: 1746.03

## 2019-11-20 ASSESSMENT — PAIN SCALES - GENERAL: PAINLEVEL: MILD PAIN (2)

## 2019-11-20 NOTE — LETTER
"    11/20/2019         RE: Bhavesh Landeros  [address] [facility] [address]        Dear Colleague,    Thank you for referring your patient, Bhavesh Landeros, to the [facility]. Please see a copy of my visit note below.    Bhavesh to follow up with Primary Care provider regarding elevated blood pressure.    Clinic Administered Medication Documentation    MEDICATION LIST:   Injection Documentation     Injection was administered by provider (please see MAR for given by information). Please see MAR and medication order for additional information.     Site: Joint injection   Medication Used: Kenolog    Exp:     The following medication was given by Carlos Pierre, APRN, CNP, DNP:     MEDICATION: Kenalog 40mg/1ml  ROUTE: Joint Injection  SITE: right knee  DOSE: 1 mL  LOT #: [id number]  : [employer]  EXPIRATION DATE:    NDC: [address]-9                        Office Visit-Follow up    Chief Complaint: Bhavesh Landeros is a 64 year old male who is being seen for   Chief Complaint   Patient presents with     Knee Pain     right knee pain       History of Present Illness:   Today's visit:  On his last visit he was seen for his left knee.  \"I should have had this one done as well\".  Same pain.  Medial side.  Sharp pain.  Worse with weightbearing and twisting.  Rates it as moderate to severe at times.  I would like an injection\".    October 23, 2019 visit:  Presents with 6 months worth of generalized left knee pain.  History of similar pain in the past.  He received a steroid injection a little over a year ago which helped until the summer.  No physical therapy.  Describes it as achy.  Weak when he tries to get up off the ground.  He takes occasional ibuprofen.  No specific injuries.  No swelling.  No traumas.    REVIEW OF SYSTEMS  General: negative for, night sweats, dizziness, fatigue  Resp: No shortness of breath and no cough  CV: negative for chest pain, syncope " "or near-syncope  GI: negative for nausea, vomiting and diarrhea  : negative for dysuria and hematuria  Musculoskeletal: as above  Neurologic: negative for syncope   Hematologic: negative for bleeding disorder    Physical Exam:  Vitals: BP (!) 142/70   Ht 1.68 m (5' 6.14\")   Wt 101.6 kg (224 lb)   BMI 36.00 kg/m     BMI= Body mass index is 36 kg/m .  Constitutional: healthy, alert and no acute distress   Psychiatric: mentation appears normal and affect normal/bright  NEURO: no focal deficits  RESP: Normal with easy respirations and no use of accessory muscles to breathe, no audible wheezing or retractions  CV: RLE: midcalf and down-  non-pitting edema         SKIN: No erythema, rashes, excoriation, or breakdown. No evidence of infection.   JOINT/EXTREMITIES:right knee: Varus deformity.  Tenderness along medial joint line.  Small effusion.  Knee is not fully correctable valgus stressing.  Full motion.  Patella tracks midline  GAIT: not tested             Diagnostic Modalities:  right knee X-ray: Bone-on-bone arthritis medial compartment.  Lateral compartment fairly well-preserved although some minimal wearing.  Patellofemoral shows narrowing lateral patellar facet with some osteophytes rimming.  Independent visualization of the images was performed.      Impression: right knee primary arthritis     Plan:  All of the above pertinent physical exam and imaging modalities findings was reviewed with Bhavesh.                                          INJECTION PROCEDURE:  The patient was counseled about an  injection, including discussion of risks (including infection), contents of the injection, rationale for performing the injection, and expected benefits of the injection. The skin was prepped with alcohol and betadine and then utilizing sterile technique an injection of the right knee joint from the anterolateral approach in the seated position was performed. The injection consisted 1ml of Kenalog (40mg per 1 ml) mixed " with 3ml of 0.5% Marcaine. The patient tolerated the injection well, and there were no complications. The injection site was covered with a Band-Aid. The injection was performed by Larry Pierre, APRN, CNP, DNP    Options discussed.  Left knee injection is done well.  He would like a right knee.  All questions answered      Return to clinic PRN, or sooner as needed for changes.  Re-x-ray on return: No    Marshal Holguin D.O.          Again, thank you for allowing me to participate in the care of your patient.        Sincerely,        Candelario Holguin, DO

## 2019-11-20 NOTE — PROGRESS NOTES
"Office Visit-Follow up    Chief Complaint: Bhavesh Landeros is a 64 year old male who is being seen for   Chief Complaint   Patient presents with     Knee Pain     right knee pain       History of Present Illness:   Today's visit:  On his last visit he was seen for his left knee.  \"I should have had this one done as well\".  Same pain.  Medial side.  Sharp pain.  Worse with weightbearing and twisting.  Rates it as moderate to severe at times.  I would like an injection\".    October 23, 2019 visit:  Presents with 6 months worth of generalized left knee pain.  History of similar pain in the past.  He received a steroid injection a little over a year ago which helped until the summer.  No physical therapy.  Describes it as achy.  Weak when he tries to get up off the ground.  He takes occasional ibuprofen.  No specific injuries.  No swelling.  No traumas.    REVIEW OF SYSTEMS  General: negative for, night sweats, dizziness, fatigue  Resp: No shortness of breath and no cough  CV: negative for chest pain, syncope or near-syncope  GI: negative for nausea, vomiting and diarrhea  : negative for dysuria and hematuria  Musculoskeletal: as above  Neurologic: negative for syncope   Hematologic: negative for bleeding disorder    Physical Exam:  Vitals: BP (!) 142/70   Ht 1.68 m (5' 6.14\")   Wt 101.6 kg (224 lb)   BMI 36.00 kg/m    BMI= Body mass index is 36 kg/m .  Constitutional: healthy, alert and no acute distress   Psychiatric: mentation appears normal and affect normal/bright  NEURO: no focal deficits  RESP: Normal with easy respirations and no use of accessory muscles to breathe, no audible wheezing or retractions  CV: RLE: midcalf and down-  non-pitting edema         SKIN: No erythema, rashes, excoriation, or breakdown. No evidence of infection.   JOINT/EXTREMITIES:right knee: Varus deformity.  Tenderness along medial joint line.  Small effusion.  Knee is not fully correctable valgus stressing.  Full motion.  Patella " tracks midline  GAIT: not tested             Diagnostic Modalities:  right knee X-ray: Bone-on-bone arthritis medial compartment.  Lateral compartment fairly well-preserved although some minimal wearing.  Patellofemoral shows narrowing lateral patellar facet with some osteophytes rimming.  Independent visualization of the images was performed.      Impression: right knee primary arthritis     Plan:  All of the above pertinent physical exam and imaging modalities findings was reviewed with Bhavesh.                                          INJECTION PROCEDURE:  The patient was counseled about an  injection, including discussion of risks (including infection), contents of the injection, rationale for performing the injection, and expected benefits of the injection. The skin was prepped with alcohol and betadine and then utilizing sterile technique an injection of the right knee joint from the anterolateral approach in the seated position was performed. The injection consisted 1ml of Kenalog (40mg per 1 ml) mixed with 3ml of 0.5% Marcaine. The patient tolerated the injection well, and there were no complications. The injection site was covered with a Band-Aid. The injection was performed by Larry Pierre, APRN, CNP, DNP    Options discussed.  Left knee injection is done well.  He would like a right knee.  All questions answered      Return to clinic PRN, or sooner as needed for changes.  Re-x-ray on return: No    Marshal Holguin D.O.

## 2019-11-20 NOTE — PROGRESS NOTES
Clinic Administered Medication Documentation    MEDICATION LIST:   Injection Documentation     Injection was administered by provider (please see MAR for given by information). Please see MAR and medication order for additional information.     Site: Joint injection   Medication Used: Kenolog    Exp:     The following medication was given by PERLA Bañuelos, CNP, DNP:     MEDICATION: Kenalog 40mg/1ml  ROUTE: Joint Injection  SITE: right knee  DOSE: 1 mL  LOT #: [id number]  : [employer]  EXPIRATION DATE:    NDC: [address]-6

## 2019-11-22 DIAGNOSIS — I35.0 AORTIC VALVE STENOSIS, ETIOLOGY OF CARDIAC VALVE DISEASE UNSPECIFIED: ICD-10-CM

## 2019-11-22 DIAGNOSIS — I10 BENIGN ESSENTIAL HYPERTENSION: ICD-10-CM

## 2019-11-22 DIAGNOSIS — E78.2 MIXED HYPERLIPIDEMIA: ICD-10-CM

## 2019-11-25 ENCOUNTER — TELEPHONE (OUTPATIENT)
Dept: FAMILY MEDICINE | Facility: OTHER | Age: 64
End: 2019-11-25

## 2019-11-25 ENCOUNTER — ANCILLARY PROCEDURE (OUTPATIENT)
Dept: GENERAL RADIOLOGY | Facility: OTHER | Age: 64
End: 2019-11-25
Attending: NURSE PRACTITIONER
Payer: COMMERCIAL

## 2019-11-25 ENCOUNTER — OFFICE VISIT (OUTPATIENT)
Dept: FAMILY MEDICINE | Facility: OTHER | Age: 64
End: 2019-11-25
Payer: COMMERCIAL

## 2019-11-25 VITALS
BODY MASS INDEX: 35.68 KG/M2 | WEIGHT: 222 LBS | TEMPERATURE: 98.1 F | OXYGEN SATURATION: 98 % | RESPIRATION RATE: 16 BRPM | SYSTOLIC BLOOD PRESSURE: 142 MMHG | HEART RATE: 63 BPM | DIASTOLIC BLOOD PRESSURE: 84 MMHG | HEIGHT: 66 IN

## 2019-11-25 DIAGNOSIS — R10.13 ABDOMINAL PAIN, EPIGASTRIC: Primary | ICD-10-CM

## 2019-11-25 DIAGNOSIS — I35.0 AORTIC VALVE STENOSIS, ETIOLOGY OF CARDIAC VALVE DISEASE UNSPECIFIED: ICD-10-CM

## 2019-11-25 DIAGNOSIS — R10.13 ABDOMINAL PAIN, EPIGASTRIC: ICD-10-CM

## 2019-11-25 DIAGNOSIS — I10 BENIGN ESSENTIAL HYPERTENSION: ICD-10-CM

## 2019-11-25 DIAGNOSIS — E78.2 MIXED HYPERLIPIDEMIA: ICD-10-CM

## 2019-11-25 DIAGNOSIS — E66.01 MORBID OBESITY (H): ICD-10-CM

## 2019-11-25 LAB
ALBUMIN SERPL-MCNC: 4.2 G/DL (ref 3.4–5)
ALBUMIN UR-MCNC: NEGATIVE MG/DL
ALP SERPL-CCNC: 93 U/L (ref 40–150)
ALT SERPL W P-5'-P-CCNC: 31 U/L (ref 0–70)
AMYLASE SERPL-CCNC: 41 U/L (ref 30–110)
ANION GAP SERPL CALCULATED.3IONS-SCNC: 4 MMOL/L (ref 3–14)
APPEARANCE UR: CLEAR
AST SERPL W P-5'-P-CCNC: 11 U/L (ref 0–45)
BILIRUB SERPL-MCNC: 0.4 MG/DL (ref 0.2–1.3)
BILIRUB UR QL STRIP: NEGATIVE
BUN SERPL-MCNC: 15 MG/DL (ref 7–30)
CALCIUM SERPL-MCNC: 9.3 MG/DL (ref 8.5–10.1)
CHLORIDE SERPL-SCNC: 106 MMOL/L (ref 94–109)
CO2 SERPL-SCNC: 31 MMOL/L (ref 20–32)
COLOR UR AUTO: YELLOW
CREAT SERPL-MCNC: 0.9 MG/DL (ref 0.66–1.25)
ERYTHROCYTE [DISTWIDTH] IN BLOOD BY AUTOMATED COUNT: 13.6 % (ref 10–15)
GFR SERPL CREATININE-BSD FRML MDRD: 89 ML/MIN/{1.73_M2}
GLUCOSE SERPL-MCNC: 89 MG/DL (ref 70–99)
GLUCOSE UR STRIP-MCNC: NEGATIVE MG/DL
HCT VFR BLD AUTO: 45.2 % (ref 40–53)
HGB BLD-MCNC: 14.8 G/DL (ref 13.3–17.7)
HGB UR QL STRIP: NEGATIVE
KETONES UR STRIP-MCNC: NEGATIVE MG/DL
LEUKOCYTE ESTERASE UR QL STRIP: NEGATIVE
LIPASE SERPL-CCNC: 136 U/L (ref 73–393)
MCH RBC QN AUTO: 28.4 PG (ref 26.5–33)
MCHC RBC AUTO-ENTMCNC: 32.7 G/DL (ref 31.5–36.5)
MCV RBC AUTO: 87 FL (ref 78–100)
NITRATE UR QL: NEGATIVE
PH UR STRIP: 7 PH (ref 5–7)
PLATELET # BLD AUTO: 238 10E9/L (ref 150–450)
POTASSIUM SERPL-SCNC: 4.8 MMOL/L (ref 3.4–5.3)
PROT SERPL-MCNC: 7.5 G/DL (ref 6.8–8.8)
RBC # BLD AUTO: 5.22 10E12/L (ref 4.4–5.9)
SODIUM SERPL-SCNC: 141 MMOL/L (ref 133–144)
SOURCE: NORMAL
SP GR UR STRIP: 1.01 (ref 1–1.03)
UROBILINOGEN UR STRIP-ACNC: 0.2 EU/DL (ref 0.2–1)
WBC # BLD AUTO: 10.5 10E9/L (ref 4–11)

## 2019-11-25 PROCEDURE — 81003 URINALYSIS AUTO W/O SCOPE: CPT | Performed by: NURSE PRACTITIONER

## 2019-11-25 PROCEDURE — 83690 ASSAY OF LIPASE: CPT | Performed by: NURSE PRACTITIONER

## 2019-11-25 PROCEDURE — 74019 RADEX ABDOMEN 2 VIEWS: CPT | Mod: FY

## 2019-11-25 PROCEDURE — 85027 COMPLETE CBC AUTOMATED: CPT | Performed by: NURSE PRACTITIONER

## 2019-11-25 PROCEDURE — 99214 OFFICE O/P EST MOD 30 MIN: CPT | Performed by: NURSE PRACTITIONER

## 2019-11-25 PROCEDURE — 93000 ELECTROCARDIOGRAM COMPLETE: CPT | Performed by: NURSE PRACTITIONER

## 2019-11-25 PROCEDURE — 80053 COMPREHEN METABOLIC PANEL: CPT | Performed by: NURSE PRACTITIONER

## 2019-11-25 PROCEDURE — 36415 COLL VENOUS BLD VENIPUNCTURE: CPT | Performed by: NURSE PRACTITIONER

## 2019-11-25 PROCEDURE — 82150 ASSAY OF AMYLASE: CPT | Performed by: NURSE PRACTITIONER

## 2019-11-25 RX ORDER — LOSARTAN POTASSIUM 25 MG/1
25 TABLET ORAL DAILY
Qty: 90 TABLET | Refills: 3 | Status: SHIPPED | OUTPATIENT
Start: 2019-11-25 | End: 2020-03-30 | Stop reason: DRUGHIGH

## 2019-11-25 RX ORDER — LOSARTAN POTASSIUM 25 MG/1
TABLET ORAL
Qty: 90 TABLET | Refills: 3 | OUTPATIENT
Start: 2019-11-25

## 2019-11-25 RX ORDER — ATORVASTATIN CALCIUM 20 MG/1
20 TABLET, FILM COATED ORAL DAILY
Qty: 90 TABLET | Refills: 3 | Status: SHIPPED | OUTPATIENT
Start: 2019-11-25 | End: 2020-11-12

## 2019-11-25 RX ORDER — ATORVASTATIN CALCIUM 20 MG/1
TABLET, FILM COATED ORAL
Qty: 90 TABLET | Refills: 3 | OUTPATIENT
Start: 2019-11-25

## 2019-11-25 ASSESSMENT — MIFFLIN-ST. JEOR: SCORE: 1736.99

## 2019-11-25 NOTE — PATIENT INSTRUCTIONS
Try miralax in clear fluid or prune juice or prunes    Avoid fatty, spicy, acidic foods for the next few days.     I will call with lab results.

## 2019-11-25 NOTE — PROGRESS NOTES
Subjective     Bhavesh Landeros is a 64 year old male who presents to clinic today for the following health issues:    HPI   ABDOMINAL   PAIN     Onset: 1 week    Description:   Character: Fullness  Location: epigastric region  Radiation: None    Intensity: 3/10    Progression of Symptoms:  same    Accompanying Signs & Symptoms:  Fever/Chills?: no   Gas/Bloating: YES- gas  Nausea: no   Vomitting: no   Diarrhea?: no   Constipation:no   Dysuria or Hematuria: no    History:   Trauma: no   Previous similar pain: no    Previous tests done: Colonoscopy    Precipitating factors:   Does the pain change with:     Food: no      BM: no     Urination: no not applicableNone    Therapies Tried and outcome: None    LMP:  not applicable         Has been going on for a week.  Stopped omeprazole two days ago.  Feels like he is full all the time.  Had bm this am - was was pretty large.  No fevers, chills, sweats. No diarrhea. Has not worsened.  Feels constant bussing in epigastric and abdomen.  Passing gas or having bowel movement does not help it. Feels can deep breath- but so bloated is uncomfortable.  Has been eating normally.  Feels voiding more- not drank more than usual.  He states his daughter made him come in.  She is really    2016 colonoscopy- diverticulosis and internal hemorrhoids.  Denies bloody stools or after wiping.      Patient Active Problem List   Diagnosis     Benign neoplasm of testis     GERD (gastroesophageal reflux disease)     Cellulitis of leg     Leg edema, right     Advanced directives, counseling/discussion     SOB (shortness of breath)     Newly recognized murmur     Ventral hernia without obstruction or gangrene     Aortic valve stenosis, etiology of cardiac valve disease unspecified     Mitral regurgitation and aortic stenosis     Primary osteoarthritis of right knee     Obesity (BMI 35.0-39.9) with comorbidity (H)     Past Surgical History:   Procedure Laterality Date     COLONOSCOPY N/A 1/22/2016     "Procedure: COLONOSCOPY;  Surgeon: Pb Rodriguez MD;  Location: [facility]     HC COLONOSCOPY W/WO BRUSH/WASH  03/27/06     HC KNEE SCOPE,MED/LAT MENISECTOMY  07/08/1999     HC REVISE MEDIAN N/CARPAL TUNNEL SURG  1986     HC UGI ENDOSCOPY DIAG W BIOPSY  10/26/09       Social History     Tobacco Use     Smoking status: Never Smoker     Smokeless tobacco: Never Used   Substance Use Topics     Alcohol use: Not Currently     Alcohol/week: 0.0 standard drinks     Comment: occ     Family History   Problem Relation Age of Onset     Diabetes Brother      Alzheimer Disease Father      Diabetes Sister          Current Outpatient Medications   Medication Sig Dispense Refill     aspirin 81 MG chewable tablet Take 1 tablet (81 mg) by mouth daily       atorvastatin (LIPITOR) 20 MG tablet Take 1 tablet (20 mg) by mouth daily 90 tablet 3     IBUPROFEN PO        losartan (COZAAR) 25 MG tablet Take 1 tablet (25 mg) by mouth daily 90 tablet 3     omeprazole (PRILOSEC) 20 MG DR capsule TAKE ONE CAPSULE BY MOUTH ONCE DAILY 90 capsule 3     Allergies   Allergen Reactions     No Known Allergies        Reviewed and updated as needed this visit by Provider         Review of Systems   ROS COMP: Constitutional, HEENT, cardiovascular, pulmonary, gi and gu systems are negative, except as otherwise noted.      Objective    BP (!) 142/84   Pulse 63   Temp 98.1  F (36.7  C) (Temporal)   Resp 16   Ht 1.68 m (5' 6.14\")   Wt 100.7 kg (222 lb)   SpO2 98%   BMI 35.68 kg/m    Body mass index is 35.68 kg/m .  Physical Exam   GENERAL: healthy, alert and no distress  NECK: no adenopathy, no asymmetry, masses, or scars and thyroid normal to palpation  RESP: lungs clear to auscultation - no rales, rhonchi or wheezes  CV: regular rate and rhythm, normal S1 S2, no S3 or S4, no murmur, click or rub, no peripheral edema and peripheral pulses strong  ABDOMEN: tenderness epigastric and RUQ, no organomegaly or masses, bowel sounds normal and no palpable or " "pulsatile masses  MS: no gross musculoskeletal defects noted, no edema  SKIN: no suspicious lesions or rashes    Diagnostic Test Results:  Labs reviewed in Epic        Assessment & Plan     1. Abdominal pain, epigastric  Xray with moderate gas and stool.  CBC is normal.  CMP is pending.  EKG is NSR no ectopy and no ischemia.  UA is negative.  Will have him restart his omeprazole.  Will add miralax.  Have him keep a food and symptom diary and return in 1-2 weeks if no improvement or new concerns.    - UA reflex to Microscopic  - XR Abdomen 2 Views; Future  - Comprehensive metabolic panel (BMP + Alb, Alk Phos, ALT, AST, Total. Bili, TP)  - Lipase  - Amylase  - CBC with platelets  - EKG 12-lead complete w/read - Clinics    2. Morbid obesity (H)  Advised dietary and lifestyle changes    3. Mixed hyperlipidemia  Renew meds today  - atorvastatin (LIPITOR) 20 MG tablet; Take 1 tablet (20 mg) by mouth daily  Dispense: 90 tablet; Refill: 3    4. Benign essential hypertension  Recheck lytes- slightly elevated in office  - losartan (COZAAR) 25 MG tablet; Take 1 tablet (25 mg) by mouth daily  Dispense: 90 tablet; Refill: 3    5. Aortic valve stenosis, etiology of cardiac valve disease unspecified  Followed by cards.  Stable.   - losartan (COZAAR) 25 MG tablet; Take 1 tablet (25 mg) by mouth daily  Dispense: 90 tablet; Refill: 3     BMI:   Estimated body mass index is 35.68 kg/m  as calculated from the following:    Height as of this encounter: 1.68 m (5' 6.14\").    Weight as of this encounter: 100.7 kg (222 lb).   Weight management plan: Discussed healthy diet and exercise guidelines    Return in about 4 weeks (around 12/23/2019) for not improving or new concerns.    Josy Duarte NP  [facility]    "

## 2019-11-25 NOTE — TELEPHONE ENCOUNTER
Pending Prescriptions:                       Disp   Refills    losartan (COZAAR) 25 MG tablet [Pharmacy *90 tab*3            Sig: TAKE ONE TABLET BY MOUTH ONCE DAILY    atorvastatin (LIPITOR) 20 MG tablet [Phar*90 tab*3            Sig: TAKE ONE TABLET BY MOUTH ONCE DAILY    Next 5 appointments (look out 90 days)    Nov 25, 2019  1:30 PM CST  [facility]arte NP  [facility] [address] [facility] [address] [phone]        Pended in todays OV encounter    Em Pitt, RN, BSN

## 2019-11-26 ENCOUNTER — TELEPHONE (OUTPATIENT)
Dept: FAMILY MEDICINE | Facility: OTHER | Age: 64
End: 2019-11-26

## 2019-11-26 RX ORDER — LOSARTAN POTASSIUM 25 MG/1
TABLET ORAL
Qty: 30 TABLET | Refills: 0 | Status: SHIPPED | OUTPATIENT
Start: 2019-11-26 | End: 2020-01-06

## 2019-11-26 RX ORDER — ATORVASTATIN CALCIUM 20 MG/1
TABLET, FILM COATED ORAL
Qty: 30 TABLET | Refills: 0 | Status: SHIPPED | OUTPATIENT
Start: 2019-11-26 | End: 2020-03-30

## 2019-11-26 NOTE — TELEPHONE ENCOUNTER
Blood pressures from last couple visits have been not at goal. I recommend he is seen in clinic by provider of choice for follow up on BP and is due for fasting labs at that appointment. I will send in one month refill of BP medication and cholesterol medication.  Almita Gonzalez, CNP

## 2019-11-26 NOTE — TELEPHONE ENCOUNTER
Per huddle with Radha Gonzalez cancel the refills that were sent by her, Josy Gerald sent refills for 90days with 3 refills these are good, I have called and spoke with Mame at [facility] pharmacy she will delete EM's refills  Closing encounter  Yanet Cotton RT (R)

## 2019-11-26 NOTE — TELEPHONE ENCOUNTER
Pending Prescriptions:                       Disp   Refills    losartan (COZAAR) 25 MG tablet [Pharmacy *90 tab*3            Sig: TAKE ONE TABLET BY MOUTH ONCE DAILY    atorvastatin (LIPITOR) 20 MG tablet [Phar*90 tab*3            Sig: TAKE ONE TABLET BY MOUTH ONCE DAILY    BP Readings from Last 3 Encounters:   11/25/19 (!) 142/84   11/20/19 (!) 142/70   10/23/19 (!) 149/80     Routing refill request to provider for review/approval because:  Labs out of range:  BP  Lipids overdue  Creatinine and potassium out of range  LOV 11/25/2019    Em Pitt, RN, BSN

## 2019-12-02 PROBLEM — E78.2 MIXED HYPERLIPIDEMIA: Status: ACTIVE | Noted: 2019-12-02

## 2019-12-28 ENCOUNTER — HOSPITAL ENCOUNTER (EMERGENCY)
Facility: CLINIC | Age: 64
Discharge: HOME | End: 2019-12-28
Attending: NURSE PRACTITIONER | Admitting: NURSE PRACTITIONER
Payer: COMMERCIAL

## 2019-12-28 VITALS
HEART RATE: 66 BPM | OXYGEN SATURATION: 99 % | RESPIRATION RATE: 16 BRPM | WEIGHT: 225 LBS | DIASTOLIC BLOOD PRESSURE: 73 MMHG | TEMPERATURE: 98.1 F | BODY MASS INDEX: 36.16 KG/M2 | SYSTOLIC BLOOD PRESSURE: 132 MMHG

## 2019-12-28 DIAGNOSIS — R55 VASOVAGAL SYNCOPE: ICD-10-CM

## 2019-12-28 LAB
ALBUMIN SERPL-MCNC: 3.5 G/DL (ref 3.4–5)
ALBUMIN UR-MCNC: NEGATIVE MG/DL
ALP SERPL-CCNC: 73 U/L (ref 40–150)
ALT SERPL W P-5'-P-CCNC: 26 U/L (ref 0–70)
ANION GAP SERPL CALCULATED.3IONS-SCNC: 4 MMOL/L (ref 3–14)
APPEARANCE UR: ABNORMAL
AST SERPL W P-5'-P-CCNC: 15 U/L (ref 0–45)
BASOPHILS # BLD AUTO: 0 10E9/L (ref 0–0.2)
BASOPHILS NFR BLD AUTO: 0.4 %
BILIRUB SERPL-MCNC: 0.3 MG/DL (ref 0.2–1.3)
BILIRUB UR QL STRIP: NEGATIVE
BUN SERPL-MCNC: 15 MG/DL (ref 7–30)
CALCIUM SERPL-MCNC: 8.4 MG/DL (ref 8.5–10.1)
CHLORIDE SERPL-SCNC: 109 MMOL/L (ref 94–109)
CO2 SERPL-SCNC: 27 MMOL/L (ref 20–32)
COLOR UR AUTO: YELLOW
CREAT SERPL-MCNC: 0.88 MG/DL (ref 0.66–1.25)
DIFFERENTIAL METHOD BLD: NORMAL
EOSINOPHIL NFR BLD AUTO: 0.7 %
ERYTHROCYTE [DISTWIDTH] IN BLOOD BY AUTOMATED COUNT: 13.1 % (ref 10–15)
GFR SERPL CREATININE-BSD FRML MDRD: >90 ML/MIN/{1.73_M2}
GLUCOSE SERPL-MCNC: 126 MG/DL (ref 70–99)
GLUCOSE UR STRIP-MCNC: NEGATIVE MG/DL
HCT VFR BLD AUTO: 41.3 % (ref 40–53)
HGB BLD-MCNC: 13.7 G/DL (ref 13.3–17.7)
HGB UR QL STRIP: NEGATIVE
IMM GRANULOCYTES # BLD: 0 10E9/L (ref 0–0.4)
IMM GRANULOCYTES NFR BLD: 0.4 %
KETONES UR STRIP-MCNC: NEGATIVE MG/DL
LEUKOCYTE ESTERASE UR QL STRIP: NEGATIVE
LYMPHOCYTES # BLD AUTO: 1.1 10E9/L (ref 0.8–5.3)
LYMPHOCYTES NFR BLD AUTO: 10.9 %
MCH RBC QN AUTO: 28.8 PG (ref 26.5–33)
MCHC RBC AUTO-ENTMCNC: 33.2 G/DL (ref 31.5–36.5)
MCV RBC AUTO: 87 FL (ref 78–100)
MONOCYTES # BLD AUTO: 0.5 10E9/L (ref 0–1.3)
MONOCYTES NFR BLD AUTO: 4.9 %
MUCOUS THREADS #/AREA URNS LPF: PRESENT /LPF
NEUTROPHILS # BLD AUTO: 8.2 10E9/L (ref 1.6–8.3)
NEUTROPHILS NFR BLD AUTO: 82.7 %
NITRATE UR QL: NEGATIVE
NRBC # BLD AUTO: 0 10*3/UL
NRBC BLD AUTO-RTO: 0 /100
PH UR STRIP: 6 PH (ref 5–7)
PLATELET # BLD AUTO: 211 10E9/L (ref 150–450)
POTASSIUM SERPL-SCNC: 3.9 MMOL/L (ref 3.4–5.3)
PROT SERPL-MCNC: 6.5 G/DL (ref 6.8–8.8)
RBC # BLD AUTO: 4.75 10E12/L (ref 4.4–5.9)
RBC #/AREA URNS AUTO: <1 /HPF (ref 0–2)
SODIUM SERPL-SCNC: 140 MMOL/L (ref 133–144)
SOURCE: ABNORMAL
SP GR UR STRIP: 1.01 (ref 1–1.03)
SQUAMOUS #/AREA URNS AUTO: <1 /HPF (ref 0–1)
TROPONIN I SERPL-MCNC: <0.015 UG/L (ref 0–0.04)
TSH SERPL DL<=0.005 MIU/L-ACNC: 3.27 MU/L (ref 0.4–4)
UROBILINOGEN UR STRIP-MCNC: 2 MG/DL (ref 0–2)
WBC # BLD AUTO: 9.9 10E9/L (ref 4–11)
WBC #/AREA URNS AUTO: 1 /HPF (ref 0–5)

## 2019-12-28 PROCEDURE — 96360 HYDRATION IV INFUSION INIT: CPT | Performed by: NURSE PRACTITIONER

## 2019-12-28 PROCEDURE — 84443 ASSAY THYROID STIM HORMONE: CPT | Performed by: NURSE PRACTITIONER

## 2019-12-28 PROCEDURE — 85025 COMPLETE CBC W/AUTO DIFF WBC: CPT | Performed by: NURSE PRACTITIONER

## 2019-12-28 PROCEDURE — 99284 EMERGENCY DEPT VISIT MOD MDM: CPT | Mod: 25 | Performed by: NURSE PRACTITIONER

## 2019-12-28 PROCEDURE — 93010 ELECTROCARDIOGRAM REPORT: CPT | Mod: Z6 | Performed by: NURSE PRACTITIONER

## 2019-12-28 PROCEDURE — 84484 ASSAY OF TROPONIN QUANT: CPT | Performed by: NURSE PRACTITIONER

## 2019-12-28 PROCEDURE — 25800030 ZZH RX IP 258 OP 636: Performed by: NURSE PRACTITIONER

## 2019-12-28 PROCEDURE — 93005 ELECTROCARDIOGRAM TRACING: CPT | Performed by: NURSE PRACTITIONER

## 2019-12-28 PROCEDURE — 81001 URINALYSIS AUTO W/SCOPE: CPT | Performed by: NURSE PRACTITIONER

## 2019-12-28 PROCEDURE — 80053 COMPREHEN METABOLIC PANEL: CPT | Performed by: NURSE PRACTITIONER

## 2019-12-28 RX ADMIN — SODIUM CHLORIDE 1000 ML: 9 INJECTION, SOLUTION INTRAVENOUS at 14:01

## 2019-12-28 NOTE — ED TRIAGE NOTES
"Brought to ED via EMS after having a syncopal episode about 1145 this morning. He reports his \"teeth were hurting\" prior to EMS arrival and took ASA for that. Nika Wilson RN   Patient reports feeling abdominal cramps and was getting up to go to restroom when he became dizzy and light headed. He passed out and was incontinent of stool. Nika Wilson RN    "

## 2019-12-28 NOTE — ED AVS SNAPSHOT
[facility] Emergency Department  [address] [place] [address]  Phone:  [phone]  Fax:  [phone]                                    Bhavesh Landeros   MRN: [id number]    Department:  [facility] Emergency Department   Date of Visit:  12/28/2019           After Visit Summary Signature Page    I have received my discharge instructions, and my questions have been answered. I have discussed any challenges I see with this plan with the nurse or doctor.    ..........................................................................................................................................  Patient/Patient Representative Signature      ..........................................................................................................................................  Patient Representative Print Name and Relationship to Patient    ..................................................               ................................................  Date                                   Time    ..........................................................................................................................................  Reviewed by Signature/Title    ...................................................              ..............................................  Date                                               Time          22EPIC Rev 08/18

## 2019-12-28 NOTE — ED PROVIDER NOTES
History     Chief Complaint   Patient presents with     Loss of Consciousness     The history is provided by the patient.     Bhavesh Landeros is a 64 year old male who presents to the emergency department via EMS for loss of consciousness. Patient reports having a syncopal episode around 1145 this morning. He states he was at his grandson's wrestling match when this happened. He was sitting on the bleacher when he felt his stomach cramp and pain in his teeth, got up to go to the bathroom, felt lightheaded and passed out. He denies any injuries from his syncopal episode. He denies any chest pain, headache or recent illness. He states this has never happened before. He states he does not drink a lot of water. He does drink about 1 pot of coffee a day.    Allergies:  Allergies   Allergen Reactions     No Known Allergies        Problem List:    Patient Active Problem List    Diagnosis Date Noted     Cellulitis of leg 08/16/2011     Priority: High     Mixed hyperlipidemia 12/02/2019     Priority: Medium     Benign essential hypertension 12/02/2019     Priority: Medium     Obesity (BMI 35.0-39.9) with comorbidity (H) 11/25/2019     Priority: Medium     Primary osteoarthritis of right knee 11/20/2019     Priority: Medium     Aortic valve stenosis, etiology of cardiac valve disease unspecified 11/19/2018     Priority: Medium     Mitral regurgitation and aortic stenosis 11/19/2018     Priority: Medium     Ventral hernia without obstruction or gangrene 11/09/2018     Priority: Medium     Newly recognized murmur 11/07/2018     Priority: Medium     Advanced directives, counseling/discussion 09/23/2014     Priority: Medium     Pt was given info       SOB (shortness of breath) 09/23/2014     Priority: Medium     Leg edema, right 05/16/2013     Priority: Medium     GERD (gastroesophageal reflux disease) 10/27/2009     Priority: Medium     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     Priority: Medium        Past Medical History:     Past Medical History:   Diagnosis Date     MEDICAL HISTORY OF -      Multiple sclerosis (H)        Past Surgical History:    Past Surgical History:   Procedure Laterality Date     COLONOSCOPY N/A 1/22/2016    Procedure: COLONOSCOPY;  Surgeon: Pb Rodriguez MD;  Location: [facility]     HC COLONOSCOPY W/WO BRUSH/WASH  03/27/06     HC KNEE SCOPE,MED/LAT MENISECTOMY  07/08/1999     HC REVISE MEDIAN N/CARPAL TUNNEL SURG  1986      UGI ENDOSCOPY DIAG W BIOPSY  10/26/09       Family History:    Family History   Problem Relation Age of Onset     Diabetes Brother      Alzheimer Disease Father      Diabetes Sister        Social History:  Marital Status:   [5]  Social History     Tobacco Use     Smoking status: Never Smoker     Smokeless tobacco: Never Used   Substance Use Topics     Alcohol use: Not Currently     Alcohol/week: 0.0 standard drinks     Comment: occ     Drug use: No        Medications:    aspirin 81 MG chewable tablet  atorvastatin (LIPITOR) 20 MG tablet  atorvastatin (LIPITOR) 20 MG tablet  IBUPROFEN PO  losartan (COZAAR) 25 MG tablet  losartan (COZAAR) 25 MG tablet  omeprazole (PRILOSEC) 20 MG DR capsule          Review of Systems   All other systems reviewed and are negative.      Physical Exam   BP: 129/76  Pulse: 66  Temp: 98.1  F (36.7  C)  Resp: 20  Weight: 102.1 kg (225 lb)(Bed scale)  SpO2: 99 %      Physical Exam  Vitals signs and nursing note reviewed.   Constitutional:       Appearance: He is well-developed.   HENT:      Head: Normocephalic.      Nose: Nose normal.   Eyes:      General: No scleral icterus.     Conjunctiva/sclera: Conjunctivae normal.   Neck:      Musculoskeletal: Normal range of motion and neck supple.   Cardiovascular:      Rate and Rhythm: Normal rate and regular rhythm.      Heart sounds: Normal heart sounds.   Pulmonary:      Effort: Pulmonary effort is normal.      Breath sounds: Normal breath sounds.   Abdominal:      Palpations: Abdomen is soft.      Tenderness: There  is no abdominal tenderness.   Musculoskeletal: Normal range of motion.   Skin:     General: Skin is warm and dry.      Coloration: Skin is not pale.   Neurological:      Mental Status: He is alert.      Motor: No abnormal muscle tone.   Psychiatric:         Behavior: Behavior normal.         ED Course        Procedures         EKG Interpretation:      Interpreted by PERLA Walsh CNP  Time reviewed: 1356  Symptoms at time of EKG: Post Syncopal Episode   Rhythm: normal sinus   Rate: normal  Axis: normal  Ectopy: none  Conduction: normal  ST Segments/ T Waves: No ST-T wave changes  Q Waves: none  Comparison to prior: Unchanged  Clinical Impression: normal EKG      Results for orders placed or performed during the hospital encounter of 12/28/19 (from the past 24 hour(s))   CBC with platelets differential   Result Value Ref Range    WBC 9.9 4.0 - 11.0 10e9/L    RBC Count 4.75 4.4 - 5.9 10e12/L    Hemoglobin 13.7 13.3 - 17.7 g/dL    Hematocrit 41.3 40.0 - 53.0 %    MCV 87 78 - 100 fl    MCH 28.8 26.5 - 33.0 pg    MCHC 33.2 31.5 - 36.5 g/dL    RDW 13.1 10.0 - 15.0 %    Platelet Count 211 150 - 450 10e9/L    Diff Method Automated Method     % Neutrophils 82.7 %    % Lymphocytes 10.9 %    % Monocytes 4.9 %    % Eosinophils 0.7 %    % Basophils 0.4 %    % Immature Granulocytes 0.4 %    Nucleated RBCs 0 0 /100    Absolute Neutrophil 8.2 1.6 - 8.3 10e9/L    Absolute Lymphocytes 1.1 0.8 - 5.3 10e9/L    Absolute Monocytes 0.5 0.0 - 1.3 10e9/L    Absolute Basophils 0.0 0.0 - 0.2 10e9/L    Abs Immature Granulocytes 0.0 0 - 0.4 10e9/L    Absolute Nucleated RBC 0.0    Comprehensive metabolic panel   Result Value Ref Range    Sodium 140 133 - 144 mmol/L    Potassium 3.9 3.4 - 5.3 mmol/L    Chloride 109 94 - 109 mmol/L    Carbon Dioxide 27 20 - 32 mmol/L    Anion Gap 4 3 - 14 mmol/L    Glucose 126 (H) 70 - 99 mg/dL    Urea Nitrogen 15 7 - 30 mg/dL    Creatinine 0.88 0.66 - 1.25 mg/dL    GFR Estimate >90 >60  mL/min/[1.73_m2]    GFR Estimate If Black >90 >60 mL/min/[1.73_m2]    Calcium 8.4 (L) 8.5 - 10.1 mg/dL    Bilirubin Total 0.3 0.2 - 1.3 mg/dL    Albumin 3.5 3.4 - 5.0 g/dL    Protein Total 6.5 (L) 6.8 - 8.8 g/dL    Alkaline Phosphatase 73 40 - 150 U/L    ALT 26 0 - 70 U/L    AST 15 0 - 45 U/L   Troponin I   Result Value Ref Range    Troponin I ES <0.015 0.000 - 0.045 ug/L   TSH with free T4 reflex   Result Value Ref Range    TSH 3.27 0.40 - 4.00 mU/L   UA with Microscopic   Result Value Ref Range    Color Urine Yellow     Appearance Urine Slightly Cloudy     Glucose Urine Negative NEG^Negative mg/dL    Bilirubin Urine Negative NEG^Negative    Ketones Urine Negative NEG^Negative mg/dL    Specific Gravity Urine 1.014 1.003 - 1.035    Blood Urine Negative NEG^Negative    pH Urine 6.0 5.0 - 7.0 pH    Protein Albumin Urine Negative NEG^Negative mg/dL    Urobilinogen mg/dL 2.0 0.0 - 2.0 mg/dL    Nitrite Urine Negative NEG^Negative    Leukocyte Esterase Urine Negative NEG^Negative    Source Midstream Urine     WBC Urine 1 0 - 5 /HPF    RBC Urine <1 0 - 2 /HPF    Squamous Epithelial /HPF Urine <1 0 - 1 /HPF    Mucous Urine Present (A) NEG^Negative /LPF       Medications   0.9% sodium chloride BOLUS (0 mLs Intravenous Stopped 12/28/19 1442)       Assessments & Plan (with Medical Decision Making)  Vasovagal Syncope  Work up today is unremarkable and reassuring.  No signs of acute cardiac involvement.  EKG normal with no ectopy or arrhythmia.  No arrhythmia on monitor here.   Blood work today is reassuring, no significant electrolyte abnormalities, blood sugar mildly elevated at 126, no infectious etiology, hemoglobin and white count are stable, urinalysis is negative for infection.  TSH is normal as well.  Patient drinks absolutely no water throughout the day but roughly a pot of coffee every day certainly this could be playing a role in his syncopal episode today.  He is completely asymptomatic here and had no return of  lightheadedness or dizziness and I feel he is stable to be discharged home.  The importance of hydration was discussed with patient.  Certainly if this becomes more frequent in occurrence I would recommend following up with his primary care provider as a Zio patch or Holter monitor may be beneficial.  Patient is agreeable to plan of care and discharged in stable condition.     I have reviewed the nursing notes.    I have reviewed the findings, diagnosis, plan and need for follow up with the patient.    New Prescriptions    No medications on file       Final diagnoses:   Vasovagal syncope     This document serves as a record of services personally performed by Madeline Carpenter CNP. It was created on their behalf by Natali Chacko, a trained medical scribe. The creation of this record is based on the provider's personal observations and the statements of the patient. This document has been checked and approved by the attending provider.    Note: Chart documentation done in part with Dragon Voice Recognition software. Although reviewed after completion, some word and grammatical errors may remain.    12/28/2019   [facility] EMERGENCY DEPARTMENT     Luci Carpenter APRN CNP  12/28/19 5663

## 2020-01-03 ENCOUNTER — OFFICE VISIT (OUTPATIENT)
Dept: SURGERY | Facility: CLINIC | Age: 65
End: 2020-01-03
Payer: COMMERCIAL

## 2020-01-03 VITALS
WEIGHT: 226.9 LBS | BODY MASS INDEX: 36.47 KG/M2 | DIASTOLIC BLOOD PRESSURE: 88 MMHG | SYSTOLIC BLOOD PRESSURE: 150 MMHG | HEIGHT: 66 IN | TEMPERATURE: 98 F

## 2020-01-03 DIAGNOSIS — R06.02 SOB (SHORTNESS OF BREATH): ICD-10-CM

## 2020-01-03 DIAGNOSIS — M62.08 DIASTASIS OF RECTUS ABDOMINIS: Primary | ICD-10-CM

## 2020-01-03 DIAGNOSIS — I10 BENIGN ESSENTIAL HYPERTENSION: ICD-10-CM

## 2020-01-03 DIAGNOSIS — E66.01 MORBID OBESITY (H): ICD-10-CM

## 2020-01-03 PROCEDURE — 99203 OFFICE O/P NEW LOW 30 MIN: CPT | Performed by: SURGERY

## 2020-01-03 ASSESSMENT — MIFFLIN-ST. JEOR: SCORE: 1756.96

## 2020-01-03 NOTE — PROGRESS NOTES
General Surgery Consultation    Bhavesh Landeros MRN# [id number]   Age: 64 year old YOB: 1955     Reason for consult: Epigastric hernia                        Assessment and Plan:   I was asked to see this patient at the request of Josy Duarte NP for evaluation of epigastric hernia.  Bhavesh Landeros is a 64 year old male who presented with history, exam, laboratory and imaging most consistent with .       ICD-10-CM    1. Diastasis of rectus abdominis M62.08    2. SOB (shortness of breath) R06.02    3. Obesity (BMI 35.0-39.9) with comorbidity (H) E66.01    4. Benign essential hypertension I10      I reassured the patient that he does not have a ventral hernia.  What he has is called a diastases rectus of the abdominis muscle.  I referenced this back to his CT of the abdomen and pelvis which showed that he has diastases rectus back in 2014.  He does not have any other hernias elsewhere.  Thus I recommend that he be seen by his primary care provider for his shortness of breath as his diastases rectus would not be the cause of his shortness of breath.  All of his questions were answered to his satisfaction.      I thank Josy Duarte NP for the opportunity to participate in the patient's care.           Chief Complaint:   Ventral heria and SOB     History is obtained from the patient         History of Present Illness:   This patient is a 64 year old  male with a significant past medical history of asthma, hypertension and morbid obesity who presents with SOB and bulge at the superior aspect of the midline abdomen.    Patient stated that this bulge of his abdomen has been there for many many years.  However he started having shortness of breath several months ago and had a vasovagal moment that prompted an ED visit last month.  Cardiac work-up and everything else was negative at this particular ED visit.  However he still has this bulge and he is concerned that his shortness of breath is related  to this bulge.  He was told that he has a ventral hernia and should be seen by a surgeon.  Stated he does not have pain with this area.  He does not remember when it first came about.  There was no popping or tearing or inciting incident that made this bulge occurred.  Denies any nausea, vomiting.  No issues with passing gas and having bowel movements.  Denies any abdominal surgeries in the past.  Denies history of MI, CVA.  Denies any fevers or chills.  Denies being on any medications except the fact that he has history of asthma.          Past Medical History:    has a past medical history of MEDICAL HISTORY OF - and Multiple sclerosis (H).          Past Surgical History:     Past Surgical History:   Procedure Laterality Date     COLONOSCOPY N/A 1/22/2016    Procedure: COLONOSCOPY;  Surgeon: Pb Rodriguez MD;  Location:  GI     HC COLONOSCOPY W/WO BRUSH/WASH  03/27/06     HC KNEE SCOPE,MED/LAT MENISECTOMY  07/08/1999     HC REVISE MEDIAN N/CARPAL TUNNEL SURG  1986      UGI ENDOSCOPY DIAG W BIOPSY  10/26/09           Medications:   aspirin 81 MG chewable tablet, Take 1 tablet (81 mg) by mouth daily  atorvastatin (LIPITOR) 20 MG tablet, TAKE ONE TABLET BY MOUTH ONCE DAILY  atorvastatin (LIPITOR) 20 MG tablet, Take 1 tablet (20 mg) by mouth daily  IBUPROFEN PO,   losartan (COZAAR) 25 MG tablet, Take 1 tablet (25 mg) by mouth daily  omeprazole (PRILOSEC) 20 MG DR capsule, TAKE ONE CAPSULE BY MOUTH ONCE DAILY (Patient not taking: Reported on 1/3/2020)    No current facility-administered medications on file prior to visit.         Allergies:      Allergies   Allergen Reactions     No Known Allergies             Social History:   Bhavesh Mirandaemir  reports that he has never smoked. He has never used smokeless tobacco. He reports previous alcohol use. He reports that he does not use drugs.          Family History:   The patient has no family history of any bleeding, clotting or anesthesia problems.          Review of  "Systems:     Constitutional: Denies fever or chills   Eyes: Denies change in visual acuity   HENT: Denies nasal congestion or sore throat   Respiratory: Denies cough; +shortness of breath   Cardiovascular: Denies chest pain or edema   GI: Denies abdominal pain, nausea, vomiting, bloody stools or diarrhea   : Denies dysuria   Musculoskeletal: Denies back pain or joint pain   Integument: Denies rash   Neurologic: Denies headache, focal weakness or sensory changes   Endocrine: Denies polyuria or polydipsia   Lymphatic: Denies swollen glands   Psychiatric: Denies depression or anxiety          Physical Exam:     Vitals: BP (!) 150/88   Temp 98  F (36.7  C) (Temporal)   Ht 1.676 m (5' 6\")   Wt 102.9 kg (226 lb 14.4 oz)   BMI 36.62 kg/m    BMI= Body mass index is 36.62 kg/m .  Constitutional: Awake, alert, no acute distress.  Eyes:  No scleral icterus.  Conjunctiva are without injection.  ENMT: Mucous membranes moist, dentition and gums are intact.   Neck: Soft, supple, trachea midline.    Endocrine: n/a  Lymphatic: There is no cervical, submandibular, or supraclavicular adenopathy.  Respiratory: No audible wheezes, no acute distress  Cardiovascular: Regular; S1, S2    Abdomen: Noted large diastasis rectus from the epigastrium down the umbilicus.  Non-distended, non-tender, normoactive bowel sounds present, No masses, no ventral hernia or umbilical hernia, or flank tenderness. No hepatosplenomegaly.   Musculoskeletal: No spinal or CVA tenderness. Full range of motion in the upper and lower extremities.    Skin: No skin rashes or lesions to inspection.  No petechia.    Neurologic: Cranial nerves II through XII are grossly intact and symmetric.  Psychiatric: The patient is alert and oriented times 3.  The patient's affect is not blunted and mood is appropriate.          Data:   Vital Signs:  BP (!) 150/88   Temp 98  F (36.7  C) (Temporal)   Ht 1.676 m (5' 6\")   Wt 102.9 kg (226 lb 14.4 oz)   BMI 36.62 kg/m   "     WBC -   WBC   Date Value Ref Range Status   12/28/2019 9.9 4.0 - 11.0 10e9/L Final   ], HgB -   Hemoglobin   Date Value Ref Range Status   12/28/2019 13.7 13.3 - 17.7 g/dL Final   ]   Liver Function Studies -   Recent Labs   Lab Test 12/28/19  1344   PROTTOTAL 6.5*   ALBUMIN 3.5   BILITOTAL 0.3   ALKPHOS 73   AST 15   ALT 26     No results found for this or any previous visit (from the past 744 hour(s)).     [facility] Mota, DO 1/3/2020 2:55 PM

## 2020-01-03 NOTE — LETTER
1/3/2020         RE: Bhavesh Landeros  [address]  [place] [address]        Dear Colleague,    Thank you for referring your patient, Bhavesh Landeros, to the [facility]. Please see a copy of my visit note below.    Bhavesh to follow up with Primary Care provider regarding elevated blood pressure.    150/88    General Surgery Consultation    Bhavesh Landeros MRN# [id number]   Age: 64 year old YOB: 1955     Reason for consult: Epigastric hernia                        Assessment and Plan:   I was asked to see this patient at the request of Josy Duarte NP for evaluation of epigastric hernia.  Bhavesh Landeros is a 64 year old male who presented with history, exam, laboratory and imaging most consistent with .       ICD-10-CM    1. Diastasis of rectus abdominis M62.08    2. SOB (shortness of breath) R06.02    3. Obesity (BMI 35.0-39.9) with comorbidity (H) E66.01    4. Benign essential hypertension I10      I reassured the patient that he does not have a ventral hernia.  What he has is called a diastases rectus of the abdominis muscle.  I referenced this back to his CT of the abdomen and pelvis which showed that he has diastases rectus back in 2014.  He does not have any other hernias elsewhere.  Thus I recommend that he be seen by his primary care provider for his shortness of breath as his diastases rectus would not be the cause of his shortness of breath.  All of his questions were answered to his satisfaction.      I thank Josy Duarte NP for the opportunity to participate in the patient's care.           Chief Complaint:   Ventral heria and SOB     History is obtained from the patient         History of Present Illness:   This patient is a 64 year old  male with a significant past medical history of asthma, hypertension and morbid obesity who presents with SOB and bulge at the superior aspect of the midline abdomen.    Patient stated that this bulge of his abdomen  has been there for many many years.  However he started having shortness of breath several months ago and had a vasovagal moment that prompted an ED visit last month.  Cardiac work-up and everything else was negative at this particular ED visit.  However he still has this bulge and he is concerned that his shortness of breath is related to this bulge.  He was told that he has a ventral hernia and should be seen by a surgeon.  Stated he does not have pain with this area.  He does not remember when it first came about.  There was no popping or tearing or inciting incident that made this bulge occurred.  Denies any nausea, vomiting.  No issues with passing gas and having bowel movements.  Denies any abdominal surgeries in the past.  Denies history of MI, CVA.  Denies any fevers or chills.  Denies being on any medications except the fact that he has history of asthma.          Past Medical History:    has a past medical history of MEDICAL HISTORY OF - and Multiple sclerosis (H).          Past Surgical History:     Past Surgical History:   Procedure Laterality Date     COLONOSCOPY N/A 1/22/2016    Procedure: COLONOSCOPY;  Surgeon: Pb Rodriguez MD;  Location:  GI     HC COLONOSCOPY W/WO BRUSH/WASH  03/27/06     HC KNEE SCOPE,MED/LAT MENISECTOMY  07/08/1999     HC REVISE MEDIAN N/CARPAL TUNNEL SURG  1986      UGI ENDOSCOPY DIAG W BIOPSY  10/26/09           Medications:   aspirin 81 MG chewable tablet, Take 1 tablet (81 mg) by mouth daily  atorvastatin (LIPITOR) 20 MG tablet, TAKE ONE TABLET BY MOUTH ONCE DAILY  atorvastatin (LIPITOR) 20 MG tablet, Take 1 tablet (20 mg) by mouth daily  IBUPROFEN PO,   losartan (COZAAR) 25 MG tablet, Take 1 tablet (25 mg) by mouth daily  omeprazole (PRILOSEC) 20 MG DR capsule, TAKE ONE CAPSULE BY MOUTH ONCE DAILY (Patient not taking: Reported on 1/3/2020)    No current facility-administered medications on file prior to visit.         Allergies:      Allergies   Allergen Reactions      "No Known Allergies             Social History:   Bhavesh Landeros  reports that he has never smoked. He has never used smokeless tobacco. He reports previous alcohol use. He reports that he does not use drugs.          Family History:   The patient has no family history of any bleeding, clotting or anesthesia problems.          Review of Systems:     Constitutional: Denies fever or chills   Eyes: Denies change in visual acuity   HENT: Denies nasal congestion or sore throat   Respiratory: Denies cough; +shortness of breath   Cardiovascular: Denies chest pain or edema   GI: Denies abdominal pain, nausea, vomiting, bloody stools or diarrhea   : Denies dysuria   Musculoskeletal: Denies back pain or joint pain   Integument: Denies rash   Neurologic: Denies headache, focal weakness or sensory changes   Endocrine: Denies polyuria or polydipsia   Lymphatic: Denies swollen glands   Psychiatric: Denies depression or anxiety          Physical Exam:     Vitals: BP (!) 150/88   Temp 98  F (36.7  C) (Temporal)   Ht 1.676 m (5' 6\")   Wt 102.9 kg (226 lb 14.4 oz)   BMI 36.62 kg/m     BMI= Body mass index is 36.62 kg/m .  Constitutional: Awake, alert, no acute distress.  Eyes:  No scleral icterus.  Conjunctiva are without injection.  ENMT: Mucous membranes moist, dentition and gums are intact.   Neck: Soft, supple, trachea midline.    Endocrine: n/a  Lymphatic: There is no cervical, submandibular, or supraclavicular adenopathy.  Respiratory: No audible wheezes, no acute distress  Cardiovascular: Regular; S1, S2    Abdomen: Noted large diastasis rectus from the epigastrium down the umbilicus.  Non-distended, non-tender, normoactive bowel sounds present, No masses, no ventral hernia or umbilical hernia, or flank tenderness. No hepatosplenomegaly.   Musculoskeletal: No spinal or CVA tenderness. Full range of motion in the upper and lower extremities.    Skin: No skin rashes or lesions to inspection.  No petechia.    Neurologic: " "Cranial nerves II through XII are grossly intact and symmetric.  Psychiatric: The patient is alert and oriented times 3.  The patient's affect is not blunted and mood is appropriate.          Data:   Vital Signs:  BP (!) 150/88   Temp 98  F (36.7  C) (Temporal)   Ht 1.676 m (5' 6\")   Wt 102.9 kg (226 lb 14.4 oz)   BMI 36.62 kg/m        WBC -   WBC   Date Value Ref Range Status   12/28/2019 9.9 4.0 - 11.0 10e9/L Final   ], HgB -   Hemoglobin   Date Value Ref Range Status   12/28/2019 13.7 13.3 - 17.7 g/dL Final   ]   Liver Function Studies -   Recent Labs   Lab Test 12/28/19  1344   PROTTOTAL 6.5*   ALBUMIN 3.5   BILITOTAL 0.3   ALKPHOS 73   AST 15   ALT 26     No results found for this or any previous visit (from the past 744 hour(s)).     Ruthy Mota DO 1/3/2020 2:55 PM           Again, thank you for allowing me to participate in the care of your patient.        Sincerely,        TodElis Mota MD    "

## 2020-01-03 NOTE — TELEPHONE ENCOUNTER
albuterol (PROAIR HFA, PROVENTIL HFA, VENTOLIN HFA) 108 (90 BASE) MCG/ACT inhaler        Last Office Visit: 11/25/2019  Future Office visit:       Routing refill request to provider for review/approval because:  Drug not active on patient's medication list    Angel Augustin RN, BSN

## 2020-01-05 NOTE — TELEPHONE ENCOUNTER
RN please call patient to triage shortness of breath and chest tightness and to schedule appointment with me. Patient requesting refill of albuterol but he was prescribed this once in 2014. I received a message from Dr. Mota that patient was seen by her for evaluation of shortness of breath and chest tightness. She recommended scheduling an appointment with me.   Thanks,  Radha Gonzalez, NP-C

## 2020-01-06 NOTE — PROGRESS NOTES
Subjective     Bhavesh Landeros is a 64 year old male who presents to clinic today for the following health issues:    HPI   ED/UC Followup:    Facility:  [facility]  Date of visit: 12/28/2019  Reason for visit: vasovagal syncope  Current Status: feels good, but light headed in the morning. He states he has been drinking a lot more water since emergency room. He had 12 oz of gatorade, 6 oz water, and 6 cups of coffee today. He typically drinks 3 cups of water a day.  His blood pressure typically is elevated when he first comes into the clinic and then it improves.  He has been taking his blood pressure medication without side effects.  He has been having epigastric discomfort and fullness in his belly.  He was seen in the clinic last month for the same symptoms and had an x-ray done which showed a moderate amount of stool and gas.  He took MiraLAX for about 6 days and then stopped it.  He was not sure if he should continue this.  He generally has 1 bowel movement every day.  He feels pretty gassy.  He is having no nausea or vomiting.  His appetite is normal.  His weight is stable.       Patient Active Problem List   Diagnosis     Benign neoplasm of testis     GERD (gastroesophageal reflux disease)     Cellulitis of leg     Leg edema, right     Advanced directives, counseling/discussion     SOB (shortness of breath)     Newly recognized murmur     Ventral hernia without obstruction or gangrene     Aortic valve stenosis, etiology of cardiac valve disease unspecified     Mitral regurgitation and aortic stenosis     Primary osteoarthritis of right knee     Obesity (BMI 35.0-39.9) with comorbidity (H)     Mixed hyperlipidemia     Benign essential hypertension     Past Surgical History:   Procedure Laterality Date     COLONOSCOPY N/A 1/22/2016    Procedure: COLONOSCOPY;  Surgeon: Pb Rodriguez MD;  Location:  GI     HC COLONOSCOPY W/WO BRUSH/WASH  03/27/06     HC KNEE SCOPE,MED/LAT MENISECTOMY  07/08/1999     HC REVISE  "MEDIAN N/CARPAL TUNNEL SURG  1986      UGI ENDOSCOPY DIAG W BIOPSY  10/26/09       Social History     Tobacco Use     Smoking status: Never Smoker     Smokeless tobacco: Never Used   Substance Use Topics     Alcohol use: Not Currently     Alcohol/week: 0.0 standard drinks     Comment: occ     Family History   Problem Relation Age of Onset     Diabetes Brother      Alzheimer Disease Father      Diabetes Sister          Current Outpatient Medications   Medication Sig Dispense Refill     aspirin 81 MG chewable tablet Take 1 tablet (81 mg) by mouth daily       atorvastatin (LIPITOR) 20 MG tablet Take 1 tablet (20 mg) by mouth daily 90 tablet 3     IBUPROFEN PO        losartan (COZAAR) 25 MG tablet Take 1 tablet (25 mg) by mouth daily 90 tablet 3     atorvastatin (LIPITOR) 20 MG tablet TAKE ONE TABLET BY MOUTH ONCE DAILY (Patient not taking: Reported on 1/8/2020) 30 tablet 0     omeprazole (PRILOSEC) 20 MG DR capsule TAKE ONE CAPSULE BY MOUTH ONCE DAILY (Patient not taking: Reported on 1/3/2020) 90 capsule 3     Allergies   Allergen Reactions     No Known Allergies      BP Readings from Last 3 Encounters:   01/08/20 128/68   01/03/20 (!) 150/88   12/28/19 132/73    Wt Readings from Last 3 Encounters:   01/08/20 101.2 kg (223 lb 1.6 oz)   01/03/20 102.9 kg (226 lb 14.4 oz)   12/28/19 102.1 kg (225 lb)                    Reviewed and updated as needed this visit by Provider         Review of Systems   ROS COMP: Constitutional, HEENT, cardiovascular, pulmonary, GI, , musculoskeletal, neuro, skin, endocrine and psych systems are negative, except as otherwise noted.      Objective    /68   Pulse 74   Temp 97.8  F (36.6  C) (Temporal)   Resp 16   Ht 1.677 m (5' 6.02\")   Wt 101.2 kg (223 lb 1.6 oz)   SpO2 97%   BMI 35.98 kg/m    Body mass index is 35.98 kg/m .  Physical Exam   GENERAL: healthy, alert and no distress  EYES: Eyes grossly normal to inspection, PERRL and conjunctivae and sclerae normal  NECK: no " adenopathy, no asymmetry, masses, or scars and thyroid normal to palpation  RESP: lungs clear to auscultation - no rales, rhonchi or wheezes  CV: regular rate and rhythm, normal S1 S2, no S3 or S4, no murmur, click or rub, trace peripheral edema  ABDOMEN: bowel sounds normal, protuberant, soft, nontender  MS: no gross musculoskeletal defects noted, no edema  SKIN: no suspicious lesions or rashes  NEURO: Normal strength and tone, mentation intact and speech normal  PSYCH: mentation appears normal, affect normal/bright    Diagnostic Test Results:  Labs reviewed in Epic        Assessment & Plan     1. Benign essential hypertension  Stable. Continue current medication(s) and dose(s).     2. Epigastric pain  We discussed that it could be constipation that is causing his epigastric pain.  He had an x-ray when he was last seen here in the clinic and it showed moderate amount of gas and stool.  I recommended that he take the MiraLAX 1 capful daily to help get his bowels better regulated and see if this does not improve his epigastric discomfort.  I recommended that he have close follow-up if his symptoms persist or worsen despite bowel cleanout.         No follow-ups on file.    PERLA [facility]

## 2020-01-06 NOTE — TELEPHONE ENCOUNTER
"    ED for acute condition Discharge Protocol    \"Hi, my name is Felix Shen RN, a registered nurse, and I am calling from [facility].  I am calling to follow up and see how things are going for you after your recent emergency visit.\"    Tell me how you are doing now that you are home?\"   Spoke with patient. Felt fine all weekend even went snowmobiling. Stomach cramp that he was seen 3 weeks ago is somewhat still bothering him.  No chest pain.    BP Readings from Last 3 Encounters:   01/03/20 (!) 150/88   12/28/19 132/73   11/25/19 (!) 142/84       advised that he should follow up in clinic.      Discharge Instructions    \"Let's review your discharge instructions.  What is/are the follow-up recommendations?  Pt. Response: follow up with rodriguez    \"Has an appointment with your primary care provider been scheduled?\"  No (needed - schedule appointment and remind to bring meds)    Medications    \"Tell me what changed about your medicines when you discharged?\"    none    \"What questions do you have about your medications?\"   None        Call Summary    \"What questions or concerns do you have about your recent visit and your follow-up care?\"     none    \"If you have questions or things don't continue to improve, we encourage you contact us through the main clinic number (give number).  Even if the clinic is not open, triage nurses are available 24/7 to help you.     We would like you to know that our clinic has extended hours (provide information).  We also have urgent care (provide details on closest location and hours/contact info)\"    \"Thank you for your time and take care!\"                "

## 2020-01-08 ENCOUNTER — OFFICE VISIT (OUTPATIENT)
Dept: FAMILY MEDICINE | Facility: OTHER | Age: 65
End: 2020-01-08
Payer: COMMERCIAL

## 2020-01-08 VITALS
TEMPERATURE: 97.8 F | WEIGHT: 223.1 LBS | RESPIRATION RATE: 16 BRPM | SYSTOLIC BLOOD PRESSURE: 128 MMHG | BODY MASS INDEX: 35.86 KG/M2 | OXYGEN SATURATION: 97 % | HEART RATE: 74 BPM | DIASTOLIC BLOOD PRESSURE: 68 MMHG | HEIGHT: 66 IN

## 2020-01-08 DIAGNOSIS — I10 BENIGN ESSENTIAL HYPERTENSION: Primary | ICD-10-CM

## 2020-01-08 DIAGNOSIS — R10.13 EPIGASTRIC PAIN: ICD-10-CM

## 2020-01-08 PROCEDURE — 99214 OFFICE O/P EST MOD 30 MIN: CPT | Performed by: STUDENT IN AN ORGANIZED HEALTH CARE EDUCATION/TRAINING PROGRAM

## 2020-01-08 ASSESSMENT — MIFFLIN-ST. JEOR: SCORE: 1740.1

## 2020-01-08 NOTE — PATIENT INSTRUCTIONS
Increase your water intake and decrease your coffee intake. Coffee will dehydrate you.    Take Miralax one capful daily for your bowels to help clean them out.     Work on losing weight by eating less and moving more.    If the fullness in your upper abdomen persists after doing the bowel cleanout, I want to see you back and I will order an ultrasound.    Radha Gonzalez, NP-C

## 2020-01-15 NOTE — TELEPHONE ENCOUNTER
Spoke to patient, patient states that he is doing better and does not need refill of albuterol inhaler, which was prescribed in 2014. Advised patient to call clinic should his symptoms persists or worsens.

## 2020-03-25 ENCOUNTER — NURSE TRIAGE (OUTPATIENT)
Dept: FAMILY MEDICINE | Facility: OTHER | Age: 65
End: 2020-03-25

## 2020-03-25 NOTE — TELEPHONE ENCOUNTER
Reason for call:  Patient reporting a symptom    Symptom or request: legs are swelling     Duration (how long have symptoms been present): a few weeks    Have you been treated for this before? No    Additional comments: declined phone visit.    Phone Number patient can be reached at:  [phone]    Best Time:      Can we leave a detailed message on this number:  NO    Call taken on 3/25/2020 at 10:18 AM by Nory Cohn

## 2020-03-25 NOTE — TELEPHONE ENCOUNTER
Spoke with patient.  Bilateral foot and ankle swelling.  Improves at night.  Both legs are about the same. No pain. No SOB.  Will try increase water intake, getting feet up throughout the day and if not improving will schedule an appointment.    Felix Shen RN, BSN          Reason for Disposition    [1] MILD swelling of both ankles (i.e., pedal edema) AND [2] new onset or worsening    Additional Information    Swelling of both ankles (i.e., pedal edema)    Negative: Severe difficulty breathing (e.g., struggling for each breath, speaks in single words)    Negative: Looks like a broken bone or dislocated joint (e.g., crooked or deformed)    Negative: Sounds like a life-threatening emergency to the triager    Negative: Chest pain    Negative: Followed a leg injury    Negative: [1] Small area of swelling AND [2] followed an insect bite to the area    Negative: Swelling of one ankle joint    Negative: Swelling of knee is main symptom    Negative: Pregnant    Negative: Postpartum (< 1 month since delivery)    Negative: Difficulty breathing at rest    Negative: Entire foot is cool or blue in comparison to other side    Negative: [1] Can't walk or can barely walk AND [2] new onset    Negative: [1] Difficulty breathing with exertion (e.g., walking) AND [2] new onset or worsening    Negative: [1] Red area or streak AND [2] fever    Negative: [1] Swelling is painful to touch AND [2] fever    Negative: [1] Cast on leg or ankle AND [2] now increased pain    Negative: Patient sounds very sick or weak to the triager    Negative: SEVERE leg swelling (e.g., swelling extends above knee, entire leg is swollen, weeping fluid)    Negative: [1] Red area or streak [2] large (> 2 in. or 5 cm)    Negative: [1] Thigh or calf pain AND [2] only 1 side AND [3] present > 1 hour    Negative: [1] Thigh, calf, or ankle swelling AND [2] only 1 side    Negative: [1] Thigh, calf, or ankle swelling AND [2] bilateral AND [3] 1 side is more  swollen    Negative: [1] MODERATE leg swelling (e.g., swelling extends up to knees) AND [2] new onset or worsening    Negative: Swelling of face, arm or hands  (Exception: slight puffiness of fingers occurring during hot weather)    Negative: Looks like a boil, infected sore, deep ulcer or other infected rash (spreading redness, pus)    Protocols used: LEG SWELLING AND EDEMA-A-AH, ANKLE SWELLING-A-AH

## 2020-03-26 ENCOUNTER — TELEPHONE (OUTPATIENT)
Dept: ORTHOPEDICS | Facility: CLINIC | Age: 65
End: 2020-03-26

## 2020-03-26 NOTE — TELEPHONE ENCOUNTER
Reason for Call:  Other call back    Detailed comments: please call pt to discuss he does need knee surgery that will have to be put off due to COVID-19 and he would really like if Dr Holguin could do an injection for the pain.     Phone Number Patient can be reached at: Cell number on file:    Telephone Information:   Mobile [phone]       Best Time: please try cell 1st then home #[phone]    Can we leave a detailed message on this number? YES    Call taken on 3/26/2020 at 3:20 PM by Cathy Choe

## 2020-03-30 ENCOUNTER — VIRTUAL VISIT (OUTPATIENT)
Dept: FAMILY MEDICINE | Facility: OTHER | Age: 65
End: 2020-03-30
Payer: COMMERCIAL

## 2020-03-30 DIAGNOSIS — I10 BENIGN ESSENTIAL HYPERTENSION: Primary | ICD-10-CM

## 2020-03-30 PROCEDURE — G2012 BRIEF CHECK IN BY MD/QHP: HCPCS | Performed by: STUDENT IN AN ORGANIZED HEALTH CARE EDUCATION/TRAINING PROGRAM

## 2020-03-30 RX ORDER — LOSARTAN POTASSIUM 50 MG/1
50 TABLET ORAL DAILY
Qty: 90 TABLET | Refills: 0 | Status: SHIPPED | OUTPATIENT
Start: 2020-03-30 | End: 2020-06-02

## 2020-03-30 NOTE — PROGRESS NOTES
"Subjective     Patient states to call house phone, if unable to reach him, call his cell phone.    Bhavesh Landeros is a 64 year old male who is being evaluated via a billable telephone visit.      The patient has been notified of following:     \"This telephone visit will be conducted via a call between you and your physician/provider. We have found that certain health care needs can be provided without the need for a physical exam.  This service lets us provide the care you need with a short phone conversation.  If a prescription is necessary we can send it directly to your pharmacy.  If lab work is needed we can place an order for that and you can then stop by our lab to have the test done at a later time.    If during the course of the call the physician/provider feels a telephone visit is not appropriate, you will not be charged for this service.\"     Physician has received verbal consent for a Telephone Visit from the patient? Yes    Bhavesh Landeros complains of No chief complaint on file.      ALLERGIES  No known allergies    HPI  He has been checking his BP at home and the numbers have been higher lately. Last night was 162/82 and this morning was 148/82. His weight has gone up a little lately to 228-230 lbs. He admits to eating more sweets lately and being less active because of knee pain from arthritis. Knee replacement has been postponed due to Covid-19. Planning to have this done possibly in the fall. He has noted more of a sock line when he wears tight socks so he bought compression stockings and is wearing these every day which help. He denies eating more salt. Drinks 4 cups of coffee in the morning, no soda, 1 glass of milk, sips of water throughout the day to total about 4 oz per day of water. He no longer has stomach pains and bowels are working better - stools 1-2 times per day and not using Miralax. Otherwise he states \"I feel pretty healthy\".       Patient Active Problem List   Diagnosis     Benign " neoplasm of testis     GERD (gastroesophageal reflux disease)     Cellulitis of leg     Leg edema, right     Advanced directives, counseling/discussion     SOB (shortness of breath)     Newly recognized murmur     Ventral hernia without obstruction or gangrene     Aortic valve stenosis, etiology of cardiac valve disease unspecified     Mitral regurgitation and aortic stenosis     Primary osteoarthritis of right knee     Obesity (BMI 35.0-39.9) with comorbidity (H)     Mixed hyperlipidemia     Benign essential hypertension     Past Surgical History:   Procedure Laterality Date     COLONOSCOPY N/A 1/22/2016    Procedure: COLONOSCOPY;  Surgeon: Pb Rodriguez MD;  Location: [facility]     HC COLONOSCOPY W/WO BRUSH/WASH  03/27/06     HC KNEE SCOPE,MED/LAT MENISECTOMY  07/08/1999     HC REVISE MEDIAN N/CARPAL TUNNEL SURG  1986     HC UGI ENDOSCOPY DIAG W BIOPSY  10/26/09       Social History     Tobacco Use     Smoking status: Never Smoker     Smokeless tobacco: Never Used   Substance Use Topics     Alcohol use: Not Currently     Alcohol/week: 0.0 standard drinks     Comment: occ     Family History   Problem Relation Age of Onset     Diabetes Brother      Alzheimer Disease Father      Diabetes Sister          Current Outpatient Medications   Medication Sig Dispense Refill     aspirin 81 MG chewable tablet Take 1 tablet (81 mg) by mouth daily       atorvastatin (LIPITOR) 20 MG tablet Take 1 tablet (20 mg) by mouth daily 90 tablet 3     IBUPROFEN PO        losartan (COZAAR) 50 MG tablet Take 1 tablet (50 mg) by mouth daily 90 tablet 0     omeprazole (PRILOSEC) 20 MG DR capsule TAKE ONE CAPSULE BY MOUTH ONCE DAILY 90 capsule 3     Allergies   Allergen Reactions     No Known Allergies      BP Readings from Last 3 Encounters:   01/08/20 128/68   01/03/20 (!) 150/88   12/28/19 132/73    Wt Readings from Last 3 Encounters:   01/08/20 101.2 kg (223 lb 1.6 oz)   01/03/20 102.9 kg (226 lb 14.4 oz)   12/28/19 102.1 kg (225 lb)                     Reviewed and updated as needed this visit by Provider         Review of Systems   ROS COMP: Constitutional, HEENT, cardiovascular, pulmonary, gi and gu systems are negative, except as otherwise noted.       Objective   na    Diagnostic Test Results:  Labs reviewed in Epic        Assessment/Plan:  1. Benign essential hypertension  Home BPs have been elevated. Will increase dose of losartan from 25 mg to 50 mg daily. Will avoid diuretic for now as I worry about causing dehydration and/or kidney function problems as he does not take in much for water.  Recommended he continue to wear compression stockings during the day, avoid added salt, improve hydration with water and stay as active as possible to keep weight down. Monitor BP at home and follow up by phone with results. Will see him in the clinic for physical exam as soon as it is safe with Covid-19 protocol.   - losartan (COZAAR) 50 MG tablet; Take 1 tablet (50 mg) by mouth daily  Dispense: 90 tablet; Refill: 0      No follow-ups on file.      Phone call duration:  9 minutes    DANYA Torres

## 2020-03-30 NOTE — TELEPHONE ENCOUNTER
"Both knees. Left > right.  Not falling. Knees doing ok.    Currently no pain.    \"tired and fatigued legs by the end of the day\".    Having some swelling to lower legs  Using compression socks. Helping.    Recommended callback if knee pain increasing, keeping awake, or having increased issues. otherwise do not recommend coming in at this point/     Had multiple questions regarding replacement.  Questions answered.    PERLA Bañuelos, CNP  Orthopedic Surgery           "

## 2020-03-30 NOTE — TELEPHONE ENCOUNTER
He can come in for an injection into his knee if the pain is worse than 5-7/10.  Or the pain is keeping him up at night.

## 2020-06-01 ENCOUNTER — TELEPHONE (OUTPATIENT)
Dept: FAMILY MEDICINE | Facility: OTHER | Age: 65
End: 2020-06-01

## 2020-06-01 NOTE — TELEPHONE ENCOUNTER
Patient scheduled   Next 5 appointments (look out 90 days)    Jun 02, 2020  9:20 AM CDT  PHYSICAL with PERLA Armstrong CNP  [facility] [address] [address]  [phone]        Closing encounter   Yanet Cotton RT (R)

## 2020-06-02 ENCOUNTER — VIRTUAL VISIT (OUTPATIENT)
Dept: FAMILY MEDICINE | Facility: OTHER | Age: 65
End: 2020-06-02
Payer: COMMERCIAL

## 2020-06-02 DIAGNOSIS — I10 BENIGN ESSENTIAL HYPERTENSION: Primary | ICD-10-CM

## 2020-06-02 DIAGNOSIS — K21.00 GASTROESOPHAGEAL REFLUX DISEASE WITH ESOPHAGITIS: ICD-10-CM

## 2020-06-02 DIAGNOSIS — E78.2 MIXED HYPERLIPIDEMIA: ICD-10-CM

## 2020-06-02 PROCEDURE — 99214 OFFICE O/P EST MOD 30 MIN: CPT | Mod: 95 | Performed by: STUDENT IN AN ORGANIZED HEALTH CARE EDUCATION/TRAINING PROGRAM

## 2020-06-02 RX ORDER — LOSARTAN POTASSIUM 50 MG/1
50 TABLET ORAL DAILY
Qty: 90 TABLET | Refills: 1 | Status: SHIPPED | OUTPATIENT
Start: 2020-06-02 | End: 2020-11-12

## 2020-06-02 RX ORDER — HYDROCHLOROTHIAZIDE 25 MG/1
25 TABLET ORAL DAILY
Qty: 30 TABLET | Refills: 0 | Status: SHIPPED | OUTPATIENT
Start: 2020-06-02 | End: 2020-08-05

## 2020-06-02 NOTE — PROGRESS NOTES
"  Bhavesh Landeros is a 64 year old male who is being evaluated via a billable video visit.   The patient has been notified of following:   \"This video visit will be conducted via a call between you and your physician/provider. We have found that certain health care needs can be provided without the need for an in-person physical exam. This service lets us provide the care you need with a video conversation. If a prescription is necessary we can send it directly to your pharmacy. If lab work is needed we can place an order for that and you can then stop by our lab to have the test done at a later time.   Video visits are billed at different rates depending on your insurance coverage. Please reach out to your insurance provider with any questions.   If during the course of the call the physician/provider feels a video visit is not appropriate, you will not be charged for this service.\"   Patient has given verbal consent for Video visit? Yes   How would you like to obtain your AVS? Mail a copy   Patient would like the video invitation sent by: Text to cell phone: [phone]   Will anyone else be joining your video visit? No      Subjective   Bhavesh Landeros is a 64 year old male who presents today via video visit for the following health issues:   HPI   Hyperlipidemia Follow-Up   Are you regularly taking any medication or supplement to lower your cholesterol? Yes- Atorvastatin 20 mg daily  Lab Results   Component Value Date    CHOL 186 11/20/2018     Lab Results   Component Value Date    HDL 53 11/20/2018     Lab Results   Component Value Date     11/20/2018     Lab Results   Component Value Date    TRIG 71 11/20/2018     Lab Results   Component Value Date    CHOLHDLRATIO 3.3 09/12/2014     The 10-year ASCVD risk score (Miriantarun CALLAWAY Jr., et al., 2013) is: 13.6%    Values used to calculate the score:      Age: 64 years      Sex: Male      Is Non- : No      Diabetic: No      Tobacco smoker: " "No      Systolic Blood Pressure: 128 mmHg      Is BP treated: Yes      HDL Cholesterol: 53 mg/dL      Total Cholesterol: 186 mg/dL  Are you having muscle aches or other side effects that you think could be caused by your cholesterol lowering medication? No  Hypertension Follow-up   Do you check your blood pressure regularly outside of the clinic? Yes   Are you following a low salt diet? Yes   Are your blood pressures ever more than 140 on the top number (systolic) OR more  than 90 on the bottom number (diastolic), for example 140/90? Yes 140-160/90  Currently on losartan 50 mg daily.   He has swelling in legs that is pretty well controlled with compression stockings and resolves by morning but bothersome to him when he does not wear the stockings which is usually every other day. He eats frozen dinners and then snacks throughout the day. Daughter sometimes cooks for him.     Annual Wellness Visit   Are you in the first 12 months of your Medicare Part B coverage? Yes, Visual Acuity: no changes in vision. Wears glasses. Last eye exam was 2 years ago. Pandemic is preventing him from going in right now but no concerns with vision.    Physical Health:   In general, how would you rate your overall physical health? good  Outside of work, how many days during the week do you exercise?4-5 days/week   Outside of work, approximately how many minutes a day do you exercise?not applicable   If you drink alcohol do you typically have >3 drinks per day or >7 drinks per week? No  Do you usually eat at least 4 servings of fruit and vegetables a day, include whole grains & fiber and avoid regularly eating high fat or \"junk\" foods? NO  Do you have any problems taking medications regularly? No   Do you have any side effects from medications? none   Needs assistance for the following daily activities: no assistance needed  Which of the following safety concerns are present in your home? lack of grab bars in the bathroom   Hearing " impairment: Yes, Feel that people are mumbling or not speaking clearly.   Need to ask people to speak up or repeat themselves.   Difficulty understanding soft or whispered speech.  In the past 6 months, have you been bothered by leaking of urine? no  Mental Health:   In general, how would you rate your overall mental or emotional health? good  PHQ-2 Score: 0  Do you feel safe in your environment? Yes   Have you ever done Advance Care Planning? (For example, a Health Directive, POLST, or a discussion with a medical provider or your loved ones about your wishes)? Yes, advance care planning is on file.   Fall risk:   Fallen 2 or more times in the past year?: No   Any fall with injury in the past year?: No   Cognitive Screenin) Repeat 3 items (Leader, Season, Table)   2) Clock draw: NORMAL   3) 3 item recall: Recalls NO objects   Results: NORMAL clock, 1-2 items recalled: COGNITIVE IMPAIRMENT LESS LIKELY   Mini-CogTM Copyright BEN Ramsey. Licensed by the author for use in [facility]; reprinted with permission (mariama@[facility]). All rights reserved.   Do you have sleep apnea, excessive snoring or daytime drowsiness?: no   Current providers sharing in care for this patient include:   Patient Care Team:   Rdaha Gonzalez APRN CNP as PCP - General (Nurse Practitioner - Family)   Joys Duarte NP as Assigned PCP   Video Start Time: 10:10 AM      Patient Active Problem List   Diagnosis     Benign neoplasm of testis     GERD (gastroesophageal reflux disease)     Cellulitis of leg     Leg edema, right     Advanced directives, counseling/discussion     SOB (shortness of breath)     Newly recognized murmur     Ventral hernia without obstruction or gangrene     Aortic valve stenosis, etiology of cardiac valve disease unspecified     Mitral regurgitation and aortic stenosis     Primary osteoarthritis of right knee     Obesity (BMI 35.0-39.9) with comorbidity (H)     Mixed hyperlipidemia     Benign  essential hypertension     Past Surgical History:   Procedure Laterality Date     COLONOSCOPY N/A 1/22/2016    Procedure: COLONOSCOPY;  Surgeon: Pb Rodriguez MD;  Location: [facility]     HC COLONOSCOPY W/WO BRUSH/WASH  03/27/06     HC KNEE SCOPE,MED/LAT MENISECTOMY  07/08/1999     HC REVISE MEDIAN N/CARPAL TUNNEL SURG  1986     HC UGI ENDOSCOPY DIAG W BIOPSY  10/26/09       Social History     Tobacco Use     Smoking status: Never Smoker     Smokeless tobacco: Never Used   Substance Use Topics     Alcohol use: Not Currently     Alcohol/week: 0.0 standard drinks     Comment: occ     Family History   Problem Relation Age of Onset     Diabetes Brother      Alzheimer Disease Father      Diabetes Sister          Current Outpatient Medications   Medication Sig Dispense Refill     aspirin 81 MG chewable tablet Take 1 tablet (81 mg) by mouth daily       atorvastatin (LIPITOR) 20 MG tablet Take 1 tablet (20 mg) by mouth daily 90 tablet 3     IBUPROFEN PO        losartan (COZAAR) 50 MG tablet Take 1 tablet (50 mg) by mouth daily 90 tablet 0     omeprazole (PRILOSEC) 20 MG DR capsule TAKE ONE CAPSULE BY MOUTH ONCE DAILY 90 capsule 3     Allergies   Allergen Reactions     No Known Allergies      Recent Labs   Lab Test 12/28/19  1344 11/25/19  1415 11/24/18  1934 11/20/18  0835 11/07/18  0929 12/28/16  0912 01/12/16  0920   LDL  --   --   --  119*  --  116* 97   HDL  --   --   --  53  --  60 62   TRIG  --   --   --  71  --  95 109   ALT 26 31 30  --  26 25 28   CR 0.88 0.90 1.11  --  0.80 0.88 0.79   GFRESTIMATED >90 89 67  --  >90 88 >90  Non  GFR Calc     GFRESTBLACK >90 >90 81  --  >90 >90   GFR Calc   >90   GFR Calc     POTASSIUM 3.9 4.8 4.0  --  4.3 4.3 4.4   TSH 3.27  --   --   --  2.59  --   --       BP Readings from Last 3 Encounters:   01/08/20 128/68   01/03/20 (!) 150/88   12/28/19 132/73    Wt Readings from Last 3 Encounters:   01/08/20 101.2 kg (223 lb 1.6 oz)   01/03/20  "102.9 kg (226 lb 14.4 oz)   12/28/19 102.1 kg (225 lb)                   Reviewed and updated as needed this visit by Provider   Review of Systems   Constitutional, HEENT, cardiovascular, pulmonary, GI, , musculoskeletal, neuro, skin, endocrine and psych systems are negative, except as otherwise noted.   Objective   There were no vitals taken for this visit.  Estimated body mass index is 35.98 kg/m  as calculated from the following:   Height as of 1/8/20: 1.677 m (5' 6.02\").   Weight as of 1/8/20: 101.2 kg (223 lb 1.6 oz).   Physical Exam   GENERAL: Healthy, alert and no distress  EYES: Eyes grossly normal to inspection.  No discharge or erythema, or obvious scleral/conjunctival abnormalities.  HENT: Normal cephalic/atraumatic.  External ears, nose and mouth without ulcers or lesions.  No nasal drainage visible.  NECK: No asymmetry, visible masses or scars  RESP: No audible wheeze, cough, or visible cyanosis.  No visible retractions or increased work of breathing.    SKIN: Visible skin clear. No significant rash, abnormal pigmentation or lesions.  NEURO: Cranial nerves grossly intact.  Mentation and speech appropriate for age.  PSYCH: Mentation appears normal, affect normal/bright, judgement and insight intact, normal speech and appearance well-groomed.  EYES: Eyes grossly normal to inspection. No discharge or erythema, or obvious scleral/conjunctival abnormalities.  RESP: No audible wheeze, cough, or visible cyanosis. No visible retractions or increased work of breathing.   NEURO: Cranial nerves grossly intact. Mentation and speech appropriate for age.   Diagnostic Test Results:  Labs reviewed in Epic   Assessment & Plan     1. Benign essential hypertension  Above goal so recommend adding hydrochlorothiazide as he also has swelling in legs that is pretty well controlled with compression stockings and resolves by morning but bothersome to him when he does not wear the stockings which is usually every other day. " "Recommended he be sure to maintain good hydration. Recheck kidney labs in 3-4 weeks.   - losartan (COZAAR) 50 MG tablet; Take 1 tablet (50 mg) by mouth daily  Dispense: 90 tablet; Refill: 1  - hydrochlorothiazide (HYDRODIURIL) 25 MG tablet; Take 1 tablet (25 mg) by mouth daily  Dispense: 30 tablet; Refill: 0  - **Basic metabolic panel FUTURE anytime; Future    2. Gastroesophageal reflux disease with esophagitis  Stable. Continue current medication(s) and dose(s).   - omeprazole (PRILOSEC) 20 MG DR capsule; TAKE ONE CAPSULE BY MOUTH ONCE DAILY  Dispense: 90 capsule; Refill: 3    3. Mixed hyperlipidemia  Stable. Future fasting labs in 3-4 weeks. Tolerating statin well.   - Lipid panel reflex to direct LDL Fasting; Future     BMI:   Estimated body mass index is 35.98 kg/m  as calculated from the following:    Height as of 1/8/20: 1.677 m (5' 6.02\").    Weight as of 1/8/20: 101.2 kg (223 lb 1.6 oz).   Weight management plan: Discussed healthy diet and exercise guidelines    No follow-ups on file.    PERLA Jackson CNP  [facility]   Video-Visit Details   Type of service: Video Visit   Video End Time:10:25 am     Originating Location (pt. Location): Home   Distant Location (provider location): [facility]   Platform used for Video Visit: [employer]   No follow-ups on file.   PERLA Jackson CNP            "

## 2020-08-02 ENCOUNTER — HOSPITAL ENCOUNTER (EMERGENCY)
Facility: CLINIC | Age: 65
Discharge: HOME | End: 2020-08-02
Attending: PHYSICIAN ASSISTANT | Admitting: PHYSICIAN ASSISTANT
Payer: COMMERCIAL

## 2020-08-02 VITALS
RESPIRATION RATE: 20 BRPM | WEIGHT: 226 LBS | SYSTOLIC BLOOD PRESSURE: 163 MMHG | BODY MASS INDEX: 36.45 KG/M2 | DIASTOLIC BLOOD PRESSURE: 83 MMHG | OXYGEN SATURATION: 99 % | HEART RATE: 90 BPM | TEMPERATURE: 98.8 F

## 2020-08-02 DIAGNOSIS — L03.119: ICD-10-CM

## 2020-08-02 PROCEDURE — 99282 EMERGENCY DEPT VISIT SF MDM: CPT | Performed by: PHYSICIAN ASSISTANT

## 2020-08-02 PROCEDURE — 99284 EMERGENCY DEPT VISIT MOD MDM: CPT | Mod: Z6 | Performed by: PHYSICIAN ASSISTANT

## 2020-08-02 RX ORDER — CEPHALEXIN 500 MG/1
500 CAPSULE ORAL 4 TIMES DAILY
Qty: 40 CAPSULE | Refills: 0 | Status: SHIPPED | OUTPATIENT
Start: 2020-08-02 | End: 2020-08-05

## 2020-08-02 NOTE — ED AVS SNAPSHOT
[facility] Emergency Department  [address] [place] [address]  Phone:  [phone]  Fax:  [phone]                                    Bhavesh Landeros   MRN: [id number]    Department:  [facility] Emergency Department   Date of Visit:  8/2/2020           After Visit Summary Signature Page    I have received my discharge instructions, and my questions have been answered. I have discussed any challenges I see with this plan with the nurse or doctor.    ..........................................................................................................................................  Patient/Patient Representative Signature      ..........................................................................................................................................  Patient Representative Print Name and Relationship to Patient    ..................................................               ................................................  Date                                   Time    ..........................................................................................................................................  Reviewed by Signature/Title    ...................................................              ..............................................  Date                                               Time          22EPIC Rev 08/18

## 2020-08-03 NOTE — ED TRIAGE NOTES
Patient states he had symptoms tonight that he normally gets before being diagnosed with cellulitis: chills, pain in right leg. He noticed his left arm pit is red. Patient's airway, breathing, circulation, and disability/mental status (ABCDs) intact/WDL during triage.

## 2020-08-03 NOTE — ED PROVIDER NOTES
History     Chief Complaint   Patient presents with     Chills     HPI  Bhavesh Landeros is a 64 year old male who presents for evaluation of onset of chills 1.5 hours prior to my evaluation.  He states that he gets cellulitis about every 2 years.  Typically it is in his right lower extremity.  Responds to antibiotics.  His initial issue was cellulitis was quite severe requiring IV antibiotics for 4 days.  He wants to prevent that type of his situation.  He states that if he typically catches this this early, that he responds well.  After developing the chills he started to look all over his body for potential cellulitic areas.  He noted an area of erythema in his left axilla.  He does not have any pain in his left axilla.  He has not had any skin intrusion or scratches in this region.  Does report that he gets regular injuries to his forearms as he works in his garage all the time, but denies having any redness or increased pain of any of these scratches/crusts.  He did check his temperature at home, and reported that he did not have a fever.  Previous to this he was feeling well.  He did not take anything to treat his symptoms to this point.        Allergies:  Allergies   Allergen Reactions     No Known Allergies        Problem List:    Patient Active Problem List    Diagnosis Date Noted     Cellulitis of leg 08/16/2011     Priority: High     Mixed hyperlipidemia 12/02/2019     Priority: Medium     Benign essential hypertension 12/02/2019     Priority: Medium     Obesity (BMI 35.0-39.9) with comorbidity (H) 11/25/2019     Priority: Medium     Primary osteoarthritis of right knee 11/20/2019     Priority: Medium     Aortic valve stenosis, etiology of cardiac valve disease unspecified 11/19/2018     Priority: Medium     Mitral regurgitation and aortic stenosis 11/19/2018     Priority: Medium     Ventral hernia without obstruction or gangrene 11/09/2018     Priority: Medium     Newly recognized murmur 11/07/2018      Priority: Medium     Advanced directives, counseling/discussion 09/23/2014     Priority: Medium     Pt was given info       SOB (shortness of breath) 09/23/2014     Priority: Medium     Leg edema, right 05/16/2013     Priority: Medium     GERD (gastroesophageal reflux disease) 10/27/2009     Priority: Medium     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     Priority: Medium        Past Medical History:    Past Medical History:   Diagnosis Date     MEDICAL HISTORY OF -      Multiple sclerosis (H)        Past Surgical History:    Past Surgical History:   Procedure Laterality Date     COLONOSCOPY N/A 1/22/2016    Procedure: COLONOSCOPY;  Surgeon: Pb Rodriguez MD;  Location: [facility]     HC COLONOSCOPY W/WO BRUSH/WASH  03/27/06     HC KNEE SCOPE,MED/LAT MENISECTOMY  07/08/1999     HC REVISE MEDIAN N/CARPAL TUNNEL SURG  1986      UGI ENDOSCOPY DIAG W BIOPSY  10/26/09       Family History:    Family History   Problem Relation Age of Onset     Diabetes Brother      Alzheimer Disease Father      Diabetes Sister        Social History:  Marital Status:   [5]  Social History     Tobacco Use     Smoking status: Never Smoker     Smokeless tobacco: Never Used   Substance Use Topics     Alcohol use: Not Currently     Alcohol/week: 0.0 standard drinks     Comment: occ     Drug use: No        Medications:    cephALEXin (KEFLEX) 500 MG capsule  aspirin 81 MG chewable tablet  atorvastatin (LIPITOR) 20 MG tablet  hydrochlorothiazide (HYDRODIURIL) 25 MG tablet  IBUPROFEN PO  losartan (COZAAR) 50 MG tablet  omeprazole (PRILOSEC) 20 MG DR capsule          Review of Systems   All other systems reviewed and are negative.      Physical Exam   BP: (!) 163/83  Pulse: 90  Temp: 98.8  F (37.1  C)  Resp: 20  Weight: 102.5 kg (226 lb)  SpO2: 99 %      Physical Exam  Vitals signs and nursing note reviewed.   Constitutional:       General: He is not in acute distress.     Appearance: He is not diaphoretic.   HENT:      Head: Normocephalic and  atraumatic.      Right Ear: External ear normal.      Left Ear: External ear normal.      Nose: Nose normal.      Mouth/Throat:      Pharynx: No oropharyngeal exudate.   Eyes:      General: No scleral icterus.        Right eye: No discharge.         Left eye: No discharge.      Conjunctiva/sclera: Conjunctivae normal.      Pupils: Pupils are equal, round, and reactive to light.   Neck:      Musculoskeletal: Normal range of motion and neck supple.      Thyroid: No thyromegaly.   Cardiovascular:      Rate and Rhythm: Normal rate and regular rhythm.      Heart sounds: Normal heart sounds. No murmur.   Pulmonary:      Effort: Pulmonary effort is normal. No respiratory distress.      Breath sounds: Normal breath sounds. No wheezing or rales.   Chest:      Chest wall: No tenderness.   Abdominal:      General: Bowel sounds are normal. There is no distension.      Palpations: Abdomen is soft. There is no mass.      Tenderness: There is no abdominal tenderness. There is no guarding or rebound.   Musculoskeletal: Normal range of motion.         General: No tenderness or deformity.   Lymphadenopathy:      Cervical: No cervical adenopathy.   Skin:     General: Skin is warm and dry.      Capillary Refill: Capillary refill takes less than 2 seconds.      Findings: Erythema and rash (Area of erythema and macular rash measuring about 5 cm in the left axilla without induration or fluctuance.  No increased warmth to palpation.  No tenderness.  No other skin lesions.) present.   Neurological:      Mental Status: He is alert and oriented to person, place, and time.      Cranial Nerves: No cranial nerve deficit.   Psychiatric:         Behavior: Behavior normal.         Thought Content: Thought content normal.                 ED Course        Procedures               Critical Care time:  none               No results found for this or any previous visit (from the past 24 hour(s)).    Medications - No data to display    Assessments & Plan  (with Medical Decision Making)  Cellulitis of axilla     64 year old male presents for evaluation of onset of chills and noticed an erythematous area in his left axilla 1.5 hours prior to arrival.  Felt well prior to this.  States that he generally gets cellulitis about every 2 years and requires antibiotic therapy.  Had a severe episode in the distant past which required 4 days of inpatient antibiotics and treatment, and he wants to avoid that.  Denies fever.  No pain anywhere.  On exam he is afebrile with a temperature of 98.8.  Healthy-appearing.  5 cm erythematous macular rash in the left axilla without induration or fluctuance.  No tenderness.  No significant lymphadenopathy.  No indication for laboratory work-up at this time.  Afebrile with normal vital signs.  Symptoms just started 1.5 hours prior to arrival.  He has improved with cephalexin therapy in the past and this was prescribed again.  Chart was reviewed and this was the antibiotic utilized.  Possible side effects of medication discussed.  Use heat in the axilla for 15-20 minutes every 1-2 hours over the next 2 days.  Strict ED return instructions reviewed with the patient.  He was in agreement with this plan and was suitable for discharge.     I have reviewed the nursing notes.    I have reviewed the findings, diagnosis, plan and need for follow up with the patient.       New Prescriptions    CEPHALEXIN (KEFLEX) 500 MG CAPSULE    Take 1 capsule (500 mg) by mouth 4 times daily for 10 days       Final diagnoses:   Cellulitis of axilla       Disclaimer: This note consists of symbols derived from keyboarding, dictation and/or voice recognition software. As a result, there may be errors in the script that have gone undetected. Please consider this when interpreting information found in this chart.      8/2/2020   Dandre Perdomo PA-C   [facility] EMERGENCY DEPARTMENT     Dandre Perdomo PA-C  08/02/20 [id number]

## 2020-08-03 NOTE — DISCHARGE INSTRUCTIONS
It was a pleasure working with you today!  I hope your condition improves rapidly!     Please start the antibiotic right away and take it 4 times per day for the whole 10 days.  Use a heating pad in your armpit for 15-20 minutes every couple hours tomorrow if at all possible to help improve your condition.  Return to the ED if you develop worsening chills, fever, or spreading rash despite antibiotic therapy.

## 2020-08-05 ENCOUNTER — NURSE TRIAGE (OUTPATIENT)
Dept: FAMILY MEDICINE | Facility: OTHER | Age: 65
End: 2020-08-05

## 2020-08-05 NOTE — PROGRESS NOTES
Subjective     Bhavesh Landeros is a 64 year old male who presents to clinic today for the following health issues:    HPI       ABDOMINAL   discomfort and bloating     Onset:   ongoing with recent worsening.  He gets full quickly.  He feels pressure and discomfort in his mid upper abdomen.  He reports a history of hiatal hernia that he was told about approximately 10 years ago when he had an upper endoscopy.  He has a history of reflux that he reports is well controlled with omeprazole.  He reports no changes in bowel habits.  He had an upper endoscopy in 2009 that was unremarkable and he was put on a PPI at that time for his symptoms.    Description:   Character: Dull ache, Fullness and bubbly gut  Location: epigastric region right upper quadrant  Radiation: lower gut at times    Intensity: mild, moderate    Progression of Symptoms:  same and intermittent    Accompanying Signs & Symptoms:  Fever/Chills?: no   Gas/Bloating: YES  Nausea: no   Vomitting: no   Diarrhea?: no   Constipation:no   Dysuria or Hematuria: no    History:   Trauma: no   Previous similar pain: YES   Previous tests done: heart testing    Precipitating factors:   Does the pain change with:     Food: YES   -  Gets full quick    BM: YES  Feels better    Urination: no     Alleviating factors:  None    Therapies Tried and outcome:  None tried -  Occasional tums    LMP:  not applicable     He recently was seen in the ER for cellulitis of the left axilla and swelling of the right leg.  He reports that this same set of symptoms has happened a couple other times in the past.  It will start with a fever and redness in his left axilla followed by swelling and redness of the medial right lower extremity.  He is currently being treated with cephalexin and the fever has resolved and the redness is improving.  The swelling is still there in the right lower extremity but he also has chronic swelling in this leg.  He had an ultrasound in 2013 to rule out DVT.  He  has been treating the swelling with compression stockings.  He was on a one-month course of hydrochlorothiazide for both blood pressure and swelling.  He quit drinking coffee recently and his blood pressure has improved.    Patient Active Problem List   Diagnosis     Benign neoplasm of testis     GERD (gastroesophageal reflux disease)     Cellulitis of leg     Leg edema, right     Advanced directives, counseling/discussion     SOB (shortness of breath)     Newly recognized murmur     Ventral hernia without obstruction or gangrene     Aortic valve stenosis, etiology of cardiac valve disease unspecified     Mitral regurgitation and aortic stenosis     Primary osteoarthritis of right knee     Obesity (BMI 35.0-39.9) with comorbidity (H)     Mixed hyperlipidemia     Benign essential hypertension     Past Surgical History:   Procedure Laterality Date     COLONOSCOPY N/A 1/22/2016    Procedure: COLONOSCOPY;  Surgeon: bP Rodriguez MD;  Location: [facility]     HC COLONOSCOPY W/WO BRUSH/WASH  03/27/06     HC KNEE SCOPE,MED/LAT MENISECTOMY  07/08/1999     HC REVISE MEDIAN N/CARPAL TUNNEL SURG  1986      UGI ENDOSCOPY DIAG W BIOPSY  10/26/09       Social History     Tobacco Use     Smoking status: Never Smoker     Smokeless tobacco: Never Used   Substance Use Topics     Alcohol use: Not Currently     Alcohol/week: 0.0 standard drinks     Comment: occ     Family History   Problem Relation Age of Onset     Diabetes Brother      Alzheimer Disease Father      Diabetes Sister          Current Outpatient Medications   Medication Sig Dispense Refill     aspirin 81 MG chewable tablet Take 1 tablet (81 mg) by mouth daily       atorvastatin (LIPITOR) 20 MG tablet Take 1 tablet (20 mg) by mouth daily 90 tablet 3     IBUPROFEN PO        losartan (COZAAR) 50 MG tablet Take 1 tablet (50 mg) by mouth daily 90 tablet 1     omeprazole (PRILOSEC) 20 MG DR capsule TAKE ONE CAPSULE BY MOUTH ONCE DAILY 90 capsule 3     Allergies   Allergen  "Reactions     No Known Allergies      Recent Labs   Lab Test 12/28/19  1344 11/25/19  1415 11/24/18  1934 11/20/18  0835 11/07/18  0929 12/28/16  0912 01/12/16  0920   LDL  --   --   --  119*  --  116* 97   HDL  --   --   --  53  --  60 62   TRIG  --   --   --  71  --  95 109   ALT 26 31 30  --  26 25 28   CR 0.88 0.90 1.11  --  0.80 0.88 0.79   GFRESTIMATED >90 89 67  --  >90 88 >90  Non  GFR Calc     GFRESTBLACK >90 >90 81  --  >90 >90   GFR Calc   >90   GFR Calc     POTASSIUM 3.9 4.8 4.0  --  4.3 4.3 4.4   TSH 3.27  --   --   --  2.59  --   --       BP Readings from Last 3 Encounters:   08/07/20 124/76   08/02/20 (!) 163/83   01/08/20 128/68    Wt Readings from Last 3 Encounters:   08/07/20 101.4 kg (223 lb 9.6 oz)   08/02/20 102.5 kg (226 lb)   01/08/20 101.2 kg (223 lb 1.6 oz)                    Reviewed and updated as needed this visit by Provider         Review of Systems   Constitutional, HEENT, cardiovascular, pulmonary, GI, , musculoskeletal, neuro, skin, endocrine and psych systems are negative, except as otherwise noted.      Objective    /76   Pulse 68   Temp 99  F (37.2  C) (Temporal)   Resp 16   Ht 1.685 m (5' 6.34\")   Wt 101.4 kg (223 lb 9.6 oz)   SpO2 99%   BMI 35.72 kg/m    Body mass index is 35.72 kg/m .  Physical Exam   GENERAL: alert, no distress and obese  EYES: Eyes grossly normal to inspection, PERRL and conjunctivae and sclerae normal  NECK: no adenopathy, no asymmetry, masses, or scars and thyroid normal to palpation  RESP: lungs clear to auscultation - no rales, rhonchi or wheezes  CV: regular rate and rhythm, normal S1 S2, no S3 or S4, no murmur, click or rub  ABDOMEN: Abdomen distended, tender in epigastric region and mild tenderness in left lower quadrant  MS: Nonpitting edema right lower extremity, trace edema left lower extremity  SKIN: Pale erythema distal medial right lower extremity, pale erythema left axilla  NEURO: " Normal strength and tone, mentation intact and speech normal  PSYCH: mentation appears normal, affect normal/bright  LYMPH: No right groin lymphadenopathy, no left axillary lymphadenopathy    Diagnostic Test Results:  Labs reviewed in Epic        Assessment & Plan     1. Early satiety  2. Abdominal bloating  3. Epigastric pain  Worsened recently.  I reviewed past CT scans from over 5 years ago and there is some borderline retroperitoneal and mesenteric lymph nodes.  His abdomen is distended and fairly taut.  Recommend getting labs and doing CT.  If CT is unremarkable we will get upper endoscopy.  - Comprehensive metabolic panel (BMP + Alb, Alk Phos, ALT, AST, Total. Bili, TP)  - Amylase  - Lipase  - CBC with platelets  - CT Abdomen Pelvis w Contrast; Future    4. Cellulitis of axilla, left  Improving with antibiotics.    5. Gastroesophageal reflux disease without esophagitis  Stable and asymptomatic with use of omeprazole.    6. Benign essential hypertension  Improved after he stopped drinking coffee.  He was drinking 1-2 pots in the morning every day.    7. Leg edema, right  Acute on chronic with recent left axillary cellulitis.  He reports that he gets swelling and redness on his right lower extremity whenever he gets left axillary cellulitis.  He is wearing compression stockings and states the redness and swelling are gradually improving but still present.  We will continue to monitor.  Recommend follow-up if redness or swelling worsens.  Continue current antibiotic.      Greater than 50% of 25 minute visit were spent on counseling or coordination of care regarding early satiety, bloating, epigastric pain, cellulitis, GERD, hypertension, leg edema.    No follow-ups on file.    PERLA Jackson [facility]

## 2020-08-05 NOTE — TELEPHONE ENCOUNTER
"  S-(situation): pt is wondering if he has gallbladder issues?     B-(background):  Says he has had some slight annoying pains that come and go for about 6 months. He has been doing some research and he is wondering if it is his gallbladder that is acting up?     A-(assessment):  Upper abdominal discomfort of unknown etiology.     R-(recommendations):  Should see provider within 2 weeks.  He is scheduled to see Radha Low on Friday, 8/14/20, at 11: 40 AM in [place].     Seek emergency care Immediately If Any of the Following Occur:  * continuous chest pain accompanied with chest tightness, pressure, or  if it is accompanied by:    * Shortness of breath    * dizziness    * Cool, moist skin    * Nausea or vomiting    * Pain in the neck, shoulders, jaw, teeth, back , or arms.     * Blue of gray face, lips, earlobes, or fingernails,    * heart palpiataions.   * NO relief from repeated Nitroglycerin every 5 minutes for three doses.    GILLIAN Hankins    Answer Assessment - Initial Assessment Questions  1. LOCATION: \"Where does it hurt?\"        C/O pains in his chest. HE was in a while ago and it was determined it was reflux disease.  That has been taken care of. All heart tests were negative. Went on acid reflux med and it has gone away.  NOW I am wondering if it is my gallbladder.  \" I just have a slight annoying pain now that it just below my breast on the left side of my chest. I always feel full. NO nausea. NO sweating. This comes every 2 years like clockwork.   2. RADIATION: \"Does the pain go anywhere else?\" (e.g., into neck, jaw, arms, back)       NO pains that radiate anywhere.   3. ONSET: \"When did the chest pain begin?\" (Minutes, hours or days)       6 months now.   4. PATTERN \"Does the pain come and go, or has it been constant since it started?\"  \"Does it get worse with exertion?\"       Comes and goes. Does NOT get worse with exertion.   5. DURATION: \"How long does it last\" (e.g., seconds, minutes, " "hours)      Discomfort lasts for just a couple minutes  6. SEVERITY: \"How bad is the pain?\"  (e.g., Scale 1-10; mild, moderate, or severe)     - MILD (1-3): doesn't interfere with normal activities      - MODERATE (4-7): interferes with normal activities or awakens from sleep     - SEVERE (8-10): excruciating pain, unable to do any normal activities        1/10.   7. CARDIAC RISK FACTORS: \"Do you have any history of heart problems or risk factors for heart disease?\" (e.g., prior heart attack, angina; high blood pressure, diabetes, being overweight, high cholesterol, smoking, or strong family history of heart disease)      He is on BP pills. NON-smoker, NO family hx of heart disease.   8. PULMONARY RISK FACTORS: \"Do you have any history of lung disease?\"  (e.g., blood clots in lung, asthma, emphysema, birth control pills)      He was born with asthma. NO troubles in that area though.   9. CAUSE: \"What do you think is causing the chest pain?\"      He is wondering if it is gallbladder?   10. OTHER SYMPTOMS: \"Do you have any other symptoms?\" (e.g., dizziness, nausea, vomiting, sweating, fever, difficulty breathing, cough)        NO other symptoms, just a fullness.  11. PREGNANCY: \"Is there any chance you are pregnant?\" \"When was your last menstrual period?\"        NA    Protocols used: CHEST PAIN-A-OH      "

## 2020-08-07 ENCOUNTER — OFFICE VISIT (OUTPATIENT)
Dept: FAMILY MEDICINE | Facility: OTHER | Age: 65
End: 2020-08-07
Payer: COMMERCIAL

## 2020-08-07 ENCOUNTER — TELEPHONE (OUTPATIENT)
Dept: FAMILY MEDICINE | Facility: OTHER | Age: 65
End: 2020-08-07

## 2020-08-07 VITALS
SYSTOLIC BLOOD PRESSURE: 124 MMHG | OXYGEN SATURATION: 99 % | HEIGHT: 66 IN | HEART RATE: 68 BPM | TEMPERATURE: 99 F | WEIGHT: 223.6 LBS | DIASTOLIC BLOOD PRESSURE: 76 MMHG | RESPIRATION RATE: 16 BRPM | BODY MASS INDEX: 35.93 KG/M2

## 2020-08-07 DIAGNOSIS — R60.0 LEG EDEMA, RIGHT: ICD-10-CM

## 2020-08-07 DIAGNOSIS — I10 BENIGN ESSENTIAL HYPERTENSION: ICD-10-CM

## 2020-08-07 DIAGNOSIS — K21.9 GASTROESOPHAGEAL REFLUX DISEASE WITHOUT ESOPHAGITIS: ICD-10-CM

## 2020-08-07 DIAGNOSIS — L03.112 CELLULITIS OF AXILLA, LEFT: ICD-10-CM

## 2020-08-07 DIAGNOSIS — R10.13 EPIGASTRIC PAIN: ICD-10-CM

## 2020-08-07 DIAGNOSIS — R68.81 EARLY SATIETY: Primary | ICD-10-CM

## 2020-08-07 DIAGNOSIS — R14.0 ABDOMINAL BLOATING: ICD-10-CM

## 2020-08-07 LAB
ALBUMIN SERPL-MCNC: 3.2 G/DL (ref 3.4–5)
ALP SERPL-CCNC: 71 U/L (ref 40–150)
ALT SERPL W P-5'-P-CCNC: 48 U/L (ref 0–70)
AMYLASE SERPL-CCNC: 34 U/L (ref 30–110)
ANION GAP SERPL CALCULATED.3IONS-SCNC: 7 MMOL/L (ref 3–14)
AST SERPL W P-5'-P-CCNC: 22 U/L (ref 0–45)
BILIRUB SERPL-MCNC: 0.3 MG/DL (ref 0.2–1.3)
BUN SERPL-MCNC: 16 MG/DL (ref 7–30)
CALCIUM SERPL-MCNC: 8.6 MG/DL (ref 8.5–10.1)
CHLORIDE SERPL-SCNC: 108 MMOL/L (ref 94–109)
CO2 SERPL-SCNC: 25 MMOL/L (ref 20–32)
CREAT SERPL-MCNC: 0.79 MG/DL (ref 0.66–1.25)
ERYTHROCYTE [DISTWIDTH] IN BLOOD BY AUTOMATED COUNT: 12.7 % (ref 10–15)
GFR SERPL CREATININE-BSD FRML MDRD: >90 ML/MIN/{1.73_M2}
GLUCOSE SERPL-MCNC: 106 MG/DL (ref 70–99)
HCT VFR BLD AUTO: 41.7 % (ref 40–53)
HGB BLD-MCNC: 13.8 G/DL (ref 13.3–17.7)
LIPASE SERPL-CCNC: 128 U/L (ref 73–393)
MCH RBC QN AUTO: 28.8 PG (ref 26.5–33)
MCHC RBC AUTO-ENTMCNC: 33.1 G/DL (ref 31.5–36.5)
MCV RBC AUTO: 87 FL (ref 78–100)
PLATELET # BLD AUTO: 219 10E9/L (ref 150–450)
POTASSIUM SERPL-SCNC: 4 MMOL/L (ref 3.4–5.3)
PROT SERPL-MCNC: 6.8 G/DL (ref 6.8–8.8)
RBC # BLD AUTO: 4.8 10E12/L (ref 4.4–5.9)
SODIUM SERPL-SCNC: 140 MMOL/L (ref 133–144)
WBC # BLD AUTO: 6.5 10E9/L (ref 4–11)

## 2020-08-07 PROCEDURE — 99214 OFFICE O/P EST MOD 30 MIN: CPT | Performed by: STUDENT IN AN ORGANIZED HEALTH CARE EDUCATION/TRAINING PROGRAM

## 2020-08-07 PROCEDURE — 85027 COMPLETE CBC AUTOMATED: CPT | Performed by: STUDENT IN AN ORGANIZED HEALTH CARE EDUCATION/TRAINING PROGRAM

## 2020-08-07 PROCEDURE — 80053 COMPREHEN METABOLIC PANEL: CPT | Performed by: STUDENT IN AN ORGANIZED HEALTH CARE EDUCATION/TRAINING PROGRAM

## 2020-08-07 PROCEDURE — 83690 ASSAY OF LIPASE: CPT | Performed by: STUDENT IN AN ORGANIZED HEALTH CARE EDUCATION/TRAINING PROGRAM

## 2020-08-07 PROCEDURE — 82150 ASSAY OF AMYLASE: CPT | Performed by: STUDENT IN AN ORGANIZED HEALTH CARE EDUCATION/TRAINING PROGRAM

## 2020-08-07 PROCEDURE — 36415 COLL VENOUS BLD VENIPUNCTURE: CPT | Performed by: STUDENT IN AN ORGANIZED HEALTH CARE EDUCATION/TRAINING PROGRAM

## 2020-08-07 ASSESSMENT — MIFFLIN-ST. JEOR: SCORE: 1747.37

## 2020-08-07 NOTE — TELEPHONE ENCOUNTER
Patient informed CT scan scheduled 8/10/20 9:15, NPO 2 hours prior  Closing encounter  Yanet Cotton RT (R)

## 2020-08-10 ENCOUNTER — HOSPITAL ENCOUNTER (OUTPATIENT)
Dept: CT IMAGING | Facility: CLINIC | Age: 65
Discharge: HOME | End: 2020-08-10
Attending: STUDENT IN AN ORGANIZED HEALTH CARE EDUCATION/TRAINING PROGRAM | Admitting: STUDENT IN AN ORGANIZED HEALTH CARE EDUCATION/TRAINING PROGRAM
Payer: COMMERCIAL

## 2020-08-10 DIAGNOSIS — R14.0 ABDOMINAL BLOATING: ICD-10-CM

## 2020-08-10 DIAGNOSIS — R68.81 EARLY SATIETY: ICD-10-CM

## 2020-08-10 DIAGNOSIS — R10.13 EPIGASTRIC PAIN: ICD-10-CM

## 2020-08-10 PROCEDURE — 74177 CT ABD & PELVIS W/CONTRAST: CPT

## 2020-08-10 PROCEDURE — 25000128 H RX IP 250 OP 636: Performed by: RADIOLOGY

## 2020-08-10 RX ORDER — IOPAMIDOL 755 MG/ML
500 INJECTION, SOLUTION INTRAVASCULAR ONCE
Status: COMPLETED | OUTPATIENT
Start: 2020-08-10 | End: 2020-08-10

## 2020-08-10 RX ADMIN — IOPAMIDOL 100 ML: 755 INJECTION, SOLUTION INTRAVENOUS at 10:28

## 2020-08-11 ENCOUNTER — TELEPHONE (OUTPATIENT)
Dept: FAMILY MEDICINE | Facility: OTHER | Age: 65
End: 2020-08-11

## 2020-08-11 DIAGNOSIS — R59.0 LYMPHADENOPATHY, ABDOMINAL: Primary | ICD-10-CM

## 2020-08-11 NOTE — TELEPHONE ENCOUNTER
Called and talked to patient about CT and order for peripheral blood smear.  He will call tomorrow to set up a lab appointment.  Almita Gonzalez, CNP

## 2020-08-12 ENCOUNTER — TELEPHONE (OUTPATIENT)
Dept: FAMILY MEDICINE | Facility: OTHER | Age: 65
End: 2020-08-12

## 2020-08-12 NOTE — TELEPHONE ENCOUNTER
Pt has questions about CT scan and hiatal hernia. Pt is also requesting provider to talk with his daughter Patria about this too as he states he doesn't always remember everything. C2C on file.

## 2020-08-12 NOTE — TELEPHONE ENCOUNTER
Spoke with patient, has lab appointment scheduled, will await results. No additional questions at this time.

## 2020-08-13 DIAGNOSIS — E78.2 MIXED HYPERLIPIDEMIA: ICD-10-CM

## 2020-08-13 DIAGNOSIS — R59.0 LYMPHADENOPATHY, ABDOMINAL: ICD-10-CM

## 2020-08-13 DIAGNOSIS — I10 BENIGN ESSENTIAL HYPERTENSION: ICD-10-CM

## 2020-08-13 LAB
ANION GAP SERPL CALCULATED.3IONS-SCNC: 4 MMOL/L (ref 3–14)
BASOPHILS # BLD AUTO: 0.1 10E9/L (ref 0–0.2)
BASOPHILS NFR BLD AUTO: 1 %
BUN SERPL-MCNC: 14 MG/DL (ref 7–30)
CALCIUM SERPL-MCNC: 9 MG/DL (ref 8.5–10.1)
CHLORIDE SERPL-SCNC: 109 MMOL/L (ref 94–109)
CHOLEST SERPL-MCNC: 119 MG/DL
CO2 SERPL-SCNC: 27 MMOL/L (ref 20–32)
CREAT SERPL-MCNC: 0.9 MG/DL (ref 0.66–1.25)
DIFFERENTIAL METHOD BLD: ABNORMAL
EOSINOPHIL NFR BLD AUTO: 2.8 %
ERYTHROCYTE [DISTWIDTH] IN BLOOD BY AUTOMATED COUNT: 12.7 % (ref 10–15)
GFR SERPL CREATININE-BSD FRML MDRD: 88 ML/MIN/{1.73_M2}
GLUCOSE SERPL-MCNC: 106 MG/DL (ref 70–99)
HCT VFR BLD AUTO: 39.2 % (ref 40–53)
HDLC SERPL-MCNC: 42 MG/DL
HGB BLD-MCNC: 13.3 G/DL (ref 13.3–17.7)
IMM GRANULOCYTES # BLD: 0.1 10E9/L (ref 0–0.4)
IMM GRANULOCYTES NFR BLD: 0.7 %
LDLC SERPL CALC-MCNC: 53 MG/DL
LYMPHOCYTES # BLD AUTO: 2.1 10E9/L (ref 0.8–5.3)
LYMPHOCYTES NFR BLD AUTO: 31.2 %
MCH RBC QN AUTO: 29.3 PG (ref 26.5–33)
MCHC RBC AUTO-ENTMCNC: 33.9 G/DL (ref 31.5–36.5)
MCV RBC AUTO: 86 FL (ref 78–100)
MONOCYTES # BLD AUTO: 0.7 10E9/L (ref 0–1.3)
MONOCYTES NFR BLD AUTO: 9.9 %
NEUTROPHILS # BLD AUTO: 3.7 10E9/L (ref 1.6–8.3)
NEUTROPHILS NFR BLD AUTO: 54.4 %
NONHDLC SERPL-MCNC: 77 MG/DL
NRBC # BLD AUTO: 0 10*3/UL
NRBC BLD AUTO-RTO: 0 /100
PLATELET # BLD AUTO: 251 10E9/L (ref 150–450)
POTASSIUM SERPL-SCNC: 4.1 MMOL/L (ref 3.4–5.3)
RBC # BLD AUTO: 4.54 10E12/L (ref 4.4–5.9)
RETICS # AUTO: 59.9 10E9/L (ref 25–95)
RETICS/RBC NFR AUTO: 1.3 % (ref 0.5–2)
SODIUM SERPL-SCNC: 140 MMOL/L (ref 133–144)
TRIGL SERPL-MCNC: 118 MG/DL
WBC # BLD AUTO: 6.9 10E9/L (ref 4–11)

## 2020-08-13 PROCEDURE — 85025 COMPLETE CBC W/AUTO DIFF WBC: CPT | Performed by: STUDENT IN AN ORGANIZED HEALTH CARE EDUCATION/TRAINING PROGRAM

## 2020-08-13 PROCEDURE — 80061 LIPID PANEL: CPT | Performed by: STUDENT IN AN ORGANIZED HEALTH CARE EDUCATION/TRAINING PROGRAM

## 2020-08-13 PROCEDURE — 80048 BASIC METABOLIC PNL TOTAL CA: CPT | Performed by: STUDENT IN AN ORGANIZED HEALTH CARE EDUCATION/TRAINING PROGRAM

## 2020-08-13 PROCEDURE — 36415 COLL VENOUS BLD VENIPUNCTURE: CPT | Performed by: STUDENT IN AN ORGANIZED HEALTH CARE EDUCATION/TRAINING PROGRAM

## 2020-08-13 PROCEDURE — 85060 BLOOD SMEAR INTERPRETATION: CPT | Performed by: STUDENT IN AN ORGANIZED HEALTH CARE EDUCATION/TRAINING PROGRAM

## 2020-08-13 PROCEDURE — 85045 AUTOMATED RETICULOCYTE COUNT: CPT | Performed by: STUDENT IN AN ORGANIZED HEALTH CARE EDUCATION/TRAINING PROGRAM

## 2020-08-14 LAB — COPATH REPORT: NORMAL

## 2020-08-20 ENCOUNTER — TELEPHONE (OUTPATIENT)
Dept: FAMILY MEDICINE | Facility: OTHER | Age: 65
End: 2020-08-20

## 2020-08-20 DIAGNOSIS — R68.81 EARLY SATIETY: Primary | ICD-10-CM

## 2020-08-20 DIAGNOSIS — R14.0 BLOATING: ICD-10-CM

## 2020-08-21 ENCOUNTER — TELEPHONE (OUTPATIENT)
Dept: FAMILY MEDICINE | Facility: OTHER | Age: 65
End: 2020-08-21

## 2020-08-21 NOTE — TELEPHONE ENCOUNTER
Left message for patient to return call to schedule colonoscopy or EGD. If Josy or Clara are unavailable, please transfer to the surgery center.

## 2020-08-21 NOTE — LETTER
Upper Endoscopy    Patient Name Bhavesh Landeros     Procedure Date 9/10/2020 Arrival Time 11:30am Procedure Time 12:30pm   Physician Dr. Betancourt   Location   [facility] Same Day Surgery   Other Instructions Occasionally procedure times need to be changed. In the event your procedure time needs to be changed, you will receive a telephone call from a nurse informing you of the change as well as any other instructions.     Review the preparation schedule below for the five days preceding your procedure.     If you have any questions, please call [phone].  5 DAYS PRIOR   *CHECK WITH YOUR INSURANCE REGARDING COVERAGE PRIOR TO PROCEDURE.  If you take Coumadin (Warfarin), Plavix, Ticlid, Aggrenox:, insulin, diabetic pills and/or iron products contact your doctor for specific instructions.  Have INR checked the day before the procedure.  Please remember to arrange a responsible adult to accompany you home.   must be present upon discharge.     1 DAY PRIOR   Eat normally up until midnight.  - You may drink small amounts of clear liquids up until 4 hours prior to your arrival.  **Please see Clear Liquid Diet Sheet for suggested liquids.again.     PROCEDURE DAY    From midnight until 3 hours prior to your procedure, you may drink small amounts of clear liquids.  Do not take your morning medications until after you have completed your procedure.  Bring a list of your medications with you on the day of your procedure.     *Reminder:  Have transportation arranged to take you home.   must accompany you to the procedure.  Do not plan to drive or operate vehicles or machinery the day of your exam.                               CLEAR LIQUID DIET  The clear liquid diet includes foods that are free of fat and fiber. They will liquefy to clear liquid at room temperature. No red or purple colored juices or Jell-O s. No dairy products. No alcoholic beverages. No foods containing seeds 2 days prior to  procedure    TYPE OF FOOD USE ONLY THESE FOODS   Beverages Coffee, Decaffeinated coffee, Tea (without milk or non-dairy creamer)  Carbonated beverages (pop)  Water  Sports Drinks   Desserts Jello (except red or purple)  Fruit ice  Popsicle   Fruit and Fruit Juices Citrus juices, strained  Fruit nectars, strained  Apple juice  Fruit drinks   Soups Broth or Bouillon  Consommé  Meat stock, strained  Vegetable broth, strained   Sweets Hard candy, plain  Sugar   Miscellaneous Flavorings  Salt       Directions to [facility]    From the North: Take the [address]nd [address] exit off of [address] (on the south side of [place]).  Turn left on [address]  Turn left at the first stoplight (at [employer]).  The hospital and clinic is the third building on the left.     From the South: Follow [address] north to the first [place] exit.  Exit on [address] following it to the right.  Turn left at the first stoplight.  The hospital and clinic is the third building on the left.    From the East: Following [address] west, turn left at the stoplight onto [address] Follow [address] through [place].  Take a right at the light on [address] (at [employer]).  The hospital and clinic is the third building on the left.    From the West: Following [address] going east, turn south on [address].  Take the [address] exit off of [address] (on the south side of [place]).  Follow [address] through [place] to the stoplight on [address] (at [employer]). Take a left.  The hospital and clinic is the third building on the left.                  UPPER ENDOSCOPY INFORMATION    WHAT IS AN UPPER ENDOSCOPY?  Upper endoscopy (also known as an upper GI endoscopy, esophagogastro-duodenoscopy [EGD], or panendoscopy) is a procedure that enables your physician to examine the lining of the upper part of your gastrointestinal tract, ie. The esophagus (swallowing tube), stomach, and duodenum (first portion of  the small intestine) using a thin flexible tube with its own lens and light source.    Upper endoscopy is usually performed to evaluate symptoms of persistent upper abdominal pain, nausea, vomiting or difficulty swallowing.  It is also the best test for finding the cause of bleeding from the upper gastrointestinal tract.    Upper endoscopy is more accurate than x-ray films for detecting inflammation, ulcers or tumors of the esophagus, stomach and duodenum.  Upper endoscopy can detect early cancer and can distinguish between benign (non-cancerous) and malignant (cancerous) conditions when biopsies (small tissue samples) of suspicious areas are obtained.  Biopsies are taken for many reasons and do not necessarily mean that cancer is suspected.  A cytology test (introduction of a small brush to collect cells) may also be performed.    Upper endoscopy is also used to treat conditions present in the upper gastrointestinal tract.  A variety of instruments can be passed through the endoscope that allow many abnormalities to be treated directly with little or no discomfort.  This may include stretching narrowed areas, removing polyps (usually benign growths) or swallowed objects, or treating upper gastrointestinal bleeding.  Safe and effective endoscopic control of bleeding has reduced the need for transfusions and surgery in many patients.    For the best and safest examination, the stomach must be completely empty.  You should have nothing to eat or drink, including water, for approximately 4 hours prior to your examination.    WHAT CAN BE EXPECTED DURING THE UPPER ENDOSCOPY?  Your doctor will review with you why upper endoscopy is being performed, whether any alternative tests are available, and possible complications from the procedure.  Your throat will be sprayed with a local anesthetic before the procedure begins and you may be given medication through a vein to help you relax during the procedure.  While you are in  a comfortable position on your side, the endoscope is passed through the mouth and then is passed in turn through the esophagus, stomach, and duodenum.  The endoscope does not interfere with your breathing during the test.  Most patients consider the procedure to be only slightly uncomfortable and many patients fall asleep during the procedure.    WHAT HAPPENS AFTER THE UPPER ENDOSCOPY?  After the procedure, you will be monitored in the endoscopy area until most of the effects of the medication have work off.  Your throat may be a little sore for a while, and you may feel bloated right after the procedure because of the air introduced into your stomach during the test.  You will be able to resume your diet after you leave unless you are instructed otherwise.    If you receive sedation, you will need to arrange to have a responsible adult drive and accompany you home from the examination because the sedative may affect your judgment and reflexes for the rest of the day.  You will not be allowed to take a taxi or bus as transportation home.  If you receive sedation, you will not be allowed to drive after the procedure even though you may not feel tired.            WHAT ARE THE POSSIBLE COMPLICATIONS OF UPPER ENDOSCOPY?  Endoscopy is generally safe.  Complications can occur but are rare when the test is performed by physicians with specialized training and experience in this procedure.  Bleeding may occur from a biopsy site or where a polyp was removed.  It is usually minimal and rarely requires blood transfusions or surgery.  Localized irritation of the vein where the medication was injected may rarely cause a tender lump lasting for several weeks, but this will go away.  Applying heat packs or hot moist towels may help relieve discomfort.  Other potential risks include a reaction to the sedatives used and complications from underlying medical conditions.  Major complication, eg, perforation (a tear that might require  surgery for repair) are very uncommon.      It is important for you to recognize early signs of any possible complication.  If you begin to run a fever after the test, begin to have trouble swallowing, or have increasing throat, chest or abdominal pain, let your doctor know about it promptly.

## 2020-08-21 NOTE — LETTER
[facility] [address] [address]  [phone]      August 24, 2020      Bhavesh Landeros  [id number] [address] [address]      Dear Bhavesh:     To better serve you, we are sending this letter to notify you that we have attempted to contact you by telephone to schedule the following procedure(s) ordered by your physician.     _____x__   Colonoscopy   _______   Upper GI Endoscopy (EGD)   _______   Colonoscopy and Upper GI Endoscopy    To provide the highest quality of care, we strongly encourage you to call and schedule the prescribed test/procedure at your earliest convenience.   The number to the Specialty Scheduling department is [phone] and the hours are 8:00am - 4:30pm Monday through Friday.   We look forward to hearing from you.    Sincerely,    [facility]

## 2020-08-21 NOTE — LETTER
[facility] [place]  [address]  [place] [address]  [phone]        August 31, 2020    Bhavesh Landeros  [address]   [place] [address]

## 2020-08-24 NOTE — TELEPHONE ENCOUNTER
Left message for patient to return call to schedule EGD/colonoscopy. If Clara or Josy are not available, please transfer to same day surgery        x2 letter sent

## 2020-08-27 NOTE — TELEPHONE ENCOUNTER
Patient was transferred from specialty to clinic. He wants to know why he needs to have colonoscopy or EGD. I explained to him that the referral put in had noted gastric pain and bloating. He stated these symptoms have subsided a bit since his CT. He wants someone to go over the results of the CT with him and explain why further testing would be needed. Please call patient back. [phone].

## 2020-08-31 DIAGNOSIS — Z11.59 ENCOUNTER FOR SCREENING FOR OTHER VIRAL DISEASES: Primary | ICD-10-CM

## 2020-08-31 NOTE — TELEPHONE ENCOUNTER
Date of colonoscopy/EGD: 9/10  Surgeon: Dr. Betancourt  Prep:EGD  Packet:Colonoscopy/EGD instructions mailed to patient's home address.   Date: 8/31/2020      Surgery Scheduler

## 2020-08-31 NOTE — TELEPHONE ENCOUNTER
Called and talked to patient. He is planning to get the EGD done.  Almita Gonzalez, CNP   
Copied from other telephone message:  Patient was transferred from specialty to clinic. He wants to know why he needs to have colonoscopy or EGD. I explained to him that the referral put in had noted gastric pain and bloating. He stated these symptoms have subsided a bit since his CT. He wants someone to go over the results of the CT with him and explain why further testing would be needed. Please call patient back. [phone].  
Flagging for provider FYI:    pateint states he will call us back on Monday he is on vacation in the [facility] to discuss this with provider he has more questions for her.   Thanks  Yanet ALTAMIRANO (R)         
Left detailed message for patient to return call. Next step in evaluation will be upper endoscopy. I put in an order for this.  Almita Gonzalez, CNP   
Patient called asking why ordered this test. He stated that the MRI showed nothing wrong and he is feeling fine. What is the purpose of this test?    Please call about this - [phone]  Ok to leave message  
Recommend phone visit to discuss     Adrienne Perrin CNP    
Routing to covering provider.     Osei Cartagena,     
Spoke with pt, he just wants to understand the test results but does not want to incur another $350 bill for this discussion. Will route to provider.   
room air

## 2020-09-08 DIAGNOSIS — Z11.59 ENCOUNTER FOR SCREENING FOR OTHER VIRAL DISEASES: ICD-10-CM

## 2020-09-08 PROCEDURE — U0003 INFECTIOUS AGENT DETECTION BY NUCLEIC ACID (DNA OR RNA); SEVERE ACUTE RESPIRATORY SYNDROME CORONAVIRUS 2 (SARS-COV-2) (CORONAVIRUS DISEASE [COVID-19]), AMPLIFIED PROBE TECHNIQUE, MAKING USE OF HIGH THROUGHPUT TECHNOLOGIES AS DESCRIBED BY CMS-2020-01-R: HCPCS | Performed by: SURGERY

## 2020-09-09 LAB
SARS-COV-2 RNA SPEC QL NAA+PROBE: NOT DETECTED
SPECIMEN SOURCE: NORMAL

## 2020-09-10 ENCOUNTER — ANESTHESIA EVENT (OUTPATIENT)
Dept: GASTROENTEROLOGY | Facility: CLINIC | Age: 65
End: 2020-09-10
Payer: COMMERCIAL

## 2020-09-10 ENCOUNTER — HOSPITAL ENCOUNTER (OUTPATIENT)
Facility: CLINIC | Age: 65
Discharge: HOME | End: 2020-09-10
Attending: SURGERY | Admitting: SURGERY
Payer: COMMERCIAL

## 2020-09-10 ENCOUNTER — ANESTHESIA (OUTPATIENT)
Dept: GASTROENTEROLOGY | Facility: CLINIC | Age: 65
End: 2020-09-10
Payer: COMMERCIAL

## 2020-09-10 VITALS
TEMPERATURE: 97.6 F | HEART RATE: 56 BPM | RESPIRATION RATE: 16 BRPM | SYSTOLIC BLOOD PRESSURE: 123 MMHG | DIASTOLIC BLOOD PRESSURE: 84 MMHG | BODY MASS INDEX: 35.69 KG/M2 | WEIGHT: 223.38 LBS | OXYGEN SATURATION: 96 %

## 2020-09-10 LAB — UPPER GI ENDOSCOPY: NORMAL

## 2020-09-10 PROCEDURE — 25000125 ZZHC RX 250: Performed by: SURGERY

## 2020-09-10 PROCEDURE — 88305 TISSUE EXAM BY PATHOLOGIST: CPT | Performed by: SURGERY

## 2020-09-10 PROCEDURE — 43239 EGD BIOPSY SINGLE/MULTIPLE: CPT | Performed by: SURGERY

## 2020-09-10 PROCEDURE — 88305 TISSUE EXAM BY PATHOLOGIST: CPT | Mod: 26 | Performed by: SURGERY

## 2020-09-10 PROCEDURE — 25800030 ZZH RX IP 258 OP 636: Performed by: NURSE ANESTHETIST, CERTIFIED REGISTERED

## 2020-09-10 PROCEDURE — 37000008 ZZH ANESTHESIA TECHNICAL FEE, 1ST 30 MIN: Performed by: SURGERY

## 2020-09-10 PROCEDURE — 25000128 H RX IP 250 OP 636: Performed by: NURSE ANESTHETIST, CERTIFIED REGISTERED

## 2020-09-10 PROCEDURE — 25000125 ZZHC RX 250: Performed by: NURSE ANESTHETIST, CERTIFIED REGISTERED

## 2020-09-10 RX ORDER — ONDANSETRON 2 MG/ML
4 INJECTION INTRAMUSCULAR; INTRAVENOUS EVERY 30 MIN PRN
Status: DISCONTINUED | OUTPATIENT
Start: 2020-09-10 | End: 2020-09-10

## 2020-09-10 RX ORDER — PROPOFOL 10 MG/ML
INJECTION, EMULSION INTRAVENOUS CONTINUOUS PRN
Status: DISCONTINUED | OUTPATIENT
Start: 2020-09-10 | End: 2020-09-10

## 2020-09-10 RX ORDER — SODIUM CHLORIDE, SODIUM LACTATE, POTASSIUM CHLORIDE, CALCIUM CHLORIDE 600; 310; 30; 20 MG/100ML; MG/100ML; MG/100ML; MG/100ML
INJECTION, SOLUTION INTRAVENOUS CONTINUOUS PRN
Status: DISCONTINUED | OUTPATIENT
Start: 2020-09-10 | End: 2020-09-10

## 2020-09-10 RX ORDER — ALBUTEROL SULFATE 0.83 MG/ML
2.5 SOLUTION RESPIRATORY (INHALATION) EVERY 4 HOURS PRN
Status: CANCELLED | OUTPATIENT
Start: 2020-09-10

## 2020-09-10 RX ORDER — ONDANSETRON 4 MG/1
4 TABLET, ORALLY DISINTEGRATING ORAL EVERY 6 HOURS PRN
Status: DISCONTINUED | OUTPATIENT
Start: 2020-09-10 | End: 2020-09-10 | Stop reason: HOSPADM

## 2020-09-10 RX ORDER — LIDOCAINE 40 MG/G
CREAM TOPICAL
Status: DISCONTINUED | OUTPATIENT
Start: 2020-09-10 | End: 2020-09-10 | Stop reason: HOSPADM

## 2020-09-10 RX ORDER — ONDANSETRON 2 MG/ML
4 INJECTION INTRAMUSCULAR; INTRAVENOUS EVERY 6 HOURS PRN
Status: DISCONTINUED | OUTPATIENT
Start: 2020-09-10 | End: 2020-09-10 | Stop reason: HOSPADM

## 2020-09-10 RX ORDER — ONDANSETRON 4 MG/1
4 TABLET, ORALLY DISINTEGRATING ORAL EVERY 30 MIN PRN
Status: DISCONTINUED | OUTPATIENT
Start: 2020-09-10 | End: 2020-09-10

## 2020-09-10 RX ORDER — SODIUM CHLORIDE, SODIUM LACTATE, POTASSIUM CHLORIDE, CALCIUM CHLORIDE 600; 310; 30; 20 MG/100ML; MG/100ML; MG/100ML; MG/100ML
INJECTION, SOLUTION INTRAVENOUS CONTINUOUS
Status: DISCONTINUED | OUTPATIENT
Start: 2020-09-10 | End: 2020-09-10 | Stop reason: HOSPADM

## 2020-09-10 RX ORDER — LIDOCAINE HYDROCHLORIDE 20 MG/ML
INJECTION, SOLUTION INFILTRATION; PERINEURAL PRN
Status: DISCONTINUED | OUTPATIENT
Start: 2020-09-10 | End: 2020-09-10

## 2020-09-10 RX ORDER — ONDANSETRON 2 MG/ML
4 INJECTION INTRAMUSCULAR; INTRAVENOUS
Status: DISCONTINUED | OUTPATIENT
Start: 2020-09-10 | End: 2020-09-10 | Stop reason: HOSPADM

## 2020-09-10 RX ORDER — FLUMAZENIL 0.1 MG/ML
0.2 INJECTION, SOLUTION INTRAVENOUS
Status: DISCONTINUED | OUTPATIENT
Start: 2020-09-10 | End: 2020-09-10 | Stop reason: HOSPADM

## 2020-09-10 RX ORDER — NALOXONE HYDROCHLORIDE 0.4 MG/ML
.1-.4 INJECTION, SOLUTION INTRAMUSCULAR; INTRAVENOUS; SUBCUTANEOUS
Status: DISCONTINUED | OUTPATIENT
Start: 2020-09-10 | End: 2020-09-10 | Stop reason: HOSPADM

## 2020-09-10 RX ORDER — PROPOFOL 10 MG/ML
INJECTION, EMULSION INTRAVENOUS PRN
Status: DISCONTINUED | OUTPATIENT
Start: 2020-09-10 | End: 2020-09-10

## 2020-09-10 RX ADMIN — LIDOCAINE HYDROCHLORIDE 50 MG: 20 INJECTION, SOLUTION INFILTRATION; PERINEURAL at 12:29

## 2020-09-10 RX ADMIN — LIDOCAINE HYDROCHLORIDE 1 ML: 10 INJECTION, SOLUTION EPIDURAL; INFILTRATION; INTRACAUDAL; PERINEURAL at 12:02

## 2020-09-10 RX ADMIN — PROPOFOL 50 MG: 10 INJECTION, EMULSION INTRAVENOUS at 12:29

## 2020-09-10 RX ADMIN — SODIUM CHLORIDE, POTASSIUM CHLORIDE, SODIUM LACTATE AND CALCIUM CHLORIDE: 600; 310; 30; 20 INJECTION, SOLUTION INTRAVENOUS at 12:17

## 2020-09-10 RX ADMIN — PROPOFOL 200 MCG/KG/MIN: 10 INJECTION, EMULSION INTRAVENOUS at 12:29

## 2020-09-10 NOTE — ANESTHESIA POSTPROCEDURE EVALUATION
Patient: Bhavesh Landeros    Procedure(s):  Esophagogastroduodenoscopy with Biopsy    Diagnosis:Early satiety [R68.81]  Bloating [R14.0]  Diagnosis Additional Information: No value filed.    Anesthesia Type:  MAC    Note:  Anesthesia Post Evaluation    Patient location during evaluation: Phase 2  Patient participation: Able to fully participate in evaluation  Level of consciousness: awake  Pain management: adequate  Airway patency: patent  Cardiovascular status: acceptable and hemodynamically stable  Respiratory status: acceptable, room air and nonlabored ventilation  Hydration status: stable  PONV: none     Anesthetic complications: None    Comments: Patient was happy with the anesthesia care received and no anesthesia related complications were noted.  I will follow up with the patient again if it is needed.        Last vitals:  Vitals:    09/10/20 1250 09/10/20 1300 09/10/20 1310   BP: 130/67 125/73 133/77   Pulse: 67 65 64   Resp:      Temp:      SpO2: 98% 96% 97%         Electronically Signed By: PERLA Beavers CRNA  September 10, 2020  1:27 PM

## 2020-09-10 NOTE — DISCHARGE INSTRUCTIONS
[facility]    Home Care Following Endoscopy      Activity:    You have just undergone an endoscopic procedure usually performed with conscious sedation.  Do not work or operate machinery (including a car) for at least 12 hours.      I encourage you to walk and attempt to pass this air as soon as possible.    Diet:    Return to the diet you were on before your procedure but eat lightly for the first 12-24 hours.    Drink plenty of water.    Resume any regular medications unless otherwise advised by your physician.  Please begin any new medication prescribed as a result of your procedure as directed by your physician.     If you had any biopsy or polyp removed please refrain from aspirin or aspirin products for 2 days.  If on Coumadin please restart as instructed by your physician.   Pain:    You may take Tylenol as needed for pain.  Expected Recovery:    You can expect some mild abdominal fullness and/or discomfort due to the air used to inflate your intestinal tract. It is also normal to have a mild sore throat after upper endoscopy.    Call Your Physician if You Have:    After Upper Endoscopy:  o Shoulder, back or chest pain.  o Difficulty breathing or swallowing.  o Vomiting blood.  Any questions or concerns about your recovery, please call [phone] or after hours [phone] Nurse Advice Line.    Follow-up Care:    You should receive a call or letter with your results within 1 week. Please call if you have not received a notification of your results.  If asked to return to clinic please make an appointment 1 week after your procedure.  Call [phone].

## 2020-09-10 NOTE — ANESTHESIA CARE TRANSFER NOTE
Patient: Bhavesh Landeros    Procedure(s):  Esophagogastroduodenoscopy with Biopsy    Diagnosis: Early satiety [R68.81]  Bloating [R14.0]  Diagnosis Additional Information: No value filed.    Anesthesia Type:   MAC     Note:  Airway :Face Mask  Patient transferred to:Phase II  Handoff Report: Identifed the Patient, Identified the Reponsible Provider, Reviewed the pertinent medical history, Discussed the surgical course, Reviewed Intra-OP anesthesia mangement and issues during anesthesia, Set expectations for post-procedure period and Allowed opportunity for questions and acknowledgement of understanding      Vitals: (Last set prior to Anesthesia Care Transfer)    CRNA VITALS  9/10/2020 1211 - 9/10/2020 1303      9/10/2020             Pulse:  71    SpO2:  99 %    Resp Rate (observed):  14                Electronically Signed By: PERLA Beavers CRNA  September 10, 2020  1:03 PM

## 2020-09-10 NOTE — H&P
Patient seen for Endoscopy    HPI:  Patient is a 64 year old male with personal history of hiatal hernia with recent increased lower chest/upper abdominal discomfort here for diagnostic EGD. Not taking blood thinning medications. No MI or CVA history. No issues with previous sedation. No recent acute illness.    Review Of Systems    Skin: negative  Ears/Nose/Throat: negative  Respiratory: No shortness of breath, dyspnea on exertion, cough, or hemoptysis  Cardiovascular: negative  Gastrointestinal: negative  Genitourinary: negative  Musculoskeletal: negative  Neurologic: negative  Hematologic/Lymphatic/Immunologic: negative  Endocrine: negative      Past Medical History:   Diagnosis Date     MEDICAL HISTORY OF -     Leg & Wrist fxs     Multiple sclerosis (H)        Past Surgical History:   Procedure Laterality Date     COLONOSCOPY N/A 1/22/2016    Procedure: COLONOSCOPY;  Surgeon: Pb Rodriguez MD;  Location:  GI     HC COLONOSCOPY W/WO BRUSH/WASH  03/27/06     HC KNEE SCOPE,MED/LAT MENISECTOMY  07/08/1999     HC REVISE MEDIAN N/CARPAL TUNNEL SURG  1986      UGI ENDOSCOPY DIAG W BIOPSY  10/26/09       Family History   Problem Relation Age of Onset     Diabetes Brother      Alzheimer Disease Father      Diabetes Sister        Social History     Socioeconomic History     Marital status:      Spouse name: Fred     Number of children: 1     Years of education: 12     Highest education level: Not on file   Occupational History     Employer: [employer]   Social Needs     Financial resource strain: Not on file     Food insecurity     Worry: Not on file     Inability: Not on file     Transportation needs     Medical: Not on file     Non-medical: Not on file   Tobacco Use     Smoking status: Never Smoker     Smokeless tobacco: Never Used   Substance and Sexual Activity     Alcohol use: Not Currently     Alcohol/week: 0.0 standard drinks     Comment: occ     Drug use: No     Sexual activity: Not  Currently     Partners: Female   Lifestyle     Physical activity     Days per week: Not on file     Minutes per session: Not on file     Stress: Not on file   Relationships     Social connections     Talks on phone: Not on file     Gets together: Not on file     Attends Mosque service: Not on file     Active member of club or organization: Not on file     Attends meetings of clubs or organizations: Not on file     Relationship status: Not on file     Intimate partner violence     Fear of current or ex partner: Not on file     Emotionally abused: Not on file     Physically abused: Not on file     Forced sexual activity: Not on file   Other Topics Concern      Service Not Asked     Blood Transfusions Not Asked     Caffeine Concern No     Occupational Exposure Not Asked     Hobby Hazards Not Asked     Sleep Concern No     Stress Concern No     Weight Concern No     Special Diet Not Asked     Back Care Not Asked     Exercise Yes     Bike Helmet Not Asked     Seat Belt Yes     Self-Exams Not Asked     Parent/sibling w/ CABG, MI or angioplasty before 65F 55M? Not Asked   Social History Narrative     Not on file       No current outpatient medications on file.       Medications and history reviewed    Physical exam:  Vitals: BP (!) 151/93   Temp 97.6  F (36.4  C) (Oral)   Resp 16   Wt 101.3 kg (223 lb 6 oz)   SpO2 98%   BMI 35.69 kg/m    BMI= Body mass index is 35.69 kg/m .    Constitutional: Healthy, alert, non-distressed   Head: Normo-cephalic, atraumatic, no lesions, masses or tenderness   Cardiovascular: RRR, no new murmurs, +S1, +S2 heart sounds, no clicks, rubs or gallops   Respiratory: CTAB, no rales, rhonchi or wheezing, equal chest rise, good respiratory effort   Gastrointestinal: Soft, non-tender, non distended, no rebound rigidity or guarding, no masses or hernias palpated   : Deferred  Musculoskeletal: Moves all extremities, normal  strength, no deformities noted   Skin: No suspicious  lesions or rashes   Psychiatric: Mentation appears normal, affect appropriate   Hematologic/Lymphatic/Immunologic: Normal cervical and supraclavicular lymph nodes   Patient able to get up on table without difficulty.    Labs show:  Results for orders placed or performed during the hospital encounter of 09/10/20 (from the past 24 hour(s))   UPPER GI ENDOSCOPY   Result Value Ref Range    Upper GI Endoscopy       [address] [place] [address] [phone]     Endoscopy Department  _______________________________________________________________________________  Patient Name: Bhavesh Landeros           Procedure Date: 9/10/2020 12:25 PM  MRN: [id number]                       Account Number: [id number]  YOB: 1955              Admit Type: Outpatient  Age: 64                               Room: Room 2  Gender: Male                          Note Status: Finalized  Attending MD: Emigdio Betancourt MD  Total Sedation Time:   _______________________________________________________________________________     Procedure:           Upper GI endoscopy  Indications:         Epigastric abdominal pain  Providers:           Emigdio Betancourt MD  Referring MD:          Medicines:           Monitored Anesthesia Care  Complications:       No immediate complications. Estimated blood loss:                        Minimal.  ______________________________________ _________________________________________  Procedure:           Pre-Anesthesia Assessment:                       - Prior to the procedure, a History and Physical was                        performed, and patient medications, allergies and                        sensitivities were reviewed. The patient's tolerance of                        previous anesthesia was reviewed.                       - The risks and benefits of the procedure and the                        sedation options and risks were discussed with the                         patient. All questions were answered and informed                        consent was obtained.                       - After reviewing the risks and benefits, the patient                        was deemed in satisfactory condition to undergo the                        procedure.                       After obtaining informed consent, the endoscope was                        passed under direct vision. Throughout the procedure,                        th e patient's blood pressure, pulse, and oxygen                        saturations were monitored continuously. The Endoscope                        was introduced through the mouth, and advanced to the                        third part of duodenum. The upper GI endoscopy was                        accomplished without difficulty. The patient tolerated                        the procedure well.                                                                                   Findings:       A 3 cm hiatal hernia was present.       The Z-line was variable. Biopsies were taken with a cold forceps for        histology.       The entire examined stomach was normal.       The examined duodenum was normal.                                                                                   Impression:          - 3 cm hiatal hernia.                       - Z-line variable. Biopsied.                       - Normal stomach.                       - Normal examined duodenum.  Recommendation:      - Dis charge patient to home.                       - Resume previous diet.                       - Continue present medications.                       - Await pathology results.                       - Return to referring physician.                                                                                     _____________________  Emigdio Betancourt MD  9/10/2020 12:40:11 PM  Number of Addenda: 0    Note Initiated On: 9/10/2020 12:25 PM         Assessment: Endoscopy  Plan: Pt  cleared for anesthesia for proposed procedure.    Emigdio Betancourt, DO

## 2020-09-10 NOTE — ANESTHESIA PREPROCEDURE EVALUATION
Anesthesia Pre-Procedure Evaluation    Patient: Tom Landeros   MRN: [id number] : 1955          Preoperative Diagnosis: Early satiety [R68.81]  Bloating [R14.0]    Procedure(s):  Esophagogastroduodenoscopy with possible biopsy, polypectomy, dilation, and/or foreign body removal    Past Medical History:   Diagnosis Date     MEDICAL HISTORY OF -     Leg & Wrist fxs     Multiple sclerosis (H)      Past Surgical History:   Procedure Laterality Date     COLONOSCOPY N/A 2016    Procedure: COLONOSCOPY;  Surgeon: Pb Rodriguez MD;  Location:  GI     HC COLONOSCOPY W/WO BRUSH/WASH  06     HC KNEE SCOPE,MED/LAT MENISECTOMY  1999     HC REVISE MEDIAN N/CARPAL TUNNEL SURG        [facility] ENDOSCOPY DIAG W BIOPSY  10/26/09       Anesthesia Evaluation     . Pt has had prior anesthetic. Type: MAC    No history of anesthetic complications          ROS/MED HX    ENT/Pulmonary:  - neg pulmonary ROS   (+)Intermittent asthma Treatment: Inhaler prn,  , . .    Neurologic:  - neg neurologic ROS   (+)Multiple Sclerosis     Cardiovascular:     (+) hypertension----. : . . . :. . Previous cardiac testing [facility]:2018results:Reading Physician  Reading Date  Result Priority   Shaan Torres MD  [phone]  2018      Narrative & Impression     [id number]  [id number]  [id number]  [id number]^MELECIO^RAFAEL^PRASANTH           [facility]  Echocardiography Laboratory  [address] [facility] [address] [place] [address]        Name: TOM LANDEROS  MRN: [id number]  : 1955  Study Date: 2018 10:13 AM  Age: 63 yrs  Gender: Male  Patient Location: [facility]  Reason For Study: Newly recognized murmur, SOB (shortness of breath)  History: Obesity  Ordering Physician: RAFAEL MAJANO  Referring Physician: RAFAEL MAJANO  Performed By: Lora Euceda     BSA: 2.1 m2  Height: 67 in  Weight: 212 lb  HR: 59  BP: 152/90  mmHg  _____________________________________________________________________________  __        Procedure  Complete Echo Adult. Contrast Optison.  _____________________________________________________________________________  __        Interpretation Summary     Technically difficult study with some improvement after contrast use.     The visual ejection fraction is estimated at 55-60%.  Mild valvular aortic stenosis.  IVC size is upper normal at 2.1 cms but collapsible.  _____________________________________________________________________________  __        Left Ventricle  The left ventricle is normal in size. Left ventricular systolic function is  normal. The visual ejection fraction is estimated at 55-60%. Left ventricular  diastolic function is indeterminate. Normal left ventricular wall motion.     Right Ventricle  The right ventricle is normal in size and function.     Atria  The left atrium is mildly dilated. Right atrial size is normal.     Mitral Valve  There is physiologic mitral regurgitation.        Tricuspid Valve  There is physiologic tricuspid regurgitation. Right ventricular systolic  pressure could not be approximated due to inadequate tricuspid regurgitation.     Aortic Valve  The aortic valve is not well visualized. There is trace aortic regurgitation.  Mild valvular aortic stenosis.     Pulmonic Valve  The pulmonic valve is not well visualized. There is trace pulmonic valvular  regurgitation.     Vessels  The aortic root is normal size. The IVC is normal in size and reactivity with  respiration, suggesting normal central venous pressure.     Pericardium  There is no pericardial effusion.        Rhythm  Sinus rhythm was noted.  _____________________________________________________________________________  __  MMode/2D Measurements & Calculations  IVSd: 1.0 cm     LVIDd: 4.8 cm  LVIDs: 3.1 cm  LVPWd: 0.96 cm  FS: 36.5 %  LV mass(C)d: 169.9 grams  LV mass(C)dI: 82.0 grams/m2  Ao root diam: 3.2  cm  LA dimension: 3.7 cm  asc Aorta Diam: 3.6 cm  LA/Ao: 1.2  LVOT diam: 2.2 cm  LVOT area: 3.8 cm2  LA Volume (BP): 76.9 ml  LA Volume Index (BP): 37.1 ml/m2  RWT: 0.40           Doppler Measurements & Calculations  MV E max basim: 66.2 cm/sec  MV A max basim: 89.2 cm/sec  MV E/A: 0.74  MV dec time: 0.24 sec  Ao V2 max: 244.9 cm/sec  Ao max P.0 mmHg  Ao V2 mean: 157.3 cm/sec  Ao mean P.3 mmHg  Ao V2 VTI: 53.2 cm  J CARLOS(I,D): 1.6 cm2  J CARLOS(V,D): 1.6 cm2  LV V1 max P.5 mmHg  LV V1 max: 106.1 cm/sec  LV V1 VTI: 22.3 cm  SV(LVOT): 84.3 ml  SI(LVOT): 40.7 ml/m2  PA acc time: 0.15 sec  TR max basim: 228.0 cm/sec  TR max P.8 mmHg  AV Basim Ratio (DI): 0.43  J CARLOS Index (cm2/m2): 0.76  E/E' av.8  Lateral E/e': 7.3  Medial E/e': 8.2              _____________________________________________________________________________  __        Report approved by: Stevie Farfan 2018 12:02 PM       date: results:ECG reviewed date:19 results:Sinus Rhythm   WITHIN NORMAL LIMITS date: results:          METS/Exercise Tolerance:     Hematologic:  - neg hematologic  ROS       Musculoskeletal:  - neg musculoskeletal ROS       GI/Hepatic:     (+) GERD Asymptomatic on medication,       Renal/Genitourinary:  - ROS Renal section negative       Endo:     (+) Obesity, .      Psychiatric:  - neg psychiatric ROS       Infectious Disease:  - neg infectious disease ROS       Malignancy:      - no malignancy   Other:    - neg other ROS                      Physical Exam  Normal systems: cardiovascular, pulmonary and dental    Airway   Mallampati: II  TM distance: >3 FB  Neck ROM: full    Dental     Cardiovascular   Rhythm and rate: regular and normal      Pulmonary    breath sounds clear to auscultation            Lab Results   Component Value Date    WBC 6.9 2020    HGB 13.3 2020    HCT 39.2 (L) 2020     2020    CRP 4.9 10/27/2016     2020    POTASSIUM 4.1 2020    CHLORIDE 109  "08/13/2020    CO2 27 08/13/2020    BUN 14 08/13/2020    CR 0.90 08/13/2020     (H) 08/13/2020    SHANNON 9.0 08/13/2020    ALBUMIN 3.2 (L) 08/07/2020    PROTTOTAL 6.8 08/07/2020    ALT 48 08/07/2020    AST 22 08/07/2020    ALKPHOS 71 08/07/2020    BILITOTAL 0.3 08/07/2020    LIPASE 128 08/07/2020    AMYLASE 34 08/07/2020    TSH 3.27 12/28/2019       Preop Vitals  BP Readings from Last 3 Encounters:   09/10/20 (!) 151/93   08/07/20 124/76   08/02/20 (!) 163/83    Pulse Readings from Last 3 Encounters:   08/07/20 68   08/02/20 90   01/08/20 74      Resp Readings from Last 3 Encounters:   09/10/20 16   08/07/20 16   08/02/20 20    SpO2 Readings from Last 3 Encounters:   09/10/20 98%   08/07/20 99%   08/02/20 99%      Temp Readings from Last 1 Encounters:   09/10/20 97.6  F (36.4  C) (Oral)    Ht Readings from Last 1 Encounters:   08/07/20 1.685 m (5' 6.34\")      Wt Readings from Last 1 Encounters:   09/10/20 101.3 kg (223 lb 6 oz)    Estimated body mass index is 35.69 kg/m  as calculated from the following:    Height as of 8/7/20: 1.685 m (5' 6.34\").    Weight as of this encounter: 101.3 kg (223 lb 6 oz).       Anesthesia Plan      History & Physical Review  History and physical reviewed and following examination; no interval change.    ASA Status:  2 .    NPO Status:  > 8 hours    Plan for MAC with Intravenous and Propofol induction. Maintenance will be TIVA.  Reason for MAC:  Deep or markedly invasive procedure (G8)    H&P by Dr Betancourt pre-op        Postoperative Care      Consents  Anesthetic plan, risks, benefits and alternatives discussed with:  Patient.  Use of blood products discussed: No .   .                 PERLA Beavers CRNA  "

## 2020-09-10 NOTE — LETTER
Bhavesh Landeros  [address]  [place] [address]    September 18, 2020      Dear Bhavesh,  This letter is to inform you of the results of your pathology report from your endoscopy.  Your pathology report was:  FINAL DIAGNOSIS:   Gastroesophageal junction biopsy:   - Stratified squamous mucosa with mild reactive changes, nonspecific.   - Cardia columnar mucinous mucosa with mild chronic inflammation and   reactive changes   - No evidence of goblet cells, dysplasia or malignancy  This findings are all benign and non-concerning. There is no additional evaluation needed at this time. If you are having continued issues despite anti-reflux medication, you could seek consultation with a surgeon who performs anti-reflux operations. We would be happy to give you that recommendation if you desire.  If you have further questions please don t hesitate to call our clinic at [phone].   Sincerely,     Emigdio Betancourt, DO

## 2020-09-14 LAB — COPATH REPORT: NORMAL

## 2020-11-03 ENCOUNTER — NURSE TRIAGE (OUTPATIENT)
Dept: FAMILY MEDICINE | Facility: OTHER | Age: 65
End: 2020-11-03

## 2020-11-03 ENCOUNTER — ALLIED HEALTH/NURSE VISIT (OUTPATIENT)
Dept: FAMILY MEDICINE | Facility: OTHER | Age: 65
End: 2020-11-03
Payer: COMMERCIAL

## 2020-11-03 VITALS — SYSTOLIC BLOOD PRESSURE: 138 MMHG | DIASTOLIC BLOOD PRESSURE: 80 MMHG

## 2020-11-03 DIAGNOSIS — I10 BENIGN ESSENTIAL HYPERTENSION: Primary | ICD-10-CM

## 2020-11-03 PROCEDURE — 99207 PR NO CHARGE NURSE ONLY: CPT | Performed by: STUDENT IN AN ORGANIZED HEALTH CARE EDUCATION/TRAINING PROGRAM

## 2020-11-03 NOTE — PROGRESS NOTES
Bhavesh Landeros was evaluated at [facility] on November 3, 2020 at which time his blood pressure was:    BP Readings from Last 3 Encounters:   11/03/20 138/80   09/10/20 123/84   08/07/20 124/76     Pulse Readings from Last 3 Encounters:   09/10/20 56   08/07/20 68   08/02/20 90       Reviewed lifestyle modifications for blood pressure control and reduction: including making healthy food choices, managing weight, getting regular exercise, smoking cessation, reducing alcohol consumption, monitoring blood pressure regularly.     Symptoms: None    BP Goal:< 140/90 mmHg    BP Assessment:  BP at goal    Potential Reasons for BP too high: NA - Not applicable    BP Follow-Up Plan: Recheck BP in 6 months at pharmacy    Recommendation to Provider: patient states he has forgotten to take his BP meds for 2 days.  Reinforce the importance of med adherence.    Note completed by: Wander Martell [facility]  [phone]

## 2020-11-03 NOTE — TELEPHONE ENCOUNTER
Patient called back and was transferred to writer.   Discussed with the patient how that his current symptoms shouldn't still be bothering him if they are from his procedure.   Patient stated that he feels fine. At the time of call he didn't have any difficult breathing or SOB. At times he feels like something gets stuck, but not always.    PLAN:   Patient will monitor symptoms and call us if symptoms worsen.   Rocky Alaniz RN  November 3, 2020

## 2020-11-03 NOTE — TELEPHONE ENCOUNTER
"Called to discuss further with patient. LM for patient to return call to any triage nurse.     BACKGROUND:   Patient had an endoscopy done on 09/10/20 and has a scratchy throat that feels lumpy. Patient wondering if this is normal?     According to \"clinical resources\", a sore throat may occur 1-2 days after the procedure.   Please triage patient for throat symptoms when call is returned.     Rocky Alaniz RN  November 3, 2020    "

## 2020-11-03 NOTE — TELEPHONE ENCOUNTER
"Reason for call:  Patient reporting a symptom    Symptom or request: scratchy throat / feels \"lumpy\"    Duration (how long have symptoms been present): after upper endoscopy on 9/10/20    Have you been treated for this before? No    Additional comments: Please call patient, he is wondering if this is a normal symptom after that procedure    Phone Number patient can be reached at:  Cell number on file:    Telephone Information:   Mobile [phone]       Best Time:  any    Can we leave a detailed message on this number:  YES    Call taken on 11/3/2020 at 11:08 AM by Skye Gregory    "

## 2020-11-09 ENCOUNTER — OFFICE VISIT (OUTPATIENT)
Dept: FAMILY MEDICINE | Facility: OTHER | Age: 65
End: 2020-11-09
Payer: COMMERCIAL

## 2020-11-09 ENCOUNTER — TELEPHONE (OUTPATIENT)
Dept: FAMILY MEDICINE | Facility: OTHER | Age: 65
End: 2020-11-09

## 2020-11-09 VITALS
BODY MASS INDEX: 36.84 KG/M2 | TEMPERATURE: 98.3 F | DIASTOLIC BLOOD PRESSURE: 80 MMHG | HEART RATE: 60 BPM | SYSTOLIC BLOOD PRESSURE: 126 MMHG | HEIGHT: 67 IN | OXYGEN SATURATION: 100 % | WEIGHT: 234.75 LBS | RESPIRATION RATE: 18 BRPM

## 2020-11-09 DIAGNOSIS — R07.0 THROAT PAIN: ICD-10-CM

## 2020-11-09 DIAGNOSIS — R09.A2 FOREIGN BODY SENSATION IN THROAT: Primary | ICD-10-CM

## 2020-11-09 LAB
DEPRECATED S PYO AG THROAT QL EIA: NEGATIVE
SPECIMEN SOURCE: NORMAL
SPECIMEN SOURCE: NORMAL
STREP GROUP A PCR: NOT DETECTED

## 2020-11-09 PROCEDURE — 99N1174 PR STATISTIC STREP A RAPID: Performed by: STUDENT IN AN ORGANIZED HEALTH CARE EDUCATION/TRAINING PROGRAM

## 2020-11-09 PROCEDURE — 99213 OFFICE O/P EST LOW 20 MIN: CPT | Performed by: STUDENT IN AN ORGANIZED HEALTH CARE EDUCATION/TRAINING PROGRAM

## 2020-11-09 PROCEDURE — 87651 STREP A DNA AMP PROBE: CPT | Performed by: STUDENT IN AN ORGANIZED HEALTH CARE EDUCATION/TRAINING PROGRAM

## 2020-11-09 RX ORDER — HYDROCHLOROTHIAZIDE 25 MG/1
TABLET ORAL
COMMUNITY
Start: 2020-06-02 | End: 2022-01-03

## 2020-11-09 ASSESSMENT — MIFFLIN-ST. JEOR: SCORE: 1810.45

## 2020-11-09 ASSESSMENT — PAIN SCALES - GENERAL: PAINLEVEL: MILD PAIN (2)

## 2020-11-09 NOTE — PROGRESS NOTES
"Subjective     Bhavesh Landeros is a 65 year old male who presents to clinic today for the following health issues:    HPI         Complaining of sore throat due to Endoscopy with some right ear tenderness x 2 months.  He feels like there is something hanging loose in the back of his throat just below his tongue. He had an endoscopy in September and has felt it since then. He has mild discomfort with swallowing. No fever or chills.     Review of Systems   Constitutional, HEENT, cardiovascular, pulmonary, gi and gu systems are negative, except as otherwise noted.      Objective    /80   Pulse 60   Temp 98.3  F (36.8  C) (Temporal)   Resp 18   Ht 1.713 m (5' 7.44\")   Wt 106.5 kg (234 lb 12 oz)   SpO2 100%   BMI 36.29 kg/m    Body mass index is 36.29 kg/m .  Physical Exam   GENERAL: healthy, alert and no distress  NECK: no adenopathy, no asymmetry, masses, or scars and thyroid normal to palpation  RESP: lungs clear to auscultation - no rales, rhonchi or wheezes  CV: regular rate and rhythm, normal S1 S2, no S3 or S4, no murmur, click or rub  MS: no gross musculoskeletal defects noted, no edema  SKIN: no suspicious lesions or rashes  NEURO: Normal strength and tone, mentation intact and speech normal  PSYCH: mentation appears normal, affect normal/bright    Results for orders placed or performed in visit on 11/09/20   Streptococcus A Rapid Scr w Reflx to PCR     Status: None    Specimen: Throat   Result Value Ref Range    Strep Specimen Description Throat     Streptococcus Group A Rapid Screen Negative NEG^Negative   Group A Streptococcus PCR Throat Swab     Status: None    Specimen: Throat   Result Value Ref Range    Specimen Description Throat     Strep Group A PCR Not Detected NDET^Not Detected           Assessment & Plan     Foreign body sensation in throat  He was concerned about strep which is negative. His throat appears normal. The symptoms started after EGD and main concern is feeling that there is " something in his throat. I recommend he see ENT for further evaluation.   - OTOLARYNGOLOGY REFERRAL  - Group A Streptococcus PCR Throat Swab    Throat pain  - Streptococcus A Rapid Scr w Reflx to PCR       No follow-ups on file.    PERLA Jackson CNP  [facility]

## 2020-11-09 NOTE — PATIENT INSTRUCTIONS
Start taking omeprazole twice daily for acid to see if this reduces the throat symptoms.    Strep test today.    Call to schedule an appointment with Dr. Santo who is the ENT here at the clinic.    Almita Gonzalez, CNP

## 2020-11-09 NOTE — TELEPHONE ENCOUNTER
Reason for call:  Patient reporting a symptom    Symptom or request: sore throat    Duration (how long have symptoms been present): two weeks    Have you been treated for this before? No    Additional comments: patient has a sore throat. He declined having a video or phone visit and is wondering if he can just come in to have someone look at his throat. He does not want to have two visits.    Phone Number patient can be reached at:  Cell number on file:    Telephone Information:   Mobile [phone]       Best Time:  any    Can we leave a detailed message on this number:  YES    Call taken on 11/9/2020 at 10:18 AM by Anjelica Dean

## 2020-11-12 DIAGNOSIS — I10 BENIGN ESSENTIAL HYPERTENSION: ICD-10-CM

## 2020-11-12 DIAGNOSIS — E78.2 MIXED HYPERLIPIDEMIA: ICD-10-CM

## 2020-11-12 RX ORDER — ATORVASTATIN CALCIUM 20 MG/1
TABLET, FILM COATED ORAL
Qty: 90 TABLET | Refills: 3 | Status: SHIPPED | OUTPATIENT
Start: 2020-11-12 | End: 2021-12-15

## 2020-11-12 RX ORDER — LOSARTAN POTASSIUM 50 MG/1
TABLET ORAL
Qty: 90 TABLET | Refills: 0 | Status: SHIPPED | OUTPATIENT
Start: 2020-11-12 | End: 2021-04-01

## 2020-11-13 NOTE — TELEPHONE ENCOUNTER
Prescription approved per [facility] [place] [place] Refill Protocol.  Rocky Alaniz RN  November 12, 2020

## 2021-01-09 ENCOUNTER — HOSPITAL ENCOUNTER (EMERGENCY)
Facility: CLINIC | Age: 66
Discharge: HOME | End: 2021-01-09
Attending: EMERGENCY MEDICINE | Admitting: EMERGENCY MEDICINE
Payer: COMMERCIAL

## 2021-01-09 VITALS
SYSTOLIC BLOOD PRESSURE: 165 MMHG | TEMPERATURE: 97.9 F | HEIGHT: 68 IN | BODY MASS INDEX: 34.86 KG/M2 | WEIGHT: 230 LBS | HEART RATE: 75 BPM | OXYGEN SATURATION: 97 % | RESPIRATION RATE: 18 BRPM | DIASTOLIC BLOOD PRESSURE: 83 MMHG

## 2021-01-09 DIAGNOSIS — M54.50 ACUTE RIGHT-SIDED LOW BACK PAIN WITHOUT SCIATICA: ICD-10-CM

## 2021-01-09 PROCEDURE — 250N000013 HC RX MED GY IP 250 OP 250 PS 637: Performed by: EMERGENCY MEDICINE

## 2021-01-09 PROCEDURE — 99284 EMERGENCY DEPT VISIT MOD MDM: CPT | Performed by: EMERGENCY MEDICINE

## 2021-01-09 PROCEDURE — 99283 EMERGENCY DEPT VISIT LOW MDM: CPT | Performed by: EMERGENCY MEDICINE

## 2021-01-09 RX ORDER — TIZANIDINE 2 MG/1
2-4 TABLET ORAL 3 TIMES DAILY PRN
Qty: 20 TABLET | Refills: 0 | Status: SHIPPED | OUTPATIENT
Start: 2021-01-09 | End: 2021-02-18

## 2021-01-09 RX ORDER — TIZANIDINE 2 MG/1
4 TABLET ORAL 3 TIMES DAILY PRN
Qty: 3 TABLET | Refills: 0 | Status: SHIPPED | OUTPATIENT
Start: 2021-01-09 | End: 2021-06-02

## 2021-01-09 RX ORDER — OXYCODONE AND ACETAMINOPHEN 5; 325 MG/1; MG/1
1-2 TABLET ORAL EVERY 4 HOURS PRN
Qty: 12 TABLET | Refills: 0 | Status: SHIPPED | OUTPATIENT
Start: 2021-01-09 | End: 2021-02-18

## 2021-01-09 RX ORDER — TIZANIDINE 2 MG/1
4 TABLET ORAL 3 TIMES DAILY PRN
Status: DISCONTINUED | OUTPATIENT
Start: 2021-01-09 | End: 2021-01-10 | Stop reason: HOSPADM

## 2021-01-09 RX ORDER — OXYCODONE AND ACETAMINOPHEN 5; 325 MG/1; MG/1
1 TABLET ORAL EVERY 6 HOURS PRN
Status: DISCONTINUED | OUTPATIENT
Start: 2021-01-09 | End: 2021-01-10 | Stop reason: HOSPADM

## 2021-01-09 RX ORDER — OXYCODONE AND ACETAMINOPHEN 5; 325 MG/1; MG/1
1 TABLET ORAL EVERY 6 HOURS PRN
Qty: 3 TABLET | Refills: 0 | Status: SHIPPED | OUTPATIENT
Start: 2021-01-09 | End: 2021-02-18

## 2021-01-09 RX ADMIN — TIZANIDINE 4 MG: 2 TABLET ORAL at 23:01

## 2021-01-09 RX ADMIN — OXYCODONE AND ACETAMINOPHEN 1 TABLET: 5; 325 TABLET ORAL at 23:02

## 2021-01-09 ASSESSMENT — MIFFLIN-ST. JEOR: SCORE: 1797.77

## 2021-01-09 NOTE — ED AVS SNAPSHOT
[facility] Emergency Dept  [address] [place] [address]  Phone: [phone]  Fax: [phone]                                    Bhavesh Landeros   MRN: [id number]    Department: [facility] Emergency Dept   Date of Visit: 1/9/2021           After Visit Summary Signature Page    I have received my discharge instructions, and my questions have been answered. I have discussed any challenges I see with this plan with the nurse or doctor.    ..........................................................................................................................................  Patient/Patient Representative Signature      ..........................................................................................................................................  Patient Representative Print Name and Relationship to Patient    ..................................................               ................................................  Date                                   Time    ..........................................................................................................................................  Reviewed by Signature/Title    ...................................................              ..............................................  Date                                               Time          22EPIC Rev 08/18

## 2021-01-10 NOTE — ED PROVIDER NOTES
History     Chief Complaint   Patient presents with     Hip Pain     HPI  Bhavesh Landeros is a 65 year old male who presents to the emergency department secondary to right-sided low back pain.  This started 2 days ago.  No distinct injury.  The pain is in the right low back and does not radiate.  No radicular symptoms no numbness tingling, urinary incontinence.  The patient is able to sleep at night.  Pain is worsened by twisting or trying to lean over to put on his socks.  He is able to get up from a seated position.  No fever dysuria hematuria abdominal pain nausea vomiting hip pain or injury.    Allergies:  Allergies   Allergen Reactions     No Known Allergies        Problem List:    Patient Active Problem List    Diagnosis Date Noted     Cellulitis of leg 08/16/2011     Priority: High     Mixed hyperlipidemia 12/02/2019     Priority: Medium     Benign essential hypertension 12/02/2019     Priority: Medium     Obesity (BMI 35.0-39.9) with comorbidity (H) 11/25/2019     Priority: Medium     Primary osteoarthritis of right knee 11/20/2019     Priority: Medium     Aortic valve stenosis, etiology of cardiac valve disease unspecified 11/19/2018     Priority: Medium     Mitral regurgitation and aortic stenosis 11/19/2018     Priority: Medium     Ventral hernia without obstruction or gangrene 11/09/2018     Priority: Medium     Newly recognized murmur 11/07/2018     Priority: Medium     Advanced directives, counseling/discussion 09/23/2014     Priority: Medium     Pt was given info       SOB (shortness of breath) 09/23/2014     Priority: Medium     Leg edema, right 05/16/2013     Priority: Medium     GERD (gastroesophageal reflux disease) 10/27/2009     Priority: Medium     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     Priority: Medium        Past Medical History:    Past Medical History:   Diagnosis Date     MEDICAL HISTORY OF -      Multiple sclerosis (H)        Past Surgical History:    Past Surgical History:  "  Procedure Laterality Date     COLONOSCOPY N/A 1/22/2016    Procedure: COLONOSCOPY;  Surgeon: Pb Rodriguez MD;  Location: [facility]     ESOPHAGOSCOPY, GASTROSCOPY, DUODENOSCOPY (EGD), COMBINED N/A 9/10/2020    Procedure: Esophagogastroduodenoscopy with Biopsy;  Surgeon: Emigdio Betancourt DO;  Location: [facility]     HC COLONOSCOPY W/WO BRUSH/WASH  03/27/06     HC KNEE SCOPE,MED/LAT MENISECTOMY  07/08/1999     HC REVISE MEDIAN N/CARPAL TUNNEL SURG  1986     HC UGI ENDOSCOPY DIAG W BIOPSY  10/26/09       Family History:    Family History   Problem Relation Age of Onset     Diabetes Brother      Alzheimer Disease Father      Diabetes Sister        Social History:  Marital Status:   [5]  Social History     Tobacco Use     Smoking status: Never Smoker     Smokeless tobacco: Never Used   Substance Use Topics     Alcohol use: Yes     Alcohol/week: 0.0 standard drinks     Comment: occ     Drug use: No        Medications:         oxyCODONE-acetaminophen (PERCOCET) 5-325 MG tablet       oxyCODONE-acetaminophen (PERCOCET) 5-325 MG tablet       tiZANidine (ZANAFLEX) 2 MG tablet       tiZANidine (ZANAFLEX) 2 MG tablet       aspirin 81 MG chewable tablet       atorvastatin (LIPITOR) 20 MG tablet       hydrochlorothiazide (HYDRODIURIL) 25 MG tablet       IBUPROFEN PO       losartan (COZAAR) 50 MG tablet       omeprazole (PRILOSEC) 20 MG DR capsule          Review of Systems   All other systems reviewed and are negative.      Physical Exam   BP: (!) 165/83  Pulse: 75  Temp: 97.9  F (36.6  C)  Resp: 18  Height: 172.7 cm (5' 8\")  Weight: 104.3 kg (230 lb)  SpO2: 100 %      Physical Exam  Vitals signs and nursing note reviewed.   Constitutional:       General: He is not in acute distress.     Appearance: He is well-developed. He is not diaphoretic.   HENT:      Head: Normocephalic and atraumatic.      Nose: Nose normal.   Eyes:      General: No scleral icterus.     Extraocular Movements: Extraocular movements intact.      " Conjunctiva/sclera: Conjunctivae normal.   Neck:      Musculoskeletal: Normal range of motion and neck supple.   Cardiovascular:      Rate and Rhythm: Normal rate and regular rhythm.   Pulmonary:      Effort: Pulmonary effort is normal.      Breath sounds: Normal breath sounds.   Musculoskeletal: Normal range of motion.         General: No swelling.      Right lower leg: No edema.      Left lower leg: No edema.      Comments: No radicular symptoms with straight leg raise.  No focal tenderness over the low back but the patient is pointing to the right lumbar paraspinal musculature which worsens with twisting turning and bending at the waist.   Skin:     General: Skin is warm and dry.      Findings: No rash.   Neurological:      Mental Status: He is alert and oriented to person, place, and time.         ED Course        Procedures                 No results found for this or any previous visit (from the past 24 hour(s)).    Medications - No data to display    Assessments & Plan (with Medical Decision Making)  Right low back pain without radicular symptoms consistent with lumbar muscle spasm.  The diagnosis, treatment options, risks and follow-up discussed with a competent patient who agrees with the plan.  Percocet and tizanidine given.     I have reviewed the nursing notes.    I have reviewed the findings, diagnosis, plan and need for follow up with the patient.      New Prescriptions    OXYCODONE-ACETAMINOPHEN (PERCOCET) 5-325 MG TABLET    Take 1 tablet by mouth every 6 hours as needed for pain    OXYCODONE-ACETAMINOPHEN (PERCOCET) 5-325 MG TABLET    Take 1-2 tablets by mouth every 4 hours as needed for pain    TIZANIDINE (ZANAFLEX) 2 MG TABLET    Take 2 tablets (4 mg) by mouth 3 times daily as needed for muscle spasms    TIZANIDINE (ZANAFLEX) 2 MG TABLET    Take 1-2 tablets (2-4 mg) by mouth 3 times daily as needed for muscle spasms       Final diagnoses:   Acute right-sided low back pain without sciatica        1/9/2021   [facility] EMERGENCY DEPT     López Persaud MD  01/09/21 2959

## 2021-01-10 NOTE — ED NOTES
Reviewed discharge instruction, verbalized understanding. No questions or concerns. Meds reviewed.

## 2021-01-10 NOTE — DISCHARGE INSTRUCTIONS
most likely your back pain is caused by muscle spasm.  Take the pain medication with caution and do not drive if taking them.  Return to the ER if you develop new or worsening symptoms.  This usually improves slowly.  Do the exercises we discussed stretching your low back.  I hope you get better quickly.

## 2021-01-10 NOTE — ED TRIAGE NOTES
R hip pain denies injury worsening x3 days and today is more moderate to severe pain. Took 3 tabs ibuprofen and not effective. Pain right now 5-6/10.

## 2021-02-18 ENCOUNTER — TELEPHONE (OUTPATIENT)
Dept: FAMILY MEDICINE | Facility: OTHER | Age: 66
End: 2021-02-18

## 2021-02-18 ENCOUNTER — OFFICE VISIT (OUTPATIENT)
Dept: FAMILY MEDICINE | Facility: CLINIC | Age: 66
End: 2021-02-18
Payer: COMMERCIAL

## 2021-02-18 VITALS
OXYGEN SATURATION: 98 % | HEART RATE: 71 BPM | TEMPERATURE: 97.8 F | DIASTOLIC BLOOD PRESSURE: 88 MMHG | SYSTOLIC BLOOD PRESSURE: 147 MMHG

## 2021-02-18 DIAGNOSIS — H69.93 DYSFUNCTION OF BOTH EUSTACHIAN TUBES: Primary | ICD-10-CM

## 2021-02-18 PROCEDURE — 99213 OFFICE O/P EST LOW 20 MIN: CPT | Performed by: PHYSICIAN ASSISTANT

## 2021-02-18 RX ORDER — FLUTICASONE PROPIONATE 50 MCG
2 SPRAY, SUSPENSION (ML) NASAL DAILY
Qty: 16 G | Refills: 3 | Status: SHIPPED | OUTPATIENT
Start: 2021-02-18 | End: 2022-01-03

## 2021-02-18 ASSESSMENT — PAIN SCALES - GENERAL: PAINLEVEL: MILD PAIN (2)

## 2021-02-18 NOTE — TELEPHONE ENCOUNTER
Patient calling today saying he has bilateral swollen lymph nodes on his neck that he noticed this morning.   He said he has tinnitus and wonders if this is related. Has has some dizziness with his tinnitus.  Patient also has a toothache which is new for him this morning. Also wonders if he could have an ear infection.    Informed the patient that swollen lymph nodes are not related to his tinnitus and should be seen today.  Patient agrees and wants to be seen.   Warm transferred patient to scheduling to make appointment.     Gina Martinez BSN, RN

## 2021-02-18 NOTE — PROGRESS NOTES
Assessment & Plan     Dysfunction of both eustachian tubes  - fluticasone (FLONASE) 50 MCG/ACT nasal spray; Spray 2 sprays into both nostrils daily    Bhavesh does have eustachian tube dysfunction bilaterally, right greater than left.  Will use Flonase to clear this fluid.  Advised him to monitor the symptoms in his neck.  If symptoms persist beyond 6 weeks I advise he follow-up with his PCP.    20 minutes spent on the date of the encounter doing chart review, patient visit and documentation     Return in about 1 month (around 3/18/2021) for Recheck with primary care provider if not improved.    DAKOTA [facility]    Branden Ospina is a 65 year old who presents for the following health issues     HPI       Acute Illness  Acute illness concerns: swollen lymph nodes, tinnitus- ear ache  Onset/Duration: x 1 day- tinnitus is ongoing   Symptoms:  Fever: no  Chills/Sweats: no  Headache (location?): YES  Sinus Pressure: no  Conjunctivitis:  no  Ear Pain: YES: bilateral  Rhinorrhea: no  Congestion: no  Sore Throat: YES  Cough: no  Wheeze: YES  Decreased Appetite: no  Nausea: no  Vomiting: no  Diarrhea: no  Dysuria/Freq.: no  Dysuria or Hematuria: no  Fatigue/Achiness: YES  Sick/Strep Exposure: no  Therapies tried and outcome: ibuprofen    Bhavesh presents to the clinic today for evaluation of a sore throat, ear fullness and pressure, tinnitus and swollen lymph nodes.  He has also had fatigue and a headache in the last few days.  He notes that his symptoms began about 1 week ago Bhavesh notes that he has tinnitus chronically and this has been a little worse than usual but not a whole lot worse.  He states his pain in his right ear is a little worse.  He tried to pop it without success.  His neck feels a little more achy than usual and when he bends his chin to his chest he notices some tightness in his cervical lymph node region.  His throat is a little scratchy but not significantly  painful.    Review of Systems   ROS negative except as noted above      Objective    BP (!) 147/88   Pulse 71   Temp 97.8  F (36.6  C)   SpO2 98%   There is no height or weight on file to calculate BMI.  Physical Exam   GENERAL: healthy, alert and no distress  EYES: Eyes grossly normal to inspection, PERRL and conjunctivae and sclerae normal  HENT: Right ear canal normal, TM appears to be in the neutral position and is gray with a air-fluid level, serous effusion noted.  Left ear canal normal, TM gray with good cone of light, no effusion noted, nose and mouth without ulcers or lesions  NECK: no adenopathy, no asymmetry, masses, or scars and thyroid normal to palpation  RESP: lungs clear to auscultation - no rales, rhonchi or wheezes  CV: regular rate and rhythm, normal S1 S2    I spent a total of 10 minutes face-to-face with Bhavesh Landeros during today's office visit.  Over 50% of this time was spent counseling the patient and/or coordinating care regarding eustachian tube dysfunction.  See note for details.

## 2021-02-18 NOTE — PATIENT INSTRUCTIONS
Flonase (fluticasone) 2 sprays in each nostril daily until symptoms resolve, then continue 1 spray in each nostril for at least 5 more days.  Tylenol and/or ibuprofen for pain relief  Warm compresses next to ear for pain relief.  Drink plenty of fluids and place a humidifier in bedroom.  Follow up with primary care provider if swelling in neck lasts more than 1 more month

## 2021-03-12 ENCOUNTER — IMMUNIZATION (OUTPATIENT)
Dept: FAMILY MEDICINE | Facility: CLINIC | Age: 66
End: 2021-03-12
Payer: COMMERCIAL

## 2021-03-12 PROCEDURE — 91301 PR COVID VAC MODERNA 100 MCG/0.5 ML IM: CPT

## 2021-03-12 PROCEDURE — 0011A PR COVID VAC MODERNA 100 MCG/0.5 ML IM: CPT

## 2021-04-01 ENCOUNTER — ALLIED HEALTH/NURSE VISIT (OUTPATIENT)
Dept: FAMILY MEDICINE | Facility: CLINIC | Age: 66
End: 2021-04-01
Payer: COMMERCIAL

## 2021-04-01 VITALS — HEART RATE: 56 BPM | SYSTOLIC BLOOD PRESSURE: 124 MMHG | DIASTOLIC BLOOD PRESSURE: 70 MMHG | RESPIRATION RATE: 10 BRPM

## 2021-04-01 DIAGNOSIS — I10 BENIGN ESSENTIAL HYPERTENSION: ICD-10-CM

## 2021-04-01 DIAGNOSIS — I10 BENIGN ESSENTIAL HYPERTENSION: Primary | ICD-10-CM

## 2021-04-01 PROCEDURE — 99207 PR NO CHARGE NURSE ONLY: CPT

## 2021-04-01 RX ORDER — LOSARTAN POTASSIUM 50 MG/1
50 TABLET ORAL DAILY
Qty: 90 TABLET | Refills: 3 | Status: SHIPPED | OUTPATIENT
Start: 2021-04-01 | End: 2022-03-17

## 2021-04-01 NOTE — PROGRESS NOTES
Chief Complaint   Patient presents with     Allied Health Visit     Bhavesh Mirandareinierjailyn is a 65 year old patient who comes in today for a Blood Pressure check.  Initial BP:  /70   Pulse 56   Resp 10      56  Disposition: follow-up as previously indicated by provider

## 2021-04-09 ENCOUNTER — IMMUNIZATION (OUTPATIENT)
Dept: FAMILY MEDICINE | Facility: CLINIC | Age: 66
End: 2021-04-09
Attending: FAMILY MEDICINE
Payer: COMMERCIAL

## 2021-04-09 PROCEDURE — 0012A PR COVID VAC MODERNA 100 MCG/0.5 ML IM: CPT

## 2021-04-09 PROCEDURE — 91301 PR COVID VAC MODERNA 100 MCG/0.5 ML IM: CPT

## 2021-06-02 ENCOUNTER — ANCILLARY PROCEDURE (OUTPATIENT)
Dept: GENERAL RADIOLOGY | Facility: CLINIC | Age: 66
End: 2021-06-02
Attending: ORTHOPAEDIC SURGERY
Payer: COMMERCIAL

## 2021-06-02 ENCOUNTER — OFFICE VISIT (OUTPATIENT)
Dept: ORTHOPEDICS | Facility: CLINIC | Age: 66
End: 2021-06-02
Payer: COMMERCIAL

## 2021-06-02 VITALS
DIASTOLIC BLOOD PRESSURE: 74 MMHG | BODY MASS INDEX: 35.06 KG/M2 | SYSTOLIC BLOOD PRESSURE: 140 MMHG | WEIGHT: 231.3 LBS | HEIGHT: 68 IN

## 2021-06-02 DIAGNOSIS — M17.12 PRIMARY OSTEOARTHRITIS OF LEFT KNEE: Primary | ICD-10-CM

## 2021-06-02 DIAGNOSIS — M17.11 PRIMARY OSTEOARTHRITIS OF RIGHT KNEE: Primary | ICD-10-CM

## 2021-06-02 DIAGNOSIS — M17.12 PRIMARY OSTEOARTHRITIS OF LEFT KNEE: ICD-10-CM

## 2021-06-02 PROCEDURE — 20610 DRAIN/INJ JOINT/BURSA W/O US: CPT | Mod: LT | Performed by: ORTHOPAEDIC SURGERY

## 2021-06-02 PROCEDURE — 99213 OFFICE O/P EST LOW 20 MIN: CPT | Mod: 25 | Performed by: ORTHOPAEDIC SURGERY

## 2021-06-02 PROCEDURE — 73564 X-RAY EXAM KNEE 4 OR MORE: CPT | Mod: TC | Performed by: RADIOLOGY

## 2021-06-02 PROCEDURE — 20610 DRAIN/INJ JOINT/BURSA W/O US: CPT | Mod: RT | Performed by: NURSE PRACTITIONER

## 2021-06-02 RX ORDER — TRIAMCINOLONE ACETONIDE 40 MG/ML
40 INJECTION, SUSPENSION INTRA-ARTICULAR; INTRAMUSCULAR ONCE
Status: COMPLETED | OUTPATIENT
Start: 2021-06-02 | End: 2021-06-02

## 2021-06-02 RX ADMIN — TRIAMCINOLONE ACETONIDE 40 MG: 40 INJECTION, SUSPENSION INTRA-ARTICULAR; INTRAMUSCULAR at 14:45

## 2021-06-02 RX ADMIN — TRIAMCINOLONE ACETONIDE 40 MG: 40 INJECTION, SUSPENSION INTRA-ARTICULAR; INTRAMUSCULAR at 09:22

## 2021-06-02 ASSESSMENT — MIFFLIN-ST. JEOR: SCORE: 1803.67

## 2021-06-02 ASSESSMENT — PAIN SCALES - GENERAL: PAINLEVEL: NO PAIN (0)

## 2021-06-02 NOTE — PROGRESS NOTES
Bhavesh to follow up with Primary Care provider regarding elevated blood pressure.  Clinic Administered Medication Documentation      Injection Documentation     Injection was administered by provider (please see MAR for given by information). Please see MAR and medication order for additional information.     Site: Joint injection   Medication Used: Kenalog 40mg   Expiration Date:  11/2022      The following medication was given by Carlos Pierre, PERLA, CNP, DNP:     MEDICATION: Kenalog 40mg/1ml  ROUTE: Joint Injection  SITE: left knee  DOSE: 1 mL  LOT #: [id number]  : POS on CLOUD   EXPIRATION DATE:  11/2022  NDC: [address]-3              Bro

## 2021-06-02 NOTE — PROGRESS NOTES
"Office Visit-Follow up    Chief Complaint: Bhavesh Landeros is a 65 year old male who is being seen for   Chief Complaint   Patient presents with     RECHECK     left knee primary osteoarthritis       History of Present Illness:   Today's visit:  On his last visit he received a left knee intra-articular steroid injection.  He does not really remember length of relief.  Currently able to do full activities during the day but towards the end of the day gets achy sore and swollen.  \"I am walking on it all day\".  Takes occasional Tylenol.  No new injuries or traumas.    November 20, 2019 visit:  On his last visit he was seen for his left knee.  \"I should have had this one done as well\".  Same pain.  Medial side.  Sharp pain.  Worse with weightbearing and twisting.  Rates it as moderate to severe at times.  I would like an injection\".     October 23, 2019 visit:  Presents with 6 months worth of generalized left knee pain.  History of similar pain in the past.  He received a steroid injection a little over a year ago which helped until the summer.  No physical therapy.  Describes it as achy.  Weak when he tries to get up off the ground.  He takes occasional ibuprofen.  No specific injuries.  No swelling.  No traumas.    Social History     Occupational History     Employer: [employer]   Tobacco Use     Smoking status: Never Smoker     Smokeless tobacco: Never Used   Substance and Sexual Activity     Alcohol use: Yes     Alcohol/week: 0.0 standard drinks     Comment: occ     Drug use: No     Sexual activity: Not Currently     Partners: Female       REVIEW OF SYSTEMS  General: negative for, night sweats, dizziness, fatigue  Resp: No shortness of breath and no cough  CV: negative for chest pain, syncope or near-syncope  GI: negative for nausea, vomiting and diarrhea  : negative for dysuria and hematuria  Musculoskeletal: as above  Neurologic: negative for syncope   Hematologic: negative for bleeding " "disorder    Physical Exam:  Vitals: BP (!) 140/74   Ht 1.727 m (5' 8\")   Wt 104.9 kg (231 lb 4.8 oz)   BMI 35.17 kg/m    BMI= Body mass index is 35.17 kg/m .  Constitutional: healthy, alert and no acute distress   Psychiatric: mentation appears normal and affect normal/bright  NEURO: no focal deficits  RESP: Normal with easy respirations and no use of accessory muscles to breathe, no audible wheezing or retractions  CV: LLE:  no edema           SKIN: No erythema, rashes, excoriation, or breakdown. No evidence of infection.   JOINT/EXTREMITIES:left knee: Varus alignment.  Some pseudolaxity medial valgus stressing.  Collateral ligaments are intact though.  Active motion 0-110 degrees.  Patella tracks midline.  No evidence of infection.  No soft tissue swelling  GAIT: not tested             Diagnostic Modalities:  left knee X-ray: No fractures or dislocations.  Bone-on-bone arthritis medial lateral compartment noted on the Nolan view.  Osteophytes.  Moderate patellofemoral narrowing laterally.  There is some lateral subluxation of the tibia relative to the femur.  Independent visualization of the images was performed.      Impression: left knee primary osteoarthritis    Plan:  All of the above pertinent physical exam and imaging modalities findings was reviewed with Bhavesh.                                          INJECTION PROCEDURE:  The patient was counseled about an  injection, including discussion of risks (including infection), contents of the injection, rationale for performing the injection, and expected benefits of the injection. The skin was prepped with alcohol and betadine and then utilizing sterile technique an injection of the left knee joint from the anterolateral approach in the seated position was performed. The injection consisted 1ml of Kenalog (40mg per 1 ml) mixed with 3ml of 0.5% Marcaine. The patient tolerated the injection well, and there were no complications. The injection site was " covered with a Band-Aid. The injection was performed by Larry Pierre, APRN, CNP, DNP    We had a discussion about how to proceed.  Previous injection did provide him good relief.  He is thinking about a knee replacement surgery.  After we reviewed the options he is opted to get an injection and follow-up this fall to see how things are going and potentially consider replacement.    Return to clinic 3, month(s), PRN, or sooner as needed for changes.  Re-x-ray on return: No    Marshal Holguin D.O.

## 2021-06-02 NOTE — PROGRESS NOTES
Clinic Administered Medication Documentation      Injection Documentation     Injection was administered by provider (please see MAR for given by information). Please see MAR and medication order for additional information.     Site: Joint injection   Medication Used: Kenalog 40mg   Expiration Date:  11/2022      The following medication was given by PERLA Bañuelos, CNP, DNP:     MEDICATION: Kenalog 40mg/1ml  ROUTE: Joint Injection  SITE: right knee  DOSE: 1 mL  LOT #: [id number]  : Flowline   EXPIRATION DATE:  11/2022  NDC: [address]-2

## 2021-06-02 NOTE — LETTER
"    6/2/2021         RE: Bhavesh Landeros  [address] [facility] [address]        Dear Colleague,    Thank you for referring your patient, Bhavesh Landeros, to the [facility]. Please see a copy of my visit note below.    Office Visit-Follow up    Chief Complaint: Bhavesh aLnderos is a 65 year old male who is being seen for   Chief Complaint   Patient presents with     RECHECK     left knee primary osteoarthritis       History of Present Illness:   Today's visit:  On his last visit he received a left knee intra-articular steroid injection.  He does not really remember length of relief.  Currently able to do full activities during the day but towards the end of the day gets achy sore and swollen.  \"I am walking on it all day\".  Takes occasional Tylenol.  No new injuries or traumas.    November 20, 2019 visit:  On his last visit he was seen for his left knee.  \"I should have had this one done as well\".  Same pain.  Medial side.  Sharp pain.  Worse with weightbearing and twisting.  Rates it as moderate to severe at times.  I would like an injection\".     October 23, 2019 visit:  Presents with 6 months worth of generalized left knee pain.  History of similar pain in the past.  He received a steroid injection a little over a year ago which helped until the summer.  No physical therapy.  Describes it as achy.  Weak when he tries to get up off the ground.  He takes occasional ibuprofen.  No specific injuries.  No swelling.  No traumas.    Social History     Occupational History     Employer: [employer]   Tobacco Use     Smoking status: Never Smoker     Smokeless tobacco: Never Used   Substance and Sexual Activity     Alcohol use: Yes     Alcohol/week: 0.0 standard drinks     Comment: occ     Drug use: No     Sexual activity: Not Currently     Partners: Female       REVIEW OF SYSTEMS  General: negative for, night sweats, dizziness, fatigue  Resp: No shortness of breath and no " "cough  CV: negative for chest pain, syncope or near-syncope  GI: negative for nausea, vomiting and diarrhea  : negative for dysuria and hematuria  Musculoskeletal: as above  Neurologic: negative for syncope   Hematologic: negative for bleeding disorder    Physical Exam:  Vitals: BP (!) 140/74   Ht 1.727 m (5' 8\")   Wt 104.9 kg (231 lb 4.8 oz)   BMI 35.17 kg/m    BMI= Body mass index is 35.17 kg/m .  Constitutional: healthy, alert and no acute distress   Psychiatric: mentation appears normal and affect normal/bright  NEURO: no focal deficits  RESP: Normal with easy respirations and no use of accessory muscles to breathe, no audible wheezing or retractions  CV: LLE:  no edema           SKIN: No erythema, rashes, excoriation, or breakdown. No evidence of infection.   JOINT/EXTREMITIES:left knee: Varus alignment.  Some pseudolaxity medial valgus stressing.  Collateral ligaments are intact though.  Active motion 0-110 degrees.  Patella tracks midline.  No evidence of infection.  No soft tissue swelling  GAIT: not tested             Diagnostic Modalities:  left knee X-ray: No fractures or dislocations.  Bone-on-bone arthritis medial lateral compartment noted on the Nolan view.  Osteophytes.  Moderate patellofemoral narrowing laterally.  There is some lateral subluxation of the tibia relative to the femur.  Independent visualization of the images was performed.      Impression: left knee primary osteoarthritis    Plan:  All of the above pertinent physical exam and imaging modalities findings was reviewed with Bhavesh.                                          INJECTION PROCEDURE:  The patient was counseled about an  injection, including discussion of risks (including infection), contents of the injection, rationale for performing the injection, and expected benefits of the injection. The skin was prepped with alcohol and betadine and then utilizing sterile technique an injection of the left knee joint from the " anterolateral approach in the seated position was performed. The injection consisted 1ml of Kenalog (40mg per 1 ml) mixed with 3ml of 0.5% Marcaine. The patient tolerated the injection well, and there were no complications. The injection site was covered with a Band-Aid. The injection was performed by PERLA Alarcon, CNP, ASHOK    We had a discussion about how to proceed.  Previous injection did provide him good relief.  He is thinking about a knee replacement surgery.  After we reviewed the options he is opted to get an injection and follow-up this fall to see how things are going and potentially consider replacement.    Return to clinic 3, month(s), PRN, or sooner as needed for changes.  Re-x-ray on return: No    CHER Chan to follow up with Primary Care provider regarding elevated blood pressure.  Clinic Administered Medication Documentation      Injection Documentation     Injection was administered by provider (please see MAR for given by information). Please see MAR and medication order for additional information.     Site: Joint injection   Medication Used: Kenalog 40mg   Expiration Date:  11/2022      The following medication was given by PERLA Bañuelos, CNP, ASHOK:     MEDICATION: Kenalog 40mg/1ml  ROUTE: Joint Injection  SITE: left knee  DOSE: 1 mL  LOT #: [id number]  : [employer]   EXPIRATION DATE:  11/2022  NDC: [address]-6              Chris/LEXII       Again, thank you for allowing me to participate in the care of your patient.        Sincerely,        Candelario Holguin,

## 2021-06-02 NOTE — LETTER
"    6/2/2021         RE: Bhavesh Landeros  [address] [facility] [address]        Dear Colleague,    Thank you for referring your patient, Bhavesh Landeros, to the [facility]. Please see a copy of my visit note below.    Clinic Administered Medication Documentation      Injection Documentation     Injection was administered by provider (please see MAR for given by information). Please see MAR and medication order for additional information.     Site: Joint injection   Medication Used: Kenalog 40mg   Expiration Date:  11/2022      The following medication was given by Carlos Pierre, APRN, CNP, DNP:     MEDICATION: Kenalog 40mg/1ml  ROUTE: Joint Injection  SITE: right knee  DOSE: 1 mL  LOT #: [id number]  : [employer]   EXPIRATION DATE:  11/2022  NDC: [address]-2                  Office Visit-Follow up    Chief Complaint: Bhavesh Landeros is a 65 year old male who is being seen for   Chief Complaint   Patient presents with     RECHECK     right knee pain       History of Present Illness:   Today's visit with Myself for right knee pain  Was in clinic earlier today with Dr. Holguin. At that time received a left knee injection.  After the visit decided his right knee was bothering him more than he initially thought and has returned for a right knee injection. States same pain as in the past. No new injury. No concern of infection.  Would like right knee injected.     Today's visit with Dr. Holguin for left knee pain  On his last visit he received a left knee intra-articular steroid injection.  He does not really remember length of relief.  Currently able to do full activities during the day but towards the end of the day gets achy sore and swollen.  \"I am walking on it all day\".  Takes occasional Tylenol.  No new injuries or traumas.     November 20, 2019 visit:  On his last visit he was seen for his left knee.  \"I should have had this one done as well\".  Same pain. " " Medial side.  Sharp pain.  Worse with weightbearing and twisting.  Rates it as moderate to severe at times.  I would like an injection\".     October 23, 2019 visit:  Presents with 6 months worth of generalized left knee pain.  History of similar pain in the past.  He received a steroid injection a little over a year ago which helped until the summer.  No physical therapy.  Describes it as achy.  Weak when he tries to get up off the ground.  He takes occasional ibuprofen.  No specific injuries.  No swelling.  No traumas.         Social History     Occupational History     Employer: [employer]   Tobacco Use     Smoking status: Never Smoker     Smokeless tobacco: Never Used   Substance and Sexual Activity     Alcohol use: Yes     Alcohol/week: 0.0 standard drinks     Comment: occ     Drug use: No     Sexual activity: Not Currently     Partners: Female       REVIEW OF SYSTEMS  General: negative for, night sweats, dizziness, fatigue  Resp: No shortness of breath and no cough  CV: negative for chest pain, syncope or near-syncope  GI: negative for nausea, vomiting and diarrhea  : negative for dysuria and hematuria  Musculoskeletal: as above  Neurologic: negative for syncope   Hematologic: negative for bleeding disorder    Physical Exam:  Vitals: There were no vitals taken for this visit.  BMI= There is no height or weight on file to calculate BMI.  Constitutional: healthy, alert and no acute distress   Psychiatric: mentation appears normal and affect normal/bright  NEURO: no focal deficits  RESP: Normal with easy respirations and no use of accessory muscles to breathe, no audible wheezing or retractions  CV: LLE:  no edema           SKIN: No erythema, rashes, excoriation, or breakdown. No evidence of infection.   JOINT/EXTREMITIES:right knee: moderate effusion.  Varus alignment.  Some pseudolaxity medial valgus stressing though collateral ligaments are intact.  Active motion 0-115 degrees.  Patella tracks " midline.  No evidence of infection.  No soft tissue swelling  GAIT: not tested         Diagnostic Modalities:  None today.  Previous imaging reviewed.  Independent visualization of the images was performed.      Impression: right knee primary osteoarthritis    Plan:  All of the above pertinent physical exam and imaging modalities findings was reviewed with Bhavesh.  Injection reasonable.  Will provide this. No reaction with previous injection and previously have provided good relief.                                           INJECTION PROCEDURE:  The patient was counseled about an  injection, including discussion of risks (including infection), contents of the injection, rationale for performing the injection, and expected benefits of the injection. The skin was prepped with alcohol and betadine and then utilizing sterile technique an injection of the right knee joint from the anterolateral approach in the seated position was performed. The injection consisted 1ml of Kenalog (40mg per 1 ml) mixed with 3ml of 0.5% Marcaine. The patient tolerated the injection well, and there were no complications. The injection site was covered with a Band-Aid. The injection was performed by PERLA Alarcon, CNP, DNP       Return to clinic 3, month(s), PRN, or sooner as needed for changes.  Re-x-ray on return: No     PERLA Bañuelos, CNP  Orthopedic Surgery            Again, thank you for allowing me to participate in the care of your patient.        Sincerely,        PERLA Oliva CNP

## 2021-06-02 NOTE — PROGRESS NOTES
"Office Visit-Follow up    Chief Complaint: Bhavesh Landeros is a 65 year old male who is being seen for   Chief Complaint   Patient presents with     RECHECK     right knee pain       History of Present Illness:   Today's visit with Myself for right knee pain  Was in clinic earlier today with Dr. Holguin. At that time received a left knee injection.  After the visit decided his right knee was bothering him more than he initially thought and has returned for a right knee injection. States same pain as in the past. No new injury. No concern of infection.  Would like right knee injected.     Today's visit with Dr. Holguin for left knee pain  On his last visit he received a left knee intra-articular steroid injection.  He does not really remember length of relief.  Currently able to do full activities during the day but towards the end of the day gets achy sore and swollen.  \"I am walking on it all day\".  Takes occasional Tylenol.  No new injuries or traumas.     November 20, 2019 visit:  On his last visit he was seen for his left knee.  \"I should have had this one done as well\".  Same pain.  Medial side.  Sharp pain.  Worse with weightbearing and twisting.  Rates it as moderate to severe at times.  I would like an injection\".     October 23, 2019 visit:  Presents with 6 months worth of generalized left knee pain.  History of similar pain in the past.  He received a steroid injection a little over a year ago which helped until the summer.  No physical therapy.  Describes it as achy.  Weak when he tries to get up off the ground.  He takes occasional ibuprofen.  No specific injuries.  No swelling.  No traumas.         Social History     Occupational History     Employer: [employer]   Tobacco Use     Smoking status: Never Smoker     Smokeless tobacco: Never Used   Substance and Sexual Activity     Alcohol use: Yes     Alcohol/week: 0.0 standard drinks     Comment: occ     Drug use: No     Sexual activity: Not " Currently     Partners: Female       REVIEW OF SYSTEMS  General: negative for, night sweats, dizziness, fatigue  Resp: No shortness of breath and no cough  CV: negative for chest pain, syncope or near-syncope  GI: negative for nausea, vomiting and diarrhea  : negative for dysuria and hematuria  Musculoskeletal: as above  Neurologic: negative for syncope   Hematologic: negative for bleeding disorder    Physical Exam:  Vitals: There were no vitals taken for this visit.  BMI= There is no height or weight on file to calculate BMI.  Constitutional: healthy, alert and no acute distress   Psychiatric: mentation appears normal and affect normal/bright  NEURO: no focal deficits  RESP: Normal with easy respirations and no use of accessory muscles to breathe, no audible wheezing or retractions  CV: LLE:  no edema           SKIN: No erythema, rashes, excoriation, or breakdown. No evidence of infection.   JOINT/EXTREMITIES:right knee: moderate effusion.  Varus alignment.  Some pseudolaxity medial valgus stressing though collateral ligaments are intact.  Active motion 0-115 degrees.  Patella tracks midline.  No evidence of infection.  No soft tissue swelling  GAIT: not tested         Diagnostic Modalities:  None today.  Previous imaging reviewed.  Independent visualization of the images was performed.      Impression: right knee primary osteoarthritis    Plan:  All of the above pertinent physical exam and imaging modalities findings was reviewed with Bhavesh.  Injection reasonable.  Will provide this. No reaction with previous injection and previously have provided good relief.                                           INJECTION PROCEDURE:  The patient was counseled about an  injection, including discussion of risks (including infection), contents of the injection, rationale for performing the injection, and expected benefits of the injection. The skin was prepped with alcohol and betadine and then utilizing sterile technique an  injection of the right knee joint from the anterolateral approach in the seated position was performed. The injection consisted 1ml of Kenalog (40mg per 1 ml) mixed with 3ml of 0.5% Marcaine. The patient tolerated the injection well, and there were no complications. The injection site was covered with a Band-Aid. The injection was performed by PERLA Alarcon, CNP, ASHOK       Return to clinic 3, month(s), PRN, or sooner as needed for changes.  Re-x-ray on return: No     PERLA Bañuelos, CNP  Orthopedic Surgery

## 2021-06-07 DIAGNOSIS — K21.00 GASTROESOPHAGEAL REFLUX DISEASE WITH ESOPHAGITIS: ICD-10-CM

## 2021-06-08 NOTE — TELEPHONE ENCOUNTER
Informed patient a prescription has been sent for his omeprazole but prior to additional refills he will need an appointment for a medication recheck.

## 2021-06-08 NOTE — TELEPHONE ENCOUNTER
Pending Prescriptions:                       Disp   Refills    omeprazole (PRILOSEC) 20 MG DR capsule    90 cap*0            Sig: TAKE ONE CAPSULE BY MOUTH ONCE DAILY    Medication is being filled for 1 time mike refill only due to:  Patient is due for med check before out    Please call and help schedule.  Thank you!

## 2021-09-13 DIAGNOSIS — K21.00 GASTROESOPHAGEAL REFLUX DISEASE WITH ESOPHAGITIS: ICD-10-CM

## 2021-09-14 NOTE — TELEPHONE ENCOUNTER
Prescription approved per [facility] Refill Protocol.    MARK De La CruzN, RN  [facility]

## 2021-10-21 ENCOUNTER — OFFICE VISIT (OUTPATIENT)
Dept: ORTHOPEDICS | Facility: CLINIC | Age: 66
End: 2021-10-21
Payer: COMMERCIAL

## 2021-10-21 VITALS
HEIGHT: 68 IN | WEIGHT: 229.5 LBS | SYSTOLIC BLOOD PRESSURE: 132 MMHG | DIASTOLIC BLOOD PRESSURE: 74 MMHG | BODY MASS INDEX: 34.78 KG/M2

## 2021-10-21 DIAGNOSIS — M17.12 PRIMARY OSTEOARTHRITIS OF LEFT KNEE: Primary | ICD-10-CM

## 2021-10-21 PROCEDURE — 20610 DRAIN/INJ JOINT/BURSA W/O US: CPT | Mod: LT | Performed by: ORTHOPAEDIC SURGERY

## 2021-10-21 RX ORDER — TRIAMCINOLONE ACETONIDE 40 MG/ML
40 INJECTION, SUSPENSION INTRA-ARTICULAR; INTRAMUSCULAR ONCE
Status: COMPLETED | OUTPATIENT
Start: 2021-10-21 | End: 2021-10-21

## 2021-10-21 RX ADMIN — TRIAMCINOLONE ACETONIDE 40 MG: 40 INJECTION, SUSPENSION INTRA-ARTICULAR; INTRAMUSCULAR at 09:18

## 2021-10-21 ASSESSMENT — PAIN SCALES - GENERAL: PAINLEVEL: MODERATE PAIN (4)

## 2021-10-21 ASSESSMENT — MIFFLIN-ST. JEOR: SCORE: 1790.51

## 2021-10-21 NOTE — PROGRESS NOTES
"Office Visit-Follow up    Chief Complaint: Bhavesh Landeros is a 66 year old male who is being seen for   Chief Complaint   Patient presents with     RECHECK     Bilateral knee primary osteoarthritis       History of Present Illness:   Today's visit:  On his last visit he received a right and left knee intra-articular steroid injection>  Overall the right is doing well.  Really no issues.  Left he started noticing increasing pain.  Mainly with twisting episodes.  He has been using some braces which are helpful.  He like an injection      June 2, 2021 visit with Larry Pierre:  Was in clinic earlier today with Dr. Holguin. At that time received a left knee injection.  After the visit decided his right knee was bothering him more than he initially thought and has returned for a right knee injection. States same pain as in the past. No new injury. No concern of infection.  Would like right knee injected.      June 2, 2021 visit with Dr. Holguin:  On his last visit he received a left knee intra-articular steroid injection.  He does not really remember length of relief.  Currently able to do full activities during the day but towards the end of the day gets achy sore and swollen.  \"I am walking on it all day\".  Takes occasional Tylenol.  No new injuries or traumas.     November 20, 2019 visit:  On his last visit he was seen for his left knee.  \"I should have had this one done as well\".  Same pain.  Medial side.  Sharp pain.  Worse with weightbearing and twisting.  Rates it as moderate to severe at times.  I would like an injection\".     October 23, 2019 visit:  Presents with 6 months worth of generalized left knee pain.  History of similar pain in the past.  He received a steroid injection a little over a year ago which helped until the summer.  No physical therapy.  Describes it as achy.  Weak when he tries to get up off the ground.  He takes occasional ibuprofen.  No specific injuries.  No swelling.  No " "traumas.         Social History     Occupational History     Employer: [employer]   Tobacco Use     Smoking status: Never Smoker     Smokeless tobacco: Never Used   Substance and Sexual Activity     Alcohol use: Yes     Alcohol/week: 0.0 standard drinks     Comment: occ     Drug use: No     Sexual activity: Not Currently     Partners: Female           Physical Exam:  Vitals: /74 (BP Location: Right arm, Patient Position: Sitting, Cuff Size: Adult Regular)   Ht 1.727 m (5' 8\")   Wt 104.1 kg (229 lb 8 oz)   BMI 34.90 kg/m    BMI= Body mass index is 34.9 kg/m .  Constitutional: healthy, alert and no acute distress   Psychiatric: mentation appears normal and affect normal/bright  NEURO: no focal deficits  RESP: Normal with easy respirations and no use of accessory muscles to breathe, no audible wheezing or retractions  CV: LLE:  no edema           SKIN: No erythema, rashes, excoriation, or breakdown. No evidence of infection.   JOINT/EXTREMITIES:left knee: Varus alignment.  Not fully correctable valgus stressing collateral ligaments are intact.  Active motion intact.  No evidence of infection.  Small effusion.  GAIT: not tested             Diagnostic Modalities:  None today.  Independent visualization of the images was performed.      Impression: Left knee primary osteoarthritis    Plan:  All of the above pertinent physical exam and imaging modalities findings was reviewed with Bhavesh.                                          INJECTION PROCEDURE:  The patient was counseled about an  injection, including discussion of risks (including infection), contents of the injection, rationale for performing the injection, and expected benefits of the injection. The skin was prepped with alcohol and betadine and then utilizing sterile technique an injection of the left knee joint from the anterolateral approach in the seated position was performed. The injection consisted 1ml of Kenalog (40mg per 1 ml) mixed with " 3ml of 0.5% Marcaine. The patient tolerated the injection well, and there were no complications. The injection site was covered with a Band-Aid. The injection was performed by Marshal Holguin D.O.    We discussed options.  No pain in the right knee.  Left knee started to bother him.  We discussed a left knee injection.    Return to clinic 3, month(s), PRN, or sooner as needed for changes.  Re-x-ray on return: No    Marshal Holguin D.O.

## 2021-10-21 NOTE — LETTER
"    10/21/2021         RE: Bhavesh Landeros  [address] [facility] [address]        Dear Colleague,    Thank you for referring your patient, Bhavesh Landeros, to the [facility]. Please see a copy of my visit note below.    Office Visit-Follow up    Chief Complaint: Bhavesh Landeros is a 66 year old male who is being seen for   Chief Complaint   Patient presents with     RECHECK     Bilateral knee primary osteoarthritis       History of Present Illness:   Today's visit:  On his last visit he received a right and left knee intra-articular steroid injection>  Overall the right is doing well.  Really no issues.  Left he started noticing increasing pain.  Mainly with twisting episodes.  He has been using some braces which are helpful.  He like an injection      June 2, 2021 visit with Larry Pierre:  Was in clinic earlier today with Dr. Holguin. At that time received a left knee injection.  After the visit decided his right knee was bothering him more than he initially thought and has returned for a right knee injection. States same pain as in the past. No new injury. No concern of infection.  Would like right knee injected.      June 2, 2021 visit with Dr. Holguin:  On his last visit he received a left knee intra-articular steroid injection.  He does not really remember length of relief.  Currently able to do full activities during the day but towards the end of the day gets achy sore and swollen.  \"I am walking on it all day\".  Takes occasional Tylenol.  No new injuries or traumas.     November 20, 2019 visit:  On his last visit he was seen for his left knee.  \"I should have had this one done as well\".  Same pain.  Medial side.  Sharp pain.  Worse with weightbearing and twisting.  Rates it as moderate to severe at times.  I would like an injection\".     October 23, 2019 visit:  Presents with 6 months worth of generalized left knee pain.  History of similar pain in the past.  He received a " "steroid injection a little over a year ago which helped until the summer.  No physical therapy.  Describes it as achy.  Weak when he tries to get up off the ground.  He takes occasional ibuprofen.  No specific injuries.  No swelling.  No traumas.         Social History     Occupational History     Employer: [employer]   Tobacco Use     Smoking status: Never Smoker     Smokeless tobacco: Never Used   Substance and Sexual Activity     Alcohol use: Yes     Alcohol/week: 0.0 standard drinks     Comment: occ     Drug use: No     Sexual activity: Not Currently     Partners: Female           Physical Exam:  Vitals: /74 (BP Location: Right arm, Patient Position: Sitting, Cuff Size: Adult Regular)   Ht 1.727 m (5' 8\")   Wt 104.1 kg (229 lb 8 oz)   BMI 34.90 kg/m    BMI= Body mass index is 34.9 kg/m .  Constitutional: healthy, alert and no acute distress   Psychiatric: mentation appears normal and affect normal/bright  NEURO: no focal deficits  RESP: Normal with easy respirations and no use of accessory muscles to breathe, no audible wheezing or retractions  CV: LLE:  no edema           SKIN: No erythema, rashes, excoriation, or breakdown. No evidence of infection.   JOINT/EXTREMITIES:left knee: Varus alignment.  Not fully correctable valgus stressing collateral ligaments are intact.  Active motion intact.  No evidence of infection.  Small effusion.  GAIT: not tested             Diagnostic Modalities:  None today.  Independent visualization of the images was performed.      Impression: Left knee primary osteoarthritis    Plan:  All of the above pertinent physical exam and imaging modalities findings was reviewed with Bhavesh.                                          INJECTION PROCEDURE:  The patient was counseled about an  injection, including discussion of risks (including infection), contents of the injection, rationale for performing the injection, and expected benefits of the injection. The skin was " prepped with alcohol and betadine and then utilizing sterile technique an injection of the left knee joint from the anterolateral approach in the seated position was performed. The injection consisted 1ml of Kenalog (40mg per 1 ml) mixed with 3ml of 0.5% Marcaine. The patient tolerated the injection well, and there were no complications. The injection site was covered with a Band-Aid. The injection was performed by Marshal Holguin D.O.    We discussed options.  No pain in the right knee.  Left knee started to bother him.  We discussed a left knee injection.    Return to clinic 3, month(s), PRN, or sooner as needed for changes.  Re-x-ray on return: No    Marshal Holguin D.O.            Again, thank you for allowing me to participate in the care of your patient.        Sincerely,        Candelario Holguin, DO

## 2021-11-03 ENCOUNTER — TELEPHONE (OUTPATIENT)
Dept: FAMILY MEDICINE | Facility: CLINIC | Age: 66
End: 2021-11-03

## 2021-11-03 NOTE — TELEPHONE ENCOUNTER
Reason for Call:  Same Day Appointment, Requested Provider:  [employer] or whoever is available    PCP: Radha Gonzalez    Reason for visit: diarrhea and stomach issues    Duration of symptoms: few days    Have you been treated for this in the past? No    Additional comments: Patient called stating he has an appointment next week with Camron Chiu but was hoping he could get in sooner as his symptoms are getting worse and he just wants it looked at. He stated he recently lost his wife so his daughter doesn't need another thing to worry about.     Can we leave a detailed message on this number? YES    Phone number patient can be reached at: Home number on file [phone] (home)    Best Time: any    Call taken on 11/3/2021 at 9:50 AM by Hilda Regan

## 2021-11-09 ENCOUNTER — OFFICE VISIT (OUTPATIENT)
Dept: FAMILY MEDICINE | Facility: CLINIC | Age: 66
End: 2021-11-09
Payer: COMMERCIAL

## 2021-11-09 VITALS
DIASTOLIC BLOOD PRESSURE: 62 MMHG | OXYGEN SATURATION: 97 % | SYSTOLIC BLOOD PRESSURE: 134 MMHG | TEMPERATURE: 98.1 F | WEIGHT: 224.6 LBS | HEART RATE: 78 BPM | BODY MASS INDEX: 34.15 KG/M2

## 2021-11-09 DIAGNOSIS — F41.9 ANXIETY: ICD-10-CM

## 2021-11-09 DIAGNOSIS — R07.89 CHEST DISCOMFORT: Primary | ICD-10-CM

## 2021-11-09 DIAGNOSIS — E66.01 MORBID OBESITY (H): ICD-10-CM

## 2021-11-09 DIAGNOSIS — K44.9 HIATAL HERNIA: ICD-10-CM

## 2021-11-09 DIAGNOSIS — K59.00 CONSTIPATION, UNSPECIFIED CONSTIPATION TYPE: ICD-10-CM

## 2021-11-09 DIAGNOSIS — R10.9 ABDOMINAL DISCOMFORT: ICD-10-CM

## 2021-11-09 DIAGNOSIS — M62.08 DIASTASIS RECTI: ICD-10-CM

## 2021-11-09 DIAGNOSIS — I35.0 AORTIC VALVE STENOSIS, ETIOLOGY OF CARDIAC VALVE DISEASE UNSPECIFIED: ICD-10-CM

## 2021-11-09 LAB
ALBUMIN SERPL-MCNC: 3.9 G/DL (ref 3.4–5)
ALBUMIN UR-MCNC: NEGATIVE MG/DL
ALP SERPL-CCNC: 76 U/L (ref 40–150)
ALT SERPL W P-5'-P-CCNC: 27 U/L (ref 0–70)
ANION GAP SERPL CALCULATED.3IONS-SCNC: 4 MMOL/L (ref 3–14)
APPEARANCE UR: CLEAR
AST SERPL W P-5'-P-CCNC: 12 U/L (ref 0–45)
BASOPHILS # BLD AUTO: 0.1 10E3/UL (ref 0–0.2)
BASOPHILS NFR BLD AUTO: 1 %
BILIRUB SERPL-MCNC: 0.5 MG/DL (ref 0.2–1.3)
BILIRUB UR QL STRIP: NEGATIVE
BUN SERPL-MCNC: 14 MG/DL (ref 7–30)
CALCIUM SERPL-MCNC: 8.9 MG/DL (ref 8.5–10.1)
CHLORIDE BLD-SCNC: 112 MMOL/L (ref 94–109)
CHOLEST SERPL-MCNC: 120 MG/DL
CO2 SERPL-SCNC: 24 MMOL/L (ref 20–32)
COLOR UR AUTO: YELLOW
CREAT SERPL-MCNC: 0.67 MG/DL (ref 0.66–1.25)
EOSINOPHIL # BLD AUTO: 0.1 10E3/UL (ref 0–0.7)
EOSINOPHIL NFR BLD AUTO: 2 %
ERYTHROCYTE [DISTWIDTH] IN BLOOD BY AUTOMATED COUNT: 12.5 % (ref 10–15)
FASTING STATUS PATIENT QL REPORTED: NO
GFR SERPL CREATININE-BSD FRML MDRD: >90 ML/MIN/1.73M2
GLUCOSE BLD-MCNC: 99 MG/DL (ref 70–99)
GLUCOSE UR STRIP-MCNC: NEGATIVE MG/DL
HCT VFR BLD AUTO: 43.4 % (ref 40–53)
HDLC SERPL-MCNC: 55 MG/DL
HGB BLD-MCNC: 14.8 G/DL (ref 13.3–17.7)
HGB UR QL STRIP: NEGATIVE
IMM GRANULOCYTES # BLD: 0 10E3/UL
IMM GRANULOCYTES NFR BLD: 0 %
KETONES UR STRIP-MCNC: NEGATIVE MG/DL
LDLC SERPL CALC-MCNC: 46 MG/DL
LEUKOCYTE ESTERASE UR QL STRIP: NEGATIVE
LYMPHOCYTES # BLD AUTO: 1.8 10E3/UL (ref 0.8–5.3)
LYMPHOCYTES NFR BLD AUTO: 26 %
MCH RBC QN AUTO: 29.3 PG (ref 26.5–33)
MCHC RBC AUTO-ENTMCNC: 34.1 G/DL (ref 31.5–36.5)
MCV RBC AUTO: 86 FL (ref 78–100)
MONOCYTES # BLD AUTO: 0.7 10E3/UL (ref 0–1.3)
MONOCYTES NFR BLD AUTO: 9 %
NEUTROPHILS # BLD AUTO: 4.5 10E3/UL (ref 1.6–8.3)
NEUTROPHILS NFR BLD AUTO: 62 %
NITRATE UR QL: NEGATIVE
NONHDLC SERPL-MCNC: 65 MG/DL
NRBC # BLD AUTO: 0 10E3/UL
NRBC BLD AUTO-RTO: 0 /100
PH UR STRIP: 7 [PH] (ref 5–7)
PLATELET # BLD AUTO: 194 10E3/UL (ref 150–450)
POTASSIUM BLD-SCNC: 4.4 MMOL/L (ref 3.4–5.3)
PROT SERPL-MCNC: 7.3 G/DL (ref 6.8–8.8)
RBC # BLD AUTO: 5.05 10E6/UL (ref 4.4–5.9)
SODIUM SERPL-SCNC: 140 MMOL/L (ref 133–144)
SP GR UR STRIP: 1.01 (ref 1–1.03)
TRIGL SERPL-MCNC: 95 MG/DL
UROBILINOGEN UR STRIP-MCNC: NORMAL MG/DL
WBC # BLD AUTO: 7.2 10E3/UL (ref 4–11)

## 2021-11-09 PROCEDURE — 80053 COMPREHEN METABOLIC PANEL: CPT | Performed by: PHYSICIAN ASSISTANT

## 2021-11-09 PROCEDURE — 36415 COLL VENOUS BLD VENIPUNCTURE: CPT | Performed by: PHYSICIAN ASSISTANT

## 2021-11-09 PROCEDURE — 81003 URINALYSIS AUTO W/O SCOPE: CPT | Performed by: PHYSICIAN ASSISTANT

## 2021-11-09 PROCEDURE — 85025 COMPLETE CBC W/AUTO DIFF WBC: CPT | Performed by: PHYSICIAN ASSISTANT

## 2021-11-09 PROCEDURE — 80061 LIPID PANEL: CPT | Performed by: PHYSICIAN ASSISTANT

## 2021-11-09 PROCEDURE — 99214 OFFICE O/P EST MOD 30 MIN: CPT | Performed by: PHYSICIAN ASSISTANT

## 2021-11-09 PROCEDURE — 93000 ELECTROCARDIOGRAM COMPLETE: CPT | Performed by: PHYSICIAN ASSISTANT

## 2021-11-09 RX ORDER — POLYETHYLENE GLYCOL 3350 17 G/17G
POWDER, FOR SOLUTION ORAL
Qty: 507 G | Refills: 0 | Status: CANCELLED | OUTPATIENT
Start: 2021-11-09

## 2021-11-09 RX ORDER — ASPIRIN 81 MG
100 TABLET, DELAYED RELEASE (ENTERIC COATED) ORAL DAILY PRN
Qty: 30 TABLET | Refills: 1 | Status: SHIPPED | OUTPATIENT
Start: 2021-11-09 | End: 2022-01-03

## 2021-11-09 NOTE — PATIENT INSTRUCTIONS
Patient Education     Heart Valve Problems: Aortic Stenosis  Aortic stenosis means your aortic valve has a problem opening. The aortic valve is 1 of the heart s 4 valves. It is on the left side of the heart. It sits between the left lower chamber (left ventricle) and the large blood vessel that sends blood to the body (aorta). With aortic stenosis, the left ventricle has to work harder to push the blood through the valve. In some cases, this extra work will make the muscle of the ventricle thicken. In time, the extra work can tire the heart and cause the heart muscle to weaken.  Stenosis usually gets worse slowly, over many years. But sometimes, it can quickly get worse.     Open aortic valve with stenosis (viewed from above).       Cross section of heart showing aortic valve with stenosis.    Possible causes  Calcium deposits can form on the aortic valve as you get older. These deposits make the valve stiff and hard to open. In some cases, you may have been born with an abnormal aortic valve. Or your aortic valve may have been damaged by rheumatic fever or a heart infection. Radiation therapy used as treatment for cancers such as lymphoma, may be a cause.  Treating aortic stenosis  In many cases, treatment won t be needed unless you have symptoms. If you do have symptoms, medicines may help relieve them. If the stenosis is severe, your healthcare provider may advise surgery to replace the valve. Or you may have a catheter-based procedure (transcatheter aortic valve replacement or TAVR) to replace the valve, even if you don t have symptoms.  [employer] last reviewed this educational content on 7/1/2019 2000-2021 [employer]. All rights reserved. This information is not intended as a substitute for professional medical care. Always follow your healthcare professional's instructions.           Patient Education     Heart Valve Problems  Your heart s job is to pump blood through your body. That job  starts with pumping blood through the heart itself. Inside your heart, blood passes through a series of one-way alvarez (valves). If a valve doesn't work correctly, not enough blood moves forward. A problem heart valve may not open wide enough, not close tightly enough, or both. In any case, not enough blood is sent to the heart muscle or out to the body.  Symptoms of heart valve problems  You can have a problem valve for decades yet have no symptoms. If you do have symptoms, they may come on so slowly that you barely notice them. In other cases, though, symptoms appear suddenly. You might have one or more of these symptoms:    Problems breathing when you lie down, exert yourself, or get stressed emotionally    Pain, pressure, tightness, or numbness in your chest, neck, back, or arms (angina)    Feeling dizzy, faint, or lightheaded    Tiredness, especially with activity or as the day goes on    Waking up at night coughing or short of breath    A fast, pounding, or irregular heartbeat    A fluttering feeling in your chest    Swollen ankles or feet    Fainting, especially upon standing up or with exertion  Common causes of valve problems  People of any age can have heart valve trouble. You may have been born with a problem valve. Or a valve may have worn out as you ve aged. It may not be possible to pinpoint what caused your valve problem. But common causes include:    Buildup of calcium or scar tissue on a valve    Rheumatic fever and certain other infections and diseases    High blood pressure    Other heart problems, such as coronary artery disease    Congenital defects of the heart valves  Problems opening (stenosis)  When a valve doesn t open all the way, the problem is called stenosis. The leaflets may be stuck together or too stiff to open fully. When the valve doesn t open fully, blood has to flow through a smaller opening. So the heart muscle has to work harder to push the blood through the valve.     Stenosis      Problems closing (regurgitation)  When a valve doesn t close tightly enough and blood leaks backward through the valve, the problem is called regurgitation or insufficiency. The valve itself may be described as leaky. Leaflets may fit together poorly. Or the structures that support them may be torn. Some blood leaks through the valve back into the chamber it just left. So the heart has to move that blood twice. This can result in heart muscle damage.     Regurgitation     [employer] last reviewed this educational content on 4/1/2019 2000-2021 [employer]. All rights reserved. This information is not intended as a substitute for professional medical care. Always follow your healthcare professional's instructions.           Patient Education     Treating Constipation  Constipation is a common and often uncomfortable problem. Constipation means you have bowel movements fewer than 3 times per week. Or that you strain to pass hard, dry stool. It can last a short time. Or it can be a problem that never seems to go away. The good news is that it can often be treated and controlled.   Eat more fiber  One of the best ways to help treat constipation is to increase your fiber intake. You can do this either through diet or by using fiber supplements. Fiber (in whole grains, fruits, and vegetables) adds bulk and absorbs water to soften the stool. This helps the stool pass through the colon more easily. When you increase your fiber intake, do it slowly to prevent side effects such as bloating. Also increase the amount of water that you drink. Eating more of these foods can add fiber to your diet:     High-fiber cereals    Whole grains, bran, and brown rice    Vegetables such as carrots, broccoli, and greens    Fresh fruits (especially apples, pears, and dried fruits such as raisins and apricots)    Nuts and legumes (especially beans such as lentils, kidney beans, and lima beans)  Get physically active  Exercise  helps improve the working of your colon which helps ease constipation. Try to get some physical activity every day. If you haven t been active for a while, talk with your healthcare provider before starting again.     Laxatives  Your healthcare provider may suggest an over-the-counter product to help ease your constipation. He or she may suggest the use of bulk-forming agents or laxatives. Laxatives, if used as directed, are common and safe. Follow directions carefully when using them. See your provider for new-onset constipation, or long-term constipation, to rule out other causes such as medicines or thyroid disease.   [employer] last reviewed this educational content on 6/1/2019 2000-2021 [employer]. All rights reserved. This information is not intended as a substitute for professional medical care. Always follow your healthcare professional's instructions.

## 2021-11-09 NOTE — PROGRESS NOTES
Assessment & Plan     Chest discomfort  Aortic valve stenosis, etiology of cardiac valve disease unspecified  Patient has reported chest discomfort with his abdominal pain. Denies association with activity, sweating, radiation into the left UE/neck or back. He does have a history of aortic valve stenosis which was last evaluated in 2018. EKG was normal today, murmur consistent with history heard on exam. Recommended repeat echo today, patient declined. He was educated on the risks of progressive stenosis, but refused to schedule test. He will call if chest pain worsens. Educational information attached to AVS.  - EKG 12-lead complete w/read - Clinics  - Lipid Profile (Chol, Trig, HDL, LDL calc); Future  - CBC with platelets and differential; Future    Abdominal discomfort  Constipation, unspecified constipation type  Hiatal hernia  Diastasis recti  No alarm symptoms in history. Patient reported last BM this morning, type 4 on bristol stool chart. Exam was unremarkable. Testing was normal. Provided education on diastasis recti as well as hiatal hernia. Reviewed the educational information on constipation. I advised the patient on use of fiber to promote healthy bowel movements. He will also have stool softener to use as needed. If symptoms do not improve as expected patient will call. Consider repeat colonoscopy at that time.   - methylcellulose (CITRUCEL) powder; 1 g (2 level teaspoons) in 8 oz (240 mL) of cold water; increase as needed by 2 level teaspoons up to 3 times/day  - docusate sodium (COLACE) 100 MG tablet; Take 1 tablet (100 mg) by mouth daily as needed for constipation  - Comprehensive metabolic panel (BMP + Alb, Alk Phos, ALT, AST, Total. Bili, TP); Future  - UA Macro with Reflex to Micro and Culture - lab collect; Future    Anxiety  Patient lost wife 7 years ago to SBO. As such he is quite anxious about this causing his abdominal discomfort. This is unlikely as he has no history of abdominal surgery  "and is passing gas as well as having normal BM. Reassurance provided to patient today.     Morbid obesity (H)  Advised that the patient maintain a healthy diet low in salts/sugars/fatty&greasy foods with regular servings of fruits and vegetables and try to get in 30 minutes of aerobic exercise 5 or more days each week as able.   - Lipid Profile (Chol, Trig, HDL, LDL calc); Future     BMI:   Estimated body mass index is 34.15 kg/m  as calculated from the following:    Height as of 10/21/21: 1.727 m (5' 8\").    Weight as of this encounter: 101.9 kg (224 lb 9.6 oz).   Weight management plan: Discussed healthy diet and exercise guidelines    Return in about 6 months (around 5/9/2022) for Return for scheduled annual checkup with PCP.    DAKOTA Buckley [facility] LUIS Ospina is a 66 year old who presents for the following health issues     HPI     Abdominal/Flank Pain  Onset/Duration: 3-4weeks  Description:   Character: Dull ache  Location: lower abdomin  Radiation: None  Intensity: moderate  Progression of Symptoms:  improving  Accompanying Signs & Symptoms:  Fever/Chills: no  Gas/Bloating: YES  Nausea: no  Vomitting: no  Diarrhea: went away about 1 week ago  Constipation: no  Dysuria or Hematuria: YES  History:   Trauma: no  Previous similar pain: no  Previous tests done: Colonoscopy and Upper Endoscopy  Precipitating factors:   Does the pain change with:     Food: no    Bowel Movement: no    Urination: no   Other factors:  no  Therapies tried and outcome: None    Patient is a 66 year old male who presents today with concerns about abdominal discomfort. He informs me that he lost his wife 7 years ago to small bowel obstruction and he is worried that he may be experiencing the same situation. He says that this symptoms began about 1 month ago with a discomfort in the epigastric region and superficial sternal discomfort. He does have a history of a hiatal hernia which is managed " with omeprazole. He also has a diastasis recti. Per the patient, this has not changed in size. No history of any abdominal surgery, last bowel movement was this morning. Rated type 4 on bristol stool chart. Colonoscopy from 2018 grossly unremarkable.     Review of chart shows that the patient was diagnosed with aortic valve stenosis. Echo completed in 2018 which showed mild stenosis. Patient says that his abdominal discomfort will radiate into the left chest. EKG normal today. I discussed with the patient that this does not always reflect worsening stenosis and advised repeat echo. He declined.     Review of Systems   Constitutional, HEENT, cardiovascular, pulmonary, gi and gu systems are negative, except as otherwise noted.      Objective    /62   Pulse 78   Temp 98.1  F (36.7  C) (Temporal)   Wt 101.9 kg (224 lb 9.6 oz)   SpO2 97%   BMI 34.15 kg/m    Body mass index is 34.15 kg/m .  Physical Exam   GENERAL: healthy, alert and no distress  NECK: no adenopathy, no asymmetry, masses, or scars and thyroid normal to palpation  RESP: lungs clear to auscultation - no rales, rhonchi or wheezes  CV: regular rate and rhythm, normal S1 S2, no S3 or S4, no murmur, click or rub, no peripheral edema and peripheral pulses strong  ABDOMEN: soft, nontender, no hepatosplenomegaly, no masses and bowel sounds normal  MS: no gross musculoskeletal defects noted, no edema  NEURO: Normal strength and tone, mentation intact and speech normal  BACK: no CVA tenderness, no paralumbar tenderness  PSYCH: mentation appears normal, affect normal/bright    Results for TOM TAYLOR (MRN [id number]) as of 11/9/2021 12:38   Ref. Range 11/9/2021 10:40   Sodium Latest Ref Range: 133 - 144 mmol/L 140   Potassium Latest Ref Range: 3.4 - 5.3 mmol/L 4.4   Chloride Latest Ref Range: 94 - 109 mmol/L 112 (H)   Carbon Dioxide Latest Ref Range: 20 - 32 mmol/L 24   Urea Nitrogen Latest Ref Range: 7 - 30 mg/dL 14   Creatinine Latest Ref Range:  0.66 - 1.25 mg/dL 0.67   GFR Estimate Latest Ref Range: >60 mL/min/1.73m2 >90   Calcium Latest Ref Range: 8.5 - 10.1 mg/dL 8.9   Anion Gap Latest Ref Range: 3 - 14 mmol/L 4   Albumin Latest Ref Range: 3.4 - 5.0 g/dL 3.9   Protein Total Latest Ref Range: 6.8 - 8.8 g/dL 7.3   Bilirubin Total Latest Ref Range: 0.2 - 1.3 mg/dL 0.5   Alkaline Phosphatase Latest Ref Range: 40 - 150 U/L 76   ALT Latest Ref Range: 0 - 70 U/L 27   AST Latest Ref Range: 0 - 45 U/L 12   Cholesterol Latest Ref Range: <200 mg/dL 120   Patient Fasting > 8hrs? Unknown No   HDL Cholesterol Latest Ref Range: >=40 mg/dL 55   LDL Cholesterol Calculated Latest Ref Range: <=100 mg/dL 46   Non HDL Cholesterol Latest Ref Range: <130 mg/dL 65   Triglycerides Latest Ref Range: <150 mg/dL 95   Glucose Latest Ref Range: 70 - 99 mg/dL 99   WBC Latest Ref Range: 4.0 - 11.0 10e3/uL 7.2   Hemoglobin Latest Ref Range: 13.3 - 17.7 g/dL 14.8   Hematocrit Latest Ref Range: 40.0 - 53.0 % 43.4   Platelet Count Latest Ref Range: 150 - 450 10e3/uL 194   RBC Count Latest Ref Range: 4.40 - 5.90 10e6/uL 5.05   MCV Latest Ref Range: 78 - 100 fL 86   MCH Latest Ref Range: 26.5 - 33.0 pg 29.3   MCHC Latest Ref Range: 31.5 - 36.5 g/dL 34.1   RDW Latest Ref Range: 10.0 - 15.0 % 12.5   % Neutrophils Latest Units: % 62   % Lymphocytes Latest Units: % 26   % Monocytes Latest Units: % 9   % Eosinophils Latest Units: % 2   % Basophils Latest Units: % 1   Absolute Basophils Latest Ref Range: 0.0 - 0.2 10e3/uL 0.1   Absolute Eosinophils Latest Ref Range: 0.0 - 0.7 10e3/uL 0.1   Absolute Immature Granulocytes Latest Ref Range: <=0.0 10e3/uL 0.0   Absolute Lymphocytes Latest Ref Range: 0.8 - 5.3 10e3/uL 1.8   Absolute Monocytes Latest Ref Range: 0.0 - 1.3 10e3/uL 0.7   % Immature Granulocytes Latest Units: % 0   Absolute Neutrophils Latest Ref Range: 1.6 - 8.3 10e3/uL 4.5   Absolute NRBCs Latest Units: 10e3/uL 0.0   NRBCs per 100 WBC Latest Ref Range: <1 /100 0

## 2021-11-09 NOTE — LETTER
November 16, 2021      Bhavesh Landeros  [id number]   [place] [address]        Dear ,    We are writing to inform you of your test results.    The results of your lab work returned without evidence of bladder infection. The cholesterol levels were within normal range. The metabolic panel, which evaluates your kidney and liver function, electrolytes and blood sugar, returned grossly normal. In addition, the cell counts did not show any evidence of anemia, infection or other abnormality. If your symptoms do not improve as expected please call to update me.     Camron Chiu PA-C       Resulted Orders   Comprehensive metabolic panel (BMP + Alb, Alk Phos, ALT, AST, Total. Bili, TP)   Result Value Ref Range    Sodium 140 133 - 144 mmol/L    Potassium 4.4 3.4 - 5.3 mmol/L    Chloride 112 (H) 94 - 109 mmol/L    Carbon Dioxide (CO2) 24 20 - 32 mmol/L    Anion Gap 4 3 - 14 mmol/L    Urea Nitrogen 14 7 - 30 mg/dL    Creatinine 0.67 0.66 - 1.25 mg/dL    Calcium 8.9 8.5 - 10.1 mg/dL    Glucose 99 70 - 99 mg/dL    Alkaline Phosphatase 76 40 - 150 U/L    AST 12 0 - 45 U/L    ALT 27 0 - 70 U/L    Protein Total 7.3 6.8 - 8.8 g/dL    Albumin 3.9 3.4 - 5.0 g/dL    Bilirubin Total 0.5 0.2 - 1.3 mg/dL    GFR Estimate >90 >60 mL/min/1.73m2      Comment:      As of July 11, 2021, eGFR is calculated by the CKD-EPI creatinine equation, without race adjustment. eGFR can be influenced by muscle mass, exercise, and diet. The reported eGFR is an estimation only and is only applicable if the renal function is stable.   Lipid Profile (Chol, Trig, HDL, LDL calc)   Result Value Ref Range    Cholesterol 120 <200 mg/dL    Triglycerides 95 <150 mg/dL    Direct Measure HDL 55 >=40 mg/dL    LDL Cholesterol Calculated 46 <=100 mg/dL    Non HDL Cholesterol 65 <130 mg/dL    Patient Fasting > 8hrs? No     Narrative    Cholesterol  Desirable:  <200 mg/dL    Triglycerides  Normal:  Less than 150 mg/dL  Borderline High:  150-199  mg/dL  High:  200-499 mg/dL  Very High:  Greater than or equal to 500 mg/dL    Direct Measure HDL  Female:  Greater than or equal to 50 mg/dL   Male:  Greater than or equal to 40 mg/dL    LDL Cholesterol  Desirable:  <100mg/dL  Above Desirable:  100-129 mg/dL   Borderline High:  130-159 mg/dL   High:  160-189 mg/dL   Very High:  >= 190 mg/dL    Non HDL Cholesterol  Desirable:  130 mg/dL  Above Desirable:  130-159 mg/dL  Borderline High:  160-189 mg/dL  High:  190-219 mg/dL  Very High:  Greater than or equal to 220 mg/dL   UA Macro with Reflex to Micro and Culture - lab collect   Result Value Ref Range    Color Urine Yellow Colorless, Straw, Light Yellow, Yellow    Appearance Urine Clear Clear    Glucose Urine Negative Negative mg/dL    Bilirubin Urine Negative Negative    Ketones Urine Negative Negative mg/dL    Specific Gravity Urine 1.008 1.003 - 1.035    Blood Urine Negative Negative    pH Urine 7.0 5.0 - 7.0    Protein Albumin Urine Negative Negative mg/dL    Urobilinogen Urine Normal Normal, 2.0 mg/dL    Nitrite Urine Negative Negative    Leukocyte Esterase Urine Negative Negative    Narrative    Microscopic not indicated   CBC with platelets and differential   Result Value Ref Range    WBC Count 7.2 4.0 - 11.0 10e3/uL    RBC Count 5.05 4.40 - 5.90 10e6/uL    Hemoglobin 14.8 13.3 - 17.7 g/dL    Hematocrit 43.4 40.0 - 53.0 %    MCV 86 78 - 100 fL    MCH 29.3 26.5 - 33.0 pg    MCHC 34.1 31.5 - 36.5 g/dL    RDW 12.5 10.0 - 15.0 %    Platelet Count 194 150 - 450 10e3/uL    % Neutrophils 62 %    % Lymphocytes 26 %    % Monocytes 9 %    % Eosinophils 2 %    % Basophils 1 %    % Immature Granulocytes 0 %    NRBCs per 100 WBC 0 <1 /100    Absolute Neutrophils 4.5 1.6 - 8.3 10e3/uL    Absolute Lymphocytes 1.8 0.8 - 5.3 10e3/uL    Absolute Monocytes 0.7 0.0 - 1.3 10e3/uL    Absolute Eosinophils 0.1 0.0 - 0.7 10e3/uL    Absolute Basophils 0.1 0.0 - 0.2 10e3/uL    Absolute Immature Granulocytes 0.0 <=0.0 10e3/uL     Absolute NRBCs 0.0 10e3/uL       If you have any questions or concerns, please call the clinic at the number listed above.

## 2021-11-09 NOTE — NURSING NOTE
Per Camron Chiu PA-C, I have performed an EKG on pt. Results given to provider. Pt tolerated well. Izzy Rivera CMA ([facility])

## 2021-11-16 ENCOUNTER — TELEPHONE (OUTPATIENT)
Dept: FAMILY MEDICINE | Facility: CLINIC | Age: 66
End: 2021-11-16
Payer: COMMERCIAL

## 2021-11-16 NOTE — TELEPHONE ENCOUNTER
Patient is calling for his results from last weeks labs.    Patient is called and informed of this message.  Patient understands and agrees to this plan.  He states he is feeling better since he started taking Citrucel and been more regular.  Closing this encounter.    Alejandra Bai RN

## 2021-12-13 DIAGNOSIS — E78.2 MIXED HYPERLIPIDEMIA: ICD-10-CM

## 2021-12-13 DIAGNOSIS — K21.00 GASTROESOPHAGEAL REFLUX DISEASE WITH ESOPHAGITIS: ICD-10-CM

## 2021-12-15 RX ORDER — ATORVASTATIN CALCIUM 20 MG/1
TABLET, FILM COATED ORAL
Qty: 90 TABLET | Refills: 2 | Status: SHIPPED | OUTPATIENT
Start: 2021-12-15 | End: 2022-03-17

## 2021-12-15 NOTE — TELEPHONE ENCOUNTER
Pending Prescriptions:                       Disp   Refills    omeprazole (PRILOSEC) 20 MG DR capsule    90 cap*0            Sig: TAKE ONE CAPSULE BY MOUTH ONCE DAILY    Medication is being filled for 1 time mike refill only due to:  Patient is due for est care with another PCP    Please call and help schedule.  Thank you!

## 2022-01-03 ENCOUNTER — OFFICE VISIT (OUTPATIENT)
Dept: FAMILY MEDICINE | Facility: CLINIC | Age: 67
End: 2022-01-03
Payer: COMMERCIAL

## 2022-01-03 VITALS
DIASTOLIC BLOOD PRESSURE: 64 MMHG | TEMPERATURE: 98 F | WEIGHT: 228 LBS | SYSTOLIC BLOOD PRESSURE: 135 MMHG | BODY MASS INDEX: 34.67 KG/M2 | OXYGEN SATURATION: 97 % | HEART RATE: 77 BPM

## 2022-01-03 DIAGNOSIS — R05.9 COUGH: Primary | ICD-10-CM

## 2022-01-03 DIAGNOSIS — J01.10 ACUTE NON-RECURRENT FRONTAL SINUSITIS: ICD-10-CM

## 2022-01-03 LAB
FLUAV AG SPEC QL IA: NEGATIVE
FLUBV AG SPEC QL IA: NEGATIVE
SARS-COV-2 RNA RESP QL NAA+PROBE: NORMAL

## 2022-01-03 PROCEDURE — 87804 INFLUENZA ASSAY W/OPTIC: CPT | Performed by: NURSE PRACTITIONER

## 2022-01-03 PROCEDURE — 99213 OFFICE O/P EST LOW 20 MIN: CPT | Performed by: NURSE PRACTITIONER

## 2022-01-03 PROCEDURE — U0003 INFECTIOUS AGENT DETECTION BY NUCLEIC ACID (DNA OR RNA); SEVERE ACUTE RESPIRATORY SYNDROME CORONAVIRUS 2 (SARS-COV-2) (CORONAVIRUS DISEASE [COVID-19]), AMPLIFIED PROBE TECHNIQUE, MAKING USE OF HIGH THROUGHPUT TECHNOLOGIES AS DESCRIBED BY CMS-2020-01-R: HCPCS | Mod: 90 | Performed by: NURSE PRACTITIONER

## 2022-01-03 PROCEDURE — U0005 INFEC AGEN DETEC AMPLI PROBE: HCPCS | Mod: 90 | Performed by: NURSE PRACTITIONER

## 2022-01-03 PROCEDURE — 99000 SPECIMEN HANDLING OFFICE-LAB: CPT | Performed by: NURSE PRACTITIONER

## 2022-01-03 ASSESSMENT — ENCOUNTER SYMPTOMS: COUGH: 1

## 2022-01-03 ASSESSMENT — PAIN SCALES - GENERAL: PAINLEVEL: NO PAIN (0)

## 2022-01-03 NOTE — PROGRESS NOTES
"  Assessment & Plan     Cough    - Symptomatic; Yes; 12/30/2021 COVID-19 Virus (Coronavirus) by PCR Oropharynx  - Influenza A/B antigen    Acute non-recurrent frontal sinusitis    - Symptomatic; Yes; 12/30/2021 COVID-19 Virus (Coronavirus) by PCR Oropharynx  - Influenza A/B antigen             Patient Instructions   Drink plenty of fluids. Use saline nasal in your sinuses as directed to help with the nasal congestion.I also recommend a nightly humidifier if you have one available.    If symptoms are not improving with treatment plan please return to clinic for further evaluation.    Discharge Instructions for COVID-19 Patients  You have--or may have--COVID-19. Please follow the instructions listed below.   If you have a weakened immune system, discuss with your doctor any other actions you need to take.  How can I protect others?  If you have symptoms (fever, cough, body aches or trouble breathing):    Stay home and away from others (self-isolate) until:  ? Your other symptoms have resolved (gotten better). And   ? You've had no fever--and no medicine that reduces fever--for 1 full day (24 hours). And   ? At least 10 days have passed since your symptoms started. (You may need to wait 20 days. Follow the advice of your care team.)  If you don't show symptoms, but testing showed that you have COVID-19:    Stay home and away from others (self-isolate) until at least 10 days have passed since the date of your first positive COVID-19 test.  During this time    Stay in your own room, even for meals. Use your own bathroom if you can.    Stay away from others in your home. No hugging, kissing or shaking hands. No visitors.    Don't go to work, school or anywhere else.    Clean \"high touch\" surfaces often (doorknobs, counters, handles). Use household cleaning spray or wipes.    You'll find a full list of  on the EPA website: www.epa.gov/pesticide-registration/list-n-disinfectants-use-against-sars-cov-2.    Cover your " mouth and nose with a mask or other face covering to avoid spreading germs.    Wash your hands and face often. Use soap and water.    Caregivers in these groups are at risk for severe illness due to COVID-19:  ? People 65 years and older  ? People who live in a nursing home or long-term care facility  ? People with chronic disease (lung, heart, cancer, diabetes, kidney, liver, immunologic)  ? People who have a weakened immune system, including those who:    Are in cancer treatment    Take medicine that weakens the immune system, such as corticosteroids    Had a bone marrow or organ transplant    Have an immune deficiency    Have poorly controlled HIV or AIDS    Are obese (body mass index of 40 or higher)    Smoke regularly    Caregivers should wear gloves while washing dishes, handling laundry and cleaning bedrooms and bathrooms.    Use caution when washing and drying laundry: Don't shake dirty laundry and use the warmest water setting that you can.    For more tips on managing your health at home, go to www.cdc.gov/coronavirus/2019-ncov/downloads/10Things.pdf.  How can I take care of myself at home?  1. Get lots of rest. Drink extra fluids (unless a doctor has told you not to).  2. Take Tylenol (acetaminophen) for fever or pain. If you have liver or kidney problems, ask your family doctor if it's okay to take Tylenol.   Adults can take either:   ? 650 mg (two 325 mg pills) every 4 to 6 hours, or   ? 1,000 mg (two 500 mg pills) every 8 hours as needed.  ? Note: Don't take more than 3,000 mg in one day. Acetaminophen is found in many medicines (both prescribed and over-the-counter medicines). Read all labels to be sure you don't take too much.   For children, check the Tylenol bottle for the right dose. The dose is based on the child's age or weight.  3. If you have other health problems (like cancer, heart failure, an organ transplant or severe kidney disease): Call your specialty clinic if you don't feel better in  the next 2 days.  4. Know when to call 911. Emergency warning signs include:  ? Trouble breathing or shortness of breath  ? Pain or pressure in the chest that doesn't go away  ? Feeling confused like you haven't felt before, or not being able to wake up  ? Bluish-colored lips or face  5. Your doctor may have prescribed a blood thinner medicine. Follow their instructions.  Where can I get more information?    [facility] - About COVID-19:   [email or web address]    CDC - What to Do If You're Sick: www.cdc.gov/coronavirus/2019-ncov/about/steps-when-sick.html    CDC - Ending Home Isolation: www.cdc.gov/coronavirus/2019-ncov/hcp/disposition-in-home-patients.html    CDC - Caring for Someone: www.cdc.gov/coronavirus/2019-ncov/if-you-are-sick/care-for-someone.html    [facility] - Interim Guidance for Hospital Discharge to Home: www.health.[facility].[place]./diseases/coronavirus/hcp/hospdischarge.pdf    Below are the COVID-19 hotlines at the [facility] ([facility]). Interpreters are available.  ? For health questions: Call 020-438-6856 or 1-450.822.2203 (7 a.m. to 7 p.m.)  ? For questions about schools and childcare: Call 844-921-0766 or 1-765.816.1357 (7 a.m. to 7 p.m.)    For informational purposes only. Not to replace the advice of your health care provider. Clinically reviewed by Dr. Jg Pastrana.   Copyright   2020 [employer]. All rights reserved. Dajie [id number] - REV 01/05/21.            PERLA Milligan CNP [facility] LUIS Ospina is a 66 year old who presents for the following health issues     Cough    Sinus Problem   Associated symptoms include cough.        Acute Illness  Acute illness concerns: ear, sinus, cough  Onset/Duration: 4 days ago  Symptoms:  Fever: no  Chills/Sweats: no  Headache (location?): no  Sinus Pressure: no  Conjunctivitis:  no  Ear Pain: YES: right  Rhinorrhea: no  Congestion: YES  Sore Throat: more dry than  sore  Cough: YES  Wheeze: YES  Decreased Appetite: no  Nausea: no  Vomiting: no  Diarrhea: YES  Dysuria/Freq.: no  Dysuria or Hematuria: no  Fatigue/Achiness: YES  Sick/Strep Exposure: no  Therapies tried and outcome: OTC meds  Patient with 4 day history of NP cough, sinus congestion,   Eating and drinking well, denies SOB       Review of Systems   Respiratory: Positive for cough.       Constitutional, HEENT, cardiovascular, pulmonary, GI, , musculoskeletal, neuro, skin, endocrine and psych systems are negative, except as otherwise noted.      Objective    /64   Pulse 77   Temp 98  F (36.7  C) (Temporal)   Wt 103.4 kg (228 lb)   SpO2 97%   BMI 34.67 kg/m    Body mass index is 34.67 kg/m .  Physical Exam   GENERAL: healthy, alert and no distress  EYES: Eyes grossly normal to inspection, PERRL and conjunctivae and sclerae normal  HENT: normal cephalic/atraumatic, ear canals and TM's normal, nose and mouth without ulcers or lesions, nasal mucosa edematous , rhinorrhea clear, oropharynx clear and oral mucous membranes moist  NECK: no adenopathy, no asymmetry, masses, or scars and thyroid normal to palpation  RESP: lungs clear to auscultation - no rales, rhonchi or wheezes  CV: regular rate and rhythm, normal S1 S2, no S3 or S4, no murmur, click or rub, no peripheral edema and peripheral pulses strong  ABDOMEN: soft, nontender, no hepatosplenomegaly, no masses and bowel sounds normal  MS: no gross musculoskeletal defects noted, no edema  SKIN: no suspicious lesions or rashes  NEURO: Normal strength and tone, mentation intact and speech normal  PSYCH: mentation appears normal, affect normal/bright

## 2022-01-03 NOTE — PATIENT INSTRUCTIONS
"Drink plenty of fluids. Use saline nasal in your sinuses as directed to help with the nasal congestion.I also recommend a nightly humidifier if you have one available.    If symptoms are not improving with treatment plan please return to clinic for further evaluation.    Discharge Instructions for COVID-19 Patients  You have--or may have--COVID-19. Please follow the instructions listed below.   If you have a weakened immune system, discuss with your doctor any other actions you need to take.  How can I protect others?  If you have symptoms (fever, cough, body aches or trouble breathing):    Stay home and away from others (self-isolate) until:  ? Your other symptoms have resolved (gotten better). And   ? You've had no fever--and no medicine that reduces fever--for 1 full day (24 hours). And   ? At least 10 days have passed since your symptoms started. (You may need to wait 20 days. Follow the advice of your care team.)  If you don't show symptoms, but testing showed that you have COVID-19:    Stay home and away from others (self-isolate) until at least 10 days have passed since the date of your first positive COVID-19 test.  During this time    Stay in your own room, even for meals. Use your own bathroom if you can.    Stay away from others in your home. No hugging, kissing or shaking hands. No visitors.    Don't go to work, school or anywhere else.    Clean \"high touch\" surfaces often (doorknobs, counters, handles). Use household cleaning spray or wipes.    You'll find a full list of  on the EPA website: www.epa.gov/pesticide-registration/list-n-disinfectants-use-against-sars-cov-2.    Cover your mouth and nose with a mask or other face covering to avoid spreading germs.    Wash your hands and face often. Use soap and water.    Caregivers in these groups are at risk for severe illness due to COVID-19:  ? People 65 years and older  ? People who live in a nursing home or long-term care facility  ? People with " chronic disease (lung, heart, cancer, diabetes, kidney, liver, immunologic)  ? People who have a weakened immune system, including those who:    Are in cancer treatment    Take medicine that weakens the immune system, such as corticosteroids    Had a bone marrow or organ transplant    Have an immune deficiency    Have poorly controlled HIV or AIDS    Are obese (body mass index of 40 or higher)    Smoke regularly    Caregivers should wear gloves while washing dishes, handling laundry and cleaning bedrooms and bathrooms.    Use caution when washing and drying laundry: Don't shake dirty laundry and use the warmest water setting that you can.    For more tips on managing your health at home, go to www.cdc.gov/coronavirus/2019-ncov/downloads/10Things.pdf.  How can I take care of myself at home?  1. Get lots of rest. Drink extra fluids (unless a doctor has told you not to).  2. Take Tylenol (acetaminophen) for fever or pain. If you have liver or kidney problems, ask your family doctor if it's okay to take Tylenol.   Adults can take either:   ? 650 mg (two 325 mg pills) every 4 to 6 hours, or   ? 1,000 mg (two 500 mg pills) every 8 hours as needed.  ? Note: Don't take more than 3,000 mg in one day. Acetaminophen is found in many medicines (both prescribed and over-the-counter medicines). Read all labels to be sure you don't take too much.   For children, check the Tylenol bottle for the right dose. The dose is based on the child's age or weight.  3. If you have other health problems (like cancer, heart failure, an organ transplant or severe kidney disease): Call your specialty clinic if you don't feel better in the next 2 days.  4. Know when to call 911. Emergency warning signs include:  ? Trouble breathing or shortness of breath  ? Pain or pressure in the chest that doesn't go away  ? Feeling confused like you haven't felt before, or not being able to wake up  ? Bluish-colored lips or face  5. Your doctor may have  prescribed a blood thinner medicine. Follow their instructions.  Where can I get more information?    [facility] - About COVID-19:   [email or web address]    CDC - What to Do If You're Sick: www.cdc.gov/coronavirus/2019-ncov/about/steps-when-sick.html    CDC - Ending Home Isolation: www.cdc.gov/coronavirus/2019-ncov/hcp/disposition-in-home-patients.html    [facility] - Caring for Someone: www.cdc.gov/coronavirus/2019-ncov/if-you-are-sick/care-for-someone.html    [facility] - Interim Guidance for Hospital Discharge to Home: www.health.[facility].[place]./diseases/coronavirus/hcp/hospdischarge.pdf    Below are the COVID-19 hotlines at the [place] [facility] of [facility] ([facility]). Interpreters are available.  ? For health questions: Call 972-189-3989 or 1-687.452.6664 (7 a.m. to 7 p.m.)  ? For questions about schools and childcare: Call 709-088-2822 or 1-164.304.7781 (7 a.m. to 7 p.m.)    For informational purposes only. Not to replace the advice of your health care provider. Clinically reviewed by Dr. Jg Pastrana.   Copyright   2020 [employer]. All rights reserved. [employer] [id number] - REV 01/05/21.

## 2022-01-04 LAB — SARS-COV-2 RNA RESP QL NAA+PROBE: DETECTED

## 2022-01-05 NOTE — RESULT ENCOUNTER NOTE
Please advise Bhavesh Landeros,  1955, that his lab results were positive covid 19 please continue treatment plan as discussed  [phone] (home)   Thank you  Adrienne Perrin CNP

## 2022-01-06 ENCOUNTER — TELEPHONE (OUTPATIENT)
Dept: FAMILY MEDICINE | Facility: CLINIC | Age: 67
End: 2022-01-06
Payer: COMMERCIAL

## 2022-01-06 NOTE — TELEPHONE ENCOUNTER
----- Message from PERLA Garcia CNP sent at 2022 10:38 AM CST -----  Please advise Bhavesh Landeros,  1955, that his lab results were positive covid 19 please continue treatment plan as discussed  [phone] (home)   Thank you  Adrienne Perrin CNP

## 2022-02-12 ENCOUNTER — HEALTH MAINTENANCE LETTER (OUTPATIENT)
Age: 67
End: 2022-02-12

## 2022-02-22 ENCOUNTER — OFFICE VISIT (OUTPATIENT)
Dept: FAMILY MEDICINE | Facility: CLINIC | Age: 67
End: 2022-02-22
Payer: COMMERCIAL

## 2022-02-22 VITALS
RESPIRATION RATE: 20 BRPM | HEART RATE: 110 BPM | WEIGHT: 230.56 LBS | SYSTOLIC BLOOD PRESSURE: 130 MMHG | TEMPERATURE: 98.9 F | BODY MASS INDEX: 35.06 KG/M2 | DIASTOLIC BLOOD PRESSURE: 80 MMHG | OXYGEN SATURATION: 98 %

## 2022-02-22 DIAGNOSIS — K44.9 HIATAL HERNIA: ICD-10-CM

## 2022-02-22 DIAGNOSIS — K59.00 CONSTIPATION, UNSPECIFIED CONSTIPATION TYPE: Primary | ICD-10-CM

## 2022-02-22 PROCEDURE — 90715 TDAP VACCINE 7 YRS/> IM: CPT | Performed by: STUDENT IN AN ORGANIZED HEALTH CARE EDUCATION/TRAINING PROGRAM

## 2022-02-22 PROCEDURE — 99213 OFFICE O/P EST LOW 20 MIN: CPT | Mod: 25 | Performed by: STUDENT IN AN ORGANIZED HEALTH CARE EDUCATION/TRAINING PROGRAM

## 2022-02-22 PROCEDURE — G0008 ADMIN INFLUENZA VIRUS VAC: HCPCS | Mod: 59 | Performed by: STUDENT IN AN ORGANIZED HEALTH CARE EDUCATION/TRAINING PROGRAM

## 2022-02-22 PROCEDURE — 90471 IMMUNIZATION ADMIN: CPT | Performed by: STUDENT IN AN ORGANIZED HEALTH CARE EDUCATION/TRAINING PROGRAM

## 2022-02-22 PROCEDURE — 90662 IIV NO PRSV INCREASED AG IM: CPT | Performed by: STUDENT IN AN ORGANIZED HEALTH CARE EDUCATION/TRAINING PROGRAM

## 2022-02-22 ASSESSMENT — PAIN SCALES - GENERAL: PAINLEVEL: NO PAIN (0)

## 2022-02-22 NOTE — PROGRESS NOTES
Prior to immunization administration, verified patients identity using patient s name and date of birth. Please see Immunization Activity for additional information.     Screening Questionnaire for Adult Immunization    Are you sick today?   No   Do you have allergies to medications, food, a vaccine component or latex?   No   Have you ever had a serious reaction after receiving a vaccination?   No   Do you have a long-term health problem with heart, lung, kidney, or metabolic disease (e.g., diabetes), asthma, a blood disorder, no spleen, complement component deficiency, a cochlear implant, or a spinal fluid leak?  Are you on long-term aspirin therapy?   No   Do you have cancer, leukemia, HIV/AIDS, or any other immune system problem?   No   Do you have a parent, brother, or sister with an immune system problem?   No   In the past 3 months, have you taken medications that affect  your immune system, such as prednisone, other steroids, or anticancer drugs; drugs for the treatment of rheumatoid arthritis, Crohn s disease, or psoriasis; or have you had radiation treatments?   No   Have you had a seizure, or a brain or other nervous system problem?   No   During the past year, have you received a transfusion of blood or blood    products, or been given immune (gamma) globulin or antiviral drug?   No   For women: Are you pregnant or is there a chance you could become       pregnant during the next month?   No   Have you received any vaccinations in the past 4 weeks?   No     Immunization questionnaire answers were all negative.        Per orders of Dr. Becerra, injection of Tdap and HD Influenza given by Frances Chahal CMA. Patient instructed to remain in clinic for 15 minutes afterwards, and to report any adverse reaction to me immediately.       Screening performed by Frances Chahal CMA on 2/22/2022 at 2:20 PM.

## 2022-02-22 NOTE — PROGRESS NOTES
Assessment & Plan     Constipation, unspecified constipation type  Abdominal discomfort does seem like it maybe related to constipation. We did discuss obtaining imaging but will treat with course of magnesium citrate and see if this improves things for him. He will then titrate miralax to regular stools. He will work on increasing fiber in his diet and consider supplement. Increase fluid intake and improve diet. If things not improving he will let me know.   - magnesium citrate solution  Dispense: 296 mL; Refill: 0    Hiatal hernia  Continue to use omeprazole as it appears to be controlling symptoms. Could consider repeat EGD in the future if symptoms not improving.         Return if symptoms worsen or fail to improve.    Vince Becerra MD  [facility] is a 66 year old who presents for the following health issues     HPI     Patient has concerns that his hiatal hernia is getting worse. Wanting to have new imaging completed. Patient is currently taking omeprazole for his pain which generally controls his reflux.  He has had trouble abdominal discomfort and fullness.  No blood in stool or urine.  Colonoscopy up-to-date.  Trouble with harder stools at times. Unsure if foods change this for him.       Review of Systems   Constitutional, HEENT, cardiovascular, pulmonary, gi and gu systems are negative, except as otherwise noted.      Objective    /80   Pulse 110   Temp 98.9  F (37.2  C) (Temporal)   Resp 20   Wt 104.6 kg (230 lb 9 oz)   SpO2 98%   BMI 35.06 kg/m    Body mass index is 35.06 kg/m .  Physical Exam   GENERAL: healthy, alert and no distress  EYES: Eyes grossly normal to inspection, PERRL and conjunctivae and sclerae normal  HENT: hearing grossly intact  NECK: no adenopathy, no asymmetry, masses, or scars and thyroid normal to palpation  RESP: lungs clear to auscultation - no rales, rhonchi or wheezes  CV: regular rate and rhythm, normal S1 S2,  murmur,  no peripheral edema and peripheral pulses strong  ABDOMEN: soft, mild left lateral epigastric tenderness, mild right lower discomfort with palpation, no hepatosplenomegaly, no masses and bowel sounds normal  MS: no gross musculoskeletal defects noted, no edema  SKIN: no suspicious lesions or rashes  NEURO: Normal strength and tone, mentation intact and speech normal  PSYCH: mentation appears normal, affect normal/bright

## 2022-02-24 ENCOUNTER — OFFICE VISIT (OUTPATIENT)
Dept: FAMILY MEDICINE | Facility: OTHER | Age: 67
End: 2022-02-24
Payer: COMMERCIAL

## 2022-02-24 ENCOUNTER — TELEPHONE (OUTPATIENT)
Dept: FAMILY MEDICINE | Facility: OTHER | Age: 67
End: 2022-02-24

## 2022-02-24 ENCOUNTER — ANCILLARY PROCEDURE (OUTPATIENT)
Dept: GENERAL RADIOLOGY | Facility: OTHER | Age: 67
End: 2022-02-24
Attending: PHYSICIAN ASSISTANT
Payer: COMMERCIAL

## 2022-02-24 VITALS
RESPIRATION RATE: 20 BRPM | WEIGHT: 227 LBS | TEMPERATURE: 98 F | BODY MASS INDEX: 34.52 KG/M2 | SYSTOLIC BLOOD PRESSURE: 138 MMHG | DIASTOLIC BLOOD PRESSURE: 74 MMHG | OXYGEN SATURATION: 99 % | HEART RATE: 72 BPM

## 2022-02-24 DIAGNOSIS — E66.01 MORBID OBESITY (H): ICD-10-CM

## 2022-02-24 DIAGNOSIS — R14.3 FLATULENCE, ERUCTATION AND GAS PAIN: ICD-10-CM

## 2022-02-24 DIAGNOSIS — K43.9 VENTRAL HERNIA WITHOUT OBSTRUCTION OR GANGRENE: ICD-10-CM

## 2022-02-24 DIAGNOSIS — I08.0 MITRAL REGURGITATION AND AORTIC STENOSIS: ICD-10-CM

## 2022-02-24 DIAGNOSIS — R10.32 ABDOMINAL PAIN, LEFT LOWER QUADRANT: ICD-10-CM

## 2022-02-24 DIAGNOSIS — R68.81 EARLY SATIETY: ICD-10-CM

## 2022-02-24 DIAGNOSIS — R14.2 FLATULENCE, ERUCTATION AND GAS PAIN: ICD-10-CM

## 2022-02-24 DIAGNOSIS — I10 HTN, GOAL BELOW 140/80: ICD-10-CM

## 2022-02-24 DIAGNOSIS — R14.1 FLATULENCE, ERUCTATION AND GAS PAIN: ICD-10-CM

## 2022-02-24 DIAGNOSIS — R10.12 ABDOMINAL PAIN, LEFT UPPER QUADRANT: ICD-10-CM

## 2022-02-24 DIAGNOSIS — I35.0 AORTIC VALVE STENOSIS, ETIOLOGY OF CARDIAC VALVE DISEASE UNSPECIFIED: ICD-10-CM

## 2022-02-24 DIAGNOSIS — R10.12 ABDOMINAL PAIN, LEFT UPPER QUADRANT: Primary | ICD-10-CM

## 2022-02-24 PROCEDURE — 74019 RADEX ABDOMEN 2 VIEWS: CPT | Performed by: RADIOLOGY

## 2022-02-24 PROCEDURE — 99214 OFFICE O/P EST MOD 30 MIN: CPT | Performed by: PHYSICIAN ASSISTANT

## 2022-02-24 ASSESSMENT — PAIN SCALES - GENERAL: PAINLEVEL: MILD PAIN (3)

## 2022-02-24 ASSESSMENT — ENCOUNTER SYMPTOMS: ABDOMINAL PAIN: 1

## 2022-02-24 NOTE — PATIENT INSTRUCTIONS
Patient Education     Eating Heart-Healthy Foods  Eating has a big impact on your heart health. In fact, eating healthier can improve several of your heart risks at once. For instance, it helps you manage weight, cholesterol, and blood pressure. Here are ideas to help you make heart-healthy changes without giving up all the foods and flavors you love.   Getting started    Talk with your healthcare provider about eating plans, such as the DASH or Mediterranean diet. You may also be referred to a dietitian.    Change a few things at a time. Give yourself time to get used to a few eating changes before adding more.    Work to create a tasty, healthy eating plan that you can stick to for the rest of your life.    Goals for healthy eating  Below are some tips to improve your eating habits:     Limit saturated fats and trans fats. Saturated fats raise your levels of cholesterol, so keep these fats to a minimum. They are found in foods such as fatty meats, whole milk, cheese, and palm and coconut oils. Avoid trans fats because they lower good cholesterol as well as raise bad cholesterol. Trans fats are most often found in processed foods, such as pastries, cookies, pies, muffins, fried foods, stick margarines, and shortening.    Reduce how much sodium (salt) you have. Eating too much salt may increase your blood pressure. Limit your sodium intake to 2,300 milligrams (mg) per day (the amount in 1 teaspoon of salt), or less if your healthcare provider recommends it. Dining out less often and eating fewer processed foods are two great ways to decrease the amount of salt you consume. At home, flavor your foods with other spices and herbs instead of salt.    Managing calories. A calorie is a unit of energy. Your body burns calories for fuel, but if you eat more calories than your body burns, the extras are stored as fat. Your healthcare provider can help you create a diet plan to manage your calories. This will likely include  eating healthier foods and getting regular exercise. To help you track your progress, keep a diary to record what you eat and how often you exercise.  Choose the right foods  Aim to make these foods staples of your diet. If you have diabetes, you may have different recommendations than what is listed here:     Fruits and vegetables provide plenty of nutrients without a lot of calories. At meals, fill half your plate with these foods. Choose between fresh, frozen, canned, or dried without added sauces, salt, or sugars. Split the other half of your plate between whole grains and lean protein.    Whole grains are high in fiber and rich in vitamins and nutrients. Good choices include whole wheat bread, pasta, oats, and brown rice. Make at least half of your grains whole grains.    Lean proteins give you nutrition with less fat. Good choices include fish, skinless chicken and turkey, and beans. Draining the fat from cooked ground meat is another way to reduce the amount of fat you eat.    Low-fat and nonfat dairy provide nutrients without a lot of fat. Try low-fat or nonfat milk, cheese, or yogurt.    Healthy fats can be good for you in small amounts. These are unsaturated fats, such as olive oil, nuts, and fish. Try to have at least 2 servings per week of fatty fish, such as salmon, sardines, mackerel, rainbow trout, and albacore tuna. These contain omega-3 fatty acids, which are good for your heart. Flaxseed and walnuts are other sources of heart-healthy fats.  More on heart-healthy eating  Read food labels  Healthy eating starts at the grocery store. Be sure to pay attention to food labels on packaged foods. Look for products that are high in fiber and protein, and low in saturated fat, added sugars, and sodium. Avoid products that contain trans fat. And pay close attention to serving size. For instance, if you plan to eat two servings, double all the numbers on the label.   Prepare food right  A key part of healthy  cooking is cutting down on added fat, sugar and salt. Look on the internet for lower-fat, lower-sodium recipes without a lot of added sugars. Also try these tips:     Remove fat from meat and skin from poultry before cooking.    Skim fat from the surface of soups and sauces.    Broil, roast, boil, bake, steam, grill, or microwave food without added fats.    Choose ingredients that spice up your food without adding calories, fat, sugar, or sodium. Try these items: horseradish, hot sauce, lemon, mustard, nonfat salad dressings, and vinegar. Small amounts of olive oil-based vinaigrettes are OK, too. For salt-free herbs and spices, try basil, cilantro, cinnamon, cumin, paprika, pepper, and rosemary.  [employer] last reviewed this educational content on 7/1/2020 2000-2021 [employer]. All rights reserved. This information is not intended as a substitute for professional medical care. Always follow your healthcare professional's instructions.           Patient Education     Excess Gas   Certain foods produce gas when digested. In some people, these foods make an excessive amount of gas. This may cause bloating, burping, or increased gas passing through the rectum (flatulence).  Foods that cause gas  These foods are more likely to cause this problem. Limit them, or remove them from your diet:    Broccoli    Cauliflower    Moore sprouts    Cabbage    Cooked dried beans    Fizzy (carbonated) drinks, such as sparkling water, soda, beer, and champagne  Other causes  Other causes of excess gas include:    Eating too fast ortalkingwhile you chew. This may cause you to swallow air. This increases the amount of gas in your stomach. And it may make your symptoms worse. Chew each mouthful completely before you swallow. Take your time.    Chewing on gum or sucking on hard candy. These cause you to swallow more often. And some of what you are swallowing is air. This leads to more gas in your stomach. Avoid chewing  gum and hard candy.    Overeating. This may increase the feeling of being bloated and cause more gas. When you are full, stop eating.     Being constipated. This can increase the amount of normal intestinal gas. Prevent constipation by getting more fiber in your diet. Good sources of fiber include whole-grain cereal, fresh vegetables (except those in the above list), and fresh fruits. High-fiber foods absorb water and carry it out of the body. When adding more fiber to your diet, you also need to drink more water. Drink at least 8, 8-ounce glasses of water (2 quarts) per day.  [employer] last reviewed this educational content on 10/1/2019    6446-5733 [employer]. All rights reserved. This information is not intended as a substitute for professional medical care. Always follow your healthcare professional's instructions.

## 2022-02-24 NOTE — TELEPHONE ENCOUNTER
Advised that he drink plenty of clear healthy fluids and avoid solid foods for the next 24 hours or so.  He may certainly  some Gas-X to see if this will help.  Advised that he avoid any type of narcotic or pain control medication like this as it could make it worse.  Follow-up as needed.  Electronically signed:    Pedrito Corona PA-C

## 2022-02-24 NOTE — PROGRESS NOTES
Assessment & Plan     Abdominal pain, left upper quadrant  Abdominal pain, left lower quadrant  Early satiety  Flatulence, eructation and gas pain  Patient presents today for recheck of his early satiety and left lateral abdominal pain.  Upon further review and x-ray we note a lot of air in the colon.  It does not appear to be constipation related at all.  Further discussion with the patient reveals a diet that is very high in gas producing foods.  We discussed this further and I gave him some information about decreasing this on a regular basis.  His last colonoscopy was within normal limits and he was given a 10-year timeframe to have this repeated.  Advised that he try making changes with respect to diet and exercise as well as related fibers and following up in 2 to 3 weeks if not improved.  - XR Abdomen 2 Views; Future    Morbid obesity (H)  Still problematic.  Follow-up with primary care provider of his choosing.    Aortic valve stenosis, etiology of cardiac valve disease unspecified  Mitral regurgitation and aortic stenosis  We note what appears to be a stable cardiac murmur.  He is encouraged to follow-up with cardiology in the near future should he have any other concerns.    Ventral hernia without obstruction or gangrene  Stable at this point time no new concerns with respect to this.  Follow-up primary care provider of choice as needed.          Work on weight loss  Regular exercise  No follow-ups on file.    Pedrito Corona PA-C  [facility] CARLOS ENRIQUE Ospina is a 66 year old who presents for the following health issues     Seen yesterday in  would like to talk about possible xrays.     History of Present Illness     Asthma:  He presents for follow up of asthma.  He has no cough, no wheezing, and no shortness of breath. He is not using a relief medication. He does not miss any doses of his controller medication throughout the week.Patient is aware of the following triggers:  none. The patient has not had a visit to the Emergency Room, Urgent Care or Hospital due to asthma since the last clinic visit.     Hyperlipidemia:  He presents for follow up of hyperlipidemia.  He is taking medication to lower cholesterol. He is not having myalgia or other side effects to statin medications.    Hypertension: He presents for follow up of hypertension.  He does not check blood pressure  regularly outside of the clinic. Outpatient blood pressures have not been over 140/90. He follows a low salt diet.     He eats 0-1 servings of fruits and vegetables daily.He consumes 1 sweetened beverage(s) daily.He exercises with enough effort to increase his heart rate 20 to 29 minutes per day.  He exercises with enough effort to increase his heart rate 5 days per week.   He is taking medications regularly.     Review of Systems   Gastrointestinal: Positive for abdominal pain.      Constitutional, HEENT, cardiovascular, pulmonary, GI, , musculoskeletal, neuro, skin, endocrine and psych systems are negative, except as otherwise noted.      Objective    /74   Pulse 72   Temp 98  F (36.7  C) (Temporal)   Resp 20   Wt 103 kg (227 lb)   SpO2 99%   BMI 34.52 kg/m    Body mass index is 34.52 kg/m .  Physical Exam   GENERAL: healthy, alert and no distress  NECK: no adenopathy, no asymmetry, masses, or scars and thyroid normal to palpation  RESP: lungs clear to auscultation - no rales, rhonchi or wheezes  CV: regular rates and rhythm, normal S1 S2, no S3 or S4, grade 3/6 holosystolic crescendo decrescendo murmur heard best over the RSB, ICS 1 and 2, peripheral pulses strong and no peripheral edema  ABDOMEN: tenderness epigastric, LUQ and LLQ and hyperactive bowel sounds noted though no tympany present reducible umbilical hernia present  MS: no gross musculoskeletal defects noted, no edema  SKIN: no suspicious lesions or rashes to exposed visible skin today.  Umbilical hernia noted.  NEURO: Normal strength and  tone, mentation intact and speech normal  PSYCH: mentation appears normal, affect normal/bright    X-ray: negative for concerning pathology to my independent review today.  Plenty of gas throughout the colon is noted and explained to the patient today it will be overread by radiology.

## 2022-03-17 ENCOUNTER — OFFICE VISIT (OUTPATIENT)
Dept: FAMILY MEDICINE | Facility: CLINIC | Age: 67
End: 2022-03-17
Payer: COMMERCIAL

## 2022-03-17 VITALS
DIASTOLIC BLOOD PRESSURE: 76 MMHG | HEART RATE: 71 BPM | BODY MASS INDEX: 34.99 KG/M2 | WEIGHT: 230.13 LBS | RESPIRATION RATE: 18 BRPM | SYSTOLIC BLOOD PRESSURE: 138 MMHG | OXYGEN SATURATION: 99 %

## 2022-03-17 DIAGNOSIS — I10 BENIGN ESSENTIAL HYPERTENSION: ICD-10-CM

## 2022-03-17 DIAGNOSIS — E78.2 MIXED HYPERLIPIDEMIA: ICD-10-CM

## 2022-03-17 DIAGNOSIS — M54.41 ACUTE RIGHT-SIDED LOW BACK PAIN WITH RIGHT-SIDED SCIATICA: Primary | ICD-10-CM

## 2022-03-17 DIAGNOSIS — K21.9 GASTROESOPHAGEAL REFLUX DISEASE WITHOUT ESOPHAGITIS: ICD-10-CM

## 2022-03-17 PROBLEM — R68.81 EARLY SATIETY: Status: RESOLVED | Noted: 2022-02-24 | Resolved: 2022-03-17

## 2022-03-17 PROBLEM — K43.9 VENTRAL HERNIA WITHOUT OBSTRUCTION OR GANGRENE: Status: RESOLVED | Noted: 2018-11-09 | Resolved: 2022-03-17

## 2022-03-17 PROCEDURE — 99213 OFFICE O/P EST LOW 20 MIN: CPT | Performed by: NURSE PRACTITIONER

## 2022-03-17 RX ORDER — LOSARTAN POTASSIUM 50 MG/1
50 TABLET ORAL DAILY
Qty: 90 TABLET | Refills: 0 | Status: SHIPPED | OUTPATIENT
Start: 2022-03-17 | End: 2022-04-28

## 2022-03-17 RX ORDER — METHYLPREDNISOLONE 4 MG
TABLET, DOSE PACK ORAL
Qty: 21 TABLET | Refills: 0 | Status: SHIPPED | OUTPATIENT
Start: 2022-03-17 | End: 2022-04-28

## 2022-03-17 RX ORDER — ATORVASTATIN CALCIUM 20 MG/1
20 TABLET, FILM COATED ORAL DAILY
Qty: 90 TABLET | Refills: 2 | Status: SHIPPED | OUTPATIENT
Start: 2022-03-17 | End: 2022-04-28

## 2022-03-17 ASSESSMENT — PAIN SCALES - GENERAL: PAINLEVEL: EXTREME PAIN (8)
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
## 2022-03-20 ENCOUNTER — TELEPHONE (OUTPATIENT)
Dept: FAMILY MEDICINE | Facility: CLINIC | Age: 67
End: 2022-03-20
Payer: COMMERCIAL

## 2022-03-20 NOTE — TELEPHONE ENCOUNTER
Central Prior Authorization Team   Phone: [phone]      EPA Denied: omeprazole (PRILOSEC) 20 MG DR capsule: waiting on denial rationale letter from patient's insurance    Denied    CaseId:[id number];Status:Denied;Review Type:Qty;Appeal Information: Attention:COMPLAINTS, APPEALS, GRIEVANCES [facility] [address],[place], [place],[address] Phone:[phone]; Important - Please read the below note on eAppeals: Please reference the denial letter for information on the rights for an appeal, rationale for the denial, and how to submit an appeal including if any information is needed to support the appeal. Note about urgent situations - Generally, an urgent situation is one which, in the opinion of the provider, the health of the patient may be in serious jeopardy or may experience pain that cannot be adequately controlled while waiting for a decision on the appeal.; Case ID: [id number]      Payer:  [employer]    [phone]    Electronic appeal:  Supported   View History

## 2022-03-21 NOTE — TELEPHONE ENCOUNTER
PRIOR AUTHORIZATION DENIED    Medication: omeprazole (PRILOSEC) 20 MG DR capsule - EPA Denied    Denial Date: 3/21/2022    Denial Rational: NOT COVERED FOR DX LONGER THAN FDA APPROVED QTY  DAYS    Appeal Information: N/A - PLAN EXCLUSION

## 2022-04-28 ENCOUNTER — OFFICE VISIT (OUTPATIENT)
Dept: FAMILY MEDICINE | Facility: CLINIC | Age: 67
End: 2022-04-28
Payer: COMMERCIAL

## 2022-04-28 VITALS
SYSTOLIC BLOOD PRESSURE: 124 MMHG | DIASTOLIC BLOOD PRESSURE: 78 MMHG | HEART RATE: 81 BPM | WEIGHT: 233.25 LBS | RESPIRATION RATE: 18 BRPM | TEMPERATURE: 97.8 F | BODY MASS INDEX: 36.61 KG/M2 | HEIGHT: 67 IN | OXYGEN SATURATION: 98 %

## 2022-04-28 DIAGNOSIS — I10 BENIGN ESSENTIAL HYPERTENSION: ICD-10-CM

## 2022-04-28 DIAGNOSIS — Z00.00 ENCOUNTER FOR MEDICARE ANNUAL WELLNESS EXAM: Primary | ICD-10-CM

## 2022-04-28 DIAGNOSIS — E66.01 MORBID OBESITY (H): ICD-10-CM

## 2022-04-28 DIAGNOSIS — K21.9 GASTROESOPHAGEAL REFLUX DISEASE WITHOUT ESOPHAGITIS: ICD-10-CM

## 2022-04-28 DIAGNOSIS — E78.2 MIXED HYPERLIPIDEMIA: ICD-10-CM

## 2022-04-28 DIAGNOSIS — J06.9 UPPER RESPIRATORY TRACT INFECTION, UNSPECIFIED TYPE: ICD-10-CM

## 2022-04-28 DIAGNOSIS — I08.0 MITRAL REGURGITATION AND AORTIC STENOSIS: ICD-10-CM

## 2022-04-28 DIAGNOSIS — R10.13 EPIGASTRIC PAIN: ICD-10-CM

## 2022-04-28 PROBLEM — R14.1 FLATULENCE, ERUCTATION AND GAS PAIN: Status: RESOLVED | Noted: 2022-02-24 | Resolved: 2022-04-28

## 2022-04-28 PROBLEM — R10.12 ABDOMINAL PAIN, LEFT UPPER QUADRANT: Status: RESOLVED | Noted: 2022-02-24 | Resolved: 2022-04-28

## 2022-04-28 PROBLEM — R14.2 FLATULENCE, ERUCTATION AND GAS PAIN: Status: RESOLVED | Noted: 2022-02-24 | Resolved: 2022-04-28

## 2022-04-28 PROBLEM — M17.11 PRIMARY OSTEOARTHRITIS OF RIGHT KNEE: Status: RESOLVED | Noted: 2019-11-20 | Resolved: 2022-04-28

## 2022-04-28 PROBLEM — R10.32 ABDOMINAL PAIN, LEFT LOWER QUADRANT: Status: RESOLVED | Noted: 2022-02-24 | Resolved: 2022-04-28

## 2022-04-28 PROBLEM — R14.3 FLATULENCE, ERUCTATION AND GAS PAIN: Status: RESOLVED | Noted: 2022-02-24 | Resolved: 2022-04-28

## 2022-04-28 PROBLEM — M17.12 PRIMARY OSTEOARTHRITIS OF LEFT KNEE: Status: RESOLVED | Noted: 2021-10-21 | Resolved: 2022-04-28

## 2022-04-28 PROBLEM — R01.1 NEWLY RECOGNIZED MURMUR: Status: RESOLVED | Noted: 2018-11-07 | Resolved: 2022-04-28

## 2022-04-28 PROCEDURE — 93000 ELECTROCARDIOGRAM COMPLETE: CPT | Performed by: NURSE PRACTITIONER

## 2022-04-28 PROCEDURE — 99397 PER PM REEVAL EST PAT 65+ YR: CPT | Performed by: NURSE PRACTITIONER

## 2022-04-28 PROCEDURE — 99214 OFFICE O/P EST MOD 30 MIN: CPT | Mod: 25 | Performed by: NURSE PRACTITIONER

## 2022-04-28 RX ORDER — ATORVASTATIN CALCIUM 20 MG/1
20 TABLET, FILM COATED ORAL DAILY
Qty: 90 TABLET | Refills: 2 | Status: SHIPPED | OUTPATIENT
Start: 2022-04-28 | End: 2023-05-31

## 2022-04-28 RX ORDER — LOSARTAN POTASSIUM 50 MG/1
50 TABLET ORAL DAILY
Qty: 90 TABLET | Refills: 0 | Status: SHIPPED | OUTPATIENT
Start: 2022-04-28 | End: 2022-08-16

## 2022-04-28 ASSESSMENT — ENCOUNTER SYMPTOMS
ARTHRALGIAS: 1
COUGH: 1
SHORTNESS OF BREATH: 1
SORE THROAT: 1

## 2022-04-28 ASSESSMENT — PAIN SCALES - GENERAL: PAINLEVEL: NO PAIN (0)

## 2022-04-28 ASSESSMENT — ACTIVITIES OF DAILY LIVING (ADL): CURRENT_FUNCTION: NO ASSISTANCE NEEDED

## 2022-04-28 NOTE — PROGRESS NOTES
"SUBJECTIVE:   Bhavesh Landeros is a 66 year old male who presents for Preventive Visit.      Patient has been advised of split billing requirements and indicates understanding: Yes  Are you in the first 12 months of your Medicare coverage?  No    Healthy Habits:     In general, how would you rate your overall health?  Good    Frequency of exercise:  6-7 days/week    Duration of exercise:  Other    Do you usually eat at least 4 servings of fruit and vegetables a day, include whole grains    & fiber and avoid regularly eating high fat or \"junk\" foods?  No    Taking medications regularly:  Yes    Medication side effects:  None    Ability to successfully perform activities of daily living:  No assistance needed    Home Safety:  No safety concerns identified    Hearing Impairment:  Need to ask people to speak up or repeat themselves and no hearing concerns    In the past 6 months, have you been bothered by leaking of urine?  No    In general, how would you rate your overall mental or emotional health?  Good      PHQ-2 Total Score: 0    Additional concerns today:  Yes    Do you feel safe in your environment? Yes    Have you ever done Advance Care Planning? (For example, a Health Directive, POLST, or a discussion with a medical provider or your loved ones about your wishes): Yes, patient states has an Advance Care Planning document and will bring a copy to the clinic.      Fall risk  Fallen 2 or more times in the past year?: No  Any fall with injury in the past year?: No    Cognitive Screening   1) Repeat 3 items (Leader, Season, Table)    2) Clock draw: NORMAL  3) 3 item recall: Recalls 2 objects   Results: NORMAL clock, 1-2 items recalled: COGNITIVE IMPAIRMENT LESS LIKELY    Mini-CogTM Copyright BEN Ramsey. Licensed by the author for use in [facility]; reprinted with permission (mariama@.[facility]). All rights reserved.          Reviewed and updated as needed this visit by clinical staff   Tobacco  Allergies  Meds "  Problems  Med Hx  Surg Hx  Fam Hx  Soc   Hx          Reviewed and updated as needed this visit by Provider   Tobacco  Allergies  Meds  Problems  Med Hx  Surg Hx  Fam Hx           Social History     Tobacco Use     Smoking status: Never Smoker     Smokeless tobacco: Never Used   Substance Use Topics     Alcohol use: Yes     Alcohol/week: 0.0 standard drinks     Comment: occ     If you drink alcohol do you typically have >3 drinks per day or >7 drinks per week? No    Alcohol Use 4/28/2022   Prescreen: >3 drinks/day or >7 drinks/week? No   Prescreen: >3 drinks/day or >7 drinks/week? -   No flowsheet data found.        Hyperlipidemia Follow-Up      Are you regularly taking any medication or supplement to lower your cholesterol?   Yes- /    Are you having muscle aches or other side effects that you think could be caused by your cholesterol lowering medication?  No    Hypertension Follow-up      Do you check your blood pressure regularly outside of the clinic? No     Are you following a low salt diet? Yes    Are your blood pressures ever more than 140 on the top number (systolic) OR more   than 90 on the bottom number (diastolic), for example 140/90? No    Epigastric pain about a week.  Nothing helped.  Tried a cough drop and that helped him sleep.  Would not feel every day.  If rubbed it would be worse but would go away right away.  Shortness of breath- feels the same- feels out of breath.  For awhile when took a deep breath would have a sharp pain in chest.  Has had chest pain ongoing with his hernia.  Did not go into arm or into jaw.  No dizziness, short of breath, lightheaded.  Walks a couple miles a day with the dog.   Has hiatal hernia.      Family history of heart disease- none.      Dry sore throat for week or so- cough drop helps with that. A little runny nose.  Some occassional sneezing     Current providers sharing in care for this patient include:   Patient Care Team:  Josy Duarte NP as  "PCP - General (Nurse Practitioner - Family)  Candelario Holguin, DO as Assigned Musculoskeletal Provider  Josy Duarte NP as Assigned PCP    The following health maintenance items are reviewed in Epic and correct as of today:  Health Maintenance Due   Topic Date Due     Pneumococcal Vaccine: 65+ Years (1 - PCV) Never done     ZOSTER IMMUNIZATION (1 of 2) Never done     FALL RISK ASSESSMENT  08/07/2021     COVID-19 Vaccine (3 - Booster for Moderna series) 09/09/2021     Lab work is in process  Labs reviewed in EPIC          Review of Systems   HENT: Positive for congestion and sore throat.    Respiratory: Positive for cough and shortness of breath.    Cardiovascular: Positive for chest pain.   Genitourinary: Negative for impotence.   Musculoskeletal: Positive for arthralgias.       OBJECTIVE:   /78 (BP Location: Right arm, Patient Position: Sitting, Cuff Size: Adult Large)   Pulse 81   Temp 97.8  F (36.6  C) (Temporal)   Resp 18   Ht 1.689 m (5' 6.5\")   Wt 105.8 kg (233 lb 4 oz)   SpO2 98%   BMI 37.08 kg/m   Estimated body mass index is 37.08 kg/m  as calculated from the following:    Height as of this encounter: 1.689 m (5' 6.5\").    Weight as of this encounter: 105.8 kg (233 lb 4 oz).  Physical Exam  GENERAL: healthy, alert and no distress  EYES: Eyes grossly normal to inspection, PERRL and conjunctivae and sclerae normal  HENT: ear canals and TM's normal, nose and mouth without ulcers or lesions  NECK: no adenopathy, no asymmetry, masses, or scars and thyroid normal to palpation  RESP: lungs clear to auscultation - no rales, rhonchi or wheezes  CV: regular rate and rhythm, normal S1 S2, no S3 or S4, no murmur, click or rub, no peripheral edema and peripheral pulses strong  ABDOMEN: soft, nontender, no hepatosplenomegaly, no masses and bowel sounds normal  MS: no gross musculoskeletal defects noted, no edema  SKIN: no suspicious lesions or rashes  NEURO: Normal strength and tone, " "mentation intact and speech normal  PSYCH: mentation appears normal, affect normal/bright    Diagnostic Test Results:  Labs reviewed in Epic  No results found for this or any previous visit (from the past 24 hour(s)).    ASSESSMENT / PLAN:   (Z00.00) Encounter for Medicare annual wellness exam  (primary encounter diagnosis)  Comment: recommend yearly physicals    (E66.01) Obesity (BMI 35.0-39.9) with comorbidity (H)  Comment: recommend dietary and lifestyle recommendations    (I08.0) Mitral regurgitation and aortic stenosis  Comment: murmur.  astymptomatic    (K21.9) Gastroesophageal reflux disease without esophagitis  Comment: controlled on PPI  Plan: omeprazole (PRILOSEC) 20 MG DR capsule    (I10) Benign essential hypertension  Comment: well controlled.    Plan: losartan (COZAAR) 50 MG tablet    (E78.2) Mixed hyperlipidemia  Comment: no side effects- good control  Plan: atorvastatin (LIPITOR) 20 MG tablet    (R10.13) Epigastric pain  Comment: he feels is related to hiatal hernia or cold.  We did do an EKG today which was normal.  Pain has been gone for 1.5 weeks.  Denied pain or shortness of breath with walking his dog every day for 2 miles.  Declines repeat stress testing at this time  Plan: EKG 12-lead complete w/read - Clinics    (J06.9) Upper respiratory tract infection, unspecified type  Comment: mild cold symptoms.  Declines covid testing.  Could add zyrtec and flonase.       Patient has been advised of split billing requirements and indicates understanding: Yes    COUNSELING:  Reviewed preventive health counseling, as reflected in patient instructions    Estimated body mass index is 37.08 kg/m  as calculated from the following:    Height as of this encounter: 1.689 m (5' 6.5\").    Weight as of this encounter: 105.8 kg (233 lb 4 oz).    Weight management plan: Discussed healthy diet and exercise guidelines    He reports that he has never smoked. He has never used smokeless tobacco.      Appropriate " preventive services were discussed with this patient, including applicable screening as appropriate for cardiovascular disease, diabetes, osteopenia/osteoporosis, and glaucoma.  As appropriate for age/gender, discussed screening for colorectal cancer, prostate cancer, breast cancer, and cervical cancer. Checklist reviewing preventive services available has been given to the patient.    Reviewed patients plan of care and provided an AVS. The Intermediate Care Plan ( asthma action plan, low back pain action plan, and migraine action plan) for Bhavesh meets the Care Plan requirement. This Care Plan has been established and reviewed with the Patient.    Counseling Resources:  ATP IV Guidelines  Pooled Cohorts Equation Calculator  Breast Cancer Risk Calculator  Breast Cancer: Medication to Reduce Risk  FRAX Risk Assessment  ICSI Preventive Guidelines  Dietary Guidelines for Americans, 2010  USDA's MyPlate  ASA Prophylaxis  Lung CA Screening    Josy Duarte NP  [facility]    Identified Health Risks:

## 2022-04-28 NOTE — PATIENT INSTRUCTIONS
Try claritin or zyrtec to see if allergies.    Patient Education   Personalized Prevention Plan  You are due for the preventive services outlined below.  Your care team is available to assist you in scheduling these services.  If you have already completed any of these items, please share that information with your care team to update in your medical record.  Health Maintenance Due   Topic Date Due     Pneumococcal Vaccine (1 - PCV) Never done     Zoster (Shingles) Vaccine (1 of 2) Never done     FALL RISK ASSESSMENT  08/07/2021     COVID-19 Vaccine (3 - Booster for Moderna series) 09/09/2021

## 2022-05-05 ENCOUNTER — OFFICE VISIT (OUTPATIENT)
Dept: INTERNAL MEDICINE | Facility: CLINIC | Age: 67
End: 2022-05-05
Payer: COMMERCIAL

## 2022-05-05 ENCOUNTER — NURSE TRIAGE (OUTPATIENT)
Dept: NURSING | Facility: CLINIC | Age: 67
End: 2022-05-05
Payer: COMMERCIAL

## 2022-05-05 VITALS
HEART RATE: 80 BPM | TEMPERATURE: 98 F | DIASTOLIC BLOOD PRESSURE: 70 MMHG | SYSTOLIC BLOOD PRESSURE: 128 MMHG | BODY MASS INDEX: 36.68 KG/M2 | OXYGEN SATURATION: 97 % | WEIGHT: 230.7 LBS | RESPIRATION RATE: 20 BRPM

## 2022-05-05 DIAGNOSIS — J40 BRONCHITIS: ICD-10-CM

## 2022-05-05 DIAGNOSIS — R06.02 SOB (SHORTNESS OF BREATH): Primary | ICD-10-CM

## 2022-05-05 PROCEDURE — 99213 OFFICE O/P EST LOW 20 MIN: CPT | Performed by: INTERNAL MEDICINE

## 2022-05-05 RX ORDER — AZITHROMYCIN 250 MG/1
TABLET, FILM COATED ORAL
Qty: 6 TABLET | Refills: 0 | Status: SHIPPED | OUTPATIENT
Start: 2022-05-05 | End: 2022-05-10

## 2022-05-05 RX ORDER — ALBUTEROL SULFATE 90 UG/1
2 AEROSOL, METERED RESPIRATORY (INHALATION) EVERY 6 HOURS
Qty: 18 G | Refills: 0 | Status: SHIPPED | OUTPATIENT
Start: 2022-05-05 | End: 2022-12-05

## 2022-05-05 ASSESSMENT — PAIN SCALES - GENERAL: PAINLEVEL: NO PAIN (0)

## 2022-05-05 NOTE — TELEPHONE ENCOUNTER
Has a bad cold.  Wheezing. 1 week symptoms.    No fever.    Wheezing.  Needs to be seen in next 4 hours per protocol.    Warm transferred to scheduling    [facility] Nurse Advisor      Reason for Disposition    Wheezing is present    Additional Information    Negative: Severe difficulty breathing (e.g., struggling for each breath, speaks in single words)    Negative: Bluish (or gray) lips or face now    Negative: [1] Rapid onset of cough AND [2] has hives    Negative: Coughing started suddenly after medicine, an allergic food or bee sting    Negative: [1] Difficulty breathing AND [2] exposure to flames, smoke, or fumes    Negative: [1] Stridor AND [2] difficulty breathing    Negative: Sounds like a life-threatening emergency to the triager    Negative: Choked on object of food that could be caught in the throat    Negative: Chest pain is main symptom    Negative: [1] Previous asthma attacks AND [2] this feels like asthma attack    Negative: Cough lasts > 3 weeks    Negative: Wet (productive) cough (i.e., white-yellow, yellow, green, or dorothea colored sputum)    Negative: [1] Dry (non-productive) cough AND [2] within 14 days of COVID-19 Exposure    Negative: Chest pain  (Exception: MILD central chest pain, present only when coughing)    Negative: Difficulty breathing    Negative: Patient sounds very sick or weak to the triager    Negative: [1] Coughed up blood AND [2] > 1 tablespoon (15 ml) (Exception: blood-tinged sputum)    Negative: Fever > 103 F (39.4 C)    Negative: [1] Fever > 101 F (38.3 C) AND [2] age > 60    Negative: [1] Fever > 100.0 F (37.8 C) AND [2] bedridden (e.g., nursing home patient, CVA, chronic illness, recovering from surgery)    Negative: [1] Fever > 100.0 F (37.8 C) AND [2] diabetes mellitus or weak immune system (e.g., HIV positive, cancer chemo, splenectomy, organ transplant, chronic steroids)    Protocols used: COUGH - ACUTE NON-PRODUCTIVE-A-AH

## 2022-05-05 NOTE — PROGRESS NOTES
Branden Ospina is a 66 year old who presents for the following health issues     URI    History of Present Illness       Reason for visit:  Raspy chest cough  Symptom onset:  3-7 days ago    He eats 0-1 servings of fruits and vegetables daily.He consumes 0 sweetened beverage(s) daily.He exercises with enough effort to increase his heart rate 60 or more minutes per day.  He exercises with enough effort to increase his heart rate 7 days per week.   He is taking medications regularly.        EMR reviewed including:             Complaint, History of Chief Complaint, Corresponding Review of Systems, and Complaint Specific Physical Examination.    #1   Increased shortness of breath with cough.  Present for the last 3 to 7 days.  Patient notes that it is already getting better.  Notes some yellow posterior nasal drainage and modestly purulent phlegm from time to time.    Denies shortness of breath or hemoptysis.  Does not smoke.  Notes that he has audible expiratory wheezing.        Exam:   LUNGS: Scattered rhonchi with expiratory wheezes noted bilaterally, airflow is slightly decreased, no intercostal retraction or stridor is noted.  Occasional coughing is noted during visit.   HEART:  regular without rubs, clicks, gallops, or murmurs. PMI is nondisplaced. Upstrokes are brisk. S1,S2 are heard.   ENT: Pharynx is non-erythemous, moderate/yellow to green PND, no significant nasal obstruction, TM's not red or retracted, hearing intact bilaterally. No carotid bruits are heard. No JVD seen. Thyroid is not nodular or enlarged.        Patient has been interviewed, applicable history and applied review of systems have been performed.    Vital Signs:   /70 (BP Location: Right arm, Patient Position: Chair, Cuff Size: Adult Large)   Pulse 80   Temp 98  F (36.7  C) (Temporal)   Resp 20   Wt 104.6 kg (230 lb 11.2 oz)   SpO2 97%   BMI 36.68 kg/m        Decision Making    Problem and Complexity     1. SOB (shortness  of breath)  Oxygen saturations maintained at 97% on room air.  Suspect has some reactive airway disease as he had this as a child and does have strong family history.  Will place on azithromycin and albuterol to be used 4 times a day and as needed.  - albuterol (PROAIR HFA/PROVENTIL HFA/VENTOLIN HFA) 108 (90 Base) MCG/ACT inhaler; Inhale 2 puffs into the lungs every 6 hours  Dispense: 18 g; Refill: 0  - azithromycin (ZITHROMAX) 250 MG tablet; Take 2 tablets (500 mg) by mouth daily for 1 day, THEN 1 tablet (250 mg) daily for 4 days.  Dispense: 6 tablet; Refill: 0    2. Bronchitis  As above  - albuterol (PROAIR HFA/PROVENTIL HFA/VENTOLIN HFA) 108 (90 Base) MCG/ACT inhaler; Inhale 2 puffs into the lungs every 6 hours  Dispense: 18 g; Refill: 0  - azithromycin (ZITHROMAX) 250 MG tablet; Take 2 tablets (500 mg) by mouth daily for 1 day, THEN 1 tablet (250 mg) daily for 4 days.  Dispense: 6 tablet; Refill: 0                                FOLLOW UP   I have asked the patient to make an appointment for followup with me in 2 weeks if not markedly        I have carefully explained the diagnosis and treatment options to the patient.  The patient has displayed an understanding of the above, and all subsequent questions were answered.      DO DANNY Winter    Portions of this note were produced using [employer]  Although every attempt at real-time proof reading has been made, occasional grammar/syntax errors may have been missed.

## 2022-07-20 ENCOUNTER — TELEPHONE (OUTPATIENT)
Dept: FAMILY MEDICINE | Facility: CLINIC | Age: 67
End: 2022-07-20

## 2022-07-20 NOTE — TELEPHONE ENCOUNTER
Reason for Call:  Same Day Appointment, Requested Provider:  anyone    PCP: Josy Duarte    Reason for visit: cyst on toe    Duration of symptoms: 2 weeks    Have you been treated for this in the past? No    Additional comments: needs sooner than September    Can we leave a detailed message on this number? YES    Phone number patient can be reached at: Cell number on file:    Telephone Information:   Mobile [phone]       Best Time: anytime    Call taken on 7/20/2022 at 8:20 AM by Mirella Jacome

## 2022-08-11 DIAGNOSIS — I10 BENIGN ESSENTIAL HYPERTENSION: ICD-10-CM

## 2022-08-16 RX ORDER — LOSARTAN POTASSIUM 50 MG/1
50 TABLET ORAL DAILY
Qty: 90 TABLET | Refills: 0 | Status: SHIPPED | OUTPATIENT
Start: 2022-08-16 | End: 2022-11-22

## 2022-09-18 DIAGNOSIS — K21.9 GASTROESOPHAGEAL REFLUX DISEASE WITHOUT ESOPHAGITIS: ICD-10-CM

## 2022-10-09 ENCOUNTER — HEALTH MAINTENANCE LETTER (OUTPATIENT)
Age: 67
End: 2022-10-09

## 2022-10-19 ENCOUNTER — VIRTUAL VISIT (OUTPATIENT)
Dept: FAMILY MEDICINE | Facility: CLINIC | Age: 67
End: 2022-10-19
Payer: COMMERCIAL

## 2022-10-19 ENCOUNTER — NURSE TRIAGE (OUTPATIENT)
Dept: FAMILY MEDICINE | Facility: CLINIC | Age: 67
End: 2022-10-19

## 2022-10-19 ENCOUNTER — TELEPHONE (OUTPATIENT)
Dept: FAMILY MEDICINE | Facility: CLINIC | Age: 67
End: 2022-10-19

## 2022-10-19 VITALS
SYSTOLIC BLOOD PRESSURE: 118 MMHG | DIASTOLIC BLOOD PRESSURE: 64 MMHG | HEART RATE: 90 BPM | OXYGEN SATURATION: 98 % | TEMPERATURE: 99 F | BODY MASS INDEX: 35.77 KG/M2 | RESPIRATION RATE: 16 BRPM | WEIGHT: 225 LBS

## 2022-10-19 DIAGNOSIS — L03.115 CELLULITIS OF RIGHT LOWER EXTREMITY: Primary | ICD-10-CM

## 2022-10-19 PROCEDURE — 99213 OFFICE O/P EST LOW 20 MIN: CPT | Performed by: FAMILY MEDICINE

## 2022-10-19 RX ORDER — CEPHALEXIN 500 MG/1
500 CAPSULE ORAL 4 TIMES DAILY
Qty: 28 CAPSULE | Refills: 0 | Status: SHIPPED | OUTPATIENT
Start: 2022-10-19 | End: 2022-10-26

## 2022-10-19 ASSESSMENT — PAIN SCALES - GENERAL: PAINLEVEL: NO PAIN (0)

## 2022-10-19 NOTE — TELEPHONE ENCOUNTER
Provider Response to 2nd Level Triage Request    I have reviewed the RN documentation. My recommendation is:  Face To Face Visit. Same Day: work patient in on my schedule as an overbook. at the 1:40 slot

## 2022-10-19 NOTE — TELEPHONE ENCOUNTER
Patient calling to state after he got home he is having the shakes. Note patient was seen today in clinic and antibiotic was prescribed. Patient didn't pick it up and is wanting it now. Patient stating he will have his daughter pick it up. Informed we would call the pharmacy and have them get it ready. Pharmacy notified of need to fill the prescription today. Deisy Weinstein LPN

## 2022-10-19 NOTE — TELEPHONE ENCOUNTER
Patient will come in to see MD face to face today. He will arrive at 1:20pm     BEKAH Luu, RN     Statement Selected

## 2022-10-19 NOTE — PROGRESS NOTES
"Bhavesh is a 67 year old who is being evaluated     Assessment & Plan     Cellulitis of right lower extremity  Prior history of cellulitis of right leg requiring hospitalization in 2013 with recurrent episode in 2016. Developed chill and slight right leg tenderness and redness last night. Resolved this morning. Exam is normal without cellulitis, adenopathy or induration.   Due to prior history will send keflex for future use if needed. No indication for antibiotic based on today's exam.  - cephALEXin (KEFLEX) 500 MG capsule; Take 1 capsule (500 mg) by mouth 4 times daily for 7 days    Prescription drug management         BMI:   Estimated body mass index is 35.77 kg/m  as calculated from the following:    Height as of 4/28/22: 1.689 m (5' 6.5\").    Weight as of this encounter: 102.1 kg (225 lb).   Weight management plan: Patient was referred to their PCP to discuss a diet and exercise plan.    There are no Patient Instructions on file for this visit.    Return in about 1 month (around 11/19/2022) for Follow up, in person, If symptoms persist or do not improve.    Buck Emery MD  [facility]    Branden Ospina is a 67 year old, presenting for the following health issues:  Groin Pain      History of Present Illness       Reason for visit:  Cellitus again  Symptom onset:  Today  Had these symptoms before:  Yes  Has tried/received treatment for these symptoms:  Yes  Previous treatment was successful:  Yes  Prior treatment description:  Antibiotecs  What makes it better:  Advill    He eats 0-1 servings of fruits and vegetables daily.He consumes 1 sweetened beverage(s) daily.He exercises with enough effort to increase his heart rate 9 or less minutes per day.  He exercises with enough effort to increase his heart rate 4 days per week.   He is taking medications regularly.       Concern - Groin pain and chills  Onset: last night  Description: everything just shakes, starts in groin upper calf " area  Intensity: moderate  Progression of Symptoms:  improving  Accompanying Signs & Symptoms: nothing else  Previous history of similar problem: yes  Precipitating factors:        Worsened by: no  Alleviating factors:        Improved by: advil  Therapies tried and outcome: Advil    Patient Active Problem List   Diagnosis     GERD (gastroesophageal reflux disease)     Advanced directives, counseling/discussion     Aortic valve stenosis, etiology of cardiac valve disease unspecified     Mitral regurgitation and aortic stenosis     Obesity (BMI 35.0-39.9) with comorbidity (H)     Mixed hyperlipidemia     HTN, goal below 140/80     Current Outpatient Medications   Medication Sig Dispense Refill     aspirin 81 MG chewable tablet Take 1 tablet (81 mg) by mouth daily       atorvastatin (LIPITOR) 20 MG tablet Take 1 tablet (20 mg) by mouth daily 90 tablet 2     losartan (COZAAR) 50 MG tablet TAKE 1 TABLET (50 MG) BY MOUTH DAILY 90 tablet 0     omeprazole (PRILOSEC) 20 MG DR capsule TAKE ONE CAPSULE BY MOUTH ONCE DAILY 90 capsule 0     albuterol (PROAIR HFA/PROVENTIL HFA/VENTOLIN HFA) 108 (90 Base) MCG/ACT inhaler Inhale 2 puffs into the lungs every 6 hours (Patient not taking: Reported on 10/19/2022) 18 g 0     IBUPROFEN PO  (Patient not taking: Reported on 5/5/2022)           Review of Systems   Constitutional, HEENT, cardiovascular, pulmonary, gi and gu systems are negative, except as otherwise noted.      Objective         /64   Pulse 90   Temp 99  F (37.2  C) (Temporal)   Resp 16   Wt 102.1 kg (225 lb)   SpO2 98%   BMI 35.77 kg/m        Physical Exam   healthy, alert and no distress  PSYCH: Alert and oriented times 3; coherent speech, normal   rate and volume, able to articulate logical thoughts, able   to abstract reason, no tangential thoughts, no hallucinations   or delusions  His affect is normal  RESP: No cough, no audible wheezing, able to talk in full sentences  Remainder of exam unable to be  completed due to telephone visits  MSK: No adenopathy, induration or erythema of right leg.

## 2022-10-19 NOTE — TELEPHONE ENCOUNTER
Nurse Triage SBAR    Is this a 2nd Level Triage? YES, LICENSED PRACTITIONER REVIEW IS REQUIRED    Situation: Patient reports he is having pain and some redness in groin area. He has been shivering, but took Advil prior to checking temp. Has had cellulitis several times in the past and he reports it always starts in his groin and spreads to his leg.     Background: Hx of cellulitis,     Assessment: Patient needs to be evaluated in the office     Protocol Recommended Disposition:   Go To Office Now    Recommendation: Can patient be seen in the office instead of the phone visit? I did schedule him for the 1:40 Virtual slot today.     Routed to provider    Does the patient meet one of the following criteria for ADS visit consideration? No  Reason for Disposition    Fever    Additional Information    Negative: Sudden onset of rash (within last 2 hours) and difficulty with breathing or swallowing    Negative: Difficult to awaken or acting confused (e.g., disoriented, slurred speech)    Negative: Fever and purple or blood-colored spots or dots    Negative: Too weak or sick to stand    Negative: Life-threatening reaction (anaphylaxis) in the past to similar substance (e.g., food, insect bite/sting, chemical, etc.) and < 2 hours since exposure    Negative: Sounds like a life-threatening emergency to the triager    Negative: Insect bites suspected    Negative: Sunburn suspected    Negative: Hives suspected    Negative: Drug rash suspected and started taking new medicine within last 2 weeks  (Exception: Antihistamine, eye drops, ear drops, decongestant or other OTC cough/cold medicines.)    Negative: Bright red, sunburn-like rash and current tampon use or nasal packing    Negative: Bright red, sunburn-like rash and wound infection or recent surgery    Negative: Bright red skin that peels off in sheets    Negative: Stiff neck (can't touch chin to chest)    Negative: Patient sounds very sick or weak to the triager    Answer  "Assessment - Initial Assessment Questions  1. APPEARANCE of RASH: \"Describe the rash.\" (e.g., spots, blisters, raised areas, skin peeling, scaly)      Redness and pain to groin area  2. SIZE: \"How big are the spots?\" (e.g., tip of pen, eraser, coin; inches, centimeters)      No spots, just redness  3. LOCATION: \"Where is the rash located?\"      R groin area  4. COLOR: \"What color is the rash?\" (Note: It is difficult to assess rash color in people with darker-colored skin. When this situation occurs, simply ask the caller to describe what they see.)      red  5. ONSET: \"When did the rash begin?\"      Last night  6. FEVER: \"Do you have a fever?\" If Yes, ask: \"What is your temperature, how was it measured, and when did it start?\"      Took advil prior to checking harrison, but has been shivering, feels he has had a fever  7. ITCHING: \"Does the rash itch?\" If Yes, ask: \"How bad is the itch?\" (Scale 1-10; or mild, moderate, severe)      No  8. CAUSE: \"What do you think is causing the rash?\"      Has a history of cellulitis in the same are  9. MEDICINE FACTORS: \"Have you started any new medicines within the last 2 weeks?\" (e.g., antibiotics)       No  10. OTHER SYMPTOMS: \"Do you have any other symptoms?\" (e.g., dizziness, headache, sore throat, joint pain)        No  11. PREGNANCY: \"Is there any chance you are pregnant?\" \"When was your last menstrual period?\"        No    Protocols used: RASH OR REDNESS - WIDESPREAD-A-OH      "

## 2022-11-18 DIAGNOSIS — I10 BENIGN ESSENTIAL HYPERTENSION: ICD-10-CM

## 2022-11-22 RX ORDER — LOSARTAN POTASSIUM 50 MG/1
50 TABLET ORAL DAILY
Qty: 90 TABLET | Refills: 0 | Status: SHIPPED | OUTPATIENT
Start: 2022-11-22 | End: 2023-02-24

## 2022-11-22 NOTE — TELEPHONE ENCOUNTER
Routing refill request to provider for review/approval because:    Requested Prescriptions   Pending Prescriptions Disp Refills    losartan (COZAAR) 50 MG tablet [Pharmacy Med Name: LOSARTAN POTASSIUM 50MG TABS] 90 tablet 0     Sig: TAKE 1 TABLET (50 MG) BY MOUTH DAILY       Angiotensin-II Receptors Failed - 11/18/2022  6:00 PM        Failed - Normal serum creatinine on file in past 12 months     Recent Labs   Lab Test 11/09/21  1040   CR 0.67       Ok to refill medication if creatinine is low          Failed - Normal serum potassium on file in past 12 months     Recent Labs   Lab Test 11/09/21  1040   POTASSIUM 4.4

## 2022-12-05 ENCOUNTER — OFFICE VISIT (OUTPATIENT)
Dept: FAMILY MEDICINE | Facility: CLINIC | Age: 67
End: 2022-12-05
Payer: COMMERCIAL

## 2022-12-05 VITALS
RESPIRATION RATE: 10 BRPM | SYSTOLIC BLOOD PRESSURE: 146 MMHG | TEMPERATURE: 98 F | OXYGEN SATURATION: 99 % | DIASTOLIC BLOOD PRESSURE: 90 MMHG | WEIGHT: 234 LBS | HEIGHT: 66 IN | HEART RATE: 76 BPM | BODY MASS INDEX: 37.61 KG/M2

## 2022-12-05 DIAGNOSIS — J45.20 MILD INTERMITTENT ASTHMA WITHOUT COMPLICATION: ICD-10-CM

## 2022-12-05 DIAGNOSIS — J20.9 ACUTE BRONCHITIS WITH SYMPTOMS > 10 DAYS: Primary | ICD-10-CM

## 2022-12-05 LAB
DEPRECATED S PYO AG THROAT QL EIA: NEGATIVE
FLUAV AG SPEC QL IA: NEGATIVE
FLUBV AG SPEC QL IA: NEGATIVE
GROUP A STREP BY PCR: NOT DETECTED
SARS-COV-2 RNA RESP QL NAA+PROBE: NEGATIVE

## 2022-12-05 PROCEDURE — U0003 INFECTIOUS AGENT DETECTION BY NUCLEIC ACID (DNA OR RNA); SEVERE ACUTE RESPIRATORY SYNDROME CORONAVIRUS 2 (SARS-COV-2) (CORONAVIRUS DISEASE [COVID-19]), AMPLIFIED PROBE TECHNIQUE, MAKING USE OF HIGH THROUGHPUT TECHNOLOGIES AS DESCRIBED BY CMS-2020-01-R: HCPCS | Performed by: FAMILY MEDICINE

## 2022-12-05 PROCEDURE — 87804 INFLUENZA ASSAY W/OPTIC: CPT | Performed by: FAMILY MEDICINE

## 2022-12-05 PROCEDURE — U0005 INFEC AGEN DETEC AMPLI PROBE: HCPCS | Performed by: FAMILY MEDICINE

## 2022-12-05 PROCEDURE — 87651 STREP A DNA AMP PROBE: CPT | Performed by: FAMILY MEDICINE

## 2022-12-05 PROCEDURE — 99214 OFFICE O/P EST MOD 30 MIN: CPT | Mod: CS | Performed by: FAMILY MEDICINE

## 2022-12-05 RX ORDER — AZITHROMYCIN 250 MG/1
TABLET, FILM COATED ORAL
Qty: 6 TABLET | Refills: 0 | Status: SHIPPED | OUTPATIENT
Start: 2022-12-05 | End: 2023-01-30

## 2022-12-05 RX ORDER — ALBUTEROL SULFATE 90 UG/1
2 AEROSOL, METERED RESPIRATORY (INHALATION) EVERY 6 HOURS
Qty: 18 G | Refills: 0 | Status: SHIPPED | OUTPATIENT
Start: 2022-12-05

## 2022-12-05 RX ORDER — PREDNISONE 20 MG/1
40 TABLET ORAL DAILY
Qty: 10 TABLET | Refills: 0 | Status: SHIPPED | OUTPATIENT
Start: 2022-12-05 | End: 2022-12-10

## 2022-12-05 ASSESSMENT — ENCOUNTER SYMPTOMS: COUGH: 1

## 2022-12-05 NOTE — PROGRESS NOTES
Assessment & Plan       ICD-10-CM    1. Acute bronchitis with symptoms > 10 days  J20.9 Influenza A & B Antigen - Clinic Collect     Symptomatic; Unknown COVID-19 Virus (Coronavirus) by PCR Nose     azithromycin (ZITHROMAX) 250 MG tablet     Streptococcus A Rapid Screen w/Reflex to PCR - Clinic Collect     Group A Streptococcus PCR Throat Swab     CANCELED: Streptococcus A Rapid Screen w/Reflex to PCR - Clinic Collect      2. Mild intermittent asthma without complication  J45.20 predniSONE (DELTASONE) 20 MG tablet         Been sick for couple weeks with runny nose, nasal congestion and coughing and they are overall getting worse.  Also has a history of asthma which typically controlled - uses the rescue inhaler rarely.  Been wheezing and using the inhaler multiple times a day which has helped.  He did not look acutely sick or in respiratory distress today.  O2 sat was at 99% on room air, but wheezing was appreciated on the physical exam.  His clinical presentation is consistent with acute bronchitis with mild asthma exacerbation    Screen for influenza, COVID and strep today and they were normal/negative.      Discussed with him about the nature of the conditions.  Recommended OTC meds as needed for symptomatic treatment.  Staredt him on the Zithromax bronchitis.  Encouraged to try Zyrtec or Claritin as needed for nasal congestion.  Discussed about nasal spray but he declined.  Continue with the albuterol inhaler as needed for wheezing, chest tightness sensations or coughing.  Will start him on 5 days course of prednisone as well; potential side effect discussed.  Encouraged to drink a lot of water.  Call in or follow-up if not improve or worsening next 4-5 days.  ER if develops breathing difficulty or breathing concern.  He did not smoke.      Your blood pressure is slightly high today which is likely due to acute illness.  He takes losartan for high blood pressure.  Encouraged him to monitor blood pressure  closely and call in or follow-up with his PCP if it is persistently above 140/90.    Return if symptoms worsen or fail to improve.    Christo Tran Mai, MD  [facility] LUIS Ospina is a 67 year old male, presenting for the following health issues:    Cough      Cough    History of Present Illness       Reason for visit:  Cough weezing tite chest  Symptom onset:  1-2 weeks ago  Symptom intensity:  Mild  Symptom progression:  Staying the same  Had these symptoms before:  Yes  Has tried/received treatment for these symptoms:  Yes  Previous treatment was successful:  No  What makes it worse:  Not sleeping  What makes it better:  Getting up doing something out side    He eats 0-1 servings of fruits and vegetables daily.He consumes 0 sweetened beverage(s) daily.He exercises with enough effort to increase his heart rate 9 or less minutes per day.  He exercises with enough effort to increase his heart rate 7 days per week.   He is taking medications regularly.     Been sick for over 2-week with runny nose, nasal congestion, sore throat and coughing.  Overall they are getting worse.  Over-the-counter medications have not helped.  Cough is nonproductive but is very congested in his chest.  No sinus pain or pressure.  No headache or dizziness.  No fever or chills.  Tolerate oral intake well, no nausea or vomiting.  Also has asthma, typically controlled and uses rescue inhaler rarely.  Been having the chest tightness sensation and wheezing associated with exertions as well as coughing.  Been using the rescue inhaler multiple times a day which has helped.  No orthopnea.  No smoking.  Eating and drinking well.  His acid reflux is well controlled.  No other concern.    Review of Systems   Respiratory: Positive for cough.       Constitutional, HEENT, cardiovascular, pulmonary, gi and gu systems are negative, except as otherwise noted.      Objective    BP (!) 146/90   Pulse 76   Temp 98  F (36.7  C)    "Resp 10   Ht 1.676 m (5' 6\")   Wt 106.1 kg (234 lb)   SpO2 99%   BMI 37.77 kg/m    There is no height or weight on file to calculate BMI.  Physical Exam   GENERAL: alert and no distress, speaking full sentences.  EYES: Eyes grossly normal to inspection, PERRL and conjunctivae and sclerae normal.  No conjunctival injection.  HENT: ear canals and TM's normal, nose and mouth without ulcers or lesions.  Nares are congested with clear drainage.  Oropharynx pink and moist, no tonsillar hypertrophy or exudate or erythema.  No tender palpation to the sinuses.  NECK: Supple, no lymphadenopathy or thyromegaly  RESP: Decreased breath sounds with expiratory wheezing.  No retraction.  No crackle.  CV: regular rate and rhythm, no murmur, no peripheral edema  ABDOMEN: soft, nontender, no hepatosplenomegaly, no masses and bowel sounds normal  MS: no gross musculoskeletal defects noted, no edema    Results for orders placed or performed in visit on 12/05/22   Symptomatic; Unknown COVID-19 Virus (Coronavirus) by PCR Nose     Status: Normal    Specimen: Nose; Swab   Result Value Ref Range    SARS CoV2 PCR Negative Negative    Narrative    Testing was performed using the Aptima SARS-CoV-2 Assay on the  [employer] System. Additional information about this  Emergency Use Authorization (EUA) assay can be found via the Lab  Guide. This test should be ordered for the detection of SARS-CoV-2 in  individuals who meet SARS-CoV-2 clinical and/or epidemiological  criteria. Test performance is unknown in asymptomatic patients. This  test is for in vitro diagnostic use under the FDA EUA for  laboratories certified under CLIA to perform high complexity testing.  This test has not been FDA cleared or approved. A negative result  does not rule out the presence of PCR inhibitors in the specimen or  target RNA in concentration below the limit of detection for the  assay. The possibility of a false negative should be considered if  the " patient's recent exposure or clinical presentation suggests  COVID-19. This test was validated by the [facility]. This laboratory is certified under  the Clinical Laboratory Improvement Amendments of 1988 (CLIA-88) as  qualified to perform high complexity laboratory testing.   Influenza A & B Antigen - Clinic Collect     Status: Normal    Specimen: Nose; Swab   Result Value Ref Range    Influenza A antigen Negative Negative    Influenza B antigen Negative Negative    Narrative    Test results must be correlated with clinical data. If necessary, results should be confirmed by a molecular assay or viral culture.   Streptococcus A Rapid Screen w/Reflex to PCR - Clinic Collect     Status: Normal    Specimen: Throat; Swab   Result Value Ref Range    Group A Strep antigen Negative Negative   Group A Streptococcus PCR Throat Swab     Status: Normal    Specimen: Throat; Swab   Result Value Ref Range    Group A strep by PCR Not Detected Not Detected    Narrative    The Xpert Xpress Strep A test, performed on the [employer], is a rapid, qualitative in vitro diagnostic test for the detection of Streptococcus pyogenes (Group A ß-hemolytic Streptococcus, Strep A) in throat swab specimens from patients with signs and symptoms of pharyngitis. The Xpert Xpress Strep A test can be used as an aid in the diagnosis of Group A Streptococcal pharyngitis. The assay is not intended to monitor treatment for Group A Streptococcus infections. The Xpert Xpress Strep A test utilizes an automated real-time polymerase chain reaction (PCR) to detect Streptococcus pyogenes DNA.

## 2022-12-26 DIAGNOSIS — K21.9 GASTROESOPHAGEAL REFLUX DISEASE WITHOUT ESOPHAGITIS: ICD-10-CM

## 2023-01-30 ENCOUNTER — OFFICE VISIT (OUTPATIENT)
Dept: ORTHOPEDICS | Facility: CLINIC | Age: 68
End: 2023-01-30
Payer: COMMERCIAL

## 2023-01-30 ENCOUNTER — ANCILLARY PROCEDURE (OUTPATIENT)
Dept: GENERAL RADIOLOGY | Facility: CLINIC | Age: 68
End: 2023-01-30
Attending: PHYSICIAN ASSISTANT
Payer: COMMERCIAL

## 2023-01-30 VITALS
HEIGHT: 66 IN | SYSTOLIC BLOOD PRESSURE: 132 MMHG | WEIGHT: 230.5 LBS | BODY MASS INDEX: 37.04 KG/M2 | DIASTOLIC BLOOD PRESSURE: 80 MMHG

## 2023-01-30 DIAGNOSIS — M17.12 PRIMARY OSTEOARTHRITIS OF LEFT KNEE: ICD-10-CM

## 2023-01-30 DIAGNOSIS — M17.11 PRIMARY OSTEOARTHRITIS OF RIGHT KNEE: ICD-10-CM

## 2023-01-30 DIAGNOSIS — M17.11 PRIMARY OSTEOARTHRITIS OF RIGHT KNEE: Primary | ICD-10-CM

## 2023-01-30 PROCEDURE — 73560 X-RAY EXAM OF KNEE 1 OR 2: CPT | Mod: TC | Performed by: RADIOLOGY

## 2023-01-30 PROCEDURE — 20610 DRAIN/INJ JOINT/BURSA W/O US: CPT | Mod: 50 | Performed by: PHYSICIAN ASSISTANT

## 2023-01-30 RX ORDER — TRIAMCINOLONE ACETONIDE 40 MG/ML
80 INJECTION, SUSPENSION INTRA-ARTICULAR; INTRAMUSCULAR
Status: SHIPPED | OUTPATIENT
Start: 2023-01-30

## 2023-01-30 RX ORDER — BUPIVACAINE HYDROCHLORIDE 5 MG/ML
3 INJECTION, SOLUTION PERINEURAL
Status: SHIPPED | OUTPATIENT
Start: 2023-01-30

## 2023-01-30 RX ADMIN — TRIAMCINOLONE ACETONIDE 80 MG: 40 INJECTION, SUSPENSION INTRA-ARTICULAR; INTRAMUSCULAR at 09:44

## 2023-01-30 RX ADMIN — BUPIVACAINE HYDROCHLORIDE 3 ML: 5 INJECTION, SOLUTION PERINEURAL at 09:44

## 2023-01-30 ASSESSMENT — PAIN SCALES - GENERAL: PAINLEVEL: MILD PAIN (2)

## 2023-01-30 NOTE — PROGRESS NOTES
"Office Visit-Follow up    Chief Complaint: Bhavesh Landeros is a 67 year old male who is being seen for   Chief Complaint   Patient presents with     RECHECK     Bilateral knee primary osteoarthritis       History of Present Illness:   Patient is here in follow-up for steroid injections.  Patient was last seen by Dr. Holguin on 10/21/2021 and at that time received a left knee steroid injection.  Prior to that on 6/2/2021 was seen by Larry Pierre NP and received a right knee steroid injection.Left knee lasted about a year and 3 months and the right a year and 7 months.  Patient's function is very good.  He takes Aleve maybe once a week which works well for him.  Patient states left greater than the right.    Physical Exam:  Vitals: /80   Ht 1.676 m (5' 6\")   Wt 104.6 kg (230 lb 8 oz)   BMI 37.20 kg/m    BMI= Body mass index is 37.2 kg/m .  Constitutional: healthy, alert and no acute distress   Psychiatric: mentation appears normal and affect normal/bright  NEURO: no focal deficits, CMS intact bilateral lower extremities  RESP: Normal with easy respirations and no use of accessory muscles to breathe, no audible wheezing or retractions  CV: Calves soft and nontender to palpation, bilateral legs warm   SKIN: No erythema, rashes, excoriation, or breakdown. No evidence of infection.   MUSCULOSKELETAL:    INSPECTION of bilateral knees: No gross deformities, erythema, edema, ecchymosis, atrophy or fasciculations.  Inspection done bilaterally.    PALPATION: No tenderness on palpation of the medial, lateral, anterior and posterior portion of the knee. No specific joint line tenderness. No increased warmth.  No effusion.  Test done bilaterally.    ROM: Able to flex and extend without any catching or locking or pain.    STRENGTH: Able to get on the exam table and off without any problems and able to ambulate without any problems.    SPECIAL TEST: None today.   GAIT: Slight bilateral antalgic gait after injections  Lymph: " no palpable lymph nodes    Diagnostic Modalities:  I reviewed the x-rays that were done on 6/2/2021 showing severe osteoarthritis bilateral knees medial and lateral compartment and moderate patellofemoral compartment bilateral knees.  No fracture no dislocation no tumor.  Osteophytes noted on the medial and lateral femur.    X-rays done today lateral view of the right knee showing joint space narrowing as mentioned above patellofemoral and medial lateral compartments.  No fracture no dislocation no tumor.    Independent visualization of the images was performed.     Impression: Bilateral knee primary osteoarthritis, severe medial and lateral and moderate patellofemoral.    Plan:    Large Joint Injection/Arthocentesis: bilateral knee    Date/Time: 1/30/2023 9:44 AM  Performed by: Shahab Mera PA-C  Authorized by: Shahab Mera PA-C     Indications:  Pain  Needle Size:  22 G  Guidance: landmark guided    Approach:  Anterolateral  Location:  Knee  Laterality:  Bilateral      Medications (Right):  80 mg triamcinolone 40 MG/ML; 3 mL bupivacaine 0.5 %  Aspirate amount (mL):  0  Medications (Left):  80 mg triamcinolone 40 MG/ML; 3 mL bupivacaine 0.5 %  Aspirate amount (mL):  0  Procedure discussed: discussed risks, benefits, and alternatives    Consent Given by:  Patient  Timeout: timeout called immediately prior to procedure    Prep: patient was prepped and draped in usual sterile fashion     The skin was prepped with betadine.  Patient was in seated position. I used carol chloride spray prior to doing the injections. The patient tolerated the injections well, and there were no complications. The injection sites were covered with a Band-Aid.                                        FOCUSED PLAN:     67-year-old male with bilateral knee known primary osteoarthritis.  Left knee lasted about a year and 3 months and the right a year and 7 months.  Patient is getting excellent relief from the steroid injections and only has  to take Aleve every once in a while.  I recommended continue with the steroid injections.  Patient did ask some questions about knee replacement although I think he is a long ways away from it even though the x-rays show severe arthritis his function is excellent.  We did bilateral steroid injections today.  Patient can follow-up on an as-needed basis.    Re-x-ray on return: No      This note was dictated with [employer].    Shahab Mera PA-C

## 2023-01-30 NOTE — LETTER
"    1/30/2023         RE: Bhavesh Landeros  [address]  [facility] [address]        Dear Colleague,    Thank you for referring your patient, Bhavesh Landeros, to the [facility]. Please see a copy of my visit note below.    Office Visit-Follow up    Chief Complaint: Bhavesh Landeros is a 67 year old male who is being seen for   Chief Complaint   Patient presents with     RECHECK     Bilateral knee primary osteoarthritis       History of Present Illness:   Patient is here in follow-up for steroid injections.  Patient was last seen by Dr. Holguin on 10/21/2021 and at that time received a left knee steroid injection.  Prior to that on 6/2/2021 was seen by Larry Pierre NP and received a right knee steroid injection.Left knee lasted about a year and 3 months and the right a year and 7 months.  Patient's function is very good.  He takes Aleve maybe once a week which works well for him.    Physical Exam:  Vitals: /80   Ht 1.676 m (5' 6\")   Wt 104.6 kg (230 lb 8 oz)   BMI 37.20 kg/m    BMI= Body mass index is 37.2 kg/m .  Constitutional: healthy, alert and no acute distress   Psychiatric: mentation appears normal and affect normal/bright  NEURO: no focal deficits, CMS intact bilateral lower extremities  RESP: Normal with easy respirations and no use of accessory muscles to breathe, no audible wheezing or retractions  CV: Calves soft and nontender to palpation, bilateral legs warm   SKIN: No erythema, rashes, excoriation, or breakdown. No evidence of infection.   MUSCULOSKELETAL:    INSPECTION of bilateral knees: No gross deformities, erythema, edema, ecchymosis, atrophy or fasciculations.  Inspection done bilaterally.    PALPATION: No tenderness on palpation of the medial, lateral, anterior and posterior portion of the knee. No specific joint line tenderness. No increased warmth.  No effusion.  Test done bilaterally.    ROM: Able to flex and extend without any catching or locking or " pain.    STRENGTH: Able to get on the exam table and off without any problems and able to ambulate without any problems.    SPECIAL TEST: None today.   GAIT: Slight bilateral antalgic gait after injections  Lymph: no palpable lymph nodes    Diagnostic Modalities:  I reviewed the x-rays that were done on 6/2/2021 showing severe osteoarthritis bilateral knees medial and lateral compartment and moderate patellofemoral compartment bilateral knees.  No fracture no dislocation no tumor.  Osteophytes noted on the medial and lateral femur.    X-rays done today lateral view of the right knee showing joint space narrowing as mentioned above patellofemoral and medial lateral compartments.  No fracture no dislocation no tumor.    Independent visualization of the images was performed.     Impression: Bilateral knee primary osteoarthritis, severe medial and lateral and moderate patellofemoral.    Plan:    Large Joint Injection/Arthocentesis: bilateral knee    Date/Time: 1/30/2023 9:44 AM  Performed by: Shahab Mera PA-C  Authorized by: Shahab Mera PA-C     Indications:  Pain  Needle Size:  22 G  Guidance: landmark guided    Approach:  Anterolateral  Location:  Knee  Laterality:  Bilateral      Medications (Right):  80 mg triamcinolone 40 MG/ML; 3 mL bupivacaine 0.5 %  Aspirate amount (mL):  0  Medications (Left):  80 mg triamcinolone 40 MG/ML; 3 mL bupivacaine 0.5 %  Aspirate amount (mL):  0  Procedure discussed: discussed risks, benefits, and alternatives    Consent Given by:  Patient  Timeout: timeout called immediately prior to procedure    Prep: patient was prepped and draped in usual sterile fashion     The skin was prepped with betadine.  Patient was in seated position. I used carol chloride spray prior to doing the injections. The patient tolerated the injections well, and there were no complications. The injection sites were covered with a Band-Aid.                                        FOCUSED PLAN:     67-year-old  male with bilateral knee known primary osteoarthritis.  Left knee lasted about a year and 3 months and the right a year and 7 months.  Patient is getting excellent relief from the steroid injections and only has to take Aleve every once in a while.  I recommended continue with the steroid injections.  Patient did ask some questions about knee replacement although I think he is a long ways away from it even though the x-rays show severe arthritis his function is excellent.  We did bilateral steroid injections today.  Patient can follow-up on an as-needed basis.    Re-x-ray on return: No      This note was dictated with [employer].    Shahab Mera PA-C        Again, thank you for allowing me to participate in the care of your patient.        Sincerely,        Shahab Mera PA-C

## 2023-02-24 DIAGNOSIS — I10 BENIGN ESSENTIAL HYPERTENSION: ICD-10-CM

## 2023-02-24 RX ORDER — LOSARTAN POTASSIUM 50 MG/1
50 TABLET ORAL DAILY
Qty: 90 TABLET | Refills: 0 | Status: SHIPPED | OUTPATIENT
Start: 2023-02-24 | End: 2023-05-31

## 2023-02-24 NOTE — TELEPHONE ENCOUNTER
Routing refill request to provider for review/approval because:    Requested Prescriptions   Pending Prescriptions Disp Refills    losartan (COZAAR) 50 MG tablet [Pharmacy Med Name: LOSARTAN POTASSIUM 50MG TABS] 90 tablet 0     Sig: TAKE 1 TABLET (50 MG) BY MOUTH DAILY       Angiotensin-II Receptors Failed - 2/24/2023 11:18 AM        Failed - Normal serum creatinine on file in past 12 months     Recent Labs   Lab Test 11/09/21  1040   CR 0.67       Ok to refill medication if creatinine is low          Failed - Normal serum potassium on file in past 12 months     Recent Labs   Lab Test 11/09/21  1040   POTASSIUM 4.4

## 2023-03-27 DIAGNOSIS — K21.9 GASTROESOPHAGEAL REFLUX DISEASE WITHOUT ESOPHAGITIS: ICD-10-CM

## 2023-05-29 DIAGNOSIS — I10 BENIGN ESSENTIAL HYPERTENSION: ICD-10-CM

## 2023-05-31 ENCOUNTER — OFFICE VISIT (OUTPATIENT)
Dept: FAMILY MEDICINE | Facility: CLINIC | Age: 68
End: 2023-05-31
Payer: COMMERCIAL

## 2023-05-31 VITALS
BODY MASS INDEX: 36.03 KG/M2 | SYSTOLIC BLOOD PRESSURE: 130 MMHG | TEMPERATURE: 99 F | RESPIRATION RATE: 20 BRPM | WEIGHT: 229.6 LBS | DIASTOLIC BLOOD PRESSURE: 68 MMHG | OXYGEN SATURATION: 98 % | HEART RATE: 65 BPM | HEIGHT: 67 IN

## 2023-05-31 DIAGNOSIS — E66.01 MORBID OBESITY (H): ICD-10-CM

## 2023-05-31 DIAGNOSIS — I10 BENIGN ESSENTIAL HYPERTENSION: ICD-10-CM

## 2023-05-31 DIAGNOSIS — I35.0 AORTIC VALVE STENOSIS, ETIOLOGY OF CARDIAC VALVE DISEASE UNSPECIFIED: ICD-10-CM

## 2023-05-31 DIAGNOSIS — Z00.00 ENCOUNTER FOR MEDICARE ANNUAL WELLNESS EXAM: Primary | ICD-10-CM

## 2023-05-31 DIAGNOSIS — E78.2 MIXED HYPERLIPIDEMIA: ICD-10-CM

## 2023-05-31 DIAGNOSIS — K21.9 GASTROESOPHAGEAL REFLUX DISEASE WITHOUT ESOPHAGITIS: ICD-10-CM

## 2023-05-31 LAB
ANION GAP SERPL CALCULATED.3IONS-SCNC: 9 MMOL/L (ref 7–15)
BUN SERPL-MCNC: 14.7 MG/DL (ref 8–23)
CALCIUM SERPL-MCNC: 8.9 MG/DL (ref 8.8–10.2)
CHLORIDE SERPL-SCNC: 104 MMOL/L (ref 98–107)
CHOLEST SERPL-MCNC: 122 MG/DL
CREAT SERPL-MCNC: 0.93 MG/DL (ref 0.67–1.17)
DEPRECATED HCO3 PLAS-SCNC: 26 MMOL/L (ref 22–29)
GFR SERPL CREATININE-BSD FRML MDRD: 89 ML/MIN/1.73M2
GLUCOSE SERPL-MCNC: 106 MG/DL (ref 70–99)
HDLC SERPL-MCNC: 45 MG/DL
LDLC SERPL CALC-MCNC: 58 MG/DL
NONHDLC SERPL-MCNC: 77 MG/DL
POTASSIUM SERPL-SCNC: 4.1 MMOL/L (ref 3.4–5.3)
SODIUM SERPL-SCNC: 139 MMOL/L (ref 136–145)
TRIGL SERPL-MCNC: 95 MG/DL

## 2023-05-31 PROCEDURE — 36415 COLL VENOUS BLD VENIPUNCTURE: CPT | Performed by: FAMILY MEDICINE

## 2023-05-31 PROCEDURE — G0439 PPPS, SUBSEQ VISIT: HCPCS | Performed by: FAMILY MEDICINE

## 2023-05-31 PROCEDURE — 80048 BASIC METABOLIC PNL TOTAL CA: CPT | Performed by: FAMILY MEDICINE

## 2023-05-31 PROCEDURE — 99214 OFFICE O/P EST MOD 30 MIN: CPT | Mod: 25 | Performed by: FAMILY MEDICINE

## 2023-05-31 PROCEDURE — 80061 LIPID PANEL: CPT | Performed by: FAMILY MEDICINE

## 2023-05-31 RX ORDER — ATORVASTATIN CALCIUM 20 MG/1
20 TABLET, FILM COATED ORAL DAILY
Qty: 90 TABLET | Refills: 3 | Status: SHIPPED | OUTPATIENT
Start: 2023-05-31 | End: 2024-06-03

## 2023-05-31 RX ORDER — LOSARTAN POTASSIUM 50 MG/1
50 TABLET ORAL DAILY
Qty: 90 TABLET | Refills: 3 | Status: SHIPPED | OUTPATIENT
Start: 2023-05-31 | End: 2024-06-03

## 2023-05-31 ASSESSMENT — ENCOUNTER SYMPTOMS
DYSURIA: 0
MYALGIAS: 1
COUGH: 0
FREQUENCY: 0
PALPITATIONS: 0
DIARRHEA: 0
DIZZINESS: 0
HEMATOCHEZIA: 0
SORE THROAT: 0
PARESTHESIAS: 0
NAUSEA: 0
ARTHRALGIAS: 1
EYE PAIN: 0
SHORTNESS OF BREATH: 0
HEMATURIA: 0
FEVER: 0
HEADACHES: 0
CONSTIPATION: 0
WEAKNESS: 0
HEARTBURN: 0
NERVOUS/ANXIOUS: 0
ABDOMINAL PAIN: 0
JOINT SWELLING: 0
CHILLS: 0

## 2023-05-31 ASSESSMENT — ASTHMA QUESTIONNAIRES
QUESTION_5 LAST FOUR WEEKS HOW WOULD YOU RATE YOUR ASTHMA CONTROL: COMPLETELY CONTROLLED
QUESTION_1 LAST FOUR WEEKS HOW MUCH OF THE TIME DID YOUR ASTHMA KEEP YOU FROM GETTING AS MUCH DONE AT WORK, SCHOOL OR AT HOME: NONE OF THE TIME
ACT_TOTALSCORE: 24
QUESTION_4 LAST FOUR WEEKS HOW OFTEN HAVE YOU USED YOUR RESCUE INHALER OR NEBULIZER MEDICATION (SUCH AS ALBUTEROL): NOT AT ALL
QUESTION_3 LAST FOUR WEEKS HOW OFTEN DID YOUR ASTHMA SYMPTOMS (WHEEZING, COUGHING, SHORTNESS OF BREATH, CHEST TIGHTNESS OR PAIN) WAKE YOU UP AT NIGHT OR EARLIER THAN USUAL IN THE MORNING: NOT AT ALL
QUESTION_2 LAST FOUR WEEKS HOW OFTEN HAVE YOU HAD SHORTNESS OF BREATH: ONCE OR TWICE A WEEK
ACT_TOTALSCORE: 24

## 2023-05-31 ASSESSMENT — PAIN SCALES - GENERAL: PAINLEVEL: NO PAIN (0)

## 2023-05-31 ASSESSMENT — ACTIVITIES OF DAILY LIVING (ADL): CURRENT_FUNCTION: NO ASSISTANCE NEEDED

## 2023-05-31 NOTE — PATIENT INSTRUCTIONS
Patient Education   Personalized Prevention Plan  You are due for the preventive services outlined below.  Your care team is available to assist you in scheduling these services.  If you have already completed any of these items, please share that information with your care team to update in your medical record.  Health Maintenance Due   Topic Date Due     Asthma Action Plan - yearly  Never done     Zoster (Shingles) Vaccine (1 of 2) Never done     Pneumococcal Vaccine (1 - PCV) Never done     COVID-19 Vaccine (3 - Moderna series) 06/04/2021     Flu Vaccine (1) 09/01/2022     ANNUAL REVIEW OF HM ORDERS  02/22/2023       Understanding USDA MyPlate  The USDA has guidelines to help you make healthy food choices. These are called MyPlate. MyPlate shows the food groups that make up healthy meals using the image of a place setting. Before you eat, think about the healthiest choices for what to put on your plate or in your cup or bowl. To learn more about building a healthy plate, visit www.choosemyplate.gov.     The food groups    Fruits. Any fruit or 100% fruit juice counts as part of the Fruit Group. Fruits may be fresh, canned, frozen, or dried, and may be whole, cut-up, or pureed. Make 1/2 of your plate fruits and vegetables.    Vegetables. Any vegetable or 100% vegetable juice counts as a member of the Vegetable Group. Vegetables may be fresh, frozen, canned, or dried. They can be served raw or cooked and may be whole, cut-up, or mashed. Make 1/2 of your plate fruits and vegetables.    Grains. All foods made from grains are part of the Grains Group. These include wheat, rice, oats, cornmeal, and barley. Grains are often used to make foods such as bread, pasta, oatmeal, cereal, tortillas, and grits. Grains should be no more than 1/4 of your plate. At least half of your grains should be whole grains.    Protein. This group includes meat, poultry, seafood, beans and peas, eggs, processed soy products (such as tofu),  nuts (including nut butters), and seeds. Make protein choices no more than 1/4 of your plate. Meat and poultry choices should be lean or low fat.    Dairy. The Dairy Group includes all fluid milk products and foods made from milk that contain calcium, such as yogurt and cheese. (Foods that have little calcium, such as cream, butter, and cream cheese, are not part of this group.) Most dairy choices should be low-fat or fat-free.    Oils. Oils aren't a food group, but they do contain essential nutrients. However it's important to watch your intake of oils. These are fats that are liquid at room temperature. They include canola, corn, olive, soybean, vegetable, and sunflower oil. Foods that are mainly oil include mayonnaise, certain salad dressings, and soft margarines. You likely already get your daily oil allowance from the foods you eat.  Things to limit  Eating healthy also means limiting these things in your diet:    Salt (sodium). Many processed foods have a lot of sodium. To keep sodium intake down, eat fresh vegetables, meats, poultry, and seafood when possible. Purchase low-sodium, reduced-sodium, or no-salt-added food products at the store. And don't add salt to your meals at home. Instead, season them with herbs and spices such as dill, oregano, cumin, and paprika. Or try adding flavor with lemon or lime zest and juice.    Saturated fat. Saturated fats are most often found in animal products such as beef, pork, and chicken. They are often solid at room temperature, such as butter. To reduce your saturated fat intake, choose leaner cuts of meat and poultry. And try healthier cooking methods such as grilling, broiling, roasting, or baking. For a simple lower-fat swap, use plain nonfat yogurt instead of mayonnaise when making potato salad or macaroni salad.    Added sugars. These are sugars added to foods. They are in foods such as ice cream, candy, soda, fruit drinks, sports drinks, energy drinks, cookies,  pastries, jams, and syrups. Cut down on added sugars by sharing sweet treats with a family member or friend. You can also choose fruit for dessert, and drink water or other unsweetened beverages.  [employer] last reviewed this educational content on 6/1/2020 2000-2022 [employer]. All rights reserved. This information is not intended as a substitute for professional medical care. Always follow your healthcare professional's instructions.          Signs of Hearing Loss  Hearing loss is a problem shared by many people. In fact, it's one of the most common health problems, particularly as people age. Most people aged 65 and older have some hearing loss. By age 80, almost everyone does. Hearing loss often occurs slowly over the years. So, you may not realize your hearing has gotten worse.   When sudden hearing loss occurs, it's important to contact your healthcare provider right away. Your provider will do a medical exam and a hearing exam as soon as possible. This is to help find the cause and type of your sudden hearing loss. Based on your diagnosis, your healthcare provider will discuss possible treatments.      Hearing much better with one ear can be a sign of hearing loss.     Have your hearing checked  Call your healthcare provider if you:     Have to strain to hear normal conversation    Have to watch other people s faces very carefully to follow what they re saying    Need to ask people to repeat what they ve said    Often misunderstand what people are saying    Turn the volume of the television or radio up so high that others complain    Feel that people are mumbling when they re talking to you    Find that the effort to hear leaves you feeling tired and irritated    Notice, when using the phone, that you hear better with one ear than the other  [employer] last reviewed this educational content on 6/1/2022 2000-2022 [employer]. All rights reserved. This information is not intended  as a substitute for professional medical care. Always follow your healthcare professional's instructions.

## 2023-05-31 NOTE — PROGRESS NOTES
"SUBJECTIVE:   Bhavesh is a 67 year old who presents for Preventive Visit.      5/31/2023     4:25 PM   Additional Questions   Roomed by Lisa HAMMONDS   Accompanied by Self         5/31/2023     4:25 PM   Patient Reported Additional Medications   Patient reports taking the following new medications None     Are you in the first 12 months of your Medicare coverage?  No    Healthy Habits:     In general, how would you rate your overall health?  Good    Frequency of exercise:  6-7 days/week    Duration of exercise:  Greater than 60 minutes    Do you usually eat at least 4 servings of fruit and vegetables a day, include whole grains    & fiber and avoid regularly eating high fat or \"junk\" foods?  No    Taking medications regularly:  Yes    Medication side effects:  None    Ability to successfully perform activities of daily living:  No assistance needed    Home Safety:  No safety concerns identified    Hearing Impairment:  Need to ask people to speak up or repeat themselves and difficulty understanding soft or whispered speech    In the past 6 months, have you been bothered by leaking of urine?  No    In general, how would you rate your overall mental or emotional health?  Excellent      PHQ-2 Total Score: 0        Have you ever done Advance Care Planning? (For example, a Health Directive, POLST, or a discussion with a medical provider or your loved ones about your wishes): No, advance care planning information given to patient to review.  Patient declined advance care planning discussion at this time.    No hearing concerns, but concerns with Tinnitus   Fall risk  Fallen 2 or more times in the past year?: No  Any fall with injury in the past year?: No    Cognitive Screening   1) Repeat 3 items (Leader, Season, Table)    2) Clock draw: NORMAL  3) 3 item recall: Recalls 3 objects  Results: 3 items recalled: COGNITIVE IMPAIRMENT LESS LIKELY    Mini-CogTM Copyright S Braulio. Licensed by the author for use in [facility] " Services; reprinted with permission ([email or web address]). All rights reserved.      Do you have sleep apnea, excessive snoring or daytime drowsiness?: no    Reviewed and updated as needed this visit by clinical staff   Tobacco  Allergies  Meds  Problems  Med Hx  Surg Hx  Fam Hx          Reviewed and updated as needed this visit by Provider   Tobacco  Allergies  Meds  Problems  Med Hx  Surg Hx  Fam Hx         Social History     Tobacco Use     Smoking status: Never     Smokeless tobacco: Never   Vaping Use     Vaping status: Never Used   Substance Use Topics     Alcohol use: Yes     Alcohol/week: 0.0 standard drinks of alcohol     Comment: occ             5/31/2023     3:58 PM   Alcohol Use   Prescreen: >3 drinks/day or >7 drinks/week? No     Do you have a current opioid prescription? No  Do you use any other controlled substances or medications that are not prescribed by a provider? None          Hyperlipidemia Follow-Up      Are you regularly taking any medication or supplement to lower your cholesterol?   Yes- Lipitor    Are you having muscle aches or other side effects that you think could be caused by your cholesterol lowering medication?  No    Hypertension Follow-up      Do you check your blood pressure regularly outside of the clinic? No     Are you following a low salt diet? No    Are your blood pressures ever more than 140 on the top number (systolic) OR more   than 90 on the bottom number (diastolic), for example 140/90? No    Asthma Follow-Up    Was ACT completed today?  Yes        5/31/2023     4:00 PM   ACT Total Scores   ACT TOTAL SCORE (Goal Greater than or Equal to 20) 24   In the past 12 months, how many times did you visit the emergency room for your asthma without being admitted to the hospital? 0   In the past 12 months, how many times were you hospitalized overnight because of your asthma? 0          How many days per week do you miss taking your asthma controller medication?  I do not  have an asthma controller medication    Please describe any recent triggers for your asthma: None    Have you had any Emergency Room Visits, Urgent Care Visits, or Hospital Admissions since your last office visit?  No      Current providers sharing in care for this patient include:   Patient Care Team:  Josy Duarte NP as PCP - General (Nurse Practitioner - Family)  Josy Duarte NP as Assigned PCP  Shahab Mera PA-C as Assigned Musculoskeletal Provider    The following health maintenance items are reviewed in Epic and correct as of today:  Health Maintenance   Topic Date Due     ASTHMA ACTION PLAN  Never done     ZOSTER IMMUNIZATION (1 of 2) Never done     Pneumococcal Vaccine: 65+ Years (1 - PCV) Never done     COVID-19 Vaccine (3 - Moderna series) 06/04/2021     INFLUENZA VACCINE (1) 09/01/2022     ASTHMA CONTROL TEST  11/30/2023     MEDICARE ANNUAL WELLNESS VISIT  05/31/2024     ANNUAL REVIEW OF HM ORDERS  05/31/2024     FALL RISK ASSESSMENT  05/31/2024     COLORECTAL CANCER SCREENING  01/22/2026     LIPID  11/09/2026     ADVANCE CARE PLANNING  05/31/2028     DTAP/TDAP/TD IMMUNIZATION (3 - Td or Tdap) 02/22/2032     HEPATITIS C SCREENING  Completed     PHQ-2 (once per calendar year)  Completed     AORTIC ANEURYSM SCREENING (SYSTEM ASSIGNED)  Completed     IPV IMMUNIZATION  Aged Out     MENINGITIS IMMUNIZATION  Aged Out     Labs reviewed in EPIC  BP Readings from Last 3 Encounters:   05/31/23 (!) 140/78   01/30/23 132/80   12/05/22 (!) 146/90    Wt Readings from Last 3 Encounters:   05/31/23 104.1 kg (229 lb 9.6 oz)   01/30/23 104.6 kg (230 lb 8 oz)   12/05/22 106.1 kg (234 lb)                  Patient Active Problem List   Diagnosis     GERD (gastroesophageal reflux disease)     Advanced directives, counseling/discussion     Aortic valve stenosis, etiology of cardiac valve disease unspecified     Mitral regurgitation and aortic stenosis     Obesity (BMI 35.0-39.9) with comorbidity (H)     Mixed  hyperlipidemia     HTN, goal below 140/80     Past Surgical History:   Procedure Laterality Date     COLONOSCOPY N/A 1/22/2016    Procedure: COLONOSCOPY;  Surgeon: Pb Rodriguez MD;  Location: [facility]     ESOPHAGOSCOPY, GASTROSCOPY, DUODENOSCOPY (EGD), COMBINED N/A 9/10/2020    Procedure: Esophagogastroduodenoscopy with Biopsy;  Surgeon: Emigdio Betancourt DO;  Location: [facility]     HC KNEE SCOPE,MED/LAT MENISECTOMY  07/08/1999     HC REVISE MEDIAN N/CARPAL TUNNEL SURG  1986      UGI ENDOSCOPY DIAG W BIOPSY  10/26/09     ZZHC COLONOSCOPY W/WO BRUSH/WASH  03/27/06       Social History     Tobacco Use     Smoking status: Never     Smokeless tobacco: Never   Vaping Use     Vaping status: Never Used   Substance Use Topics     Alcohol use: Yes     Alcohol/week: 0.0 standard drinks of alcohol     Comment: occ     Family History   Problem Relation Age of Onset     Diabetes Brother      Alzheimer Disease Father      Diabetes Sister          Current Outpatient Medications   Medication Sig Dispense Refill     aspirin 81 MG chewable tablet Take 1 tablet (81 mg) by mouth daily       atorvastatin (LIPITOR) 20 MG tablet Take 1 tablet (20 mg) by mouth daily 90 tablet 2     losartan (COZAAR) 50 MG tablet TAKE 1 TABLET (50 MG) BY MOUTH DAILY 90 tablet 0     omeprazole (PRILOSEC) 20 MG DR capsule TAKE ONE CAPSULE BY MOUTH ONCE DAILY 90 capsule 0     albuterol (PROAIR HFA/PROVENTIL HFA/VENTOLIN HFA) 108 (90 Base) MCG/ACT inhaler Inhale 2 puffs into the lungs every 6 hours (Patient not taking: Reported on 5/31/2023) 18 g 0     Allergies   Allergen Reactions     No Known Allergies      Recent Labs   Lab Test 11/09/21  1040 08/13/20  1030 08/07/20  1159 12/28/19  1344 11/24/18  1934 11/20/18  0835 11/07/18  0929   LDL 46 53  --   --   --  119*  --    HDL 55 42  --   --   --  53  --    TRIG 95 118  --   --   --  71  --    ALT 27  --  48 26   < >  --  26   CR 0.67 0.90 0.79 0.88   < >  --  0.80   GFRESTIMATED >90 88 >90 >90   < >  --  " >90   GFRESTBLACK  --  >90 >90 >90   < >  --  >90   POTASSIUM 4.4 4.1 4.0 3.9   < >  --  4.3   TSH  --   --   --  3.27  --   --  2.59    < > = values in this interval not displayed.                Review of Systems   Constitutional: Negative for chills and fever.   HENT: Positive for ear pain and hearing loss. Negative for congestion and sore throat.    Eyes: Negative for pain and visual disturbance.   Respiratory: Negative for cough and shortness of breath.    Cardiovascular: Negative for chest pain, palpitations and peripheral edema.   Gastrointestinal: Negative for abdominal pain, constipation, diarrhea, heartburn, hematochezia and nausea.   Genitourinary: Negative for dysuria, frequency, genital sores, hematuria, impotence, penile discharge and urgency.   Musculoskeletal: Positive for arthralgias and myalgias. Negative for joint swelling.   Skin: Negative for rash.   Neurological: Negative for dizziness, weakness, headaches and paresthesias.   Psychiatric/Behavioral: Negative for mood changes. The patient is not nervous/anxious.          OBJECTIVE:   BP (!) 140/78 (BP Location: Right arm, Patient Position: Sitting, Cuff Size: Adult Large)   Pulse 65   Temp 99  F (37.2  C) (Temporal)   Resp 20   Ht 1.711 m (5' 7.36\")   Wt 104.1 kg (229 lb 9.6 oz)   SpO2 98%   BMI 35.57 kg/m   Estimated body mass index is 35.57 kg/m  as calculated from the following:    Height as of this encounter: 1.711 m (5' 7.36\").    Weight as of this encounter: 104.1 kg (229 lb 9.6 oz).  Physical Exam  GENERAL: healthy, alert, no distress and obese  EYES: Eyes grossly normal to inspection, PERRL and conjunctivae and sclerae normal  HENT: ear canals and TM's normal, nose and mouth without ulcers or lesions  NECK: no adenopathy, no asymmetry, masses, or scars and thyroid normal to palpation  RESP: lungs clear to auscultation - no rales, rhonchi or wheezes  CV: regular rates and rhythm, normal S1 S2, no S3 or S4, grade 2-3/6 systolic " murmur heard best over the aortic distribution, peripheral pulses strong and no peripheral edema  ABDOMEN: soft, nontender, no hepatosplenomegaly, no masses and bowel sounds normal  MS: no gross musculoskeletal defects noted, no edema  NEURO: Normal strength and tone, mentation intact and speech normal  PSYCH: mentation appears normal, affect normal/bright  LYMPH: no cervical, supraclavicular, axillary, or inguinal adenopathy    Diagnostic Test Results:  Labs reviewed in Epic  Results for orders placed or performed in visit on 05/31/23 (from the past 24 hour(s))   Lipid panel reflex to direct LDL Fasting   Result Value Ref Range    Cholesterol 122 <200 mg/dL    Triglycerides 95 <150 mg/dL    Direct Measure HDL 45 >=40 mg/dL    LDL Cholesterol Calculated 58 <=100 mg/dL    Non HDL Cholesterol 77 <130 mg/dL    Narrative    Cholesterol  Desirable:  <200 mg/dL    Triglycerides  Normal:  Less than 150 mg/dL  Borderline High:  150-199 mg/dL  High:  200-499 mg/dL  Very High:  Greater than or equal to 500 mg/dL    Direct Measure HDL  Female:  Greater than or equal to 50 mg/dL   Male:  Greater than or equal to 40 mg/dL    LDL Cholesterol  Desirable:  <100mg/dL  Above Desirable:  100-129 mg/dL   Borderline High:  130-159 mg/dL   High:  160-189 mg/dL   Very High:  >= 190 mg/dL    Non HDL Cholesterol  Desirable:  130 mg/dL  Above Desirable:  130-159 mg/dL  Borderline High:  160-189 mg/dL  High:  190-219 mg/dL  Very High:  Greater than or equal to 220 mg/dL   Basic metabolic panel  (Ca, Cl, CO2, Creat, Gluc, K, Na, BUN)   Result Value Ref Range    Sodium 139 136 - 145 mmol/L    Potassium 4.1 3.4 - 5.3 mmol/L    Chloride 104 98 - 107 mmol/L    Carbon Dioxide (CO2) 26 22 - 29 mmol/L    Anion Gap 9 7 - 15 mmol/L    Urea Nitrogen 14.7 8.0 - 23.0 mg/dL    Creatinine 0.93 0.67 - 1.17 mg/dL    Calcium 8.9 8.8 - 10.2 mg/dL    Glucose 106 (H) 70 - 99 mg/dL    GFR Estimate 89 >60 mL/min/1.73m2       ASSESSMENT / PLAN:       ICD-10-CM    1.  Encounter for Medicare annual wellness exam  Z00.00 PRIMARY CARE FOLLOW-UP SCHEDULING     REVIEW OF HEALTH MAINTENANCE PROTOCOL ORDERS     Adult Audiology  Referral      2. Benign essential hypertension  I10 losartan (COZAAR) 50 MG tablet     Basic metabolic panel  (Ca, Cl, CO2, Creat, Gluc, K, Na, BUN)     Basic metabolic panel  (Ca, Cl, CO2, Creat, Gluc, K, Na, BUN)      3. Mixed hyperlipidemia  E78.2 atorvastatin (LIPITOR) 20 MG tablet     Lipid panel reflex to direct LDL Fasting     Lipid panel reflex to direct LDL Fasting      4. Gastroesophageal reflux disease without esophagitis  K21.9 omeprazole (PRILOSEC) 20 MG DR capsule      5. Aortic valve stenosis, etiology of cardiac valve disease unspecified  I35.0 Echocardiogram Complete          Patient has been advised of split billing requirements and indicates understanding: Yes   1. Encounter for Medicare annual wellness exam  - PRIMARY CARE FOLLOW-UP SCHEDULING; Future  - REVIEW OF HEALTH MAINTENANCE PROTOCOL ORDERS  - Adult Audiology  Referral; Future    2. Benign essential hypertension  Chronic, controlled. Recheck renal profile. The current medical regimen is effective;  continue present plan and medications. Encourage weight loss.  - losartan (COZAAR) 50 MG tablet; Take 1 tablet (50 mg) by mouth daily  Dispense: 90 tablet; Refill: 3  - Basic metabolic panel  (Ca, Cl, CO2, Creat, Gluc, K, Na, BUN); Future  - Basic metabolic panel  (Ca, Cl, CO2, Creat, Gluc, K, Na, BUN)    3. Mixed hyperlipidemia  Chronic, stable. The current medical regimen is effective;  continue present plan and medications. Recheck lipid profile.  - atorvastatin (LIPITOR) 20 MG tablet; Take 1 tablet (20 mg) by mouth daily  Dispense: 90 tablet; Refill: 3  - Lipid panel reflex to direct LDL Fasting; Future  - Lipid panel reflex to direct LDL Fasting    4. Gastroesophageal reflux disease without esophagitis  Chronic, asymptomatic on PPI. He may trial discontinuation.  "Encourage weight loss.   - omeprazole (PRILOSEC) 20 MG DR capsule; Take 1 capsule (20 mg) by mouth daily  Dispense: 90 capsule; Refill: 3    5. Aortic valve stenosis, etiology of cardiac valve disease unspecified  Prior ECHO 5 years ago with mild aortic stenosis. Recommend repeat every 1-2 years.   - Echocardiogram Complete; Future        COUNSELING:  Reviewed preventive health counseling, as reflected in patient instructions       Regular exercise       Healthy diet/nutrition       Vision screening       Hearing screening       Immunizations    Declined: Covid-19, Influenza, Pneumococcal and Zoster due to Other will consider at local pharmacy               Aspirin prophylaxis        Hepatitis C screening       Colon cancer screening       Prostate cancer screening      BMI:   Estimated body mass index is 35.57 kg/m  as calculated from the following:    Height as of this encounter: 1.711 m (5' 7.36\").    Weight as of this encounter: 104.1 kg (229 lb 9.6 oz).   Weight management plan: Discussed healthy diet and exercise guidelines      He reports that he has never smoked. He has never used smokeless tobacco.      Appropriate preventive services were discussed with this patient, including applicable screening as appropriate for cardiovascular disease, diabetes, osteopenia/osteoporosis, and glaucoma.  As appropriate for age/gender, discussed screening for colorectal cancer, prostate cancer, breast cancer, and cervical cancer. Checklist reviewing preventive services available has been given to the patient.    Reviewed patients plan of care and provided an AVS. The Basic Care Plan (routine screening as documented in Health Maintenance) for Bhavesh meets the Care Plan requirement. This Care Plan has been established and reviewed with the Patient.          Buck Emery MD  [facility]    Identified Health Risks:    I have reviewed Opioid Use Disorder and Substance Use Disorder risk factors and made " any needed referrals.

## 2023-06-01 RX ORDER — LOSARTAN POTASSIUM 50 MG/1
50 TABLET ORAL DAILY
Qty: 90 TABLET | Refills: 0 | OUTPATIENT
Start: 2023-06-01

## 2023-06-06 ENCOUNTER — TELEPHONE (OUTPATIENT)
Dept: FAMILY MEDICINE | Facility: CLINIC | Age: 68
End: 2023-06-06
Payer: COMMERCIAL

## 2023-06-06 NOTE — TELEPHONE ENCOUNTER
"  Forms/Letter Request    Type of form/letter: DIONI OROZCO    Have you been seen for this request: Yes 5/31/2023    Do we have the form/letter: Yes: IN mailbox    Who is the form from? Insurance comp    Where did/will the form come from? Patient or family brought in       When is form/letter needed by: \"asap\"    How would you like the form/letter returned: Mail  Send in envelope that is with form please  Or call [phone]    Patient Notified form requests are processed in 3-5 business days:Yes    Could we send this information to you in [employer] or would you prefer to receive a phone call?:   Patient would prefer a phone call   Okay to leave a detailed message?: Yes at Cell number on file:    Telephone Information:   Mobile [phone]     "

## 2023-07-11 ENCOUNTER — HOSPITAL ENCOUNTER (OUTPATIENT)
Dept: CARDIOLOGY | Facility: CLINIC | Age: 68
Discharge: HOME | End: 2023-07-11
Attending: FAMILY MEDICINE | Admitting: FAMILY MEDICINE
Payer: COMMERCIAL

## 2023-07-11 DIAGNOSIS — I35.0 AORTIC VALVE STENOSIS, ETIOLOGY OF CARDIAC VALVE DISEASE UNSPECIFIED: ICD-10-CM

## 2023-07-11 LAB — LVEF ECHO: NORMAL

## 2023-07-11 PROCEDURE — 999N000208 ECHOCARDIOGRAM COMPLETE

## 2023-07-11 PROCEDURE — 93306 TTE W/DOPPLER COMPLETE: CPT | Mod: 26 | Performed by: INTERNAL MEDICINE

## 2023-07-11 PROCEDURE — 255N000002 HC RX 255 OP 636: Performed by: INTERNAL MEDICINE

## 2023-07-11 RX ADMIN — HUMAN ALBUMIN MICROSPHERES AND PERFLUTREN 6 ML: 10; .22 INJECTION, SOLUTION INTRAVENOUS at 14:31

## 2023-11-06 ENCOUNTER — OFFICE VISIT (OUTPATIENT)
Dept: ORTHOPEDICS | Facility: CLINIC | Age: 68
End: 2023-11-06
Payer: COMMERCIAL

## 2023-11-06 VITALS
HEIGHT: 67 IN | DIASTOLIC BLOOD PRESSURE: 70 MMHG | BODY MASS INDEX: 36.88 KG/M2 | SYSTOLIC BLOOD PRESSURE: 130 MMHG | TEMPERATURE: 97.7 F | WEIGHT: 235 LBS

## 2023-11-06 DIAGNOSIS — M17.12 PRIMARY OSTEOARTHRITIS OF LEFT KNEE: ICD-10-CM

## 2023-11-06 DIAGNOSIS — M17.11 PRIMARY OSTEOARTHRITIS OF RIGHT KNEE: Primary | ICD-10-CM

## 2023-11-06 PROCEDURE — 20610 DRAIN/INJ JOINT/BURSA W/O US: CPT | Mod: 50 | Performed by: PHYSICIAN ASSISTANT

## 2023-11-06 RX ORDER — BUPIVACAINE HYDROCHLORIDE 5 MG/ML
3 INJECTION, SOLUTION PERINEURAL
Status: SHIPPED | OUTPATIENT
Start: 2023-11-06

## 2023-11-06 RX ORDER — TRIAMCINOLONE ACETONIDE 40 MG/ML
80 INJECTION, SUSPENSION INTRA-ARTICULAR; INTRAMUSCULAR
Status: SHIPPED | OUTPATIENT
Start: 2023-11-06

## 2023-11-06 RX ADMIN — BUPIVACAINE HYDROCHLORIDE 3 ML: 5 INJECTION, SOLUTION PERINEURAL at 09:27

## 2023-11-06 RX ADMIN — TRIAMCINOLONE ACETONIDE 80 MG: 40 INJECTION, SUSPENSION INTRA-ARTICULAR; INTRAMUSCULAR at 09:27

## 2023-11-06 ASSESSMENT — PAIN SCALES - GENERAL: PAINLEVEL: NO PAIN (0)

## 2023-11-06 NOTE — PROGRESS NOTES
"Office Visit-Follow up    Chief Complaint: Bhavesh Landeros is a 68 year old male who is being seen for   Chief Complaint   Patient presents with    RECHECK     Bilateral knee primary osteoarthritis, severe medial and lateral and moderate patellofemora       History of Present Illness:   Patient is here in follow-up for steroid injections.  Patient was last seen on 1/30/2023 and at that time received bilateral steroid injections which lasted about 10 months.  Prior to that the left knee injection lasted 3 months in the right knee 7 months from the bilateral steroid injection done on 6/2/2021 by Larry Pierre NP.  Dr. Holguin gave on 10/21/2021 left knee steroid injection.  Patient uses Aleve which works for him.  He has good kidney function.    Physical Exam:  Vitals: /70 (BP Location: Left arm, Cuff Size: Adult Large)   Temp 97.7  F (36.5  C) (Temporal)   Ht 1.711 m (5' 7.36\")   Wt 106.6 kg (235 lb)   BMI 36.41 kg/m    BMI= Body mass index is 36.41 kg/m .  Constitutional: healthy, alert and no acute distress   Psychiatric: mentation appears normal and affect normal/bright  NEURO: no focal deficits, CMS intact bilateral lower extremities   RESP: Normal with easy respirations and no use of accessory muscles to breathe, no audible wheezing or retractions  CV: Calves soft and nontender to palpation, bilateral legs warm   SKIN: No erythema, rashes, excoriation, or breakdown. No evidence of infection.   MUSCULOSKELETAL:  INSPECTION of bilateral knees: No gross deformities, erythema, edema, ecchymosis, atrophy or fasciculations.  Inspection done bilaterally.  PALPATION: No tenderness on palpation of the medial, lateral, anterior and posterior portion of the knee. No specific joint line tenderness. No increased warmth.  No effusion.  Test done bilaterally.  ROM: Able to flex and extend without any catching or locking or pain.  STRENGTH: Able to get on the exam table and off without any problems and able to ambulate " without any problems.  SPECIAL TEST: None today.   GAIT: non-antalgic, however slight bilateral after the injections  Lymph: no palp able lymph nodes    Impression: Bilateral knee primary osteoarthritis, severe medial and lateral and moderate patellofemoral.     Plan:    Large Joint Injection/Arthocentesis: bilateral knee    Date/Time: 11/6/2023 9:27 AM    Performed by: Shahab Mera PA-C  Authorized by: Shahab Mera PA-C    Indications:  Pain  Needle Size:  22 G  Guidance: landmark guided    Approach:  Anterolateral  Location:  Knee  Laterality:  Bilateral      Medications (Right):  80 mg triamcinolone 40 MG/ML; 3 mL BUPivacaine 0.5 %  Aspirate amount (mL):  0  Medications (Left):  80 mg triamcinolone 40 MG/ML; 3 mL BUPivacaine 0.5 %  Aspirate amount (mL):  0  Procedure discussed: discussed risks, benefits, and alternatives    Consent Given by:  Patient  Timeout: timeout called immediately prior to procedure    Prep: patient was prepped and draped in usual sterile fashion     The skin was prepped with betadine.  Patient was in seated position. I used carol chloride spray prior to doing the injections. The patient tolerated the injections well, and there were no complications. The injection sites were covered with a Band-Aid.                                        FOCUSED PLAN:     Patient received about 10 months of relief from his bilateral steroid injections which were done on 1/30/2023 by myself.  Patient is happy with just getting steroid injection and is not interested in knee replacement surgery.  I did briefly mention Mobic or Voltaren gel but he wants to hold off on that.  He can follow-up on an as-needed basis.    Re-x-ray on return: No      This note was dictated with iPower[employer].    Shahab Mera PA-C

## 2023-11-06 NOTE — LETTER
"    11/6/2023         RE: Bhavesh Landeros  [address]  [facility] [address]        Dear Colleague,    Thank you for referring your patient, Bhavesh Landeros, to the [facility]. Please see a copy of my visit note below.    Office Visit-Follow up    Chief Complaint: Bhavesh Landeros is a 68 year old male who is being seen for   Chief Complaint   Patient presents with     RECHECK     Bilateral knee primary osteoarthritis, severe medial and lateral and moderate patellofemora       History of Present Illness:   Patient is here in follow-up for steroid injections.  Patient was last seen on 1/30/2023 and at that time received bilateral steroid injections which lasted about 10 months.  Prior to that the left knee injection lasted 3 months in the right knee 7 months from the bilateral steroid injection done on 6/2/2021 by Larry Pierre NP.  Dr. Holguin gave on 10/21/2021 left knee steroid injection.  Patient uses Aleve which works for him.  He has good kidney function.    Physical Exam:  Vitals: /70 (BP Location: Left arm, Cuff Size: Adult Large)   Temp 97.7  F (36.5  C) (Temporal)   Ht 1.711 m (5' 7.36\")   Wt 106.6 kg (235 lb)   BMI 36.41 kg/m    BMI= Body mass index is 36.41 kg/m .  Constitutional: healthy, alert and no acute distress   Psychiatric: mentation appears normal and affect normal/bright  NEURO: no focal deficits, CMS intact bilateral lower extremities   RESP: Normal with easy respirations and no use of accessory muscles to breathe, no audible wheezing or retractions  CV: Calves soft and nontender to palpation, bilateral legs warm   SKIN: No erythema, rashes, excoriation, or breakdown. No evidence of infection.   MUSCULOSKELETAL:  INSPECTION of bilateral knees: No gross deformities, erythema, edema, ecchymosis, atrophy or fasciculations.  Inspection done bilaterally.  PALPATION: No tenderness on palpation of the medial, lateral, anterior and posterior portion of the knee. " No specific joint line tenderness. No increased warmth.  No effusion.  Test done bilaterally.  ROM: Able to flex and extend without any catching or locking or pain.  STRENGTH: Able to get on the exam table and off without any problems and able to ambulate without any problems.  SPECIAL TEST: None today.   GAIT: non-antalgic, however slight bilateral after the injections  Lymph: no palp able lymph nodes    Impression: Bilateral knee primary osteoarthritis, severe medial and lateral and moderate patellofemoral.     Plan:    Large Joint Injection/Arthocentesis: bilateral knee    Date/Time: 11/6/2023 9:27 AM    Performed by: Shahab Mera PA-C  Authorized by: Shahab Mera PA-C    Indications:  Pain  Needle Size:  22 G  Guidance: landmark guided    Approach:  Anterolateral  Location:  Knee  Laterality:  Bilateral      Medications (Right):  80 mg triamcinolone 40 MG/ML; 3 mL BUPivacaine 0.5 %  Aspirate amount (mL):  0  Medications (Left):  80 mg triamcinolone 40 MG/ML; 3 mL BUPivacaine 0.5 %  Aspirate amount (mL):  0  Procedure discussed: discussed risks, benefits, and alternatives    Consent Given by:  Patient  Timeout: timeout called immediately prior to procedure    Prep: patient was prepped and draped in usual sterile fashion     The skin was prepped with betadine.  Patient was in seated position. I used carol chloride spray prior to doing the injections. The patient tolerated the injections well, and there were no complications. The injection sites were covered with a Band-Aid.                                        FOCUSED PLAN:     Patient received about 10 months of relief from his bilateral steroid injections which were done on 1/30/2023 by myself.  Patient is happy with just getting steroid injection and is not interested in knee replacement surgery.  I did briefly mention Mobic or Voltaren gel but he wants to hold off on that.  He can follow-up on an as-needed basis.    Re-x-ray on return: No      This note  was dictated with [facility].    Shahab Mera PA-C        Again, thank you for allowing me to participate in the care of your patient.        Sincerely,        Shahab Mera PA-C

## 2023-12-26 NOTE — PATIENT INSTRUCTIONS
You have a new heart murmur so we are going to evaluate this further with an echocardiogram, EKG, blood work and chest x-ray.     I will see you back on Friday to discuss the findings. In the meantime I recommend sleeping propped up on a couple of pillows and avoiding any overexertion until we have evaluated this further.    I also want you to start taking a baby aspirin 81 mg every day.  You can  a bottle of aspirin at the pharmacy or grocery store.    Radha Gonzalez, JARROD-C   
Adult

## 2024-01-08 ENCOUNTER — HOSPITAL ENCOUNTER (OUTPATIENT)
Dept: GENERAL RADIOLOGY | Facility: CLINIC | Age: 69
Discharge: HOME | End: 2024-01-08
Payer: COMMERCIAL

## 2024-01-08 ENCOUNTER — OFFICE VISIT (OUTPATIENT)
Dept: FAMILY MEDICINE | Facility: CLINIC | Age: 69
End: 2024-01-08
Payer: COMMERCIAL

## 2024-01-08 VITALS
WEIGHT: 233.38 LBS | SYSTOLIC BLOOD PRESSURE: 134 MMHG | HEIGHT: 68 IN | DIASTOLIC BLOOD PRESSURE: 78 MMHG | RESPIRATION RATE: 20 BRPM | HEART RATE: 68 BPM | TEMPERATURE: 98.6 F | OXYGEN SATURATION: 98 % | BODY MASS INDEX: 35.37 KG/M2

## 2024-01-08 DIAGNOSIS — K21.00 GASTROESOPHAGEAL REFLUX DISEASE WITH ESOPHAGITIS, UNSPECIFIED WHETHER HEMORRHAGE: Primary | ICD-10-CM

## 2024-01-08 DIAGNOSIS — R06.02 SOB (SHORTNESS OF BREATH): ICD-10-CM

## 2024-01-08 DIAGNOSIS — R10.13 EPIGASTRIC PAIN: ICD-10-CM

## 2024-01-08 DIAGNOSIS — R14.0 BLOATING: ICD-10-CM

## 2024-01-08 DIAGNOSIS — K44.9 HIATAL HERNIA: ICD-10-CM

## 2024-01-08 DIAGNOSIS — I08.0 MITRAL REGURGITATION AND AORTIC STENOSIS: ICD-10-CM

## 2024-01-08 DIAGNOSIS — K59.00 CONSTIPATION, UNSPECIFIED CONSTIPATION TYPE: ICD-10-CM

## 2024-01-08 PROCEDURE — 74019 RADEX ABDOMEN 2 VIEWS: CPT

## 2024-01-08 PROCEDURE — 99214 OFFICE O/P EST MOD 30 MIN: CPT

## 2024-01-08 PROCEDURE — 71046 X-RAY EXAM CHEST 2 VIEWS: CPT

## 2024-01-08 RX ORDER — RESPIRATORY SYNCYTIAL VIRUS VACCINE 120MCG/0.5
0.5 KIT INTRAMUSCULAR ONCE
Qty: 1 EACH | Refills: 0 | Status: CANCELLED | OUTPATIENT
Start: 2024-01-08 | End: 2024-01-08

## 2024-01-08 RX ORDER — SUCRALFATE 1 G/1
1 TABLET ORAL 4 TIMES DAILY PRN
Qty: 90 TABLET | Refills: 0 | Status: SHIPPED | OUTPATIENT
Start: 2024-01-08 | End: 2024-06-03

## 2024-01-08 RX ORDER — OMEPRAZOLE 40 MG/1
40 CAPSULE, DELAYED RELEASE ORAL DAILY
Qty: 90 CAPSULE | Refills: 3 | Status: SHIPPED | OUTPATIENT
Start: 2024-01-08 | End: 2024-06-03

## 2024-01-08 RX ORDER — SIMETHICONE 125 MG
125 TABLET,CHEWABLE ORAL 2 TIMES DAILY PRN
Qty: 60 TABLET | Refills: 0 | Status: SHIPPED | OUTPATIENT
Start: 2024-01-08 | End: 2024-06-03

## 2024-01-08 RX ORDER — FAMOTIDINE 20 MG/1
20 TABLET, FILM COATED ORAL 2 TIMES DAILY
Qty: 30 TABLET | Refills: 3 | Status: SHIPPED | OUTPATIENT
Start: 2024-01-08 | End: 2024-06-03

## 2024-01-08 ASSESSMENT — ASTHMA QUESTIONNAIRES
ACT_TOTALSCORE: 15
QUESTION_4 LAST FOUR WEEKS HOW OFTEN HAVE YOU USED YOUR RESCUE INHALER OR NEBULIZER MEDICATION (SUCH AS ALBUTEROL): ONE OR TWO TIMES PER DAY
ACT_TOTALSCORE: 15
QUESTION_2 LAST FOUR WEEKS HOW OFTEN HAVE YOU HAD SHORTNESS OF BREATH: MORE THAN ONCE A DAY
QUESTION_3 LAST FOUR WEEKS HOW OFTEN DID YOUR ASTHMA SYMPTOMS (WHEEZING, COUGHING, SHORTNESS OF BREATH, CHEST TIGHTNESS OR PAIN) WAKE YOU UP AT NIGHT OR EARLIER THAN USUAL IN THE MORNING: ONCE OR TWICE
QUESTION_1 LAST FOUR WEEKS HOW MUCH OF THE TIME DID YOUR ASTHMA KEEP YOU FROM GETTING AS MUCH DONE AT WORK, SCHOOL OR AT HOME: NONE OF THE TIME
QUESTION_5 LAST FOUR WEEKS HOW WOULD YOU RATE YOUR ASTHMA CONTROL: SOMEWHAT CONTROLLED

## 2024-01-08 ASSESSMENT — ENCOUNTER SYMPTOMS: SHORTNESS OF BREATH: 1

## 2024-01-08 NOTE — PATIENT INSTRUCTIONS
GERD/epigastric pain:  Known GERD.  He has been taking omeprazole 20 mg at bedtime after dinner.  Recommend increasing omeprazole to 40 mg in the morning on empty stomach.  Wait 1 hour prior to taking other medications or eating.  Start taking famotidine (Pepcid) at bedtime each night  You may take Carafate 1 tablet by mouth up to 4 times daily as needed prior to eating meals  Discussed additional lifestyle changes as described below  Recommend follow-up with GI, referral was placed.  Schedule follow-up appointment to primary care provider next available provider    Bloating:  Recommend simethicone twice daily as needed for gas and bloating  Recommend MiraLAX daily for constipation management as described below    Shortness of breath/hiatal hernia:  Recommend chest x-ray today for further investigation  Discussed differential diagnoses including and not limited to: Enlarging hiatal hernia, constipation, bowel obstruction, gastritis, PUD, viral illness, pneumonia, COPD, versus other.  Offered testing for viral illnesses including COVID, flu.  Patient declined.  Recommend general surgery consult for hiatal hernia hiatal hernia, referral was placed.,  Continue albuterol as needed.    Constipation:  Miralax:   Recommend taking 1 capful (17g) of powder mixed with 8oz of water daily until you are having soft regular stools. If your stools are too soft you can cut the dose in half or stop. If 1 capful is not enough you can do it twice daily. Continue with increased fluids and fiber.     Recommend increasing your fiber intake.  If you do not feel that increasing fiber in your diet is achievable, you can use a supplement called FiberCon. It is available over the counter.  It is important to take FiberCon with your evening meal, this will prevent the bloating and gas that is typically associated with fiber supplements. You can start with 1 tablet for 1-2 weeks and increase to 2 tablets as tolerated. It is also important to  note that adequate water intake is necessary to maximize the benefits from fiber supplements.  You should drink approximately 64 oz of water daily, unless you have been directed otherwise by your provider.  You can tell if you are drinking enough water if you are rarely thirsty and your urine is colorless or light in color.  In regards to exercise, 30 minutes of moderate exercise 5 days a week or 150 minutes weekly. You may use Miralax over the counter as needed for constipation.       Mitral regurgitation and aortic stenosis:  Follow-up with cardiology  Follow-up with primary care provider    Gastroesophageal Reflux Disease (GERD):  - Encouraged to avoid acid-producing triggers such as stress, NSAIDs, caffeine, alcohol, tobacco, citrus fruits and juices, tomatoes, spicy, fatty or greasy meals.   - Avoid eating three hours before bed.   - A proton pump inhibitor such as Prilosec (omeprazole) should be taken 30-60 minutes before eating or drinking anything other than water in the morning. It may be repeated if need be also on an empty stomach in the late afternoon before the evening meal if symptoms worsen in the evening.   - Over the counter medications such as Tums, Zantac (ranitidine) or Pepcid may also be used for instant relief.   - If symptoms do not improve, call or return to clinic for further evaluation and treatment options.    Patient understood and verbally consented to the treatment plan. Discussed symptoms that would warrant an urgent or emergent visit. All of the patients' questions were answered. Patient was instructed to contact the clinic if questions or concerns arise. Recommend follow up appointments if symptoms worsen or fail to improve. Recommend follow up as needed. Recommend ER in the case of an emergency.    Nayeli Arroyo PA-C    Please note: Voice recognition software may have been used in preparing this note, unintended word substitutions may be present.

## 2024-01-08 NOTE — COMMUNITY RESOURCES LIST (ENGLISH)
01/08/2024   [facility]  N/A  For questions about this resource list or additional care needs, please contact your primary care clinic or care manager.  Phone: [phone]   Email: N/A   Address: [address] [place] [address]   Hours: N/A        Food and Nutrition       Food pantry  1  [facility] ([facility]) Distance: 10.62 miles      [facility]   [address] [place] [address]  Language: English, Citizen of [place]  Hours: Mon 10:00 AM - 1:30 PM Appt. Only, Wed 10:00 AM - 1:30 PM Appt. Only, Thu 4:30 PM - 6:00 PM Appt. Only, Fri 10:00 AM - 12:00 PM  Fees: Free   Phone: (969) 826-5391 Email: info@[employer].i.Meter Website: http://www.[employer].i.Meter     2  [facility] Distance: 12.1 miles      In-Person, [place], Phone/Virtual   [address] [place] [address]  Language: English  Hours: Mon 5:30 PM - 7:30 PM Appt. Only, Tue 11:00 AM - 12:30 PM , Wed 9:00 AM - 11:00 AM Appt. Only, Thu 5:30 PM - 7:30 PM Appt. Only, Fri 11:30 AM - 12:30 PM  Fees: Free   Phone: (459) 810-1603 Email: director@[employer].i.Meter Website: [email or web address]/     SNAP application assistance  3  [facility] Distance: 11.67 miles      In-Person   [address] [place] [address]  Language: English  Hours: Mon - Fri 8:00 AM - 4:30 PM  Fees: Free   Phone: (535) 966-4971 Email: [email or web address]. Website: [email or web address]     Soup kitchen or free meals  4  [facility] Distance: 21 miles      In-Person   [address] [place] [address]  Language: English  Hours: Wed 5:30 PM - 6:15 PM  Fees: Free   Phone: (673) 179-6592 Email: humble@mtMeditech.org Website: http://[email or web address]/     5  [facility] - Family Table Meal Distance: 24.52 miles      [facility]   [address] [place] [address]  Language: English  Hours:  Thu 5:00 PM - 6:30 PM  Fees: Free   Phone: (232) 297-1693 Email: [email or web address] Website: [email or web address]          Important Numbers & Websites       Emergency Services   911  [facility] Services   311  Poison Control   (325) 721-3668  Suicide Prevention Lifeline   (443) 207-7246 (TALK)  Child Abuse Hotline   (180) 138-3696 (4-A-Child)  Sexual Assault Hotline   (421) 575-6168 (HOPE)  National Runaway Safeline   (427) 973-3470 (RUNAWAY)  All-Options Talkline   (520) 149-3721  Substance Abuse Referral   (711) 434-2269 (HELP)

## 2024-01-08 NOTE — PROGRESS NOTES
"  Assessment & Plan     Gastroesophageal reflux disease with esophagitis, unspecified whether hemorrhage  - omeprazole (PRILOSEC) 40 MG DR capsule; Take 1 capsule (40 mg) by mouth daily  - famotidine (PEPCID) 20 MG tablet; Take 1 tablet (20 mg) by mouth 2 times daily  - sucralfate (CARAFATE) 1 GM tablet; Take 1 tablet (1 g) by mouth 4 times daily as needed (Prior to meals)  - Adult GI  Referral - Consult Only; Future    Epigastric pain  - sucralfate (CARAFATE) 1 GM tablet; Take 1 tablet (1 g) by mouth 4 times daily as needed (Prior to meals)  - XR Abdomen 2 Views; Future  - Adult GI  Referral - Consult Only; Future    Bloating  - simethicone (MYLICON) 125 MG chewable tablet; Take 1 tablet (125 mg) by mouth 2 times daily as needed for intestinal gas  - sucralfate (CARAFATE) 1 GM tablet; Take 1 tablet (1 g) by mouth 4 times daily as needed (Prior to meals)  - XR Abdomen 2 Views; Future  - Adult GI  Referral - Consult Only; Future    Hiatal hernia  - sucralfate (CARAFATE) 1 GM tablet; Take 1 tablet (1 g) by mouth 4 times daily as needed (Prior to meals)  - XR Abdomen 2 Views; Future  - Adult General Surg Referral; Future  - Adult GI  Referral - Consult Only; Future    SOB (shortness of breath)  - XR Chest 2 Views; Future    Constipation, unspecified constipation type    Mitral regurgitation and aortic stenosis    See instructions    Ordering of each unique test  Prescription drug management  I spent a total of 37 minutes on the day of the visit.   Time spent by me doing chart review, history and exam, documentation and further activities per the note       BMI:   Estimated body mass index is 36.01 kg/m  as calculated from the following:    Height as of this encounter: 1.715 m (5' 7.5\").    Weight as of this encounter: 105.9 kg (233 lb 6 oz).   Weight management plan: Patient was referred to their PCP to discuss a diet and exercise plan.    See Patient Instructions  Patient " Instructions   GERD/epigastric pain:  Known GERD.  He has been taking omeprazole 20 mg at bedtime after dinner.  Recommend increasing omeprazole to 40 mg in the morning on empty stomach.  Wait 1 hour prior to taking other medications or eating.  Start taking famotidine (Pepcid) at bedtime each night  You may take Carafate 1 tablet by mouth up to 4 times daily as needed prior to eating meals  Discussed additional lifestyle changes as described below  Recommend follow-up with GI, referral was placed.  Schedule follow-up appointment to primary care provider next available provider    Bloating:  Recommend simethicone twice daily as needed for gas and bloating  Recommend MiraLAX daily for constipation management as described below    Shortness of breath/hiatal hernia:  Recommend chest x-ray today for further investigation  Discussed differential diagnoses including and not limited to: Enlarging hiatal hernia, constipation, bowel obstruction, gastritis, PUD, viral illness, pneumonia, COPD, versus other.  Offered testing for viral illnesses including COVID, flu.  Patient declined.  Recommend general surgery consult for hiatal hernia hiatal hernia, referral was placed.,  Continue albuterol as needed.    Constipation:  Miralax:   Recommend taking 1 capful (17g) of powder mixed with 8oz of water daily until you are having soft regular stools. If your stools are too soft you can cut the dose in half or stop. If 1 capful is not enough you can do it twice daily. Continue with increased fluids and fiber.     Recommend increasing your fiber intake.  If you do not feel that increasing fiber in your diet is achievable, you can use a supplement called FiberCon. It is available over the counter.  It is important to take FiberCon with your evening meal, this will prevent the bloating and gas that is typically associated with fiber supplements. You can start with 1 tablet for 1-2 weeks and increase to 2 tablets as tolerated. It is also  important to note that adequate water intake is necessary to maximize the benefits from fiber supplements.  You should drink approximately 64 oz of water daily, unless you have been directed otherwise by your provider.  You can tell if you are drinking enough water if you are rarely thirsty and your urine is colorless or light in color.  In regards to exercise, 30 minutes of moderate exercise 5 days a week or 150 minutes weekly. You may use Miralax over the counter as needed for constipation.       Mitral regurgitation and aortic stenosis:  Follow-up with cardiology  Follow-up with primary care provider    Gastroesophageal Reflux Disease (GERD):  - Encouraged to avoid acid-producing triggers such as stress, NSAIDs, caffeine, alcohol, tobacco, citrus fruits and juices, tomatoes, spicy, fatty or greasy meals.   - Avoid eating three hours before bed.   - A proton pump inhibitor such as Prilosec (omeprazole) should be taken 30-60 minutes before eating or drinking anything other than water in the morning. It may be repeated if need be also on an empty stomach in the late afternoon before the evening meal if symptoms worsen in the evening.   - Over the counter medications such as Tums, Zantac (ranitidine) or Pepcid may also be used for instant relief.   - If symptoms do not improve, call or return to clinic for further evaluation and treatment options.    Patient understood and verbally consented to the treatment plan. Discussed symptoms that would warrant an urgent or emergent visit. All of the patients' questions were answered. Patient was instructed to contact the clinic if questions or concerns arise. Recommend follow up appointments if symptoms worsen or fail to improve. Recommend follow up as needed. Recommend ER in the case of an emergency.    Nayeli Arroyo PA-C    Please note: Voice recognition software may have been used in preparing this note, unintended word substitutions may be present.            Nayeli  DAKOTA Arroyo [facility] LUIS Ospina is a 68 year old, presenting for the following health issues:  Abdominal Pain and Shortness of Breath        1/8/2024     2:37 PM   Additional Questions   Roomed by Debbie REVELES MA       History of Present Illness       Reason for visit:  Uncomfortable breathing    He eats 0-1 servings of fruits and vegetables daily.He consumes 0 sweetened beverage(s) daily.He exercises with enough effort to increase his heart rate 30 to 60 minutes per day.  He exercises with enough effort to increase his heart rate 7 days per week.   He is taking medications regularly.         Concern - sob  Onset: 1.5 months ago  Description: shortnes of breath and a pain at his hyatyal hernia  Intensity: severe  Progression of Symptoms:  worsening today is better  but was worsening  Accompanying Signs & Symptoms: shortness of breath and a pain in the stomach its airy and makes noises  Previous history of similar problem: none  Precipitating factors:        Worsened by:  working   Alleviating factors:        Improved by: resting   Therapies tried and outcome: None      Patient presents with concerns of stomach issues.  He has GERD and has been taking omeprazole 20 mg daily.  His last Medicare annual wellness visit and lab work was on 5/31/2023 and weight loss was encouraged at that time.  Last abdominal imaging was on 2/24/2022 with nonobstructive Bowel gas patterns, no free air, unchanged fluid bolus over lying the pelvis, splenic capsular calcifications overlying the left upper quadrant which has been seen prior on CT. notes the reason for the visit is uncomfortable breathing and feeling like he cannot take a full deep breath.    He reports abdominal fullness and bloating.  He reports that he usually has several bowel movements a day and he had a few small bowel movements this morning.  He has had changes in breathing pattern over the past month and a half.  He reports that  "he has a hiatal hernia and is concerned that this may be affecting his breathing.  He reports epigastric pain that is sharp that comes and goes.  He feels pain radiating over the esophagus area superior to the epigastric area.  He has not had a surgical procedure on his hiatal hernia.  He states that when he is not thinking about his breathing does not bother him, however sometimes he feels like he constantly has to take a big deep breath to get a full breath in.  Negative for fever, chills, chest pain, hemoptysis, nausea, vomiting, reduced appetite, or urinary symptoms.  Using albuterol as needed without relief and states he has asthma.  No history of smoking.  He does have a cough that is intermittent.  Symptoms do not appear to change with food or bowel movements.  He states that he always feels full and has early satiety.  Reports that his stomach feels bloated and hard.  He has not had any leg swelling, hemoptysis, or recent URI illness.  Reports his cough is dry.  It happens a few times during the night.  He has not had evaluation for ALEK and has never had a sleep study.  Negative for wheezing.  Symptoms appear to worsen with sitting with the epigastric pain and is better if he stands up.  No fevers or chills.  He does note that he has diastases recti, umbilical hernia, and hiatal hernia.    Review of Systems   Respiratory:  Positive for shortness of breath.       Constitutional, HEENT, cardiovascular, pulmonary, GI, , musculoskeletal, neuro, skin, endocrine and psych systems are negative, except as otherwise noted.      Objective    /78   Pulse 68   Temp 98.6  F (37  C) (Temporal)   Resp 20   Ht 1.715 m (5' 7.5\")   Wt 105.9 kg (233 lb 6 oz)   SpO2 98%   BMI 36.01 kg/m    Body mass index is 36.01 kg/m .  Physical Exam   GENERAL: healthy, alert and no distress  EYES: Eyes grossly normal to inspection, PERRL and conjunctivae and sclerae normal  RESP: lungs clear to auscultation - no rales, rhonchi " or wheezes  CV: regular rate and rhythm, normal S1 S2, no S3 or S4, no murmur, click or rub, no peripheral edema and peripheral pulses strong  ABDOMEN: Mildly distended, soft, tender to palpation in the epigastric region.  There is no guarding, rigidity, or rebound tenderness.  Negative Romero sign, Rovsing sign, and Romero sign.  Diastases recti noted when patient lifts his head from the exam table.  Umbilical hernia noted.  No hepatosplenomegaly, no masses and bowel sounds normal  MS: no gross musculoskeletal defects noted, no edema  SKIN: no suspicious lesions or rashes  NEURO: Normal strength and tone, mentation intact and speech normal  PSYCH: mentation appears normal, affect normal/bright    Hospital Outpatient Visit on 07/11/2023   Component Date Value Ref Range Status    LVEF  07/11/2023 60-65%   Final     Results for orders placed or performed during the hospital encounter of 01/08/24   XR Abdomen 2 Views     Status: None    Narrative    XR ABDOMEN 2 VIEWS   1/8/2024 3:43 PM     HISTORY: Bloating; Hiatal hernia; Epigastric pain    COMPARISON: Abdominal radiograph 2/24/2022, CT 8/10/2020      Impression    IMPRESSION: No evidence of bowel obstruction or pneumoperitoneum.  Visualized portions of the lung bases are clear. No acute bony  abnormality.    KARRI HERNANDEZ MD         SYSTEM ID:  [id number]   Results for orders placed or performed during the hospital encounter of 01/08/24   XR Chest 2 Views     Status: None (Preliminary result)    Narrative    CHEST TWO VIEWS  1/8/2024 3:43 PM     HISTORY: 68-year-old patient with shortness of breath.       Impression    IMPRESSION: Since November 24, 2018, heart size is normal. No pleural  effusion, pneumothorax, or abnormal area of consolidation.     No results found for this or any previous visit (from the past 24 hour(s)).  XR Chest 2 Views    Result Date: 1/8/2024  CHEST TWO VIEWS  1/8/2024 3:43 PM HISTORY: 68-year-old patient with shortness of breath.      IMPRESSION: Since November 24, 2018, heart size is normal. No pleural effusion, pneumothorax, or abnormal area of consolidation.    XR Abdomen 2 Views    Result Date: 1/8/2024  XR ABDOMEN 2 VIEWS   1/8/2024 3:43 PM HISTORY: Bloating; Hiatal hernia; Epigastric pain COMPARISON: Abdominal radiograph 2/24/2022, CT 8/10/2020     IMPRESSION: No evidence of bowel obstruction or pneumoperitoneum. Visualized portions of the lung bases are clear. No acute bony abnormality. KARRI HERNANDEZ MD   SYSTEM ID:  [id number]

## 2024-01-09 NOTE — RESULT ENCOUNTER NOTE
Hello -    Here are my comments about the recent results. IMPRESSION: Since November 24, 2018, heart size is normal. No pleural effusion, pneumothorax, or abnormal area of consolidation.    Please let us know if you have any questions or concerns.    Regards,  Nayeli Arroyo PA-C

## 2024-01-09 NOTE — RESULT ENCOUNTER NOTE
Hello -    Here are my comments about the recent results.  No evidence of bowel obstruction or pneumoperitoneum.  The visualized portions of the lung bases are clear.  There are no acute bony abnormalities noted.    Please let us know if you have any questions or concerns.    Regards,  Nayeli Arroyo PA-C

## 2024-01-10 ENCOUNTER — PATIENT OUTREACH (OUTPATIENT)
Dept: ONCOLOGY | Facility: CLINIC | Age: 69
End: 2024-01-10
Payer: COMMERCIAL

## 2024-01-10 DIAGNOSIS — K44.9 HIATAL HERNIA: ICD-10-CM

## 2024-01-10 DIAGNOSIS — R10.13 EPIGASTRIC PAIN: ICD-10-CM

## 2024-01-10 DIAGNOSIS — R14.0 BLOATING: ICD-10-CM

## 2024-01-10 DIAGNOSIS — K21.00 GASTROESOPHAGEAL REFLUX DISEASE WITH ESOPHAGITIS, UNSPECIFIED WHETHER HEMORRHAGE: Primary | ICD-10-CM

## 2024-01-10 NOTE — PROGRESS NOTES
New Patient Oncology Nurse Navigator Note     Referring provider: Nayeli Arroyo PA-C    Referring Clinic/Organization: [facility]  Referred to: Thoracic Surgery  Requested provider (if applicable): First available - did not specify   Referral Received: 01/10/24  (Referral in wrong workqueue)     Evaluation for :   Diagnosis   K21.00 (ICD-10-CM) - Gastroesophageal reflux disease with esophagitis, unspecified whether hemorrhage   R14.0 (ICD-10-CM) - Bloating   K44.9 (ICD-10-CM) - Hiatal hernia   R10.13 (ICD-10-CM) - Epigastric pain        Clinical History (per Nurse review of records provided):      09/10/2020 Upper GI Endoscopy (bookmarked) showed:   Impression:          - 3 cm hiatal hernia.                        - Z-line variable. Biopsied.                        - Normal stomach.                        - Normal examined duodenum.     Clinical Assessment / Barriers to Care (Per Nurse):  Kirstin is a 67 y/o male who had an upper GI in 2020 which showed a 3 cm hiatal hernia. He was seen by his PCP 01/08/2024 for GERD, epigastric pain, bloating, SOB.     Records Location: [facility]     Records Needed: None  Additional testing needed prior to consult: CT Chest  Referral updates and Plan:     Per telephone note from 1/11/24, pt reports his symptoms has resolved and he does not feel the need to see GI or surgery at this time. Will reject referral for now.     MARK SeayN, RN, OCN  [facility] Oncology Nurse Navigator  [phone] / [phone]

## 2024-01-11 ENCOUNTER — TELEPHONE (OUTPATIENT)
Dept: FAMILY MEDICINE | Facility: CLINIC | Age: 69
End: 2024-01-11
Payer: COMMERCIAL

## 2024-01-11 NOTE — TELEPHONE ENCOUNTER
"Patient calling to state he had looked at his imaging results and questioned if any abnormalities? Informed per imaging done nothing was noted or any abnormalities related to his symptoms.  Patient stating he had received a call to schedule with a surgeon. \"What was that about?\" Informed that is in regards to his hernia.     Patient stating \"what ever was going on has resolved and I do not not feel the need to see GI or a surgeon at this time.\"   Informed we will make not in his chart. Deisy Weinstein LPN    "

## 2024-01-11 NOTE — TELEPHONE ENCOUNTER
Spoke with patient, informed of message from the provider. No further actions needed.    Renae Fleming, RN, BSN

## 2024-05-01 ENCOUNTER — PATIENT OUTREACH (OUTPATIENT)
Dept: CARE COORDINATION | Facility: CLINIC | Age: 69
End: 2024-05-01
Payer: COMMERCIAL

## 2024-06-02 SDOH — HEALTH STABILITY: PHYSICAL HEALTH: ON AVERAGE, HOW MANY MINUTES DO YOU ENGAGE IN EXERCISE AT THIS LEVEL?: 110 MIN

## 2024-06-02 SDOH — HEALTH STABILITY: PHYSICAL HEALTH: ON AVERAGE, HOW MANY DAYS PER WEEK DO YOU ENGAGE IN MODERATE TO STRENUOUS EXERCISE (LIKE A BRISK WALK)?: 7 DAYS

## 2024-06-02 ASSESSMENT — SOCIAL DETERMINANTS OF HEALTH (SDOH): HOW OFTEN DO YOU GET TOGETHER WITH FRIENDS OR RELATIVES?: PATIENT DECLINED

## 2024-06-03 ENCOUNTER — OFFICE VISIT (OUTPATIENT)
Dept: FAMILY MEDICINE | Facility: CLINIC | Age: 69
End: 2024-06-03
Payer: COMMERCIAL

## 2024-06-03 VITALS
HEIGHT: 67 IN | OXYGEN SATURATION: 97 % | WEIGHT: 228.2 LBS | BODY MASS INDEX: 35.82 KG/M2 | SYSTOLIC BLOOD PRESSURE: 128 MMHG | DIASTOLIC BLOOD PRESSURE: 78 MMHG | HEART RATE: 67 BPM | RESPIRATION RATE: 14 BRPM | TEMPERATURE: 98.8 F

## 2024-06-03 DIAGNOSIS — Z00.00 ENCOUNTER FOR MEDICARE ANNUAL WELLNESS EXAM: Primary | ICD-10-CM

## 2024-06-03 DIAGNOSIS — K21.00 GASTROESOPHAGEAL REFLUX DISEASE WITH ESOPHAGITIS, UNSPECIFIED WHETHER HEMORRHAGE: ICD-10-CM

## 2024-06-03 DIAGNOSIS — E78.2 MIXED HYPERLIPIDEMIA: ICD-10-CM

## 2024-06-03 DIAGNOSIS — E66.01 MORBID OBESITY (H): ICD-10-CM

## 2024-06-03 DIAGNOSIS — I10 BENIGN ESSENTIAL HYPERTENSION: ICD-10-CM

## 2024-06-03 LAB
ANION GAP SERPL CALCULATED.3IONS-SCNC: 11 MMOL/L (ref 7–15)
BUN SERPL-MCNC: 13.2 MG/DL (ref 8–23)
CALCIUM SERPL-MCNC: 9.2 MG/DL (ref 8.8–10.2)
CHLORIDE SERPL-SCNC: 105 MMOL/L (ref 98–107)
CHOLEST SERPL-MCNC: 119 MG/DL
CREAT SERPL-MCNC: 0.87 MG/DL (ref 0.67–1.17)
DEPRECATED HCO3 PLAS-SCNC: 23 MMOL/L (ref 22–29)
EGFRCR SERPLBLD CKD-EPI 2021: >90 ML/MIN/1.73M2
ERYTHROCYTE [DISTWIDTH] IN BLOOD BY AUTOMATED COUNT: 12.6 % (ref 10–15)
FASTING STATUS PATIENT QL REPORTED: YES
FASTING STATUS PATIENT QL REPORTED: YES
GLUCOSE SERPL-MCNC: 91 MG/DL (ref 70–99)
HCT VFR BLD AUTO: 40.6 % (ref 40–53)
HDLC SERPL-MCNC: 43 MG/DL
HGB BLD-MCNC: 13.9 G/DL (ref 13.3–17.7)
LDLC SERPL CALC-MCNC: 62 MG/DL
MCH RBC QN AUTO: 29.1 PG (ref 26.5–33)
MCHC RBC AUTO-ENTMCNC: 34.2 G/DL (ref 31.5–36.5)
MCV RBC AUTO: 85 FL (ref 78–100)
NONHDLC SERPL-MCNC: 76 MG/DL
PLATELET # BLD AUTO: 191 10E3/UL (ref 150–450)
POTASSIUM SERPL-SCNC: 4.1 MMOL/L (ref 3.4–5.3)
RBC # BLD AUTO: 4.77 10E6/UL (ref 4.4–5.9)
SODIUM SERPL-SCNC: 139 MMOL/L (ref 135–145)
TRIGL SERPL-MCNC: 71 MG/DL
WBC # BLD AUTO: 7.2 10E3/UL (ref 4–11)

## 2024-06-03 PROCEDURE — G0439 PPPS, SUBSEQ VISIT: HCPCS | Performed by: FAMILY MEDICINE

## 2024-06-03 PROCEDURE — 36415 COLL VENOUS BLD VENIPUNCTURE: CPT | Performed by: FAMILY MEDICINE

## 2024-06-03 PROCEDURE — 80061 LIPID PANEL: CPT | Performed by: FAMILY MEDICINE

## 2024-06-03 PROCEDURE — 85027 COMPLETE CBC AUTOMATED: CPT | Performed by: FAMILY MEDICINE

## 2024-06-03 PROCEDURE — 99214 OFFICE O/P EST MOD 30 MIN: CPT | Mod: 25 | Performed by: FAMILY MEDICINE

## 2024-06-03 PROCEDURE — 80048 BASIC METABOLIC PNL TOTAL CA: CPT | Performed by: FAMILY MEDICINE

## 2024-06-03 RX ORDER — RESPIRATORY SYNCYTIAL VIRUS VACCINE 120MCG/0.5
0.5 KIT INTRAMUSCULAR ONCE
Qty: 1 EACH | Refills: 0 | Status: CANCELLED | OUTPATIENT
Start: 2024-06-03 | End: 2024-06-03

## 2024-06-03 RX ORDER — LOSARTAN POTASSIUM 50 MG/1
50 TABLET ORAL DAILY
Qty: 90 TABLET | Refills: 3 | Status: SHIPPED | OUTPATIENT
Start: 2024-06-03

## 2024-06-03 RX ORDER — ATORVASTATIN CALCIUM 20 MG/1
20 TABLET, FILM COATED ORAL DAILY
Qty: 90 TABLET | Refills: 3 | Status: SHIPPED | OUTPATIENT
Start: 2024-06-03

## 2024-06-03 ASSESSMENT — ASTHMA QUESTIONNAIRES
ACT_TOTALSCORE: 20
QUESTION_2 LAST FOUR WEEKS HOW OFTEN HAVE YOU HAD SHORTNESS OF BREATH: ONCE A DAY
QUESTION_4 LAST FOUR WEEKS HOW OFTEN HAVE YOU USED YOUR RESCUE INHALER OR NEBULIZER MEDICATION (SUCH AS ALBUTEROL): ONCE A WEEK OR LESS
QUESTION_3 LAST FOUR WEEKS HOW OFTEN DID YOUR ASTHMA SYMPTOMS (WHEEZING, COUGHING, SHORTNESS OF BREATH, CHEST TIGHTNESS OR PAIN) WAKE YOU UP AT NIGHT OR EARLIER THAN USUAL IN THE MORNING: ONCE OR TWICE
ACT_TOTALSCORE: 20
QUESTION_5 LAST FOUR WEEKS HOW WOULD YOU RATE YOUR ASTHMA CONTROL: COMPLETELY CONTROLLED
QUESTION_1 LAST FOUR WEEKS HOW MUCH OF THE TIME DID YOUR ASTHMA KEEP YOU FROM GETTING AS MUCH DONE AT WORK, SCHOOL OR AT HOME: NONE OF THE TIME

## 2024-06-03 NOTE — PATIENT INSTRUCTIONS
"Preventive Care Advice   This is general advice we often give to help people stay healthy. Your care team may have specific advice just for you. Please talk to your care team about your own preventive care needs.  Lifestyle  Exercise at least 150 minutes each week (30 minutes a day, 5 days a week).  Do muscle strengthening activities 2 days a week. These help control your weight and prevent disease.  No smoking.  Wear sunscreen to prevent skin cancer.  Have your home tested for radon every 2 to 5 years. Radon is a colorless, odorless gas that can harm your lungs. To learn more, go to www.health.[facility].mn. and search for \"Radon in Homes.\"  Keep guns unloaded and locked up in a safe place like a safe or gun vault, or, use a gun lock and hide the keys. Always lock away bullets separately. To learn more, visit [place].gov and search for \"safe gun storage.\"  Nutrition  Eat 5 or more servings of fruits and vegetables each day.  Try wheat bread, brown rice and whole grain pasta (instead of white bread, rice, and pasta).  Get enough calcium and vitamin D. Check the label on foods and aim for 100% of the RDA (recommended daily allowance).  Regular exams  Have a dental exam and cleaning every 6 months.  See your health care team every year to talk about:  Any changes in your health.  Any medicines your care team has prescribed.  Preventive care, family planning, and ways to prevent chronic diseases.  Shots (vaccines)   HPV shots (up to age 26), if you've never had them before.  Hepatitis B shots (up to age 59), if you've never had them before.  COVID-19 shot: Get this shot when it's due.  Flu shot: Get a flu shot every year.  Tetanus shot: Get a tetanus shot every 10 years.  Pneumococcal, hepatitis A, and RSV shots: Ask your care team if you need these based on your risk.  Shingles shot (for age 50 and up).  General health tests  Diabetes screening:  Starting at age 35, Get screened for diabetes at least every 3 years.  If " you are younger than age 35, ask your care team if you should be screened for diabetes.  Cholesterol test: At age 39, start having a cholesterol test every 5 years, or more often if advised.  Bone density scan (DEXA): At age 50, ask your care team if you should have this scan for osteoporosis (brittle bones).  Hepatitis C: Get tested at least once in your life.  Abdominal aortic aneurysm screening: Talk to your doctor about having this screening if you:  Have ever smoked; and  Are biologically male; and  Are between the ages of 65 and 75.  STIs (sexually transmitted infections)  Before age 24: Ask your care team if you should be screened for STIs.  After age 24: Get screened for STIs if you're at risk. You are at risk for STIs (including HIV) if:  You are sexually active with more than one person.  You don't use condoms every time.  You or a partner was diagnosed with a sexually transmitted infection.  If you are at risk for HIV, ask about PrEP medicine to prevent HIV.  Get tested for HIV at least once in your life, whether you are at risk for HIV or not.  Cancer screening tests  Cervical cancer screening: If you have a cervix, begin getting regular cervical cancer screening tests at age 21. Most people who have regular screenings with normal results can stop after age 65. Talk about this with your provider.  Breast cancer scan (mammogram): If you've ever had breasts, begin having regular mammograms starting at age 40. This is a scan to check for breast cancer.  Colon cancer screening: It is important to start screening for colon cancer at age 45.  Have a colonoscopy test every 10 years (or more often if you're at risk) Or, ask your provider about stool tests like a FIT test every year or Cologuard test every 3 years.  To learn more about your testing options, visit: www.[employer]/639991.pdf.  For help making a decision, visit: irving/fi81251.  Prostate cancer screening test: If you have a prostate and are age 55  to 69, ask your provider if you would benefit from a yearly prostate cancer screening test.  Lung cancer screening: If you are a current or former smoker age 50 to 80, ask your care team if ongoing lung cancer screenings are right for you.  For informational purposes only. Not to replace the advice of your health care provider. Copyright   2023 [employer]. All rights reserved. Clinically reviewed by the [facility] Transitions Program. [employer] [id number] - REV 04/24.    Hearing Loss: Care Instructions  Overview     Hearing loss is a sudden or slow decrease in how well you hear. It can range from slight to profound. Permanent hearing loss can occur with aging. It also can happen when you are exposed long-term to loud noise. Examples include listening to loud music, riding motorcycles, or being around other loud machines.  Hearing loss can affect your work and home life. It can make you feel lonely or depressed. You may feel that you have lost your independence. But hearing aids and other devices can help you hear better and feel connected to others.  Follow-up care is a key part of your treatment and safety. Be sure to make and go to all appointments, and call your doctor if you are having problems. It's also a good idea to know your test results and keep a list of the medicines you take.  How can you care for yourself at home?  Avoid loud noises whenever possible. This helps keep your hearing from getting worse.  Always wear hearing protection around loud noises.  Wear a hearing aid as directed.  A professional can help you pick a hearing aid that will work best for you.  You can also get hearing aids over the counter for mild to moderate hearing loss.  Have hearing tests as your doctor suggests. They can show whether your hearing has changed. Your hearing aid may need to be adjusted.  Use other devices as needed. These may include:  Telephone amplifiers and hearing aids that can connect to a  "television, stereo, radio, or microphone.  Devices that use lights or vibrations. These alert you to the doorbell, a ringing telephone, or a baby monitor.  Television closed-captioning. This shows the words at the bottom of the screen. Most new TVs can do this.  TTY (text telephone). This lets you type messages back and forth on the telephone instead of talking or listening. These devices are also called TDD. When messages are typed on the keyboard, they are sent over the phone line to a receiving TTY. The message is shown on a monitor.  Use text messaging, social media, and email if it is hard for you to communicate by telephone.  Try to learn a listening technique called speechreading. It is not lipreading. You pay attention to people's gestures, expressions, posture, and tone of voice. These clues can help you understand what a person is saying. Face the person you are talking to, and have them face you. Make sure the lighting is good. You need to see the other person's face clearly.  Think about counseling if you need help to adjust to your hearing loss.  When should you call for help?  Watch closely for changes in your health, and be sure to contact your doctor if:    You think your hearing is getting worse.     You have new symptoms, such as dizziness or nausea.   Where can you learn more?  Go to https://www.[employer].net/patiented  Enter R798 in the search box to learn more about \"Hearing Loss: Care Instructions.\"  Current as of: September 27, 2023               Content Version: 14.0    1647-0385 [email or web address].   Care instructions adapted under license by your healthcare professional. If you have questions about a medical condition or this instruction, always ask your healthcare professional. [employer], [employer] disclaims any warranty or liability for your use of this information.      "

## 2024-06-03 NOTE — PROGRESS NOTES
Preventive Care Visit  [facility]  Buck Emery MD, Family Medicine  Young 3, 2024      Assessment & Plan     Encounter for Medicare annual wellness exam  Bhavesh is a 68-year-old male who presents to clinic today for his Medicare annual wellness visit.  He identifies his overall health as fair.  He has a history of hypertension and hyperlipidemia as well as chronic esophageal reflux.  He continues on Lipitor, losartan and omeprazole.    All age and gender specific screening recommendations were reviewed.  He is current with colon cancer screening after undergoing a normal colonoscopy in 2021.  He is on a 10-year screening regiment.    All vaccine recommendations and's schedules were reviewed.  He declines COVID vaccination as well as RSV vaccination.  He is otherwise up-to-date with all recommended vaccinations.    On exam he is a moderately overweight middle-aged male in no acute distress.  Blood pressure is 128/78.  Pulse is 67 and regular.  HEENT exam normocephalic atraumatic.  Pupils are equally round and reactive to light and accommodation.  He saw his eye doctor earlier this year.  TMs are clear bilaterally.  Oropharynx is clear.  Neck is supple without adenopathy or thyromegaly.  Lungs are clear without wheezing rales or rhonchi.  Cardiac exam is regular with a 1/6 to 2/6 systolic ejection murmur heard in an aortic distribution.  Abdomen is obese, soft, nontender with good bowel sounds.  Extremities demonstrate no clubbing cyanosis or edema.  Neurologic exam is nonfocal.  Mood is normal.    Screening labs remain normal.    Continue current medication and plan.  Follow-up in 1 year.  Results for orders placed or performed in visit on 06/03/24   Lipid panel reflex to direct LDL Non-fasting     Status: None   Result Value Ref Range    Cholesterol 119 <200 mg/dL    Triglycerides 71 <150 mg/dL    Direct Measure HDL 43 >=40 mg/dL    LDL Cholesterol Calculated 62 <=100 mg/dL    Non HDL  Cholesterol 76 <130 mg/dL    Patient Fasting > 8hrs? Yes     Narrative    Cholesterol  Desirable:  <200 mg/dL    Triglycerides  Normal:  Less than 150 mg/dL  Borderline High:  150-199 mg/dL  High:  200-499 mg/dL  Very High:  Greater than or equal to 500 mg/dL    Direct Measure HDL  Female:  Greater than or equal to 50 mg/dL   Male:  Greater than or equal to 40 mg/dL    LDL Cholesterol  Desirable:  <100mg/dL  Above Desirable:  100-129 mg/dL   Borderline High:  130-159 mg/dL   High:  160-189 mg/dL   Very High:  >= 190 mg/dL    Non HDL Cholesterol  Desirable:  130 mg/dL  Above Desirable:  130-159 mg/dL  Borderline High:  160-189 mg/dL  High:  190-219 mg/dL  Very High:  Greater than or equal to 220 mg/dL   Basic metabolic panel  (Ca, Cl, CO2, Creat, Gluc, K, Na, BUN)     Status: None   Result Value Ref Range    Sodium 139 135 - 145 mmol/L    Potassium 4.1 3.4 - 5.3 mmol/L    Chloride 105 98 - 107 mmol/L    Carbon Dioxide (CO2) 23 22 - 29 mmol/L    Anion Gap 11 7 - 15 mmol/L    Urea Nitrogen 13.2 8.0 - 23.0 mg/dL    Creatinine 0.87 0.67 - 1.17 mg/dL    GFR Estimate >90 >60 mL/min/1.73m2    Calcium 9.2 8.8 - 10.2 mg/dL    Glucose 91 70 - 99 mg/dL    Patient Fasting > 8hrs? Yes    CBC with platelets     Status: Normal   Result Value Ref Range    WBC Count 7.2 4.0 - 11.0 10e3/uL    RBC Count 4.77 4.40 - 5.90 10e6/uL    Hemoglobin 13.9 13.3 - 17.7 g/dL    Hematocrit 40.6 40.0 - 53.0 %    MCV 85 78 - 100 fL    MCH 29.1 26.5 - 33.0 pg    MCHC 34.2 31.5 - 36.5 g/dL    RDW 12.6 10.0 - 15.0 %    Platelet Count 191 150 - 450 10e3/uL       Mixed hyperlipidemia  Chronic, stable.  Continue current medication and plan.  - Lipid panel reflex to direct LDL Non-fasting; Future  - atorvastatin (LIPITOR) 20 MG tablet; Take 1 tablet (20 mg) by mouth daily    Morbid obesity (H)  Chronic, stable.  Encouraged weight loss with increased exercise and dietary modification.    Benign essential hypertension  Chronic, stable.  Continue current  medication and plan.  - Basic metabolic panel  (Ca, Cl, CO2, Creat, Gluc, K, Na, BUN); Future  - losartan (COZAAR) 50 MG tablet; Take 1 tablet (50 mg) by mouth daily  - CBC with platelets; Future    Gastroesophageal reflux disease with esophagitis, unspecified whether hemorrhage  Chronic, stable.  Continue current medication and plan.  - omeprazole (PRILOSEC) 20 MG DR capsule; Take 1 capsule (20 mg) by mouth daily  - CBC with platelets; Future    Patient has been advised of split billing requirements and indicates understanding: Yes    Results for orders placed or performed in visit on 06/03/24   Lipid panel reflex to direct LDL Non-fasting     Status: None   Result Value Ref Range    Cholesterol 119 <200 mg/dL    Triglycerides 71 <150 mg/dL    Direct Measure HDL 43 >=40 mg/dL    LDL Cholesterol Calculated 62 <=100 mg/dL    Non HDL Cholesterol 76 <130 mg/dL    Patient Fasting > 8hrs? Yes     Narrative    Cholesterol  Desirable:  <200 mg/dL    Triglycerides  Normal:  Less than 150 mg/dL  Borderline High:  150-199 mg/dL  High:  200-499 mg/dL  Very High:  Greater than or equal to 500 mg/dL    Direct Measure HDL  Female:  Greater than or equal to 50 mg/dL   Male:  Greater than or equal to 40 mg/dL    LDL Cholesterol  Desirable:  <100mg/dL  Above Desirable:  100-129 mg/dL   Borderline High:  130-159 mg/dL   High:  160-189 mg/dL   Very High:  >= 190 mg/dL    Non HDL Cholesterol  Desirable:  130 mg/dL  Above Desirable:  130-159 mg/dL  Borderline High:  160-189 mg/dL  High:  190-219 mg/dL  Very High:  Greater than or equal to 220 mg/dL   Basic metabolic panel  (Ca, Cl, CO2, Creat, Gluc, K, Na, BUN)     Status: None   Result Value Ref Range    Sodium 139 135 - 145 mmol/L    Potassium 4.1 3.4 - 5.3 mmol/L    Chloride 105 98 - 107 mmol/L    Carbon Dioxide (CO2) 23 22 - 29 mmol/L    Anion Gap 11 7 - 15 mmol/L    Urea Nitrogen 13.2 8.0 - 23.0 mg/dL    Creatinine 0.87 0.67 - 1.17 mg/dL    GFR Estimate >90 >60 mL/min/1.73m2     Calcium 9.2 8.8 - 10.2 mg/dL    Glucose 91 70 - 99 mg/dL    Patient Fasting > 8hrs? Yes    CBC with platelets     Status: Normal   Result Value Ref Range    WBC Count 7.2 4.0 - 11.0 10e3/uL    RBC Count 4.77 4.40 - 5.90 10e6/uL    Hemoglobin 13.9 13.3 - 17.7 g/dL    Hematocrit 40.6 40.0 - 53.0 %    MCV 85 78 - 100 fL    MCH 29.1 26.5 - 33.0 pg    MCHC 34.2 31.5 - 36.5 g/dL    RDW 12.6 10.0 - 15.0 %    Platelet Count 191 150 - 450 10e3/uL           Counseling  Appropriate preventive services were discussed with this patient, including applicable screening as appropriate for fall prevention, nutrition, physical activity, Tobacco-use cessation, weight loss and cognition.  Checklist reviewing preventive services available has been given to the patient.  Reviewed patient's diet, addressing concerns and/or questions.   The patient was provided with written information regarding signs of hearing loss.       SELF MONITORING:       - Please check blood pressure readings several times per week  Work on weight loss  Regular exercise   Follow-up Visit   Expected date:  Young 10, 2025 (Approximate)      Follow Up Appointment Details:     Follow-up with whom?: PCP    Follow-Up for what?: Medicare Wellness    Welcome or Annual?: Annual Wellness    How?: In Person                     Branden Ospina is a 68 year old, presenting for the following:  Physical        6/3/2024     2:01 PM   Additional Questions   Roomed by Jayde rodriguez         Health Care Directive  Patient does not have a Health Care Directive or Living Will: Discussed advance care planning with patient; information given to patient to review.    HPI      Hyperlipidemia Follow-Up    Are you regularly taking any medication or supplement to lower your cholesterol?   Yes- Lipitor  Are you having muscle aches or other side effects that you think could be caused by your cholesterol lowering medication?  No    Hypertension Follow-up    Do you check your blood pressure regularly  outside of the clinic? No   Are you following a low salt diet? No  Are your blood pressures ever more than 140 on the top number (systolic) OR more   than 90 on the bottom number (diastolic), for example 140/90? No        6/2/2024   General Health   How would you rate your overall physical health? Good   Feel stress (tense, anxious, or unable to sleep) Not at all         6/2/2024   Nutrition   Diet: I don't know         6/2/2024   Exercise   Days per week of moderate/strenous exercise 7 days   Average minutes spent exercising at this level 110 min         6/2/2024   Social Factors   Frequency of gathering with friends or relatives Patient declined   Worry food won't last until get money to buy more No   Food not last or not have enough money for food? No   Do you have housing?  Yes   Are you worried about losing your housing? No   Lack of transportation? No   Unable to get utilities (heat,electricity)? No         6/3/2024   Fall Risk   Fallen 2 or more times in the past year? No   Trouble with walking or balance? No          6/2/2024   Activities of Daily Living- Home Safety   Needs help with the following daily activites None of the above   Safety concerns in the home None of the above         6/2/2024   Dental   Dentist two times every year? Yes         6/2/2024   Hearing Screening   Hearing concerns? (!) IT'S HARD TO FOLLOW A CONVERSATION IN A NOISY RESTAURANT OR CROWDED ROOM.         6/2/2024   Driving Risk Screening   Patient/family members have concerns about driving No         6/2/2024   General Alertness/Fatigue Screening   Have you been more tired than usual lately? No         6/2/2024   Urinary Incontinence Screening   Bothered by leaking urine in past 6 months No         6/2/2024   TB Screening   Were you born outside of the [place]? No         Today's PHQ-2 Score:       6/3/2024     1:22 PM   PHQ-2 ( 1999 Pf[employer])   Q1: Little interest or pleasure in doing things 0   Q2: Feeling down, depressed or hopeless 0    PHQ-2 Score 0   Q1: Little interest or pleasure in doing things Not at all   Q2: Feeling down, depressed or hopeless Not at all   PHQ-2 Score 0           6/2/2024   Substance Use   Alcohol more than 3/day or more than 7/wk No   Do you have a current opioid prescription? No   How severe/bad is pain from 1 to 10? 1/10   Do you use any other substances recreationally? No     Social History     Tobacco Use    Smoking status: Never    Smokeless tobacco: Never   Vaping Use    Vaping status: Never Used   Substance Use Topics    Alcohol use: Yes     Alcohol/week: 0.0 standard drinks of alcohol     Comment: occ    Drug use: No           6/2/2024   AAA Screening   Family history of Abdominal Aortic Aneurysm (AAA)? Unsure   Last PSA:   PSA   Date Value Ref Range Status   12/28/2016 0.72 0 - 4 ug/L Final     Comment:     Assay Method:  Chemiluminescence using Siemens Vista analyzer     ASCVD Risk   The ASCVD Risk score (Deepali MULLINS, et al., 2019) failed to calculate for the following reasons:    The valid total cholesterol range is 130 to 320 mg/dL            Reviewed and updated as needed this visit by Provider                    Lab work is in process  Labs reviewed in EPIC  BP Readings from Last 3 Encounters:   06/03/24 128/78   01/08/24 134/78   11/06/23 130/70    Wt Readings from Last 3 Encounters:   06/03/24 103.5 kg (228 lb 3.2 oz)   01/08/24 105.9 kg (233 lb 6 oz)   11/06/23 106.6 kg (235 lb)                  Patient Active Problem List   Diagnosis    GERD (gastroesophageal reflux disease)    Advanced directives, counseling/discussion    Aortic valve stenosis, etiology of cardiac valve disease unspecified    Mitral regurgitation and aortic stenosis    Obesity (BMI 35.0-39.9) with comorbidity (H)    Mixed hyperlipidemia    HTN, goal below 140/80     Past Surgical History:   Procedure Laterality Date    COLONOSCOPY N/A 1/22/2016    Procedure: COLONOSCOPY;  Surgeon: Pb Rodriguez MD;  Location:  GI     ESOPHAGOSCOPY, GASTROSCOPY, DUODENOSCOPY (EGD), COMBINED N/A 9/10/2020    Procedure: Esophagogastroduodenoscopy with Biopsy;  Surgeon: Emigdio Betancourt DO;  Location: [facility]    HC KNEE SCOPE,MED/LAT MENISECTOMY  07/08/1999    HC REVISE MEDIAN N/CARPAL TUNNEL SURG  1986    HC UGI ENDOSCOPY DIAG W BIOPSY  10/26/09    ZZHC COLONOSCOPY W/WO BRUSH/WASH  03/27/06       Social History     Tobacco Use    Smoking status: Never    Smokeless tobacco: Never   Substance Use Topics    Alcohol use: Yes     Alcohol/week: 0.0 standard drinks of alcohol     Comment: occ     Family History   Problem Relation Age of Onset    Diabetes Brother     Alzheimer Disease Father     Diabetes Sister          Current Outpatient Medications   Medication Sig Dispense Refill    albuterol (PROAIR HFA/PROVENTIL HFA/VENTOLIN HFA) 108 (90 Base) MCG/ACT inhaler Inhale 2 puffs into the lungs every 6 hours 18 g 0    aspirin 81 MG chewable tablet Take 1 tablet (81 mg) by mouth daily      atorvastatin (LIPITOR) 20 MG tablet Take 1 tablet (20 mg) by mouth daily 90 tablet 3    losartan (COZAAR) 50 MG tablet Take 1 tablet (50 mg) by mouth daily 90 tablet 3    omeprazole (PRILOSEC) 40 MG DR capsule Take 1 capsule (40 mg) by mouth daily 90 capsule 3    famotidine (PEPCID) 20 MG tablet Take 1 tablet (20 mg) by mouth 2 times daily 30 tablet 3    simethicone (MYLICON) 125 MG chewable tablet Take 1 tablet (125 mg) by mouth 2 times daily as needed for intestinal gas 60 tablet 0    sucralfate (CARAFATE) 1 GM tablet Take 1 tablet (1 g) by mouth 4 times daily as needed (Prior to meals) 90 tablet 0     Allergies   Allergen Reactions    No Known Allergies      Recent Labs   Lab Test 05/31/23  1720 11/09/21  1040 08/13/20  1030 08/07/20  1159 12/28/19  1344 11/20/18  0835 11/07/18  0929   LDL 58 46 53  --   --    < >  --    HDL 45 55 42  --   --    < >  --    TRIG 95 95 118  --   --    < >  --    ALT  --  27  --  48 26   < > 26   CR 0.93 0.67 0.90 0.79 0.88   < >  "0.80   GFRESTIMATED 89 >90 88 >90 >90   < > >90   GFRESTBLACK  --   --  >90 >90 >90   < > >90   POTASSIUM 4.1 4.4 4.1 4.0 3.9   < > 4.3   TSH  --   --   --   --  3.27  --  2.59    < > = values in this interval not displayed.      Current providers sharing in care for this patient include:  Patient Care Team:  Josy Duarte NP as PCP - General (Nurse Practitioner - Family)  Shahab Mera PA-C as Assigned Musculoskeletal Provider  Buck Emery MD as Assigned PCP  Verónica Gutierrez APRN CNP as Nurse Practitioner (Gastroenterology)    The following health maintenance items are reviewed in Epic and correct as of today:  Health Maintenance   Topic Date Due    ASTHMA ACTION PLAN  Never done    RSV VACCINE (Pregnancy & 60+) (1 - 1-dose 60+ series) Never done    COVID-19 Vaccine (3 - 2023-24 season) 09/01/2023    LIPID  05/31/2024    ANNUAL REVIEW OF HM ORDERS  05/31/2024    MEDICARE ANNUAL WELLNESS VISIT  05/31/2024    INFLUENZA VACCINE (Season Ended) 09/01/2024    ASTHMA CONTROL TEST  12/03/2024    FALL RISK ASSESSMENT  06/03/2025    COLORECTAL CANCER SCREENING  01/22/2026    GLUCOSE  05/31/2026    ADVANCE CARE PLANNING  05/31/2028    DTAP/TDAP/TD IMMUNIZATION (3 - Td or Tdap) 02/22/2032    HEPATITIS C SCREENING  Completed    PHQ-2 (once per calendar year)  Completed    Pneumococcal Vaccine: 65+ Years  Completed    ZOSTER IMMUNIZATION  Completed    IPV IMMUNIZATION  Aged Out    HPV IMMUNIZATION  Aged Out    MENINGITIS IMMUNIZATION  Aged Out    RSV MONOCLONAL ANTIBODY  Aged Out         Review of Systems  CONSTITUTIONAL: NEGATIVE for fever, chills, change in weight  ENT/MOUTH: NEGATIVE for ear, mouth and throat problems  RESP: NEGATIVE for significant cough or SOB  CV: NEGATIVE for chest pain, palpitations or peripheral edema  ROS otherwise negative     Objective    Exam  /78   Pulse 67   Temp 98.8  F (37.1  C)   Resp 14   Ht 1.7 m (5' 6.93\")   Wt 103.5 kg (228 lb 3.2 oz)   SpO2 97%   BMI 35.82 kg/m   " "  Estimated body mass index is 35.82 kg/m  as calculated from the following:    Height as of this encounter: 1.7 m (5' 6.93\").    Weight as of this encounter: 103.5 kg (228 lb 3.2 oz).    Physical Exam  GENERAL: alert, no distress, and obese  EYES: Eyes grossly normal to inspection, PERRL and conjunctivae and sclerae normal  HENT: ear canals and TM's normal, nose and mouth without ulcers or lesions  NECK: no adenopathy, no asymmetry, masses, or scars  RESP: lungs clear to auscultation - no rales, rhonchi or wheezes  CV: regular rate and rhythm, normal S1 S2, no S3 or S4, no murmur, click or rub, no peripheral edema  ABDOMEN: soft, nontender, no hepatosplenomegaly, no masses and bowel sounds normal  MS: no gross musculoskeletal defects noted, no edema  NEURO: Normal strength and tone, mentation intact and speech normal  PSYCH: mentation appears normal, affect normal/bright  LYMPH: no cervical, supraclavicular, axillary, or inguinal adenopathy        6/3/2024   Mini Cog   Clock Draw Score 0 Abnormal   3 Item Recall 1 object recalled   Mini Cog Total Score 1              Signed Electronically by: Buck Emery MD    "

## 2024-09-03 ENCOUNTER — NURSE TRIAGE (OUTPATIENT)
Dept: FAMILY MEDICINE | Facility: CLINIC | Age: 69
End: 2024-09-03
Payer: COMMERCIAL

## 2024-09-03 NOTE — TELEPHONE ENCOUNTER
"  S: Shortness of breath    B: Patient is calling in with complaints of shortness of breath.  States he has been shortness of breath for a few weeks.  Worse when laying down.  Has new swelling to legs, but does wear compression socks. He is noted to have to take a deep breath every other sentence while talking with RN.  Patient also reports intermittent chest pain to mid sternum down to upper abdomen.  Pain lasts for a minute or two. Denies radiating to arm but states has new right shoulder pain this morning.  He reports occasional dizziness, but not currently dizzy. Denies chest pain right at this time.     A: Advised patient to go tot  ED NOW per protocol.    R: patient verbalize understanding and is agreeable.  He will try to find someone to bring him to the ED. He has a couple of neighbors he will call. \"I will work on it\".  Strongly recommended getting to ED as soon as possible, patient states he will get there.  He is advised to call 911 if symptom's worsen at all or he is not able to find a ride soon.  He verbalizes understanding.     Reason for Disposition   MODERATE difficulty breathing (e.g., speaks in phrases, SOB even at rest, pulse 100-120) of new-onset or worse than normal   Pain also in shoulder(s) or arm(s) or jaw   Difficulty breathing    Additional Information   Negative: SEVERE difficulty breathing (e.g., struggling for each breath, speaks in single words, pulse > 120)   Negative: Breathing stopped and hasn't returned   Negative: Choking on something   Negative: Bluish (or gray) lips or face   Negative: Difficult to awaken or acting confused (e.g., disoriented, slurred speech)   Negative: Passed out (i.e., fainted, collapsed and was not responding)   Negative: Wheezing started suddenly after medicine, an allergic food, or bee sting   Negative: Stridor (harsh sound while breathing in)   Negative: Slow, shallow and weak breathing   Negative: Sounds like a life-threatening emergency to the " triager   Negative: Chest pain   Negative: Wheezing (high pitched whistling sound) and previous asthma attacks or use of asthma medicines   Negative: Difficulty breathing and within 14 days of COVID-19 Exposure   Negative: Difficulty breathing and only present when coughing   Negative: Difficulty breathing and only from stuffy nose   Negative: Difficulty breathing and only from stuffy nose or runny nose from common cold   Negative: Followed an injury to chest   Negative: SEVERE chest pain   Negative: Visible sweat on face or sweat dripping down face   Negative: Sounds like a life-threatening emergency to the triager   Negative: Heart beating < 50 beats per minute OR > 140 beats per minute   Negative: Chest pain lasting longer than 5 minutes and ANY of the following:         Pain is crushing, pressure-like, or heavy         Over 44 years old          Over 30 years old and one cardiac risk factor (e.g diabetes, high blood pressure, high cholesterol, smoker, or family history of heart disease)         History of heart disease (e.g. angina, heart attack, heart failure, bypass surgery, takes nitroglycerin)   Negative: Passed out (i.e., lost consciousness, collapsed and was not responding)   Negative: Difficult to awaken or acting confused (e.g., disoriented, slurred speech)   Negative: Shock suspected (e.g., cold/pale/clammy skin, too weak to stand, low BP, rapid pulse)   Negative: SEVERE difficulty breathing (e.g., struggling for each breath, speaks in single words)    Protocols used: Breathing Difficulty-A-OH, Chest Pain-A-OH

## 2024-09-05 ENCOUNTER — HOSPITAL ENCOUNTER (OUTPATIENT)
Dept: GENERAL RADIOLOGY | Facility: CLINIC | Age: 69
Discharge: HOME | End: 2024-09-05
Attending: FAMILY MEDICINE | Admitting: FAMILY MEDICINE
Payer: COMMERCIAL

## 2024-09-05 ENCOUNTER — OFFICE VISIT (OUTPATIENT)
Dept: FAMILY MEDICINE | Facility: CLINIC | Age: 69
End: 2024-09-05
Payer: COMMERCIAL

## 2024-09-05 VITALS
BODY MASS INDEX: 36.63 KG/M2 | SYSTOLIC BLOOD PRESSURE: 130 MMHG | WEIGHT: 233.4 LBS | HEART RATE: 68 BPM | DIASTOLIC BLOOD PRESSURE: 70 MMHG | OXYGEN SATURATION: 100 % | HEIGHT: 67 IN | TEMPERATURE: 98 F | RESPIRATION RATE: 14 BRPM

## 2024-09-05 DIAGNOSIS — R10.13 EPIGASTRIC PAIN: ICD-10-CM

## 2024-09-05 DIAGNOSIS — R10.13 EPIGASTRIC PAIN: Primary | ICD-10-CM

## 2024-09-05 PROCEDURE — 71046 X-RAY EXAM CHEST 2 VIEWS: CPT

## 2024-09-05 PROCEDURE — 99213 OFFICE O/P EST LOW 20 MIN: CPT | Performed by: FAMILY MEDICINE

## 2024-09-05 PROCEDURE — 74019 RADEX ABDOMEN 2 VIEWS: CPT

## 2024-09-05 PROCEDURE — 93000 ELECTROCARDIOGRAM COMPLETE: CPT | Performed by: FAMILY MEDICINE

## 2024-09-05 ASSESSMENT — ENCOUNTER SYMPTOMS: SHORTNESS OF BREATH: 1

## 2024-09-05 NOTE — LETTER
Colonoscopy   (with Miralax/Gatorade Prep)  Miralax has not been proven safe for patients with a history of congestive heart   failure, cardiomyopathy, chronic kidney disease, any kidney insufficiency, elevated creatinine or with renal failure (on dialysis). These patients should use the Golytely prep.  Patient Name      Procedure Date    Arrival Time  Procedure Time    Physician    Location [facility] Same Day Surgery Department   Other Instructions  Occasionally procedure times need to be changed.   In the event your procedure time needs to be changed, you will receive a telephone call from a nurse informing you of the change as well as any other instructions.     YOU WILL NEED TO PURCHASE   Bisacodyl/Dulcolax 5 mg tablets (4 ORAL tablets) No prescription needed. (Purchase at any pharmacy)  Gatorade (64 ounces or two 32 ounce bottles) or Propel any flavor except those red or purple in color (Orange is ok)(Purchase at any grocery store)  Miralax ( 8.3 ounces or 238 grams) (no prescription needed) (Purchase at any Pharmacy)   BEFORE THE PROCEDURE   You will receive a phone call from a nurse and registration 1 to 2 days before your procedure. Information will be collected to pre-admit you, which will assist us in giving you the best care possible or you can call [phone].If you are diabetic, consult your physician for additional instruction.  Check with your insurance carrier about coverage (phone number on the back of your insurance card).  You will need to make arrangements to have someone drive you home. You will not be able to drive the day of your examination.  You can expect to be here approximately 2 hours; your  needs to come into the Same Day Surgery 2 hours after your arrival time so you can be discharged to your . You will not be able to return to work the day of your procedure.  If using iron supplements, stop taking those five days before your  procedure.  If you are taking a multiple vitamin with iron you do not need to stop it.  Three days before your procedure, begin a low-fiber diet. Avoid raw fruits, vegetables, whole wheat, nuts, fruits and vegetables with small seeds, popcorn, Metamucil, Fibercon, bran or bulking agents. Meats and cooked vegetables are fine.  Two days before your procedure, increase your water intake (8 glasses of water is recommended.)     If you are on Coumadin, check with your provider regarding withholding any medications.      INSTRUCTIONS FOR DAY BEFORE PROCEDURE IF ARRIVING BEFORE 11:00 AM   You will need to be on a clear liquid diet the entire day (see backside of page). No solid foods.  In the morning, mix the entire contents of the Miralax powder with the Gatorade/Propel until completely dissolved.  You may put the solution in the fridge but it is not necessary.  At 9 am take the 4 tablets of Biscodyl/Dulcolax.    At 6 pm start drinking your solution of Gatorade and Miralax. You will be drinking half (32 oz ) of prep solution.  It is best to drink an 8 oz glass every 15 minutes.  It will take about 1 to 2 hours to drink the 32 oz of bowel prep solution. Continue drinking clear liquids after you finish the prep solution.   If you experience bloating, cramping, nausea, or vomiting, take a 15-30 minute break and then start drinking the prep solution again.   DAY OF PROCEDURE   Start drinking the second half of prep (32 oz)  at 4am.  Drink a glass every 15 minutes until you have finished the last 32 oz of solution.  You will need to finish this at least 3 hours before your scheduled procedure time.  You may have clear liquids up until 2 hours before your procedure.  You must finish all of the prep solution.       INSTRUCTIONS FOR DAY BEFORE IF ARRIVING AFTER 11:00 AM   You will need to be on a clear liquid diet the entire day (See Below for diet). No solid foods.  In the morning, mix the entire contents of the Miralax powder  with the Gatorade until completely dissolved.  You may put the solution in the fridge but it is not necessary.  At 9 am take the four tablets of Bisacodyl/Dulcolox.    At 6 pm start drinking your solution of Gatorade and Miralax..  You will only be drinking half (32 oz) of bowel prep.  It will take you about 1 to 2 hours to drink the solution.  It is best to drink 1 glass every 15 minutes.  You may keep the other 32 oz or half in the fridge for the next morning.    If you experience bloating, cramping, nausea, or vomiting, take a 15-30 minute break and then start drinking the prep solution again.  Some patients will continue going to the bathroom throughout the night.   PROCEDURE DAY   Start drinking your last half (32 oz) of bowel prep solution at 8 am.  Drink an 8 oz glass every 15 minutes until you are finished with all the prep solution.  It will take about 1 to 2 hours to drink the remaining solution.  YOU MUST FINISH ALL THE PREP SOLUTION.  If you experience bloating, cramping, nausea, or vomiting, take a 15-30 minute break and then start drinking the prep solution again.  You may have clear liquids up until 2 hours before your procedure.  Nothing to drink after that time.     Dress comfortably. Do not wear any perfume or cologne.   Unless told otherwise, we recommend holding your morning medications until after procedure. Please bring a list of your current medications to your appointment if you have not spoken to the pre-admit nurse. If you have any questions regarding these instructions, please call us at [phone], Monday through Friday between 7:00 am and 5 pm.     CLEAR LIQUID DIET  The clear liquid diet are foods that are free of fat and fiber. They will liquefy to clear liquid at room temperature.   No red or purple colored juices or Jell-O s, dairy products, or alcoholic beverages.  TYPE OF FOOD USE ONLY THESE FOODS   Beverages Coffee      Tea      Decaffeinated coffee  Carbonated beverages  (pop)  Water  Sport drinks (except red or purple)   Desserts Jell-O (except red or purple)  Fruit ice (except red or purple)  Popsicle (except red or purple)   Fruit and Fruit Juices Citrus juices, strained  Fruit nectars, strained  Apple juice  Fruit drinks (except red or purple)   Soups Broth  Bouillon  Consommé  Meat stock, strained  Vegetable broth, strained   Sweets Hard candy, non-red or non-purple, Sugar   Miscellaneous Flavorings, salt     Directions to [facility]    From the North:   Take the [address]nd [address] exit off of [address] (on the south side of [place]).  Turn left on [address] .  Turn left at the first stoplight (at [facility]).  The hospital and clinic is the third building on the left.     From the South:  Follow [address] north to the first [place] exit.  Exit on [address] following it to the right.  Turn left at the first stoplight.  The hospital and clinic is the third building on the left.    From the East:     Following [address] west, turn left at the stoplight onto [address] Follow [address] through [place].  Take a right at the light on [address] (at [facility]).  The hospital and clinic is the third building on the left.    From the West:    Following [address] going east, turn south on [address].  Take the [address] exit off of [address] (on the south side of [place]).  Follow [address] through [place] to the stoplight on [address] (at [facility]). Take a left.  The hospital and clinic is the third building on the left.    Colonoscopy  What you should know    What is a colonoscopy?  A colonoscopy lets the doctor look inside your large intestine (colon). The doctor guides a long, flexible, lighted tube through the entire colon. The exam is used to look for early signs of cancer. It is also used to find the cause of a change in bowel habits. The doctor is able to see any inflamed tissue, polyps, ulcers, bleeding or muscle  spasms.    How do I prepare for the exam?  See the guidelines for cleaning out your colon. The steps to prepare your colon begin four days before the exam.    Please arrive with someone who can drive you home after the exam. The medicines used in the exam will make you sleepy.  You will not be able to drive. You cannot take a bus or taxi by yourself.  You cannot walk home.    How do I prepare the day of the exam?    *Dress in comfortable, loose clothes.  *Leave your purse, billfold, credit cards, etc. at home.  *Bring your insurance card.  *Bring a list of your medications.  *Plan to arrive on time.  *We do our best to stay on time, but there may be a delay. Please bring something to pass the time, such as a newspaper, book or magazine.    What happens when I arrive?    *You will do some paper work.  *We will take you to the admission area. Your family may stay with you during this time.  *A nurse will check your records and ask you questions.  *You will change into a hospital gown without underwear.   *You will sign a consent form.  *We will start an IV (intravenous). We will use it to give you medicines during the exam.    What happens during the exam?    *We will take you on a cart to the exam room.   *You can ask questions of the doctor who is doing your exam.  *You will lie on your left side on a table.    *You will receive medicines through your IV to relax you and for pain.    *The doctor will insert a thin, lighted tube (scope) into your rectum. Then the doctor will slowly guide it into your colon. The scope bends, so he or she can move it around the curves of your colon.    *The scope sends an image of the inside of the colon. The image allows the doctor to examine the lining of the colon.    *We may ask you to change positions to help the doctor move the scope.    *The scope also blows air into your colon. This enlarges the colon and helps the doctor see the lining.  *You should feel little pain during the  exam.   *You may feel full and have cramps. Breathe slowly and evenly to help your body relax. Tell your doctor or nurse if you have any pain.    If we see a polyp, we will remove a piece of it (polypectomy). That tissue (biopsy) will be tested in the lab. Most polyps are benign (not cancer). We remove most polyps because they can cause bleeding or turn into cancer.     Risks of the exam are bleeding and piercing or tearing the colon. But this is rare.    What happens after the exam?    *We will return you to your room. We will watch you for one hour or until most of the pain medicine has worn off.  *You may feel bloated or have cramping for a short time because of the air injected during the exam. You will pass the rest of the air from your colon in the next couple hours.  *We will remove the IV.   *Your first meal should be light. Slowly return to normal meals, unless your doctor gives you other orders.

## 2024-09-05 NOTE — PROGRESS NOTES
Assessment & Plan     Epigastric pain  Bhavesh is a 68-year-old male who presents to clinic today with weeklong history of epigastric fullness and some difficulty taking a deep breath.  She had recurrent episodes of gastric bloating.  He was seen in January for similar symptoms.  EKG at that time was normal chest x-ray was clear and flat and upright abdomen showed just some nonspecific's bowel and gas.  He did have a lot of stool at that time and it was recommended that he start on fiber he has been taking Metamucil which she finds when he takes this he gets extremely bloated which makes it difficult for him to take a deep breath.  He denies any chest pain.  He is not short of breath at rest but in certain positions finds it difficult to take a deep breath because of his large abdominal girth.  He has discontinued the Metamucil over the last couple of days and finds that his bloatedness is better and his breathing is better.    He has a history of aortic valve stenosis.  Has a history of mitral regurgitation.  Previous echocardiogram 1 year ago was stable.  He is followed by cardiology for this.    He has a history of gastroesophageal reflux for which she takes omeprazole.    On exam this is a obese middle-age male in no acute distress.  His blood pressure is 130/70.  His pulse is 68 and regular.  He is afebrile.  His respiratory rate is 14 with oxygen saturations of 100% on room air.  HEENT exam is unremarkable.  Nasal passages are clear and oropharynx is clear.  His lungs are clear to auscultation in all fields there are no wheezing rales or rhonchi.  There is no cough.  His cardiac exam is regular with a 3/6 systolic ejection murmur heard in the aortic distribution.  Abdomen is obese, slightly tympanitic but nontender with active bowel sounds throughout.  No appreciable organomegaly secondary to his obesity.  Extremities show no edema.    EKG showed a normal sinus rhythm with no acute ischemic changes.  Chest  "x-ray is stable showing a normal heart size.  There is no pleural effusions, pneumothorax or area of consolidation.  There is maybe a little bit of atelectasis at the right base.  2 view abdominal film shows no evidence of bowel obstruction or pneumo peritoneum.  He has moderate stool burden throughout with lots of gas.    Assessment 68-year-old male with weeklong history of progressive shortness of breath due to abdominal bloating most likely from consumption of Metamucil for stool softening.  His symptoms are improving as he has gotten rid of the Metamucil.  This point I would recommend he do a moderate bowel cleansing program with a MiraLAX and GoLytely bowel prep.  Reviewed the colonoscopy bowel prep and informed him he does not need to do the full prep but could get by with a half of prep.  This should help to evacuate some of the colonic gas and stool to help with his symptoms.  If symptoms or not improving would then recommend repeat colonoscopy.  His last colonoscopy was in 2016 and was normal and therefore should be having a colonoscopy within 10 years.  Previous upper endoscopy was completed in 2020 was unremarkable.    - EKG 12-lead complete w/read - Clinics  - XR Chest 2 Views; Future  - XR Abdomen 2 Views; Future          BMI  Estimated body mass index is 36.63 kg/m  as calculated from the following:    Height as of this encounter: 1.7 m (5' 6.93\").    Weight as of this encounter: 105.9 kg (233 lb 6.4 oz).   Weight management plan: Discussed healthy diet and exercise guidelines      Work on weight loss  Regular exercise    Branden Ospina is a 68 year old, presenting for the following health issues:  Shortness of Breath        9/5/2024     2:50 PM   Additional Questions   Roomed by Jayde rodriguez     Shortness of Breath    History of Present Illness       Reason for visit:  Breathing issues    He eats 0-1 servings of fruits and vegetables daily.He consumes 1 sweetened beverage(s) daily.He exercises with " "enough effort to increase his heart rate 60 or more minutes per day.  He exercises with enough effort to increase his heart rate 5 days per week.   He is taking medications regularly.     S: Shortness of breath     B: Patient is calling in with complaints of shortness of breath.  States he has been shortness of breath for a few weeks.  Worse when laying down.  Has new swelling to legs, but does wear compression socks. He is noted to have to take a deep breath every other sentence while talking with RN.  Patient also reports intermittent chest pain to mid sternum down to upper abdomen.  Pain lasts for a minute or two. Denies radiating to arm but states has new right shoulder pain this morning.  He reports occasional dizziness, but not currently dizzy. Denies chest pain right at this time.      A: Advised patient to go tot he ED NOW per protocol.     R: patient verbalize understanding and is agreeable.  He will try to find someone to bring him to the ED. He has a couple of neighbors he will call. \"I will work on it\".  Strongly recommended getting to ED as soon as possible, patient states he will get there.  He is advised to call 911 if symptom's worsen at all or he is not able to find a ride soon.  He verbalizes understanding.           Patient Active Problem List   Diagnosis    GERD (gastroesophageal reflux disease)    Aortic valve stenosis, etiology of cardiac valve disease unspecified    Mitral regurgitation and aortic stenosis    Obesity (BMI 35.0-39.9) with comorbidity (H)    Mixed hyperlipidemia    HTN, goal below 140/80     Current Outpatient Medications   Medication Sig Dispense Refill    albuterol (PROAIR HFA/PROVENTIL HFA/VENTOLIN HFA) 108 (90 Base) MCG/ACT inhaler Inhale 2 puffs into the lungs every 6 hours 18 g 0    aspirin 81 MG chewable tablet Take 1 tablet (81 mg) by mouth daily      atorvastatin (LIPITOR) 20 MG tablet Take 1 tablet (20 mg) by mouth daily 90 tablet 3    losartan (COZAAR) 50 MG tablet " "Take 1 tablet (50 mg) by mouth daily 90 tablet 3    omeprazole (PRILOSEC) 20 MG DR capsule Take 1 capsule (20 mg) by mouth daily 90 capsule 3         Review of Systems  CONSTITUTIONAL: NEGATIVE for fever, chills, change in weight  ENT/MOUTH: NEGATIVE for ear, mouth and throat problems  RESP:POSITIVE for SOB/dyspnea and which is positional and NEGATIVE for cough-non productive, hemoptysis, Hx asthma, Hx chronic bronchitis, Hx COPD, pleurisy, sputum , and wheezing  CV: POSITIVE for HX HTN and dyspnea occasionally nonexertional and NEGATIVE for chest pain/chest pressure, claudication, diaphoresis, irregular heart beat, lower extremity edema, orthopnea, paroxysmal nocturnal dyspnea, syncope or near-syncope, and tachycardia  GI: POSITIVE for abdominal pain epigastric and gas or bloating and NEGATIVE for constipation, diarrhea, hematemesis, hematochezia, nausea, and vomiting  ROS otherwise negative      Objective    /70   Pulse 68   Temp 98  F (36.7  C)   Resp 14   Ht 1.7 m (5' 6.93\")   Wt 105.9 kg (233 lb 6.4 oz)   SpO2 100%   BMI 36.63 kg/m    Body mass index is 36.63 kg/m .  Physical Exam   GENERAL: alert, no distress, and obese  NECK: no adenopathy, no asymmetry, masses, or scars  RESP: lungs clear to auscultation - no rales, rhonchi or wheezes  CV: regular rate and rhythm, normal S1 S2, no S3 or S4, no murmur, click or rub, no peripheral edema  ABDOMEN: soft, nontender, without hepatosplenomegaly or masses, bowel sounds normal, and mild tympanitic distention  MS: no gross musculoskeletal defects noted, no edema    EKG - Reviewed and interpreted by me appears normal, NSR, normal axis, normal intervals, no acute ST/T changes c/w ischemia, no LVH by voltage criteria, unchanged from previous tracings  No results found for this visit on 09/05/24.        Signed Electronically by: Buck Emery MD    "

## 2024-09-30 ENCOUNTER — OFFICE VISIT (OUTPATIENT)
Dept: ORTHOPEDICS | Facility: CLINIC | Age: 69
End: 2024-09-30
Payer: COMMERCIAL

## 2024-09-30 VITALS
SYSTOLIC BLOOD PRESSURE: 149 MMHG | BODY MASS INDEX: 36.57 KG/M2 | DIASTOLIC BLOOD PRESSURE: 82 MMHG | WEIGHT: 233 LBS | TEMPERATURE: 97.2 F

## 2024-09-30 DIAGNOSIS — M17.12 PRIMARY OSTEOARTHRITIS OF LEFT KNEE: ICD-10-CM

## 2024-09-30 DIAGNOSIS — M17.11 PRIMARY OSTEOARTHRITIS OF RIGHT KNEE: Primary | ICD-10-CM

## 2024-09-30 PROCEDURE — 20610 DRAIN/INJ JOINT/BURSA W/O US: CPT | Mod: 50 | Performed by: PHYSICIAN ASSISTANT

## 2024-09-30 RX ORDER — TRIAMCINOLONE ACETONIDE 40 MG/ML
80 INJECTION, SUSPENSION INTRA-ARTICULAR; INTRAMUSCULAR
Status: SHIPPED | OUTPATIENT
Start: 2024-09-30

## 2024-09-30 RX ORDER — BUPIVACAINE HYDROCHLORIDE 5 MG/ML
3 INJECTION, SOLUTION PERINEURAL
Status: SHIPPED | OUTPATIENT
Start: 2024-09-30

## 2024-09-30 RX ADMIN — TRIAMCINOLONE ACETONIDE 80 MG: 40 INJECTION, SUSPENSION INTRA-ARTICULAR; INTRAMUSCULAR at 09:34

## 2024-09-30 RX ADMIN — BUPIVACAINE HYDROCHLORIDE 3 ML: 5 INJECTION, SOLUTION PERINEURAL at 09:34

## 2024-09-30 ASSESSMENT — PAIN SCALES - GENERAL: PAINLEVEL: MILD PAIN (2)

## 2024-09-30 NOTE — PROGRESS NOTES
Office Visit-Follow up    Chief Complaint: Bhavesh Landeros is a 69 year old male who is being seen for   Chief Complaint   Patient presents with    RECHECK     Osteoarthritis of both knees       History of Present Illness:   Patient is here in follow-up for steroid injections.  Patient was last seen by myself on 11/6/2023 and at that time he had a bilateral steroid injection which lasted about 11 months.  Prior to that patient had bilateral steroid injection done by myself on 1/30/2023 which lasted 10 months.  Prior to that patient received a bilateral steroid injection on 6/2/2021 by JARROD Juarez and that 1 the left knee lasted 3 months and the right knee lasted 7 months.  Patient also on 10/21/2021 had left steroid injection By Dr. Holguin.  Patient was very happy just getting steroid injection and was not interested in knee replacement.  On his last visit I did briefly mention Mobic and Voltaren gel but he wanted to hold on that.  Patient does utilize Aleve which works for him as needed.  Patient does not take Tylenol and has not used any topicals for his knees.  He has utilize activity modification.  He does use an elastic knee brace for both knees which helps.    Physical Exam:  Vitals: BP (!) 149/82 (BP Location: Right arm, Patient Position: Sitting, Cuff Size: Adult Large)   Temp 97.2  F (36.2  C) (Temporal)   Wt 105.7 kg (233 lb)   BMI 36.57 kg/m    BMI= Body mass index is 36.57 kg/m .  Constitutional: healthy, alert and no acute distress   Psychiatric: mentation appears normal and affect normal/bright  MUSCULOSKELETAL:  No erythema no effusion no swelling no abrasions on the bilateral knees.    Impression: 1.  Bilateral knee primary osteoarthritis, severe medial and lateral and moderate patellofemoral.     Plan:    Large Joint Injection/Arthocentesis: bilateral knee    Date/Time: 9/30/2024 9:34 AM    Performed by: Shahab Mera PA-C  Authorized by: Shahab Mera PA-C    Indications:  Pain  Needle Size:  22  G  Guidance: landmark guided    Approach:  Anterolateral  Location:  Knee  Laterality:  Bilateral      Medications (Right):  80 mg triamcinolone 40 MG/ML; 3 mL BUPivacaine 0.5 %  Aspirate amount (mL):  0  Medications (Left):  80 mg triamcinolone 40 MG/ML; 3 mL BUPivacaine 0.5 %  Aspirate amount (mL):  0  Procedure discussed: discussed risks, benefits, and alternatives    Consent Given by:  Patient  Timeout: timeout called immediately prior to procedure    Prep: patient was prepped and draped in usual sterile fashion     The skin was prepped with betadine.  Patient was in seated position. I used carol chloride spray prior to doing the injections. The patient tolerated the injections well, and there were no complications. The injection sites were covered with a Band-Aid.                                        FOCUSED PLAN:     Patient's last bilateral steroid injection done by myself on 11/6/2023 lasted almost 11 months.  We did another bilateral steroid injection today since he is getting such good relief from them.  He takes Aleve every once in a while as needed.  He is not interested in the Mobic or Voltaren.  Patient is not ready for a knee replacement at this point but when he is he had seen Dr. Holguin in the past so he could potentially see the patient for this.  Patient is more interested in hyaluronic acid injections today and I answered questions on that and did mention since his arthritis it is a little more advanced they are less likely to give him good relief but they are always worth a try so we put in for approval for Synvisc 1 injections today.  If he got approved he would not have to have them right away and we could wait until the steroid injections wear off.  Patient can follow-up on an as-needed basis.    Re-x-ray on return: No      This note was dictated with Ase[place]ia.    Shahab Mera PA-C

## 2024-09-30 NOTE — LETTER
9/30/2024      Bhavesh Landeros  [address]  [place] [address]      Dear Colleague,    Thank you for referring your patient, Bhavesh Landeros, to the [facility]. Please see a copy of my visit note below.    Office Visit-Follow up    Chief Complaint: Bhavesh Landeros is a 69 year old male who is being seen for   Chief Complaint   Patient presents with     RECHECK     Osteoarthritis of both knees       History of Present Illness:   Patient is here in follow-up for steroid injections.  Patient was last seen by myself on 11/6/2023 and at that time he had a bilateral steroid injection which lasted about 11 months.  Prior to that patient had bilateral steroid injection done by myself on 1/30/2023 which lasted 10 months.  Prior to that patient received a bilateral steroid injection on 6/2/2021 by JARROD Juarez and that 1 the left knee lasted 3 months and the right knee lasted 7 months.  Patient also on 10/21/2021 had left steroid injection By Dr. Holguin.  Patient was very happy just getting steroid injection and was not interested in knee replacement.  On his last visit I did briefly mention Mobic and Voltaren gel but he wanted to hold on that.  Patient does utilize Aleve which works for him as needed.  Patient does not take Tylenol and has not used any topicals for his knees.  He does use an elastic knee brace for both knees which helps.    Physical Exam:  Vitals: BP (!) 149/82 (BP Location: Right arm, Patient Position: Sitting, Cuff Size: Adult Large)   Temp 97.2  F (36.2  C) (Temporal)   Wt 105.7 kg (233 lb)   BMI 36.57 kg/m    BMI= Body mass index is 36.57 kg/m .  Constitutional: healthy, alert and no acute distress   Psychiatric: mentation appears normal and affect normal/bright  MUSCULOSKELETAL:  No erythema no effusion no swelling no abrasions on the bilateral knees.    Impression: 1.  Bilateral knee primary osteoarthritis, severe medial and lateral and moderate patellofemoral.      Plan:    Large Joint Injection/Arthocentesis: bilateral knee    Date/Time: 9/30/2024 9:34 AM    Performed by: Shahab Mera PA-C  Authorized by: Shahab Mera PA-C    Indications:  Pain  Needle Size:  22 G  Guidance: landmark guided    Approach:  Anterolateral  Location:  Knee  Laterality:  Bilateral      Medications (Right):  80 mg triamcinolone 40 MG/ML; 3 mL BUPivacaine 0.5 %  Aspirate amount (mL):  0  Medications (Left):  80 mg triamcinolone 40 MG/ML; 3 mL BUPivacaine 0.5 %  Aspirate amount (mL):  0  Procedure discussed: discussed risks, benefits, and alternatives    Consent Given by:  Patient  Timeout: timeout called immediately prior to procedure    Prep: patient was prepped and draped in usual sterile fashion     The skin was prepped with betadine.  Patient was in seated position. I used carol chloride spray prior to doing the injections. The patient tolerated the injections well, and there were no complications. The injection sites were covered with a Band-Aid.                                        FOCUSED PLAN:     Patient's last bilateral steroid injection done by myself on 11/6/2023 lasted almost 11 months.  We did another bilateral steroid injection today since he is getting such good relief from them.  He takes Aleve every once in a while as needed.  He is not interested in the Mobic or Voltaren.  Patient is not ready for a knee replacement at this point but when he is he had seen Dr. Holguin in the past so he could potentially see the patient for this.  Patient is more interested in hyaluronic acid injections today and I answered questions on that and did mention since his arthritis it is a little more advanced they are less likely to give him good relief but they are always worth a try so we put in for approval for Synvisc 1 injections today.  If he got approved he would not have to have them right away and we could wait until the steroid injections wear off.  Patient can follow-up on an  as-needed basis.    Re-x-ray on return: No      This note was dictated with Anagear.    Shahab Mera PA-C        Again, thank you for allowing me to participate in the care of your patient.        Sincerely,        Shahab Mera PA-C

## 2024-12-18 ENCOUNTER — APPOINTMENT (OUTPATIENT)
Dept: GENERAL RADIOLOGY | Facility: CLINIC | Age: 69
End: 2024-12-18
Attending: FAMILY MEDICINE
Payer: COMMERCIAL

## 2024-12-18 ENCOUNTER — HOSPITAL ENCOUNTER (EMERGENCY)
Facility: CLINIC | Age: 69
Discharge: HOME | End: 2024-12-18
Attending: FAMILY MEDICINE | Admitting: FAMILY MEDICINE
Payer: COMMERCIAL

## 2024-12-18 ENCOUNTER — HOSPITAL ENCOUNTER (EMERGENCY)
Facility: CLINIC | Age: 69
Discharge: HOME | End: 2024-12-18
Attending: EMERGENCY MEDICINE
Payer: COMMERCIAL

## 2024-12-18 VITALS
SYSTOLIC BLOOD PRESSURE: 154 MMHG | RESPIRATION RATE: 16 BRPM | OXYGEN SATURATION: 96 % | TEMPERATURE: 97.2 F | HEART RATE: 68 BPM | HEIGHT: 67 IN | DIASTOLIC BLOOD PRESSURE: 102 MMHG | BODY MASS INDEX: 35.63 KG/M2 | WEIGHT: 227 LBS

## 2024-12-18 VITALS
HEART RATE: 63 BPM | BODY MASS INDEX: 34.4 KG/M2 | OXYGEN SATURATION: 98 % | RESPIRATION RATE: 18 BRPM | DIASTOLIC BLOOD PRESSURE: 83 MMHG | SYSTOLIC BLOOD PRESSURE: 135 MMHG | TEMPERATURE: 97.6 F | HEIGHT: 68 IN | WEIGHT: 227 LBS
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
## 2024-12-18 DIAGNOSIS — S61.216A LACERATION OF RIGHT LITTLE FINGER WITHOUT FOREIGN BODY WITHOUT DAMAGE TO NAIL, INITIAL ENCOUNTER: ICD-10-CM

## 2024-12-18 DIAGNOSIS — H02.822 CYST OF RIGHT LOWER EYELID: ICD-10-CM

## 2024-12-18 DIAGNOSIS — S00.83XA FACIAL CONTUSION, INITIAL ENCOUNTER: ICD-10-CM

## 2024-12-18 PROCEDURE — 99283 EMERGENCY DEPT VISIT LOW MDM: CPT | Mod: 25 | Performed by: EMERGENCY MEDICINE

## 2024-12-18 PROCEDURE — 99283 EMERGENCY DEPT VISIT LOW MDM: CPT | Mod: 25 | Performed by: FAMILY MEDICINE

## 2024-12-18 PROCEDURE — 10060 I&D ABSCESS SIMPLE/SINGLE: CPT | Performed by: EMERGENCY MEDICINE

## 2024-12-18 PROCEDURE — 73140 X-RAY EXAM OF FINGER(S): CPT | Mod: RT

## 2024-12-18 PROCEDURE — 250N000013 HC RX MED GY IP 250 OP 250 PS 637: Performed by: FAMILY MEDICINE

## 2024-12-18 PROCEDURE — 99283 EMERGENCY DEPT VISIT LOW MDM: CPT | Performed by: EMERGENCY MEDICINE

## 2024-12-18 PROCEDURE — 12042 INTMD RPR N-HF/GENIT2.6-7.5: CPT | Mod: F9 | Performed by: FAMILY MEDICINE

## 2024-12-18 PROCEDURE — 26770 TREAT FINGER DISLOCATION: CPT | Mod: F9 | Performed by: FAMILY MEDICINE

## 2024-12-18 RX ORDER — DOXYCYCLINE 100 MG/1
100 CAPSULE ORAL 2 TIMES DAILY
Qty: 14 CAPSULE | Refills: 0 | Status: SHIPPED | OUTPATIENT
Start: 2024-12-18

## 2024-12-18 RX ORDER — ACETAMINOPHEN 500 MG
1000 TABLET ORAL ONCE
Status: COMPLETED | OUTPATIENT
Start: 2024-12-18 | End: 2024-12-18

## 2024-12-18 RX ADMIN — ACETAMINOPHEN 1000 MG: 500 TABLET, FILM COATED ORAL at 12:32

## 2024-12-18 ASSESSMENT — COLUMBIA-SUICIDE SEVERITY RATING SCALE - C-SSRS

## 2024-12-18 ASSESSMENT — ACTIVITIES OF DAILY LIVING (ADL)
ADLS_ACUITY_SCORE: 41

## 2024-12-18 NOTE — DISCHARGE INSTRUCTIONS
You had a very small cyst or abscess on your lower eyelid.  A needle was used to puncture this area so that it can drain and we squeezed some of the pus out.  It may continue to drain, but that is okay since we want that material to come out and not stay trapped under the skin    You should do warm compresses to the area several times per day, or 5 to 10 minutes at a time.  Can let a washcloth under warm water and apply it to your eye    You should also start the antibiotic today.  This will help speed the course of healing    If you develop any significant new or worsening symptoms do not hesitate to return to the emergency room for evaluation

## 2024-12-18 NOTE — ED TRIAGE NOTES
"Patient states he fell outside the Specialty clinic.  He has abrasions and swelling to right cheek and he states \"the bone is sticking out of my finger\". Hand is wrapped from specialty clinic staff.     Triage Assessment (Adult)       Row Name 12/18/24 1018          Triage Assessment    Airway WDL WDL        Respiratory WDL    Respiratory WDL WDL        Skin Circulation/Temperature WDL    Skin Circulation/Temperature WDL X;all     Skin Circulation --  face abrasion                     "

## 2024-12-18 NOTE — ED TRIAGE NOTES
Patient has had a lump to his right lower eyelid for about a week. He thinks he has wood in his eye.     Triage Assessment (Adult)       Row Name 12/18/24 0845          Triage Assessment    Airway WDL WDL        Respiratory WDL    Respiratory WDL WDL        Skin Circulation/Temperature WDL    Skin Circulation/Temperature WDL WDL

## 2024-12-18 NOTE — ED PROVIDER NOTES
ED Provider Note     Bhavesh Landeros  [id number]  December 18, 2024      CC:     Chief Complaint   Patient presents with    Fall          History is obtained from patient.    HPI: Bhavesh Landeros is a 69 year old male presenting for evaluation of injuries that he sustained when he fell outside of our specialty clinic.  Patient was here in the emergency department to have an eye problem looked at.  He was leaving the building through the specialty clinic doors when he doors when he states that there was an uneven surface, and he tripped and fell.  He has a right fifth finger injury with what looks like a bone protruding from a wound.  He is unable to move the finger.  Patient also hit the right side of his cheek and head.  No loss of consciousness.  Patient is not on any chronic anticoagulation.    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    Cellulitis of leg 8/16/2011    Early satiety 2/24/2022    Leg edema, right 5/16/2013    MEDICAL HISTORY OF -     Multiple sclerosis (H)     Primary osteoarthritis of left knee 10/21/2021    Primary osteoarthritis of right knee 11/20/2019    Ventral hernia without obstruction or gangrene 11/9/2018       PSH:   Past Surgical History:   Procedure Laterality Date    COLONOSCOPY N/A 1/22/2016    Procedure: COLONOSCOPY;  Surgeon: Pb Rodriguez MD;  Location:  GI    ESOPHAGOSCOPY, GASTROSCOPY, DUODENOSCOPY (EGD), COMBINED N/A 9/10/2020    Procedure: Esophagogastroduodenoscopy with Biopsy;  Surgeon: Emigdio Betancourt DO;  Location:  GI    HC KNEE SCOPE,MED/LAT MENISECTOMY  07/08/1999    HC REVISE MEDIAN N/CARPAL TUNNEL SURG  1986     [facility] ENDOSCOPY DIAG W BIOPSY  10/26/09    [facility] COLONOSCOPY W/WO BRUSH/WASH  03/27/06       MEDS:   Current Facility-Administered Medications   Medication Dose Route Frequency Provider Last Rate Last Admin    3.0 mL bupivacaine  (MARCAINE) 0.5% injection (50 mL vial)  3 mL      3 mL at 09/30/24 0934    3.0 mL bupivacaine (MARCAINE) 0.5% injection (50 mL vial)  3 mL      3 mL at 09/30/24 0934    3.0 mL bupivacaine (MARCAINE) 0.5% injection (50 mL vial)  3 mL   Sahhab Mera PA-C   3 mL at 11/06/23 0927    3.0 mL bupivacaine (MARCAINE) 0.5% injection (50 mL vial)  3 mL   Shahab Mera PA-C   3 mL at 11/06/23 0927    3.0 mL bupivacaine (MARCAINE) 0.5% injection (50 mL vial)  3 mL   Shahab Mera PA-C   3 mL at 01/30/23 0944    3.0 mL bupivacaine (MARCAINE) 0.5% injection (50 mL vial)  3 mL   Shahab Mera PA-C   3 mL at 01/30/23 0944    acetaminophen (TYLENOL) tablet 1,000 mg  1,000 mg Oral Once Pily Reed MD        triamcinolone (KENALOG-40) injection 80 mg  80 mg      80 mg at 09/30/24 0934    triamcinolone (KENALOG-40) injection 80 mg  80 mg      80 mg at 09/30/24 0934    triamcinolone (KENALOG-40) injection 80 mg  80 mg   Shahab Mera PA-C   80 mg at 11/06/23 0927    triamcinolone (KENALOG-40) injection 80 mg  80 mg   Shahab Mera PA-C   80 mg at 11/06/23 0927    triamcinolone (KENALOG-40) injection 80 mg  80 mg   Shahab Mera PA-C   80 mg at 01/30/23 0944    triamcinolone (KENALOG-40) injection 80 mg  80 mg   Shahab Mera PA-C   80 mg at 01/30/23 0944     Current Outpatient Medications   Medication Sig Dispense Refill    aspirin 81 MG chewable tablet Take 1 tablet (81 mg) by mouth daily      albuterol (PROAIR HFA/PROVENTIL HFA/VENTOLIN HFA) 108 (90 Base) MCG/ACT inhaler Inhale 2 puffs into the lungs every 6 hours 18 g 0    atorvastatin (LIPITOR) 20 MG tablet Take 1 tablet (20 mg) by mouth daily 90 tablet 3    doxycycline hyclate (VIBRAMYCIN) 100 MG capsule Take 1 capsule (100 mg) by mouth 2 times daily. 14 capsule 0    losartan (COZAAR) 50 MG tablet Take 1 tablet (50 mg) by mouth daily 90 tablet 3    omeprazole (PRILOSEC) 20 MG DR capsule Take 1 capsule (20 mg) by mouth daily 90 capsule 3       Allergies: No known  "allergies    Triage and nursing notes were reviewed.    ROS: All other systems were reviewed and are negative    Physical Exam:  Vitals:    12/18/24 1020   BP: (!) 154/102   Pulse: 68   Resp: 16   Temp: 97.2  F (36.2  C)   TempSrc: Oral   SpO2: 96%   Weight: 103 kg (227 lb)   Height: 1.702 m (5' 7\")     GENERAL APPEARANCE: Alert and oriented x 3.  GCS 15  HEAD/FACE: Patient has contusion and abrasion to the right lateral cheek and zygomatic region.  There is some swelling over the periorbital area.  EYES: PERRL, EOMI; no apparent injury to the orbit.  HENT: oral exam benign; as above  RESP: Normal respiratory effort  EXT: Patient has an open wound to the palmar surface of the right fifth finger.  There appears to be a tendon that is intact.  There is either fracture or dislocation of the middle phalanx.  Patient's finger was distracted, with a reduction.  Patient is able to flex and extend afterwards.  Bleeding is minimal.  SKIN: no rash; as above  NEURO: mentation and speech normal; no focal deficits    Labs/Imaging Results:  Results for orders placed or performed during the hospital encounter of 12/18/24 (from the past 24 hours)   XR Finger Right G/E 2 Views    Narrative    XR FINGER RIGHT G/E 2 VIEWS 12/18/2024 10:59 AM    HISTORY: fall    COMPARISON: None.      Impression    IMPRESSION: No evidence of a fracture. Normal alignment.    DAVID DE LEON MD         SYSTEM ID:  [id number]         Procedure Note: Patient has a 3.5 cm laceration along the length of the right fifth finger on the palmar side.  There is tendon that is exposed.  There is no foreign bodies noted on exam of the field.  There is minimal bleeding.  The finger was locally anesthetized with approximately 4 mL of lidocaine without epinephrine.  The wound was copiously irrigated with 200 cc of saline.  The wound was prepped and draped in usual sterile fashion and closed with 7 interrupted simple sutures using 4-0 Ethilon material.  The wound was " then dressed with antibiotic ointment, nonadherent dressing, and tube gauze.      Impression:  Final diagnoses:   Laceration of right little finger   Fall, initial encounter   Facial contusion,         ED Course & Medical Decision Making (Plan):  Bhavesh Landeros is a 69 year old male with a fall outside of our facility as he was leaving the building.  Patient hit the right side of his face, had no loss of consciousness, but reached out to Mike's fall, and that resulted in an open wound to the right fifth finger.  The initial report was that there was bone sticking out of the finger and he was unable to move the finger.  Patient is not on any chronic anticoagulation.    Vital signs reveal a temp of 97.2, blood pressure 154/102, heart rate of 68, respiration 16, 96% oxygen saturation.  On exam, patient has abrasion and contusion to the right side of the cheek and around the supraorbital region.  Extraocular muscles are intact.  Pupils are equal and reactive.  He has a small pimple under the right eye that was lanced earlier in the ED.  Patient has an open wound to the right fifth finger on the palmar side.  This was reduced with extension of the finger and patient was able to move his finger with flexion and extension normally.    X-rays were obtained, and revealed no evidence of fracture or dislocation.  The patient's wound was locally anesthetized, irrigated, and closed with 7 sutures.  Aftercare instructions were discussed.  Patient's last tetanus shot was in 2022 and he is current.  He is already on doxycycline.  Patient's daughter arrived prior to the laceration repair.  Patient and her daughter were in agreement with discharge instructions below.    Written after-visit summary and instructions were given at the time of discharge.          Discharge Instructions:  We are sorry about your fall outside of our building.  You have abrasions and contusion to the right side of your face.  Ice these areas and perform  daily wound care.  Your right fifth finger was dislocated and lacerated.  Your last tetanus shot was in 2022.  The dislocation was reduced, and your laceration was repaired with 7 stitches.  Keep the dressing clean and dry for the next 24 hours, and then begin daily wound care and dressing changes.  Wash gently with soap and water, cover with antibiotic ointment and bandage.  Follow-up in 10-12 days for suture removal.  Take your doxycycline antibiotic.  Return if any signs of infection.        Disclaimer: This note consists of words and symbols derived from keyboarding and dictation using voice recognition software.  As a result, there may be errors that have gone undetected.  Please consider this when interpreting information found in this note.       Pily Reed MD  12/18/24 [id number]

## 2024-12-18 NOTE — DISCHARGE INSTRUCTIONS
We are sorry about your fall outside of our building.  You have abrasions and contusion to the right side of your face.  Ice these areas and perform daily wound care.  Your right fifth finger was dislocated and lacerated.  Your last tetanus shot was in 2022.  The dislocation was reduced, and your laceration was repaired with 7 stitches.  Keep the dressing clean and dry for the next 24 hours, and then begin daily wound care and dressing changes.  Wash gently with soap and water, cover with antibiotic ointment and bandage.  Follow-up in 10-12 days for suture removal.  Take your doxycycline antibiotic.  Return if any signs of infection.

## 2024-12-18 NOTE — ED PROVIDER NOTES
History     Chief Complaint   Patient presents with    Eye Pain     HPI  Bhavesh Landeros is a 69 year old male who presents with concern of eye irritation.  He said he has noticed this 3 to 4 days ago.  It is not painful but it is irritating his eye a bit.  He is concerned because he can now see it out of the corner of his eye.  He is unsure if this was due to some woodworking he was doing a few weeks ago but does not remember getting anything in his eye.  It has not been draining.  He does wear glasses but has not noticed this affected his vision.    Allergies:  Allergies   Allergen Reactions    No Known Allergies        Problem List:    Patient Active Problem List    Diagnosis Date Noted    Mixed hyperlipidemia 12/02/2019     Priority: Medium    HTN, goal below 140/80 12/02/2019     Priority: Medium    Obesity (BMI 35.0-39.9) with comorbidity (H) 11/25/2019     Priority: Medium    Aortic valve stenosis, etiology of cardiac valve disease unspecified 11/19/2018     Priority: Medium    Mitral regurgitation and aortic stenosis 11/19/2018     Priority: Medium    GERD (gastroesophageal reflux disease) 10/27/2009     Priority: Medium        Past Medical History:    Past Medical History:   Diagnosis Date    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    Cellulitis of leg 8/16/2011    Early satiety 2/24/2022    Leg edema, right 5/16/2013    MEDICAL HISTORY OF -     Multiple sclerosis (H)     Primary osteoarthritis of left knee 10/21/2021    Primary osteoarthritis of right knee 11/20/2019    Ventral hernia without obstruction or gangrene 11/9/2018       Past Surgical History:    Past Surgical History:   Procedure Laterality Date    COLONOSCOPY N/A 1/22/2016    Procedure: COLONOSCOPY;  Surgeon: Pb Rodriguez MD;  Location:  GI    ESOPHAGOSCOPY, GASTROSCOPY, DUODENOSCOPY (EGD), COMBINED N/A 9/10/2020    Procedure: Esophagogastroduodenoscopy with Biopsy;  Surgeon: Emigdio Betancourt DO;  Location:  GI     KNEE  "SCOPE,MED/LAT MENISECTOMY  07/08/1999    HC REVISE MEDIAN N/CARPAL TUNNEL SURG  1986     [facility] ENDOSCOPY DIAG W BIOPSY  10/26/09    [facility] COLONOSCOPY W/WO BRUSH/WASH  03/27/06       Family History:    Family History   Problem Relation Age of Onset    Diabetes Brother     Alzheimer Disease Father     Diabetes Sister        Social History:  Marital Status:   [5]  Social History     Tobacco Use    Smoking status: Never    Smokeless tobacco: Never   Vaping Use    Vaping status: Never Used   Substance Use Topics    Alcohol use: Yes     Alcohol/week: 0.0 standard drinks of alcohol     Comment: occ    Drug use: No        Medications:    doxycycline hyclate (VIBRAMYCIN) 100 MG capsule  albuterol (PROAIR HFA/PROVENTIL HFA/VENTOLIN HFA) 108 (90 Base) MCG/ACT inhaler  aspirin 81 MG chewable tablet  atorvastatin (LIPITOR) 20 MG tablet  losartan (COZAAR) 50 MG tablet  omeprazole (PRILOSEC) 20 MG DR capsule          Review of Systems   All other systems reviewed and are negative.      Physical Exam   BP: (!) 152/84  Pulse: 69  Temp: 97.6  F (36.4  C)  Resp: 16  Height: 171.5 cm (5' 7.5\")  Weight: 103 kg (227 lb)  SpO2: 97 %      Physical Exam  Vitals and nursing note reviewed.   Constitutional:       General: He is not in acute distress.     Appearance: He is not toxic-appearing.   HENT:      Head: Normocephalic.      Nose: Nose normal.   Eyes:      Extraocular Movements: Extraocular movements intact.      Conjunctiva/sclera: Conjunctivae normal.      Pupils: Pupils are equal, round, and reactive to light.     Pulmonary:      Effort: Pulmonary effort is normal.   Neurological:      Mental Status: He is alert.         ED [facility]    PROCEDURE: -Incision/Drainage    Date/Time: 12/18/2024 9:14 AM    Performed by: Ava Mccracken DO  Authorized by: Ava Mccracken DO    Risks, benefits and alternatives discussed.      LOCATION:      Type:  Cyst    Size:  3 mm   "  Location:  Head    Head location:  R eyelid    PRE-PROCEDURE DETAILS:     Procedure prep: Alcohol wipe.    PROCEDURE TYPE:     Complexity:  Simple    ANESTHESIA (see MAR for exact dosages):     Anesthesia method:  None    PROCEDURE DETAILS:     Incision types:  Stab incision    Scalpel size: 18-gauge needle.    Drainage:  Purulent    Wound treatment:  Wound left open    Packing materials:  None    PROCEDURE    Patient Tolerance:  Patient tolerated the procedure well with no immediate complications                Critical Care time:  none              No results found for this or any previous visit (from the past 24 hours).    Medications - No data to display    Assessments & Plan (with Medical Decision Making)  Bhavesh is a 69-year-old male presenting with concern of a small lump on his right lower eyelid for the last few days.  See history and physical exam as above  Pleasant 69-year-old male in no acute distress, mildly hypertensive with blood pressure 152/84 but otherwise vitally stable and afebrile.  Area was examined and does not appear to be on the inner eyelid or along the water line to suggest a stye or hordeolum.  This appears to be more on the skin of the lower eyelid and appears to be a small cyst or abscess.  Would like to place patient on some antibiotics but would also like to try and incise and drain this to help antibiotics work better.  He is agreeable to proceeding.  Area was cleansed with an alcohol wipe and a small puncture was made with an 18-gauge needle.  Purulent discharge was then expressed.  Decreased in size immediately.  I discussed supportive cares with warm compresses and starting the antibiotic today.  He can follow-up with his primary doctor if needed and advised to return to the ER if worsening symptoms develop.  All questions answered discharged in stable condition     I have reviewed the nursing notes.    I have reviewed the findings, diagnosis, plan and need for follow up with the  patient.           Medical Decision Making  The patient's presentation was of low complexity (an acute and uncomplicated illness or injury).    The patient's evaluation involved:  history and exam without other MDM data elements    The patient's management necessitated moderate risk (prescription drug management including medications given in the ED) and moderate risk (a decision regarding minor procedure (incision & drainage) with risk factors of none).        New Prescriptions    DOXYCYCLINE HYCLATE (VIBRAMYCIN) 100 MG CAPSULE    Take 1 capsule (100 mg) by mouth 2 times daily.       Final diagnoses:   Cyst of right lower eyelid       12/18/2024   [facility] EMERGENCY DEPT       Ava Mccracken DO  12/18/24 0916

## 2024-12-19 ENCOUNTER — PATIENT OUTREACH (OUTPATIENT)
Dept: CARE COORDINATION | Facility: CLINIC | Age: 69
End: 2024-12-19
Payer: COMMERCIAL

## 2024-12-19 NOTE — PROGRESS NOTES
Clinic Care Coordination Contact  Community Health Worker Initial Outreach    CHW Initial Information Gathering:  Referral Source: ED Follow-Up  Current living arrangement:: Not Assessed  CHW Additional Questions  If ED/Hospital discharge, follow-up appointment scheduled as recommended?: No  Patient agreeable to assistance with scheduling?: Yes  CHW assisted patient to schedule (specify): CHW transfered Patient via Back Door.  Medication changes made following ED/Hospital discharge?: No  MyChart active?: Yes  Patient sent Social Drivers of Health questionnaire?: No    Patient expressed that he needs to follow up with Renetta due to Patient's Glasses is messed up and needs replacement.      Patient accepts CC: No, Patient expressed no additional support is needed at this time. Patient will be sent Care Coordination introduction letter for future reference.     Andreia Wells  Community Health Worker    Connected Care Resources   [facility]     *Connected Care Resources Team does NOT   follow patient ongoing. Referrals are identified   based on internal discharge report and the outreach is to ensure   Patient has understanding of their discharge instructions.

## 2024-12-19 NOTE — LETTER
Bhavesh Landeros  [id number]   [place] [address]    Dear Bhavesh Landeros,    I am a team member within the [facility] with [facility]. I recently contacted you to ensure you are doing well following a visit within our health system. I also wanted to take this chance to introduce Clinic Care Coordination should you have any interest in this program in the future.    Below is a description of Clinic Care Coordination and how this team can further assist you:       The Clinic Care Coordination team is made up of a Registered Nurse, , Financial Resource Worker, and a Community Health Worker who understand and can help navigate the health care system. The goal of clinic care coordination is to help you manage your health, improve access to care, and achieve optimal health outcomes. They work alongside your provider to assist you in determining your health and social needs, obtain health care and community resources, and provide you with necessary information and education. Clinic Care Coordination can work with you through any barriers and develop a care plan that helps coordinate and strengthen the relationship between you and your care team.    If you wish to connect with the Clinic Care Coordination Team, please let your [facility] Primary Care Provider or Clinic Care Team know and they can place a referral. The Clinic Care Coordination team will then reach out by phone to further support you.    We are focused on providing you with the highest-quality healthcare experience possible.    Sincerely,   Andreia ABRAMS  Community Health Worker    Connected Care Resources  [facility] [address] ([phone]).

## 2024-12-30 ENCOUNTER — ALLIED HEALTH/NURSE VISIT (OUTPATIENT)
Dept: FAMILY MEDICINE | Facility: CLINIC | Age: 69
End: 2024-12-30

## 2024-12-30 DIAGNOSIS — Z48.02 ENCOUNTER FOR REMOVAL OF SUTURES: Primary | ICD-10-CM

## 2024-12-30 PROCEDURE — 99207 PR NO CHARGE NURSE ONLY: CPT

## 2024-12-30 NOTE — PROGRESS NOTES
Bhavesh Landeros presents to the clinic today for removal of sutures.  The patient has had the sutures in place for 12 days.  There has been no history of infection or drainage.  7 sutures are seen located on the right hand pinky finger.  The wound is healing well.  The finger is more swollen than other hand.  Did have Dr Emery come and assess wound.  Dr Emery okay with sutures coming out today.  Wound was dressed with non-adherent dressing, gauze, and koban.  Tetanus status is up to date.   All sutures were easily removed today.  Routine wound care discussed.  The patient will follow up as needed.    Maddie Perales RN on 12/30/2024 at 11:52 AM

## 2025-01-21 NOTE — TELEPHONE ENCOUNTER
CHILD LIFE INITIAL ASSESSMENT NOTE    Patient Name: Willie  Age: 16 year old    Certified Child Life Specialist transitioned to bedside to introduce services and assess patient/family coping and needs.    Objective Information  Previous hospital experience: Yes: unknown  Developmental considerations: No  Does this patient need an Adaptive Care Plan? No    Family Assessment  Family present at bedside? Yes: mother and father  Caregiver information/intervention: Provided support and a [employer] [place] bag  Sibling information/intervention: has two older siblings and a younger sibling    Patient Assessment  Patient behavior/affect during encounter: Pt calm and interactive  Specific interests/motivators: include roller coasters  Expresses fear and anxiety: Yes: worried about issues with veins from port placement  Is the patient able to demonstrate understanding of current hospitalization? Yes: Pt answered and asked appropriate questions.     Interventions Provided  Activities/toys for normalization and/or diversion: Yes: Arnav  Activities to meet therapeutic/medical goals: No   Medical/procedural education: Yes: Port education  Items to promote appropriate stimulation/soothing: No   Sensory items: No   Emotional expression and/or processing of experiences: No     Care Plan  -Assess patients understanding and coping with hospitalization and diagnosis through emotional support and active listening   -Educate patient about diagnosis and procedures using simple explanations and educational material  -Provide distraction and normalization through teen groups, movies, video games and emotional support  -Provide support to enhance coping during port access and other procedures  -Provide support to parents through active listening and conversation  -Provide family with resources to promote positive reinforcement and financial support     Child Life services will remain available to provide support to patient and family throughout  Prescription approved per [facility].  Rocky Alaniz RN  November 12, 2020     admission.     Saulo Saavedra MS, CCLS

## 2025-02-14 ENCOUNTER — ANCILLARY PROCEDURE (OUTPATIENT)
Dept: GENERAL RADIOLOGY | Facility: OTHER | Age: 70
End: 2025-02-14
Attending: PHYSICIAN ASSISTANT
Payer: COMMERCIAL

## 2025-02-14 DIAGNOSIS — K44.9 HIATAL HERNIA: ICD-10-CM

## 2025-02-14 DIAGNOSIS — M62.08 DIASTASIS RECTI: ICD-10-CM

## 2025-02-14 DIAGNOSIS — K45.8 RECURRENT ABDOMINAL HERNIA WITHOUT OBSTRUCTION OR GANGRENE, UNSPECIFIED HERNIA TYPE: ICD-10-CM

## 2025-02-14 DIAGNOSIS — K59.01 SLOW TRANSIT CONSTIPATION: ICD-10-CM

## 2025-02-14 DIAGNOSIS — I10 HTN, GOAL BELOW 140/80: ICD-10-CM

## 2025-02-14 DIAGNOSIS — E66.01 MORBID OBESITY (H): ICD-10-CM

## 2025-02-14 PROBLEM — R10.84 ABDOMINAL PAIN, GENERALIZED: Status: ACTIVE | Noted: 2025-02-14

## 2025-02-14 PROBLEM — K40.90 INGUINAL HERNIA WITHOUT OBSTRUCTION OR GANGRENE, RECURRENCE NOT SPECIFIED, UNSPECIFIED LATERALITY: Status: ACTIVE | Noted: 2025-02-14

## 2025-02-14 PROCEDURE — 74019 RADEX ABDOMEN 2 VIEWS: CPT | Mod: TC | Performed by: INTERNAL MEDICINE

## 2025-02-17 ENCOUNTER — TELEPHONE (OUTPATIENT)
Dept: FAMILY MEDICINE | Facility: CLINIC | Age: 70
End: 2025-02-17
Payer: COMMERCIAL

## 2025-02-17 NOTE — TELEPHONE ENCOUNTER
Received call. Patient states clinic called him then put him on hold and he must have been transferred to writer. Upon chart review do not see a recent note of call today. Patient states he believes it might be from pulmonary results. While trying to assist, patient states he was receiving another call on the other line from [facility]. Patient states he will answer that call.    Maddie Perales RN on 2/17/2025 at 8:54 AM

## 2025-02-24 ENCOUNTER — TELEPHONE (OUTPATIENT)
Dept: CARDIOLOGY | Facility: CLINIC | Age: 70
End: 2025-02-24
Payer: COMMERCIAL

## 2025-02-24 NOTE — TELEPHONE ENCOUNTER
[facility] Call Center    Phone Message    May a detailed message be left on voicemail: yes     Reason for Call: Appointment Intake    Referring Provider Name:   Pedrito Aguilar PA-C     Diagnosis and/or Symptoms:     Aortic valve stenosis, etiology of cardiac valve disease unspecified [I35.0]  HTN, goal below 140/80 [I10]  Mitral regurgitation and aortic stenosis [I08.0]  SOB (shortness of breath) [R06.02]  PHELAN (dyspnea on exertion) [R06.09]  Cardiomegaly    NOT COG CARDIOLOGY PER 2/21/25 NOTE IN CHART       Action Taken: Other: Cardiology    Travel Screening: Not Applicable     Date of Service:

## 2025-02-24 NOTE — TELEPHONE ENCOUNTER
Nothing further is needed at this time.   Closing encounter.     Franca Rosales on 2/24/2025 at 11:42 AM

## 2025-02-24 NOTE — TELEPHONE ENCOUNTER
Bhavesh has an appointment scheduled on 7/30/25 with Dr Dimas. Please advise if we need to do anything else for him.

## 2025-02-25 ENCOUNTER — HOSPITAL ENCOUNTER (OUTPATIENT)
Dept: RESPIRATORY THERAPY | Facility: CLINIC | Age: 70
Discharge: HOME | End: 2025-02-25
Attending: PHYSICIAN ASSISTANT
Payer: COMMERCIAL

## 2025-02-25 DIAGNOSIS — R06.09 DOE (DYSPNEA ON EXERTION): ICD-10-CM

## 2025-02-25 DIAGNOSIS — R06.02 SOB (SHORTNESS OF BREATH): ICD-10-CM

## 2025-02-25 DIAGNOSIS — I35.0 AORTIC VALVE STENOSIS, ETIOLOGY OF CARDIAC VALVE DISEASE UNSPECIFIED: ICD-10-CM

## 2025-02-25 LAB
DLCOCOR-%PRED-PRE: 104 %
DLCOCOR-PRE: 24.64 ML/MIN/MMHG
DLCOUNC-%PRED-PRE: 101 %
DLCOUNC-PRE: 23.99 ML/MIN/MMHG
DLCOUNC-PRED: 23.64 ML/MIN/MMHG
ERV-%PRED-PRE: 5 %
ERV-PRE: 0.07 L
ERV-PRED: 1.29 L
EXPTIME-PRE: 8.46 SEC
FEF2575-%PRED-POST: 125 %
FEF2575-%PRED-PRE: 90 %
FEF2575-POST: 2.69 L/SEC
FEF2575-PRE: 1.95 L/SEC
FEF2575-PRED: 2.14 L/SEC
FEFMAX-%PRED-PRE: 59 %
FEFMAX-PRE: 4.58 L/SEC
FEFMAX-PRED: 7.65 L/SEC
FEV1-%PRED-PRE: 69 %
FEV1-PRE: 1.87 L
FEV1FEV6-PRE: 80 %
FEV1FEV6-PRED: 78 %
FEV1FVC-PRE: 80 %
FEV1FVC-PRED: 77 %
FEV1SVC-PRE: 87 %
FEV1SVC-PRED: 70 %
FIFMAX-PRE: 1.49 L/SEC
FRCPLETH-%PRED-PRE: 91 %
FRCPLETH-PRE: 3.08 L
FRCPLETH-PRED: 3.38 L
FVC-%PRED-PRE: 66 %
FVC-PRE: 2.32 L
FVC-PRED: 3.51 L
IC-%PRED-PRE: 80 %
IC-PRE: 2.08 L
IC-PRED: 2.58 L
RVPLETH-%PRED-PRE: 118 %
RVPLETH-PRE: 3.01 L
RVPLETH-PRED: 2.55 L
TLCPLETH-%PRED-PRE: 79 %
TLCPLETH-PRE: 5.16 L
TLCPLETH-PRED: 6.52 L
VA-%PRED-PRE: 77 %
VA-PRE: 4.51 L
VC-%PRED-PRE: 55 %
VC-PRE: 2.15 L
VC-PRED: 3.87 L

## 2025-02-25 PROCEDURE — 94729 DIFFUSING CAPACITY: CPT

## 2025-02-25 PROCEDURE — 94726 PLETHYSMOGRAPHY LUNG VOLUMES: CPT

## 2025-02-25 PROCEDURE — 94060 EVALUATION OF WHEEZING: CPT

## 2025-02-25 NOTE — DISCHARGE INSTRUCTIONS
Thank you for completing pulmonary function testing today.  All results will be scanned into your epic results for your doctor to review.  Please resume taking all your current prescribed medications and diet as directed by your provider.   If you have not heard from your provider about your testing within two weeks and do not have a follow-up appointment scheduled with them please contact your provider about any questions you have concerning your testing.   Thank you  [facility] Pulmonary Function [facility]

## 2025-03-04 ENCOUNTER — TELEPHONE (OUTPATIENT)
Dept: FAMILY MEDICINE | Facility: CLINIC | Age: 70
End: 2025-03-04

## 2025-03-04 ENCOUNTER — ANCILLARY PROCEDURE (OUTPATIENT)
Dept: GENERAL RADIOLOGY | Facility: OTHER | Age: 70
End: 2025-03-04
Attending: STUDENT IN AN ORGANIZED HEALTH CARE EDUCATION/TRAINING PROGRAM
Payer: COMMERCIAL

## 2025-03-04 ENCOUNTER — OFFICE VISIT (OUTPATIENT)
Dept: FAMILY MEDICINE | Facility: OTHER | Age: 70
End: 2025-03-04
Payer: COMMERCIAL

## 2025-03-04 VITALS
OXYGEN SATURATION: 97 % | SYSTOLIC BLOOD PRESSURE: 144 MMHG | RESPIRATION RATE: 20 BRPM | TEMPERATURE: 97.7 F | BODY MASS INDEX: 37.83 KG/M2 | HEART RATE: 73 BPM | DIASTOLIC BLOOD PRESSURE: 77 MMHG | HEIGHT: 67 IN | WEIGHT: 241 LBS

## 2025-03-04 DIAGNOSIS — R60.0 LOWER EXTREMITY EDEMA: ICD-10-CM

## 2025-03-04 DIAGNOSIS — R06.02 SHORTNESS OF BREATH: ICD-10-CM

## 2025-03-04 DIAGNOSIS — Z29.11 NEED FOR VACCINATION AGAINST RESPIRATORY SYNCYTIAL VIRUS: ICD-10-CM

## 2025-03-04 DIAGNOSIS — R14.0 ABDOMINAL DISTENSION: Primary | ICD-10-CM

## 2025-03-04 DIAGNOSIS — I35.0 AORTIC VALVE STENOSIS, ETIOLOGY OF CARDIAC VALVE DISEASE UNSPECIFIED: ICD-10-CM

## 2025-03-04 DIAGNOSIS — R60.0 PERIPHERAL EDEMA: ICD-10-CM

## 2025-03-04 DIAGNOSIS — I10 HTN, GOAL BELOW 140/80: ICD-10-CM

## 2025-03-04 LAB
ALBUMIN SERPL BCG-MCNC: 3.9 G/DL (ref 3.5–5.2)
ALBUMIN UR-MCNC: NEGATIVE MG/DL
ALP SERPL-CCNC: 77 U/L (ref 40–150)
ALT SERPL W P-5'-P-CCNC: 14 U/L (ref 0–70)
ANION GAP SERPL CALCULATED.3IONS-SCNC: 13 MMOL/L (ref 7–15)
APPEARANCE UR: CLEAR
AST SERPL W P-5'-P-CCNC: 25 U/L (ref 0–45)
BILIRUB SERPL-MCNC: 0.5 MG/DL
BILIRUB UR QL STRIP: NEGATIVE
BUN SERPL-MCNC: 12.3 MG/DL (ref 8–23)
CALCIUM SERPL-MCNC: 9 MG/DL (ref 8.8–10.4)
CHLORIDE SERPL-SCNC: 105 MMOL/L (ref 98–107)
COLOR UR AUTO: YELLOW
CREAT SERPL-MCNC: 0.95 MG/DL (ref 0.67–1.17)
EGFRCR SERPLBLD CKD-EPI 2021: 86 ML/MIN/1.73M2
ERYTHROCYTE [DISTWIDTH] IN BLOOD BY AUTOMATED COUNT: 13 % (ref 10–15)
GLUCOSE SERPL-MCNC: 94 MG/DL (ref 70–99)
GLUCOSE UR STRIP-MCNC: NEGATIVE MG/DL
HCO3 SERPL-SCNC: 23 MMOL/L (ref 22–29)
HCT VFR BLD AUTO: 41.3 % (ref 40–53)
HGB BLD-MCNC: 13.6 G/DL (ref 13.3–17.7)
HGB UR QL STRIP: NEGATIVE
KETONES UR STRIP-MCNC: NEGATIVE MG/DL
LEUKOCYTE ESTERASE UR QL STRIP: NEGATIVE
LIPASE SERPL-CCNC: 24 U/L (ref 13–60)
MCH RBC QN AUTO: 28.8 PG (ref 26.5–33)
MCHC RBC AUTO-ENTMCNC: 32.9 G/DL (ref 31.5–36.5)
MCV RBC AUTO: 88 FL (ref 78–100)
NITRATE UR QL: NEGATIVE
NT-PROBNP SERPL-MCNC: 85 PG/ML (ref 0–900)
PH UR STRIP: 6 [PH] (ref 5–7)
PLATELET # BLD AUTO: 176 10E3/UL (ref 150–450)
POTASSIUM SERPL-SCNC: 4.6 MMOL/L (ref 3.4–5.3)
PROT SERPL-MCNC: 6.3 G/DL (ref 6.4–8.3)
RBC # BLD AUTO: 4.72 10E6/UL (ref 4.4–5.9)
SODIUM SERPL-SCNC: 141 MMOL/L (ref 135–145)
SP GR UR STRIP: 1.01 (ref 1–1.03)
UROBILINOGEN UR STRIP-ACNC: 0.2 E.U./DL
WBC # BLD AUTO: 5 10E3/UL (ref 4–11)

## 2025-03-04 PROCEDURE — 83690 ASSAY OF LIPASE: CPT | Performed by: STUDENT IN AN ORGANIZED HEALTH CARE EDUCATION/TRAINING PROGRAM

## 2025-03-04 PROCEDURE — 3078F DIAST BP <80 MM HG: CPT | Performed by: STUDENT IN AN ORGANIZED HEALTH CARE EDUCATION/TRAINING PROGRAM

## 2025-03-04 PROCEDURE — 3077F SYST BP >= 140 MM HG: CPT | Performed by: STUDENT IN AN ORGANIZED HEALTH CARE EDUCATION/TRAINING PROGRAM

## 2025-03-04 PROCEDURE — 83880 ASSAY OF NATRIURETIC PEPTIDE: CPT | Performed by: STUDENT IN AN ORGANIZED HEALTH CARE EDUCATION/TRAINING PROGRAM

## 2025-03-04 PROCEDURE — 81003 URINALYSIS AUTO W/O SCOPE: CPT | Performed by: STUDENT IN AN ORGANIZED HEALTH CARE EDUCATION/TRAINING PROGRAM

## 2025-03-04 PROCEDURE — 74019 RADEX ABDOMEN 2 VIEWS: CPT | Mod: TC | Performed by: STUDENT IN AN ORGANIZED HEALTH CARE EDUCATION/TRAINING PROGRAM

## 2025-03-04 PROCEDURE — 85027 COMPLETE CBC AUTOMATED: CPT | Performed by: STUDENT IN AN ORGANIZED HEALTH CARE EDUCATION/TRAINING PROGRAM

## 2025-03-04 PROCEDURE — G2211 COMPLEX E/M VISIT ADD ON: HCPCS | Performed by: STUDENT IN AN ORGANIZED HEALTH CARE EDUCATION/TRAINING PROGRAM

## 2025-03-04 PROCEDURE — 93000 ELECTROCARDIOGRAM COMPLETE: CPT | Performed by: STUDENT IN AN ORGANIZED HEALTH CARE EDUCATION/TRAINING PROGRAM

## 2025-03-04 PROCEDURE — 71046 X-RAY EXAM CHEST 2 VIEWS: CPT | Mod: TC | Performed by: RADIOLOGY

## 2025-03-04 PROCEDURE — 80053 COMPREHEN METABOLIC PANEL: CPT | Performed by: STUDENT IN AN ORGANIZED HEALTH CARE EDUCATION/TRAINING PROGRAM

## 2025-03-04 PROCEDURE — 36415 COLL VENOUS BLD VENIPUNCTURE: CPT | Performed by: STUDENT IN AN ORGANIZED HEALTH CARE EDUCATION/TRAINING PROGRAM

## 2025-03-04 PROCEDURE — 99214 OFFICE O/P EST MOD 30 MIN: CPT | Mod: 24 | Performed by: STUDENT IN AN ORGANIZED HEALTH CARE EDUCATION/TRAINING PROGRAM

## 2025-03-04 PROCEDURE — 1126F AMNT PAIN NOTED NONE PRSNT: CPT | Performed by: STUDENT IN AN ORGANIZED HEALTH CARE EDUCATION/TRAINING PROGRAM

## 2025-03-04 RX ORDER — SENNA AND DOCUSATE SODIUM 50; 8.6 MG/1; MG/1
1 TABLET, FILM COATED ORAL AT BEDTIME
Qty: 30 TABLET | Refills: 0 | Status: SHIPPED | OUTPATIENT
Start: 2025-03-04

## 2025-03-04 RX ORDER — FUROSEMIDE 20 MG/1
20 TABLET ORAL DAILY
Qty: 30 TABLET | Refills: 0 | Status: SHIPPED | OUTPATIENT
Start: 2025-03-04

## 2025-03-04 ASSESSMENT — ASTHMA QUESTIONNAIRES
ACT_TOTALSCORE: 10
QUESTION_4 LAST FOUR WEEKS HOW OFTEN HAVE YOU USED YOUR RESCUE INHALER OR NEBULIZER MEDICATION (SUCH AS ALBUTEROL): THREE OR MORE TIMES PER DAY
ACT_TOTALSCORE: 10
QUESTION_2 LAST FOUR WEEKS HOW OFTEN HAVE YOU HAD SHORTNESS OF BREATH: MORE THAN ONCE A DAY
QUESTION_5 LAST FOUR WEEKS HOW WOULD YOU RATE YOUR ASTHMA CONTROL: POORLY CONTROLLED
QUESTION_1 LAST FOUR WEEKS HOW MUCH OF THE TIME DID YOUR ASTHMA KEEP YOU FROM GETTING AS MUCH DONE AT WORK, SCHOOL OR AT HOME: A LITTLE OF THE TIME
QUESTION_3 LAST FOUR WEEKS HOW OFTEN DID YOUR ASTHMA SYMPTOMS (WHEEZING, COUGHING, SHORTNESS OF BREATH, CHEST TIGHTNESS OR PAIN) WAKE YOU UP AT NIGHT OR EARLIER THAN USUAL IN THE MORNING: TWO OR THREE NIGHTS A WEEK

## 2025-03-04 ASSESSMENT — PAIN SCALES - GENERAL: PAINLEVEL_OUTOF10: NO PAIN (0)

## 2025-03-04 NOTE — PROGRESS NOTES
Assessment & Plan     Need for vaccination against respiratory syncytial virus      Abdominal distension  - CBC with platelets  - Comprehensive metabolic panel  - Lipase  - UA Macroscopic with reflex to Microscopic and Culture  - XR Abdomen 2 Views  - SENNA-docusate sodium (SENNA S) 8.6-50 MG tablet; Take 1 tablet by mouth at bedtime.  - XR Chest 2 Views    Lower extremity edema  - BNP-N terminal pro  - EKG 12-lead complete w/read - Clinics  - furosemide (LASIX) 20 MG tablet; Take 1 tablet (20 mg) by mouth daily.    HTN, goal below 140/80  - BNP-N terminal pro    Aortic valve stenosis, etiology of cardiac valve disease unspecified  - BNP-N terminal pro    Peripheral edema  - BNP-N terminal pro    Shortness of breath  - BNP-N terminal pro    Seen several weeks ago for abdominal distention and feeling more dyspneic- given miralax and albuterol to try without much relief. Has not had much improvement in the past few weeks and is feeling more short of breath. Has gained 11lbs since last visit and has some new mild lower extremity pitting edema. Is still very distended but having daily bowel movements. No fluid wave appreciated on exam. Recent PFTs were relatively unremarkable.   Will repeat labs and also check BNP, lipase plus EKG, CXR and abdominal XR. Will start lasix. Continue miralax and add senna for bowel regimen. Encouraged increased fiber intake. Has upcoming echo and cards visit.   Follow up in 4 weeks and update if sx worsen.  Worrisome signs and symptoms discussed      Subjective   Bhavesh is a 69 year old, presenting for the following health issues:  Abdominal swelling        3/4/2025     9:47 AM   Additional Questions   Roomed by aston   Accompanied by self     History of Present Illness       Reason for visit:  I have a hard chest alot of air    He eats 0-1 servings of fruits and vegetables daily.He consumes 0 sweetened beverage(s) daily.He exercises with enough effort to increase his heart rate 30 to  "60 minutes per day.  He exercises with enough effort to increase his heart rate 3 or less days per week.   He is taking medications regularly.          Patient was seen last week for same issue, had a chest xray and they saw a little bit of bowels    Concern - Abdomin swelling  Onset: couple of months  Description: after eating, stomach swells, makes it harder to breath with stomach being bloated, have a lot of air in stomach, gets full very fast  Intensity: mild  Progression of Symptoms:  same  Accompanying Signs & Symptoms: none  Previous history of similar problem: yes  Precipitating factors:        Worsened by: sitting, bending  Alleviating factors:        Improved by: none  Therapies tried and outcome: miralax , metamucil   Still has had no improvement in his abdominal distention and shortness of breath. He is having daily bowel movements. Used the miralax but has not used in a few days. Denies any chest pain or palpitation. Does have some exertion with light activity. Denies orthopnea. Has known aortic stenosis.       Review of Systems  Constitutional, HEENT, cardiovascular, pulmonary, gi and gu systems are negative, except as otherwise noted.      Objective    BP (!) 144/77   Pulse 73   Temp 97.7  F (36.5  C) (Temporal)   Resp 20   Ht 1.714 m (5' 7.48\")   Wt 109.3 kg (241 lb)   SpO2 97%   BMI 37.21 kg/m    Body mass index is 37.21 kg/m .  Physical Exam  Vitals and nursing note reviewed.   Constitutional:       General: He is not in acute distress.     Appearance: Normal appearance. He is not ill-appearing, toxic-appearing or diaphoretic.   HENT:      Head: Normocephalic and atraumatic.      Right Ear: Tympanic membrane, ear canal and external ear normal. There is no impacted cerumen.      Left Ear: Tympanic membrane, ear canal and external ear normal. There is no impacted cerumen.      Nose: Nose normal. No congestion or rhinorrhea.      Mouth/Throat:      Mouth: Mucous membranes are moist.      " Pharynx: Oropharynx is clear. No oropharyngeal exudate or posterior oropharyngeal erythema.   Eyes:      General:         Right eye: No discharge.         Left eye: No discharge.      Extraocular Movements: Extraocular movements intact.      Conjunctiva/sclera: Conjunctivae normal.      Pupils: Pupils are equal, round, and reactive to light.   Cardiovascular:      Rate and Rhythm: Normal rate and regular rhythm.      Heart sounds: No murmur heard.  Pulmonary:      Effort: Pulmonary effort is normal. No respiratory distress.      Breath sounds: Normal breath sounds.   Abdominal:      General: There is distension.      Tenderness: There is no abdominal tenderness. There is no right CVA tenderness, left CVA tenderness, guarding or rebound.      Comments: tympanic   Musculoskeletal:         General: Normal range of motion.      Cervical back: Normal range of motion.   Lymphadenopathy:      Cervical: No cervical adenopathy.   Neurological:      Mental Status: He is alert.   Psychiatric:         Mood and Affect: Mood normal.         Behavior: Behavior normal.         Thought Content: Thought content normal.            Signed Electronically by: KATALINA PRATT MD

## 2025-03-04 NOTE — TELEPHONE ENCOUNTER
Test Results    Contacts       Contact Date/Time Type Contact Phone/Fax    03/04/2025 03:48 PM CST Phone (Incoming) Bhavesh Landeros (Self) [phone] (M)            Who ordered the test:  [facility]    Type of test: Lab and X-ray    Date of test:  3/4    Where was the test performed:  [facility]     What are your questions/concerns?:  Pt would like a return call regarding results from xray done today 3/4    Could we send this information to you in [facility] or would you prefer to receive a phone call?:   Patient would prefer a phone call   Okay to leave a detailed message?: Yes at Cell number on file:    Telephone Information:   Mobile [phone]

## 2025-03-07 ENCOUNTER — TELEPHONE (OUTPATIENT)
Dept: CARDIOLOGY | Facility: CLINIC | Age: 70
End: 2025-03-07
Payer: COMMERCIAL

## 2025-03-07 NOTE — TELEPHONE ENCOUNTER
Please review the message from the Access Center and let us know if it is ok or not, so we can notify the patient.    Thank you,  Belkys Gore

## 2025-03-07 NOTE — TELEPHONE ENCOUNTER
[facility] Call Center    Phone Message    May a detailed message be left on voicemail: yes     Reason for Call: Other: Bhavesh called because his appt for aortic stenosis was moved up to before his echo on 3/14 and wants to make sure that is okay or if his appt needs to be pushed out to afterwards. Please review and reach out to Bhavesh to r/s if needed. Thank you!     Action Taken: Other: Cardiology    Travel Screening: Not Applicable    Thank you!  [facility] Center       Date of Service:

## 2025-03-10 ENCOUNTER — HOSPITAL ENCOUNTER (OUTPATIENT)
Dept: ULTRASOUND IMAGING | Facility: CLINIC | Age: 70
Discharge: HOME | End: 2025-03-10
Attending: STUDENT IN AN ORGANIZED HEALTH CARE EDUCATION/TRAINING PROGRAM | Admitting: STUDENT IN AN ORGANIZED HEALTH CARE EDUCATION/TRAINING PROGRAM
Payer: COMMERCIAL

## 2025-03-10 PROCEDURE — 76700 US EXAM ABDOM COMPLETE: CPT

## 2025-03-10 NOTE — TELEPHONE ENCOUNTER
Echo should be done Prior to Pt. Seeing .   Echo is currently scheduled for 3/14/25 and OV with  is scheduled for 3/12/25.     Please see if pt. Could be worked in if there were any cancellations or if another site has a slot available. If there are no open slots for Echo please reschedule pt. To hold slot on 3/26/25 with  at 1:00.     Thank you

## 2025-03-11 ENCOUNTER — TELEPHONE (OUTPATIENT)
Dept: FAMILY MEDICINE | Facility: OTHER | Age: 70
End: 2025-03-11

## 2025-03-11 NOTE — TELEPHONE ENCOUNTER
Test Results    Contacts       Contact Date/Time Type Contact Phone/Fax    03/11/2025 01:00 PM CDT Phone (Incoming) Bhavesh Landeros (Self) [phone] (M)            Who ordered the test:  Dr Deutsch    Type of test:Ultrasound    Date of test:  3/10/25    Where was the test performed:  [facility]    What are your questions/concerns?:  Pt wants call back as soon as possible to discuss US results and go over plan.    Could we send this information to you in [employer] or would you prefer to receive a phone call?:   Patient would prefer a phone call   Okay to leave a detailed message?: Yes at Cell number on file:    Telephone Information:   Mobile [phone]

## 2025-03-11 NOTE — TELEPHONE ENCOUNTER
Patient calling again, requesting to speak with provider to go over test results. RN huddled with provider, OK to double book patient tomorrow. Patient scheduled tomorrow at 1pm.     Bessie Torres RN on 3/11/2025 at 5:54 PM

## 2025-03-12 ENCOUNTER — VIRTUAL VISIT (OUTPATIENT)
Dept: FAMILY MEDICINE | Facility: OTHER | Age: 70
End: 2025-03-12
Payer: COMMERCIAL

## 2025-03-12 DIAGNOSIS — R14.0 ABDOMINAL DISTENSION: Primary | ICD-10-CM

## 2025-03-12 DIAGNOSIS — R10.9 ABDOMINAL DISCOMFORT: ICD-10-CM

## 2025-03-12 PROCEDURE — 98014 SYNCH AUDIO-ONLY EST MOD 30: CPT | Performed by: STUDENT IN AN ORGANIZED HEALTH CARE EDUCATION/TRAINING PROGRAM

## 2025-03-12 RX ORDER — SPIRONOLACTONE 25 MG/1
25 TABLET ORAL DAILY
Qty: 30 TABLET | Refills: 0 | Status: SHIPPED | OUTPATIENT
Start: 2025-03-12

## 2025-03-12 NOTE — PROGRESS NOTES
Bhavesh is a 69 year old who is being evaluated via a billable telephone visit.    What phone number would you like to be contacted at? [phone]  How would you like to obtain your AVS? MyChart  Originating Location (pt. Location): Home    Distant Location (provider location):  On-site  Telephone visit completed due to the patient did not consent to a video visit.    Assessment & Plan     Abdominal distension  Abdominal discomfort  - CT Abdomen Pelvis w/o Contrast; Future  - Adult GI  Referral - Consult Only; Future  Reviewed patient's recent lab work and ultrasound, initial presentation concerns for constipation from 2 weeks ago.  Labs reassuring, x-ray showing moderate stool and potential colitis.  Ultrasound showing small amount of ascites along with hepatic steatosis/hepatomegaly.  Patient still has continued discomfort and feels quite distended, has been taking stool softeners and MiraLAX with decent amount of success stooling wise but no change of his abdominal distention/discomfort.  With his continued discomfort we will plan for CT scan and plan for GI referral especially with new findings.  Also recommended dietary changes in regards to hepatic steatosis.  Also of note patient has not noticed any difference with his Lasix medication, still has some edema in his lower extremities, with also the newfound slight ascites, will add on spironolactone to see if this makes a difference.  Will monitor potassium levels at next visit.  Patient agrees with overall plan.  Worrisome signs and symptoms were discussed         Subjective   Bhavesh is a 69 year old, presenting for the following health issues:  Follow-up    HPI          Review of Systems  Constitutional, HEENT, cardiovascular, pulmonary, gi and gu systems are negative, except as otherwise noted.      Objective           Vitals:  No vitals were obtained today due to virtual visit.    Physical Exam   General: Alert and no distress //Respiratory: No  audible wheeze, cough, or shortness of breath // Psychiatric:  Appropriate affect, tone, and pace of words            Phone call duration: 11 minutes  Signed Electronically by: KATALINA PRATT MD

## 2025-03-13 ENCOUNTER — APPOINTMENT (OUTPATIENT)
Dept: ULTRASOUND IMAGING | Facility: CLINIC | Age: 70
End: 2025-03-13
Attending: PHYSICIAN ASSISTANT
Payer: COMMERCIAL

## 2025-03-13 ENCOUNTER — HOSPITAL ENCOUNTER (EMERGENCY)
Facility: CLINIC | Age: 70
End: 2025-03-13
Attending: PHYSICIAN ASSISTANT
Payer: COMMERCIAL

## 2025-03-13 ENCOUNTER — APPOINTMENT (OUTPATIENT)
Dept: CT IMAGING | Facility: CLINIC | Age: 70
End: 2025-03-13
Attending: PHYSICIAN ASSISTANT
Payer: COMMERCIAL

## 2025-03-13 DIAGNOSIS — R93.5 ABNORMAL CT OF THE ABDOMEN: ICD-10-CM

## 2025-03-13 DIAGNOSIS — R06.2 WHEEZING: ICD-10-CM

## 2025-03-13 DIAGNOSIS — R06.09 OTHER FORM OF DYSPNEA: ICD-10-CM

## 2025-03-13 DIAGNOSIS — R18.0 MALIGNANT ASCITES (H): ICD-10-CM

## 2025-03-13 LAB
ALBUMIN SERPL BCG-MCNC: 3.9 G/DL (ref 3.5–5.2)
ALP SERPL-CCNC: 68 U/L (ref 40–150)
ALT SERPL W P-5'-P-CCNC: 18 U/L (ref 0–70)
AMMONIA PLAS-SCNC: 27 UMOL/L (ref 16–60)
ANION GAP SERPL CALCULATED.3IONS-SCNC: 12 MMOL/L (ref 7–15)
AST SERPL W P-5'-P-CCNC: 27 U/L (ref 0–45)
BASOPHILS # BLD AUTO: 0.1 10E3/UL (ref 0–0.2)
BASOPHILS NFR BLD AUTO: 1 %
BILIRUB SERPL-MCNC: 0.3 MG/DL
BUN SERPL-MCNC: 23.4 MG/DL (ref 8–23)
CALCIUM SERPL-MCNC: 8.3 MG/DL (ref 8.8–10.4)
CHLORIDE SERPL-SCNC: 104 MMOL/L (ref 98–107)
CREAT SERPL-MCNC: 0.93 MG/DL (ref 0.67–1.17)
D DIMER PPP FEU-MCNC: 3.61 UG/ML FEU (ref 0–0.5)
EGFRCR SERPLBLD CKD-EPI 2021: 88 ML/MIN/1.73M2
EOSINOPHIL # BLD AUTO: 0.3 10E3/UL (ref 0–0.7)
EOSINOPHIL NFR BLD AUTO: 4 %
ERYTHROCYTE [DISTWIDTH] IN BLOOD BY AUTOMATED COUNT: 13.3 % (ref 10–15)
GLUCOSE SERPL-MCNC: 103 MG/DL (ref 70–99)
HCO3 SERPL-SCNC: 21 MMOL/L (ref 22–29)
HCT VFR BLD AUTO: 39.4 % (ref 40–53)
HGB BLD-MCNC: 13.3 G/DL (ref 13.3–17.7)
IMM GRANULOCYTES # BLD: 0 10E3/UL
IMM GRANULOCYTES NFR BLD: 0 %
INR PPP: 1.15 (ref 0.85–1.15)
LACTATE SERPL-SCNC: 1.2 MMOL/L (ref 0.7–2)
LIPASE SERPL-CCNC: 29 U/L (ref 13–60)
LYMPHOCYTES # BLD AUTO: 1 10E3/UL (ref 0.8–5.3)
LYMPHOCYTES NFR BLD AUTO: 16 %
MAGNESIUM SERPL-MCNC: 1.8 MG/DL (ref 1.7–2.3)
MCH RBC QN AUTO: 28.7 PG (ref 26.5–33)
MCHC RBC AUTO-ENTMCNC: 33.8 G/DL (ref 31.5–36.5)
MCV RBC AUTO: 85 FL (ref 78–100)
MONOCYTES # BLD AUTO: 0.7 10E3/UL (ref 0–1.3)
MONOCYTES NFR BLD AUTO: 12 %
NEUTROPHILS # BLD AUTO: 4.1 10E3/UL (ref 1.6–8.3)
NEUTROPHILS NFR BLD AUTO: 67 %
NRBC # BLD AUTO: 0 10E3/UL
NRBC BLD AUTO-RTO: 0 /100
NT-PROBNP SERPL-MCNC: 71 PG/ML (ref 0–900)
PLATELET # BLD AUTO: 185 10E3/UL (ref 150–450)
POTASSIUM SERPL-SCNC: 4.1 MMOL/L (ref 3.4–5.3)
PROCALCITONIN SERPL IA-MCNC: 0.15 NG/ML
PROT SERPL-MCNC: 6.2 G/DL (ref 6.4–8.3)
RBC # BLD AUTO: 4.63 10E6/UL (ref 4.4–5.9)
SODIUM SERPL-SCNC: 137 MMOL/L (ref 135–145)
TROPONIN T SERPL HS-MCNC: 12 NG/L
TSH SERPL DL<=0.005 MIU/L-ACNC: 3.94 UIU/ML (ref 0.3–4.2)
WBC # BLD AUTO: 6.1 10E3/UL (ref 4–11)

## 2025-03-13 PROCEDURE — 82140 ASSAY OF AMMONIA: CPT | Performed by: PHYSICIAN ASSISTANT

## 2025-03-13 PROCEDURE — 85014 HEMATOCRIT: CPT | Performed by: PHYSICIAN ASSISTANT

## 2025-03-13 PROCEDURE — 99285 EMERGENCY DEPT VISIT HI MDM: CPT | Mod: 25 | Performed by: PHYSICIAN ASSISTANT

## 2025-03-13 PROCEDURE — 84484 ASSAY OF TROPONIN QUANT: CPT | Performed by: PHYSICIAN ASSISTANT

## 2025-03-13 PROCEDURE — 85610 PROTHROMBIN TIME: CPT | Performed by: PHYSICIAN ASSISTANT

## 2025-03-13 PROCEDURE — 93970 EXTREMITY STUDY: CPT

## 2025-03-13 PROCEDURE — 83735 ASSAY OF MAGNESIUM: CPT | Performed by: PHYSICIAN ASSISTANT

## 2025-03-13 PROCEDURE — 250N000011 HC RX IP 250 OP 636: Performed by: PHYSICIAN ASSISTANT

## 2025-03-13 PROCEDURE — 83690 ASSAY OF LIPASE: CPT | Performed by: PHYSICIAN ASSISTANT

## 2025-03-13 PROCEDURE — 84443 ASSAY THYROID STIM HORMONE: CPT | Performed by: PHYSICIAN ASSISTANT

## 2025-03-13 PROCEDURE — 84145 PROCALCITONIN (PCT): CPT | Performed by: PHYSICIAN ASSISTANT

## 2025-03-13 PROCEDURE — 99285 EMERGENCY DEPT VISIT HI MDM: CPT | Performed by: PHYSICIAN ASSISTANT

## 2025-03-13 PROCEDURE — 71275 CT ANGIOGRAPHY CHEST: CPT

## 2025-03-13 PROCEDURE — 96374 THER/PROPH/DIAG INJ IV PUSH: CPT | Mod: 59 | Performed by: PHYSICIAN ASSISTANT

## 2025-03-13 PROCEDURE — 85041 AUTOMATED RBC COUNT: CPT | Performed by: PHYSICIAN ASSISTANT

## 2025-03-13 PROCEDURE — 83880 ASSAY OF NATRIURETIC PEPTIDE: CPT | Performed by: PHYSICIAN ASSISTANT

## 2025-03-13 PROCEDURE — 85004 AUTOMATED DIFF WBC COUNT: CPT | Performed by: PHYSICIAN ASSISTANT

## 2025-03-13 PROCEDURE — 74177 CT ABD & PELVIS W/CONTRAST: CPT

## 2025-03-13 PROCEDURE — 250N000009 HC RX 250: Performed by: PHYSICIAN ASSISTANT

## 2025-03-13 PROCEDURE — 83605 ASSAY OF LACTIC ACID: CPT | Performed by: PHYSICIAN ASSISTANT

## 2025-03-13 PROCEDURE — 36415 COLL VENOUS BLD VENIPUNCTURE: CPT | Performed by: PHYSICIAN ASSISTANT

## 2025-03-13 PROCEDURE — 85379 FIBRIN DEGRADATION QUANT: CPT | Performed by: PHYSICIAN ASSISTANT

## 2025-03-13 PROCEDURE — 82040 ASSAY OF SERUM ALBUMIN: CPT | Performed by: PHYSICIAN ASSISTANT

## 2025-03-13 RX ORDER — IOPAMIDOL 755 MG/ML
500 INJECTION, SOLUTION INTRAVASCULAR ONCE
Status: COMPLETED | OUTPATIENT
Start: 2025-03-13 | End: 2025-03-13

## 2025-03-13 RX ADMIN — IOPAMIDOL 100 ML: 755 INJECTION, SOLUTION INTRAVENOUS at 23:54

## 2025-03-13 RX ADMIN — SODIUM CHLORIDE 70 ML: 9 INJECTION, SOLUTION INTRAVENOUS at 23:54

## 2025-03-13 ASSESSMENT — COLUMBIA-SUICIDE SEVERITY RATING SCALE - C-SSRS
6. HAVE YOU EVER DONE ANYTHING, STARTED TO DO ANYTHING, OR PREPARED TO DO ANYTHING TO END YOUR LIFE?: NO
1. IN THE PAST MONTH, HAVE YOU WISHED YOU WERE DEAD OR WISHED YOU COULD GO TO SLEEP AND NOT WAKE UP?: NO
2. HAVE YOU ACTUALLY HAD ANY THOUGHTS OF KILLING YOURSELF IN THE PAST MONTH?: NO

## 2025-03-13 ASSESSMENT — ACTIVITIES OF DAILY LIVING (ADL)
ADLS_ACUITY_SCORE: 41
ADLS_ACUITY_SCORE: 41

## 2025-03-14 ENCOUNTER — HOSPITAL ENCOUNTER (INPATIENT)
Facility: CLINIC | Age: 70
DRG: 823 | End: 2025-03-14
Attending: HOSPITALIST | Admitting: INTERNAL MEDICINE
Payer: COMMERCIAL

## 2025-03-14 ENCOUNTER — APPOINTMENT (OUTPATIENT)
Dept: CT IMAGING | Facility: CLINIC | Age: 70
DRG: 823 | End: 2025-03-14
Attending: RADIOLOGY
Payer: COMMERCIAL

## 2025-03-14 VITALS
DIASTOLIC BLOOD PRESSURE: 71 MMHG | SYSTOLIC BLOOD PRESSURE: 127 MMHG | BODY MASS INDEX: 36.9 KG/M2 | WEIGHT: 239 LBS | OXYGEN SATURATION: 95 % | HEART RATE: 93 BPM | TEMPERATURE: 97.7 F | RESPIRATION RATE: 26 BRPM

## 2025-03-14 VITALS
RESPIRATION RATE: 26 BRPM | TEMPERATURE: 97.7 F | SYSTOLIC BLOOD PRESSURE: 150 MMHG | OXYGEN SATURATION: 96 % | DIASTOLIC BLOOD PRESSURE: 83 MMHG | HEART RATE: 89 BPM

## 2025-03-14 DIAGNOSIS — I10 HTN, GOAL BELOW 140/80: ICD-10-CM

## 2025-03-14 DIAGNOSIS — R18.8 OTHER ASCITES: ICD-10-CM

## 2025-03-14 DIAGNOSIS — I08.0 MITRAL REGURGITATION AND AORTIC STENOSIS: ICD-10-CM

## 2025-03-14 DIAGNOSIS — R19.00 RETROPERITONEAL MASS: Primary | ICD-10-CM

## 2025-03-14 DIAGNOSIS — K45.8 RECURRENT ABDOMINAL HERNIA WITHOUT OBSTRUCTION OR GANGRENE, UNSPECIFIED HERNIA TYPE: ICD-10-CM

## 2025-03-14 DIAGNOSIS — R10.84 ABDOMINAL PAIN, GENERALIZED: ICD-10-CM

## 2025-03-14 DIAGNOSIS — E66.01 MORBID OBESITY (H): ICD-10-CM

## 2025-03-14 DIAGNOSIS — R18.8 ASCITES: ICD-10-CM

## 2025-03-14 DIAGNOSIS — K44.9 HIATAL HERNIA: ICD-10-CM

## 2025-03-14 DIAGNOSIS — K40.90 INGUINAL HERNIA WITHOUT OBSTRUCTION OR GANGRENE, RECURRENCE NOT SPECIFIED, UNSPECIFIED LATERALITY: ICD-10-CM

## 2025-03-14 DIAGNOSIS — Z29.9 PREVENTIVE MEASURE: ICD-10-CM

## 2025-03-14 DIAGNOSIS — C82.18 GRADE 2 FOLLICULAR LYMPHOMA OF LYMPH NODES OF MULTIPLE REGIONS (H): ICD-10-CM

## 2025-03-14 DIAGNOSIS — I35.0 AORTIC VALVE STENOSIS, ETIOLOGY OF CARDIAC VALVE DISEASE UNSPECIFIED: ICD-10-CM

## 2025-03-14 DIAGNOSIS — I26.99 OTHER ACUTE PULMONARY EMBOLISM WITHOUT ACUTE COR PULMONALE (H): ICD-10-CM

## 2025-03-14 DIAGNOSIS — M62.08 DIASTASIS RECTI: ICD-10-CM

## 2025-03-14 DIAGNOSIS — K59.00 CONSTIPATION, UNSPECIFIED CONSTIPATION TYPE: ICD-10-CM

## 2025-03-14 DIAGNOSIS — R10.84 GENERALIZED ABDOMINAL PAIN: ICD-10-CM

## 2025-03-14 DIAGNOSIS — K59.01 SLOW TRANSIT CONSTIPATION: ICD-10-CM

## 2025-03-14 DIAGNOSIS — G89.29 CHRONIC LOW BACK PAIN WITHOUT SCIATICA, UNSPECIFIED BACK PAIN LATERALITY: ICD-10-CM

## 2025-03-14 DIAGNOSIS — M54.50 CHRONIC LOW BACK PAIN WITHOUT SCIATICA, UNSPECIFIED BACK PAIN LATERALITY: ICD-10-CM

## 2025-03-14 DIAGNOSIS — E78.2 MIXED HYPERLIPIDEMIA: ICD-10-CM

## 2025-03-14 DIAGNOSIS — R60.0 LOWER EXTREMITY EDEMA: ICD-10-CM

## 2025-03-14 PROBLEM — R10.9 ABDOMINAL PAIN: Status: ACTIVE | Noted: 2025-03-14

## 2025-03-14 LAB
ALBUMIN SERPL BCG-MCNC: 3.6 G/DL (ref 3.5–5.2)
ALBUMIN SERPL BCG-MCNC: 3.8 G/DL (ref 3.5–5.2)
ALBUMIN UR-MCNC: NEGATIVE MG/DL
ALP SERPL-CCNC: 75 U/L (ref 40–150)
ALT SERPL W P-5'-P-CCNC: 21 U/L (ref 0–70)
ANION GAP SERPL CALCULATED.3IONS-SCNC: 9 MMOL/L (ref 7–15)
APPEARANCE UR: CLEAR
AST SERPL W P-5'-P-CCNC: 31 U/L (ref 0–45)
BILIRUB SERPL-MCNC: 0.4 MG/DL
BILIRUB UR QL STRIP: NEGATIVE
BUN SERPL-MCNC: 20.5 MG/DL (ref 8–23)
CALCIUM SERPL-MCNC: 8.4 MG/DL (ref 8.8–10.4)
CHLORIDE SERPL-SCNC: 106 MMOL/L (ref 98–107)
COLOR UR AUTO: ABNORMAL
CREAT SERPL-MCNC: 0.88 MG/DL (ref 0.67–1.17)
EGFRCR SERPLBLD CKD-EPI 2021: >90 ML/MIN/1.73M2
ERYTHROCYTE [DISTWIDTH] IN BLOOD BY AUTOMATED COUNT: 13.4 % (ref 10–15)
ERYTHROCYTE [DISTWIDTH] IN BLOOD BY AUTOMATED COUNT: 13.5 % (ref 10–15)
FLUAV RNA SPEC QL NAA+PROBE: NEGATIVE
FLUBV RNA RESP QL NAA+PROBE: NEGATIVE
GLUCOSE SERPL-MCNC: 101 MG/DL (ref 70–99)
GLUCOSE UR STRIP-MCNC: NEGATIVE MG/DL
HCO3 SERPL-SCNC: 23 MMOL/L (ref 22–29)
HCT VFR BLD AUTO: 37.3 % (ref 40–53)
HCT VFR BLD AUTO: 39.1 % (ref 40–53)
HGB BLD-MCNC: 12.6 G/DL (ref 13.3–17.7)
HGB BLD-MCNC: 13.4 G/DL (ref 13.3–17.7)
HGB UR QL STRIP: NEGATIVE
HOLD SPECIMEN: NORMAL
KETONES UR STRIP-MCNC: NEGATIVE MG/DL
LEUKOCYTE ESTERASE UR QL STRIP: NEGATIVE
MAGNESIUM SERPL-MCNC: 2 MG/DL (ref 1.7–2.3)
MCH RBC QN AUTO: 28.9 PG (ref 26.5–33)
MCH RBC QN AUTO: 28.9 PG (ref 26.5–33)
MCHC RBC AUTO-ENTMCNC: 33.8 G/DL (ref 31.5–36.5)
MCHC RBC AUTO-ENTMCNC: 34.3 G/DL (ref 31.5–36.5)
MCV RBC AUTO: 84 FL (ref 78–100)
MCV RBC AUTO: 86 FL (ref 78–100)
MUCOUS THREADS #/AREA URNS LPF: PRESENT /LPF
NITRATE UR QL: NEGATIVE
PH UR STRIP: 5.5 [PH] (ref 5–7)
PHOSPHATE SERPL-MCNC: 3.2 MG/DL (ref 2.5–4.5)
PLATELET # BLD AUTO: 172 10E3/UL (ref 150–450)
PLATELET # BLD AUTO: 183 10E3/UL (ref 150–450)
POTASSIUM SERPL-SCNC: 4.1 MMOL/L (ref 3.4–5.3)
PROT SERPL-MCNC: 6.3 G/DL (ref 6.4–8.3)
RBC # BLD AUTO: 4.36 10E6/UL (ref 4.4–5.9)
RBC # BLD AUTO: 4.63 10E6/UL (ref 4.4–5.9)
RBC URINE: 1 /HPF
RSV RNA SPEC NAA+PROBE: NEGATIVE
SARS-COV-2 RNA RESP QL NAA+PROBE: NEGATIVE
SODIUM SERPL-SCNC: 138 MMOL/L (ref 135–145)
SP GR UR STRIP: 1.01 (ref 1–1.03)
SQUAMOUS EPITHELIAL: <1 /HPF
TROPONIN T SERPL HS-MCNC: 11 NG/L
UROBILINOGEN UR STRIP-MCNC: NORMAL MG/DL
WBC # BLD AUTO: 5.6 10E3/UL (ref 4–11)
WBC # BLD AUTO: 6.2 10E3/UL (ref 4–11)
WBC URINE: <1 /HPF

## 2025-03-14 PROCEDURE — 84484 ASSAY OF TROPONIN QUANT: CPT | Performed by: PHYSICIAN ASSISTANT

## 2025-03-14 PROCEDURE — 84132 ASSAY OF SERUM POTASSIUM: CPT | Performed by: PHYSICIAN ASSISTANT

## 2025-03-14 PROCEDURE — 36415 COLL VENOUS BLD VENIPUNCTURE: CPT | Performed by: PHYSICIAN ASSISTANT

## 2025-03-14 PROCEDURE — 77012 CT SCAN FOR NEEDLE BIOPSY: CPT

## 2025-03-14 PROCEDURE — 250N000011 HC RX IP 250 OP 636: Performed by: STUDENT IN AN ORGANIZED HEALTH CARE EDUCATION/TRAINING PROGRAM

## 2025-03-14 PROCEDURE — 81001 URINALYSIS AUTO W/SCOPE: CPT | Performed by: PHYSICIAN ASSISTANT

## 2025-03-14 PROCEDURE — 88185 FLOWCYTOMETRY/TC ADD-ON: CPT | Performed by: RADIOLOGY

## 2025-03-14 PROCEDURE — 83735 ASSAY OF MAGNESIUM: CPT | Performed by: PHYSICIAN ASSISTANT

## 2025-03-14 PROCEDURE — 82040 ASSAY OF SERUM ALBUMIN: CPT | Performed by: STUDENT IN AN ORGANIZED HEALTH CARE EDUCATION/TRAINING PROGRAM

## 2025-03-14 PROCEDURE — 88189 FLOWCYTOMETRY/READ 16 & >: CPT | Performed by: STUDENT IN AN ORGANIZED HEALTH CARE EDUCATION/TRAINING PROGRAM

## 2025-03-14 PROCEDURE — 120N000001 HC R&B MED SURG/OB

## 2025-03-14 PROCEDURE — 88360 TUMOR IMMUNOHISTOCHEM/MANUAL: CPT | Mod: TC | Performed by: RADIOLOGY

## 2025-03-14 PROCEDURE — 250N000011 HC RX IP 250 OP 636: Performed by: PHYSICIAN ASSISTANT

## 2025-03-14 PROCEDURE — 88184 FLOWCYTOMETRY/ TC 1 MARKER: CPT | Performed by: STUDENT IN AN ORGANIZED HEALTH CARE EDUCATION/TRAINING PROGRAM

## 2025-03-14 PROCEDURE — 84075 ASSAY ALKALINE PHOSPHATASE: CPT | Performed by: PHYSICIAN ASSISTANT

## 2025-03-14 PROCEDURE — 88184 FLOWCYTOMETRY/ TC 1 MARKER: CPT | Performed by: RADIOLOGY

## 2025-03-14 PROCEDURE — 88341 IMHCHEM/IMCYTCHM EA ADD ANTB: CPT | Mod: TC | Performed by: RADIOLOGY

## 2025-03-14 PROCEDURE — 80053 COMPREHEN METABOLIC PANEL: CPT | Performed by: STUDENT IN AN ORGANIZED HEALTH CARE EDUCATION/TRAINING PROGRAM

## 2025-03-14 PROCEDURE — 88342 IMHCHEM/IMCYTCHM 1ST ANTB: CPT | Mod: TC | Performed by: RADIOLOGY

## 2025-03-14 PROCEDURE — 36415 COLL VENOUS BLD VENIPUNCTURE: CPT | Performed by: STUDENT IN AN ORGANIZED HEALTH CARE EDUCATION/TRAINING PROGRAM

## 2025-03-14 PROCEDURE — 87637 SARSCOV2&INF A&B&RSV AMP PRB: CPT | Performed by: PHYSICIAN ASSISTANT

## 2025-03-14 PROCEDURE — 999N000154 HC STATISTIC RADIOLOGY XRAY, US, CT, MAR, NM

## 2025-03-14 PROCEDURE — 250N000013 HC RX MED GY IP 250 OP 250 PS 637: Performed by: PHYSICIAN ASSISTANT

## 2025-03-14 PROCEDURE — 272N000431 CT ABDOMEN RETROPERITONEAL BIOPSY

## 2025-03-14 PROCEDURE — 250N000011 HC RX IP 250 OP 636: Performed by: RADIOLOGY

## 2025-03-14 PROCEDURE — 99223 1ST HOSP IP/OBS HIGH 75: CPT | Performed by: PHYSICIAN ASSISTANT

## 2025-03-14 PROCEDURE — 85014 HEMATOCRIT: CPT | Performed by: PHYSICIAN ASSISTANT

## 2025-03-14 PROCEDURE — 85014 HEMATOCRIT: CPT | Performed by: STUDENT IN AN ORGANIZED HEALTH CARE EDUCATION/TRAINING PROGRAM

## 2025-03-14 PROCEDURE — 99223 1ST HOSP IP/OBS HIGH 75: CPT | Mod: 24 | Performed by: INTERNAL MEDICINE

## 2025-03-14 PROCEDURE — 07BD3ZX EXCISION OF AORTIC LYMPHATIC, PERCUTANEOUS APPROACH, DIAGNOSTIC: ICD-10-PCS | Performed by: RADIOLOGY

## 2025-03-14 PROCEDURE — 99418 PROLNG IP/OBS E/M EA 15 MIN: CPT | Performed by: PHYSICIAN ASSISTANT

## 2025-03-14 PROCEDURE — 84100 ASSAY OF PHOSPHORUS: CPT | Performed by: PHYSICIAN ASSISTANT

## 2025-03-14 PROCEDURE — 250N000009 HC RX 250: Performed by: RADIOLOGY

## 2025-03-14 PROCEDURE — 250N000013 HC RX MED GY IP 250 OP 250 PS 637: Performed by: INTERNAL MEDICINE

## 2025-03-14 RX ORDER — NALOXONE HYDROCHLORIDE 0.4 MG/ML
0.4 INJECTION, SOLUTION INTRAMUSCULAR; INTRAVENOUS; SUBCUTANEOUS
Status: DISCONTINUED | OUTPATIENT
Start: 2025-03-14 | End: 2025-03-14

## 2025-03-14 RX ORDER — AMOXICILLIN 250 MG
2 CAPSULE ORAL 2 TIMES DAILY PRN
Status: DISCONTINUED | OUTPATIENT
Start: 2025-03-14 | End: 2025-03-24 | Stop reason: HOSPADM

## 2025-03-14 RX ORDER — HYDROMORPHONE HCL IN WATER/PF 6 MG/30 ML
0.2 PATIENT CONTROLLED ANALGESIA SYRINGE INTRAVENOUS
Status: DISCONTINUED | OUTPATIENT
Start: 2025-03-14 | End: 2025-03-24 | Stop reason: HOSPADM

## 2025-03-14 RX ORDER — OXYCODONE HYDROCHLORIDE 5 MG/1
5 TABLET ORAL EVERY 4 HOURS PRN
Status: DISCONTINUED | OUTPATIENT
Start: 2025-03-14 | End: 2025-03-24 | Stop reason: HOSPADM

## 2025-03-14 RX ORDER — POLYETHYLENE GLYCOL 3350 17 G/17G
17 POWDER, FOR SOLUTION ORAL 2 TIMES DAILY PRN
Status: DISCONTINUED | OUTPATIENT
Start: 2025-03-14 | End: 2025-03-24 | Stop reason: HOSPADM

## 2025-03-14 RX ORDER — NALOXONE HYDROCHLORIDE 0.4 MG/ML
0.2 INJECTION, SOLUTION INTRAMUSCULAR; INTRAVENOUS; SUBCUTANEOUS
Status: DISCONTINUED | OUTPATIENT
Start: 2025-03-14 | End: 2025-03-14

## 2025-03-14 RX ORDER — FENTANYL CITRATE 50 UG/ML
25-50 INJECTION, SOLUTION INTRAMUSCULAR; INTRAVENOUS EVERY 5 MIN PRN
Status: DISCONTINUED | OUTPATIENT
Start: 2025-03-14 | End: 2025-03-14

## 2025-03-14 RX ORDER — FLUMAZENIL 0.1 MG/ML
0.2 INJECTION, SOLUTION INTRAVENOUS
Status: DISCONTINUED | OUTPATIENT
Start: 2025-03-14 | End: 2025-03-14

## 2025-03-14 RX ORDER — HEPARIN SODIUM 10000 [USP'U]/100ML
0-5000 INJECTION, SOLUTION INTRAVENOUS CONTINUOUS
Status: DISCONTINUED | OUTPATIENT
Start: 2025-03-14 | End: 2025-03-16

## 2025-03-14 RX ORDER — NALOXONE HYDROCHLORIDE 0.4 MG/ML
0.4 INJECTION, SOLUTION INTRAMUSCULAR; INTRAVENOUS; SUBCUTANEOUS
Status: DISCONTINUED | OUTPATIENT
Start: 2025-03-14 | End: 2025-03-24 | Stop reason: HOSPADM

## 2025-03-14 RX ORDER — ALBUTEROL SULFATE 0.83 MG/ML
2.5 SOLUTION RESPIRATORY (INHALATION)
Status: DISCONTINUED | OUTPATIENT
Start: 2025-03-14 | End: 2025-03-24 | Stop reason: HOSPADM

## 2025-03-14 RX ORDER — PANTOPRAZOLE SODIUM 40 MG/1
40 TABLET, DELAYED RELEASE ORAL DAILY
Status: DISCONTINUED | OUTPATIENT
Start: 2025-03-14 | End: 2025-03-24 | Stop reason: HOSPADM

## 2025-03-14 RX ORDER — LIDOCAINE 40 MG/G
CREAM TOPICAL
Status: DISCONTINUED | OUTPATIENT
Start: 2025-03-14 | End: 2025-03-16

## 2025-03-14 RX ORDER — ACETAMINOPHEN 650 MG/1
650 SUPPOSITORY RECTAL EVERY 4 HOURS PRN
Status: DISCONTINUED | OUTPATIENT
Start: 2025-03-14 | End: 2025-03-24 | Stop reason: HOSPADM

## 2025-03-14 RX ORDER — NALOXONE HYDROCHLORIDE 0.4 MG/ML
0.2 INJECTION, SOLUTION INTRAMUSCULAR; INTRAVENOUS; SUBCUTANEOUS
Status: DISCONTINUED | OUTPATIENT
Start: 2025-03-14 | End: 2025-03-24 | Stop reason: HOSPADM

## 2025-03-14 RX ORDER — ACETAMINOPHEN 325 MG/1
650 TABLET ORAL EVERY 4 HOURS PRN
Status: DISCONTINUED | OUTPATIENT
Start: 2025-03-14 | End: 2025-03-24 | Stop reason: HOSPADM

## 2025-03-14 RX ORDER — BISACODYL 10 MG
10 SUPPOSITORY, RECTAL RECTAL DAILY PRN
Status: DISCONTINUED | OUTPATIENT
Start: 2025-03-14 | End: 2025-03-24 | Stop reason: HOSPADM

## 2025-03-14 RX ORDER — PROCHLORPERAZINE MALEATE 5 MG/1
5 TABLET ORAL EVERY 6 HOURS PRN
Status: DISCONTINUED | OUTPATIENT
Start: 2025-03-14 | End: 2025-03-24 | Stop reason: HOSPADM

## 2025-03-14 RX ORDER — POLYETHYLENE GLYCOL 3350 17 G/17G
17 POWDER, FOR SOLUTION ORAL DAILY
Status: DISCONTINUED | OUTPATIENT
Start: 2025-03-14 | End: 2025-03-24 | Stop reason: HOSPADM

## 2025-03-14 RX ORDER — ONDANSETRON 2 MG/ML
4 INJECTION INTRAMUSCULAR; INTRAVENOUS EVERY 6 HOURS PRN
Status: DISCONTINUED | OUTPATIENT
Start: 2025-03-14 | End: 2025-03-24 | Stop reason: HOSPADM

## 2025-03-14 RX ORDER — HEPARIN SODIUM 10000 [USP'U]/100ML
0-5000 INJECTION, SOLUTION INTRAVENOUS CONTINUOUS
Status: DISCONTINUED | OUTPATIENT
Start: 2025-03-14 | End: 2025-03-14 | Stop reason: HOSPADM

## 2025-03-14 RX ORDER — AMOXICILLIN 250 MG
2 CAPSULE ORAL 2 TIMES DAILY
Status: DISCONTINUED | OUTPATIENT
Start: 2025-03-14 | End: 2025-03-24 | Stop reason: HOSPADM

## 2025-03-14 RX ORDER — AMOXICILLIN 250 MG
1 CAPSULE ORAL 2 TIMES DAILY
Status: DISCONTINUED | OUTPATIENT
Start: 2025-03-14 | End: 2025-03-24 | Stop reason: HOSPADM

## 2025-03-14 RX ORDER — HYDROMORPHONE HCL IN WATER/PF 6 MG/30 ML
0.4 PATIENT CONTROLLED ANALGESIA SYRINGE INTRAVENOUS
Status: DISCONTINUED | OUTPATIENT
Start: 2025-03-14 | End: 2025-03-24 | Stop reason: HOSPADM

## 2025-03-14 RX ORDER — BENZONATATE 100 MG/1
100 CAPSULE ORAL 3 TIMES DAILY PRN
Status: DISCONTINUED | OUTPATIENT
Start: 2025-03-14 | End: 2025-03-24 | Stop reason: HOSPADM

## 2025-03-14 RX ORDER — HYDRALAZINE HYDROCHLORIDE 20 MG/ML
10 INJECTION INTRAMUSCULAR; INTRAVENOUS EVERY 4 HOURS PRN
Status: DISCONTINUED | OUTPATIENT
Start: 2025-03-14 | End: 2025-03-24 | Stop reason: HOSPADM

## 2025-03-14 RX ORDER — ONDANSETRON 4 MG/1
4 TABLET, ORALLY DISINTEGRATING ORAL EVERY 6 HOURS PRN
Status: DISCONTINUED | OUTPATIENT
Start: 2025-03-14 | End: 2025-03-24 | Stop reason: HOSPADM

## 2025-03-14 RX ORDER — GUAIFENESIN 200 MG/10ML
200 LIQUID ORAL EVERY 4 HOURS PRN
Status: DISCONTINUED | OUTPATIENT
Start: 2025-03-14 | End: 2025-03-24 | Stop reason: HOSPADM

## 2025-03-14 RX ORDER — ASPIRIN 81 MG/1
81 TABLET, CHEWABLE ORAL DAILY
Status: DISCONTINUED | OUTPATIENT
Start: 2025-03-14 | End: 2025-03-19

## 2025-03-14 RX ORDER — AMOXICILLIN 250 MG
1 CAPSULE ORAL 2 TIMES DAILY PRN
Status: DISCONTINUED | OUTPATIENT
Start: 2025-03-14 | End: 2025-03-24 | Stop reason: HOSPADM

## 2025-03-14 RX ORDER — HEPARIN SODIUM 10000 [USP'U]/100ML
0-5000 INJECTION, SOLUTION INTRAVENOUS CONTINUOUS
Status: DISCONTINUED | OUTPATIENT
Start: 2025-03-14 | End: 2025-03-14

## 2025-03-14 RX ORDER — HYDRALAZINE HYDROCHLORIDE 10 MG/1
10 TABLET, FILM COATED ORAL EVERY 4 HOURS PRN
Status: DISCONTINUED | OUTPATIENT
Start: 2025-03-14 | End: 2025-03-24 | Stop reason: HOSPADM

## 2025-03-14 RX ADMIN — SENNOSIDES AND DOCUSATE SODIUM 1 TABLET: 50; 8.6 TABLET ORAL at 20:51

## 2025-03-14 RX ADMIN — PANTOPRAZOLE SODIUM 40 MG: 40 TABLET, DELAYED RELEASE ORAL at 11:16

## 2025-03-14 RX ADMIN — LIDOCAINE HYDROCHLORIDE 20 ML: 10 INJECTION, SOLUTION EPIDURAL; INFILTRATION; INTRACAUDAL; PERINEURAL at 14:10

## 2025-03-14 RX ADMIN — HEPARIN SODIUM 1800 UNITS/HR: 10000 INJECTION, SOLUTION INTRAVENOUS at 18:53

## 2025-03-14 RX ADMIN — MIDAZOLAM 1 MG: 1 INJECTION INTRAMUSCULAR; INTRAVENOUS at 13:50

## 2025-03-14 RX ADMIN — HEPARIN SODIUM 1800 UNITS/HR: 10000 INJECTION, SOLUTION INTRAVENOUS at 04:14

## 2025-03-14 RX ADMIN — FENTANYL CITRATE 50 MCG: 50 INJECTION, SOLUTION INTRAMUSCULAR; INTRAVENOUS at 13:50

## 2025-03-14 RX ADMIN — Medication 1 LOZENGE: at 20:50

## 2025-03-14 RX ADMIN — BENZONATATE 100 MG: 100 CAPSULE ORAL at 20:51

## 2025-03-14 RX ADMIN — Medication 1 MG: at 20:51

## 2025-03-14 ASSESSMENT — ACTIVITIES OF DAILY LIVING (ADL)
ADLS_ACUITY_SCORE: 41
ADLS_ACUITY_SCORE: 22
ADLS_ACUITY_SCORE: 22
ADLS_ACUITY_SCORE: 41
ADLS_ACUITY_SCORE: 22
ADLS_ACUITY_SCORE: 21
ADLS_ACUITY_SCORE: 22
ADLS_ACUITY_SCORE: 22
ADLS_ACUITY_SCORE: 21
ADLS_ACUITY_SCORE: 22
ADLS_ACUITY_SCORE: 41
ADLS_ACUITY_SCORE: 22
ADLS_ACUITY_SCORE: 41

## 2025-03-14 NOTE — PROGRESS NOTES
Care Suites Procedure Nursing Note    Patient Information  Name: Bhavesh Landeros  Age: 69 year old    Procedure  Procedure: ct retroperitoneal biopsy  Procedure start time: 1345  Procedure complete time: 1415  Concerns/abnormal assessment: no  If abnormal assessment, provider notified: N/A  Plan/Other: After consent obtained and time out done a ct guided retroperitoneal biopsy performed with conscious sedation of 50 mcg fentanyl IV and 1 mg versed IV, VSS, pt tolerated well,bandage placed to left side back, CDI no hematoma no bleeding. pt transported back to room via cart by DEBRA, report called to pt's nurse.    Sherron Kyle RN

## 2025-03-14 NOTE — CONSULTS
IR consult for retroperitoneal lymphadenopathy. Please turn off heparin gtt ASAP. Will plan on CT guided biopsy this afternoon. Keep patient NPO.    Iron Thomas MD

## 2025-03-14 NOTE — ED TRIAGE NOTES
"Patient presents with concern of having wheezing, off and on dry cough, and bloating of the belly. States it is \"hard as a rock\". Has been seen a few times for these issues and feels like things are not improving. The wheezing started a week ago, he is borderline asthmatic, and feels his inhaler is not effective.     Triage Assessment (Adult)       Row Name 03/13/25 1238          Triage Assessment    Airway WDL WDL        Respiratory WDL    Respiratory WDL X        Skin Circulation/Temperature WDL    Skin Circulation/Temperature WDL WDL        Cardiac WDL    Cardiac WDL WDL        Peripheral/Neurovascular WDL    Peripheral Neurovascular WDL WDL        Cognitive/Neuro/Behavioral WDL    Cognitive/Neuro/Behavioral WDL WDL                     "

## 2025-03-14 NOTE — PLAN OF CARE
Shift Note 4429-4431:     Reason for admission: Ascites and PE  Cognition/ Mentation: Alert and oriented x4.   Activity: SB-independent  Vital Signs/Tele: VSS. Tele: SR with RBBB  Aggression Stop Light: green    Shift Summary: Transferred from [facility] ER. Hem/ONC consult ordered. Advance directive consult ordered. IR consulted, CT ordered for biopsy.     Therapy Recommendations: Therapy not ordered  Discharge Plan: TBD once medically cleared    See Flow sheets for assessment

## 2025-03-14 NOTE — CONSULTS
Patient has Medicare Advantage through [facility].    Xarelto/Eliquis  --Upon receipt of RX, Discharge Pharmacy can provide 1 mo free using one-time  voucher.  --Patient will pay 100% of the first $235 in drug costs (first month will be as much as $282)  --Subsequent fills will be $47/mo, or $94 for 3 mo at [facility].  --If/when total out of pocket drug costs exceed $2000, patient will pay $0 for all covered drugs for the remainder of the year.    Edwina Carreno  Pharmacy Technician/Liaison, Discharge Pharmacy   [phone] (voice or text)  [email or web address]  Pharmacy test claims are estimates and may not reflect final costs.   Suggested alternatives aim to be cost-effective but may not be therapeutically equivalent as this consult is informational and does not constitute medical advice.   Clinical decisions should be made by qualified healthcare providers.

## 2025-03-14 NOTE — ED NOTES
Patient initially managed by DON Perdomo.  Plan was to follow-up on CT imaging.  Lab work was reassuring.  Patient presented initially for 1 audible wheezes shortness of breath as well as abdominal distention lower leg swelling.  It has been ongoing for the past month.  He has been seen by his primary care twice throughout these symptoms with unremarkable ultrasounds and plain film imaging.  He continues to have worsening shortness of breath as well as abdominal pain intermittently with distention and feels like every time he ambulates his legs got significantly worse.  He has had lab work has been otherwise unremarkable in regards to heart failure enzymes as well as protein abnormalities.    Reviewed patient's results and on upon CT imaging concern for significant lymphoma burden with severe ascites resulting in IVC compression.  Patient also found to have pulmonary emboli without heart strain.    Consult placed at 1 AM to oncology for transfer for concerns of new diagnosis of lymphoma with associated significant ascites and pulmonary emboli.  Unfortunately hospitalist team is out that will accept until case can be discussed with oncology.  Was able to discuss the case with [facility] oncology however no bed available at this location.  [facility] full at this time with capacity.  [facility] pending.  Was able to discuss the case with the hospitalist team to consider initiating heparin due to patient's pulmonary emboli burden.  Believe that at this point paracentesis may be detrimental to the patient's unknown diagnosis which could result in possible seeding therefore will relay this to the [facility] facility for discussion and plan to proceed.    Was able to consult with the [place] oncology Dr. Camara who noted that patient can be transferred to [facility] and does not see any acute reason why patient requires transfer to [facility] at this time.  We discussed case with hospitalist team I will continue to admit [facility]  at this time.  Transfer initiated.  Patient transferred in stable condition.  Will hold off on paracentesis prior to transfer secondary to patient be on heparin at this time as well as possibility that seeding could occur with the current situation of unknown etiology of mass.         Kailyn Anderson MD  03/14/25 0427

## 2025-03-14 NOTE — ED PROVIDER NOTES
History     Chief Complaint   Patient presents with    Wheezing    Bloated     HPI  Bhavesh Landeros is a 69 year old male who presents for evaluation of abdominal bloating increasing over the past 1 month.  Reports a similar episode about 5-6 months ago.  Was treated for constipation and states that he did well without any complicating factors.  Symptoms seem to be worsening and have gotten to the point where he has wheezing over the last week.  In the last 2 days he started having a dry cough and rhinorrhea.  Denies any fevers or chills.  Bilateral lower extremity edema for the past 2 weeks.  No prior history of DVT or PE.  Denies any chest pain or orthopnea.  He has been seen in the primary care clinic 2 different times.  Initially, they were concerned about his heart.  He is set to have an echocardiogram tomorrow.  Cardiology consultation is not for another 13 days.  Patient denies any prior liver issues.  Had a recent ultrasound as noted below that showed some ascites.  Fatty liver noted.  No masses.  Denies any regular Tylenol use.  No prior history of street drugs or IV drugs.  Has not drank alcohol since he was young.  Does have a history of mild asthma but no COPD.  Non-smoker.  Denies any unexplained weight loss, night sweats, or chills.  No skin rashes.  Chart was reviewed in detail including labs that have been done in the past 1 month, abdominal ultrasound, and primary care visits as noted below.    Narrative & Impression   EXAM: US ABDOMEN COMPLETE  LOCATION: [facility]  DATE: 3/10/2025     INDICATION:  Abdominal distension  COMPARISON: None.  TECHNIQUE: Complete abdominal ultrasound.     FINDINGS:     GALLBLADDER: Negative sonographic Romero's sign. No gallstones or gallbladder sludge identified. Gallbladder wall is 3 mm.     BILE DUCTS: No biliary dilatation. The common duct measures 4 mm.     LIVER: Increased echogenicity from diffuse fatty infiltration. No focal  mass. The portal vein is patent with flow in the normal direction. Liver measures 16.7 cm in length.     RIGHT KIDNEY: Normal size. No hydronephrosis. Incidental cyst without specific imaging follow-up recommended.      LEFT KIDNEY: Normal size. Normal echogenicity with no hydronephrosis or mass.      SPLEEN: Normal.     PANCREAS: The pancreas is largely obscured by overlying gas.     AORTA: Normal in caliber.      IVC: Normal where visualized.     Small-moderate ascites. There is also left pleural fluid.                                                                      IMPRESSION:  1.  Small-moderate ascites. Left-sided pleural fluid incidentally seen.  2.  No gallstones identified. Negative sonographic Romero's sign.  3.  Fatty liver. Hepatomegaly.                   Component      Latest Ref Rng 2/14/2025  9:35 AM 3/4/2025  11:04 AM 3/4/2025  11:20 AM   Sodium      135 - 145 mmol/L 142  141     Potassium      3.4 - 5.3 mmol/L 4.7  4.6     Carbon Dioxide (CO2)      22 - 29 mmol/L 21 (L)  23     Anion Gap      7 - 15 mmol/L 14  13     Urea Nitrogen      8.0 - 23.0 mg/dL 13.3  12.3     Creatinine      0.67 - 1.17 mg/dL 0.95  0.95     GFR Estimate      >60 mL/min/1.73m2 86  86     Calcium      8.8 - 10.4 mg/dL 9.0  9.0     Chloride      98 - 107 mmol/L 107  105     Glucose      70 - 99 mg/dL 106 (H)  94     Alkaline Phosphatase      40 - 150 U/L 74  77     AST      0 - 45 U/L 23  25     ALT      0 - 70 U/L 15  14     Protein Total      6.4 - 8.3 g/dL 6.3 (L)  6.3 (L)     Albumin      3.5 - 5.2 g/dL 4.0  3.9     Bilirubin Total      <=1.2 mg/dL 0.4  0.5     Color Urine      Colorless, Straw, Light Yellow, Yellow    Yellow    Appearance Urine      Clear    Clear    Glucose Urine      Negative mg/dL   Negative    Bilirubin Urine      Negative    Negative    Ketones Urine      Negative mg/dL   Negative    Specific Gravity Urine      1.003 - 1.035    1.010    Blood Urine      Negative    Negative    pH Urine      5.0 -  7.0    6.0    Protein Albumin Urine      Negative mg/dL   Negative    Urobilinogen Urine      0.2, 1.0 E.U./dL   0.2    Nitrite Urine      Negative    Negative    Leukocyte Esterase Urine      Negative    Negative    WBC      4.0 - 11.0 10e3/uL 5.2  5.0     RBC Count      4.40 - 5.90 10e6/uL 4.79  4.72     Hemoglobin      13.3 - 17.7 g/dL 13.7  13.6     Hematocrit      40.0 - 53.0 % 41.4  41.3     MCV      78 - 100 fL 86  88     MCH      26.5 - 33.0 pg 28.6  28.8     MCHC      31.5 - 36.5 g/dL 33.1  32.9     RDW      10.0 - 15.0 % 12.7  13.0     Platelet Count      150 - 450 10e3/uL 150  176     Prostate Specific Antigen Screen      0.00 - 6.50 ng/mL 0.79      Lipase      13 - 60 U/L  24     N-Terminal Pro Bnp      0 - 900 pg/mL  85        Legend:  (L) Low  (H) High        Excerpt from most recent primary care evaluation yesterday on 3/12/2025:    Assessment & Plan  Abdominal distension  Abdominal discomfort  - CT Abdomen Pelvis w/o Contrast; Future  - Adult GI  Referral - Consult Only; Future  Reviewed patient's recent lab work and ultrasound, initial presentation concerns for constipation from 2 weeks ago.  Labs reassuring, x-ray showing moderate stool and potential colitis.  Ultrasound showing small amount of ascites along with hepatic steatosis/hepatomegaly.  Patient still has continued discomfort and feels quite distended, has been taking stool softeners and MiraLAX with decent amount of success stooling wise but no change of his abdominal distention/discomfort.  With his continued discomfort we will plan for CT scan and plan for GI referral especially with new findings.  Also recommended dietary changes in regards to hepatic steatosis.  Also of note patient has not noticed any difference with his Lasix medication, still has some edema in his lower extremities, with also the newfound slight ascites, will add on spironolactone to see if this makes a difference.  Will monitor potassium levels at next  visit.  Patient agrees with overall plan.  Worrisome signs and symptoms were discussed                  Allergies:  Allergies   Allergen Reactions    No Known Allergies        Problem List:    Patient Active Problem List    Diagnosis Date Noted    Abdominal pain, generalized 02/14/2025     Priority: Medium    Recurrent abdominal hernia without obstruction or gangrene, unspecified hernia type 02/14/2025     Priority: Medium    Diastasis recti 02/14/2025     Priority: Medium    Hiatal hernia - historically 02/14/2025     Priority: Medium    Slow transit constipation 02/14/2025     Priority: Medium    Inguinal hernia without obstruction or gangrene, recurrence not specified, unspecified laterality 02/14/2025     Priority: Medium    Mixed hyperlipidemia 12/02/2019     Priority: Medium    HTN, goal below 140/80 12/02/2019     Priority: Medium    Obesity (BMI 35.0-39.9) with comorbidity (H) 11/25/2019     Priority: Medium    Aortic valve stenosis, etiology of cardiac valve disease unspecified 11/19/2018     Priority: Medium    Mitral regurgitation and aortic stenosis 11/19/2018     Priority: Medium    GERD (gastroesophageal reflux disease) 10/27/2009     Priority: Medium        Past Medical History:    Past Medical History:   Diagnosis Date    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    Cellulitis of leg 8/16/2011    Early satiety 2/24/2022    Leg edema, right 5/16/2013    MEDICAL HISTORY OF -     Multiple sclerosis (H)     Primary osteoarthritis of left knee 10/21/2021    Primary osteoarthritis of right knee 11/20/2019    Ventral hernia without obstruction or gangrene 11/9/2018       Past Surgical History:    Past Surgical History:   Procedure Laterality Date    COLONOSCOPY N/A 1/22/2016    Procedure: COLONOSCOPY;  Surgeon: Pb Rodriguez MD;  Location:  GI    ESOPHAGOSCOPY, GASTROSCOPY, DUODENOSCOPY (EGD), COMBINED N/A 9/10/2020    Procedure: Esophagogastroduodenoscopy with Biopsy;  Surgeon: Emigdio Betancourt DO;   Location: [facility]    HC KNEE SCOPE,MED/LAT MENISECTOMY  07/08/1999    HC REVISE MEDIAN N/CARPAL TUNNEL SURG  1986     UGI ENDOSCOPY DIAG W BIOPSY  10/26/09    ZZHC COLONOSCOPY W/WO BRUSH/WASH  03/27/06       Family History:    Family History   Problem Relation Age of Onset    Diabetes Brother     Alzheimer Disease Father     Diabetes Sister        Social History:  Marital Status:   [5]  Social History     Tobacco Use    Smoking status: Never    Smokeless tobacco: Never   Vaping Use    Vaping status: Never Used   Substance Use Topics    Alcohol use: Yes     Alcohol/week: 0.0 standard drinks of alcohol     Comment: occ    Drug use: No        Medications:    albuterol (PROAIR HFA/PROVENTIL HFA/VENTOLIN HFA) 108 (90 Base) MCG/ACT inhaler  aspirin 81 MG chewable tablet  atorvastatin (LIPITOR) 20 MG tablet  doxycycline hyclate (VIBRAMYCIN) 100 MG capsule  furosemide (LASIX) 20 MG tablet  losartan (COZAAR) 50 MG tablet  omeprazole (PRILOSEC) 20 MG DR capsule  polyethylene glycol (MIRALAX) 17 GM/Dose powder  SENNA-docusate sodium (SENNA S) 8.6-50 MG tablet  spironolactone (ALDACTONE) 25 MG tablet          Review of Systems   All other systems reviewed and are negative.      Physical Exam   BP: (!) 146/77  Pulse: 94  Temp: 97.7  F (36.5  C)  Resp: 26  SpO2: 97 %      Physical Exam  Vitals and nursing note reviewed.   Constitutional:       General: He is not in acute distress.     Appearance: He is not diaphoretic.   HENT:      Head: Normocephalic and atraumatic.      Right Ear: Tympanic membrane, ear canal and external ear normal.      Left Ear: Tympanic membrane, ear canal and external ear normal.      Nose: Nose normal. No congestion or rhinorrhea.      Mouth/Throat:      Mouth: Mucous membranes are moist.      Pharynx: No oropharyngeal exudate.   Eyes:      General: No scleral icterus.        Right eye: No discharge.         Left eye: No discharge.      Conjunctiva/sclera: Conjunctivae normal.      Pupils: Pupils  are equal, round, and reactive to light.   Neck:      Thyroid: No thyromegaly.   Cardiovascular:      Rate and Rhythm: Normal rate and regular rhythm.      Heart sounds: Murmur (Left sternal border second intercostal space grade 2 out of 6 midsystolic murmur.) heard.   Pulmonary:      Effort: Pulmonary effort is normal. No respiratory distress.      Breath sounds: Normal breath sounds. No wheezing, rhonchi or rales.      Comments: Upper airway wheezing sounds.  Chest:      Chest wall: No tenderness.   Abdominal:      General: Bowel sounds are normal. There is no distension.      Palpations: There is no mass.      Tenderness: There is no abdominal tenderness. There is no right CVA tenderness, left CVA tenderness, guarding or rebound.      Hernia: No hernia is present.      Comments: Distended.  Tense.   Musculoskeletal:         General: Swelling (2+ bilateral lower extremity edema.  No calf tenderness.  Negative Homans' sign.  Distal pulses 2+.) present. No tenderness, deformity or signs of injury. Normal range of motion.      Cervical back: Normal range of motion and neck supple.      Right lower leg: Edema present.      Left lower leg: Edema present.   Lymphadenopathy:      Cervical: No cervical adenopathy.   Skin:     General: Skin is warm and dry.      Capillary Refill: Capillary refill takes less than 2 seconds.      Coloration: Skin is not jaundiced or pale.      Findings: No bruising, erythema, lesion or rash.   Neurological:      General: No focal deficit present.      Mental Status: He is alert and oriented to person, place, and time.      Cranial Nerves: No cranial nerve deficit.      Sensory: No sensory deficit.      Motor: No weakness.      Coordination: Coordination normal.      Gait: Gait normal.      Deep Tendon Reflexes: Reflexes normal.   Psychiatric:         Behavior: Behavior normal.         Thought Content: Thought content normal.         ED Course        Procedures              Critical Care  time:  none     None         Results for orders placed or performed during the hospital encounter of 03/13/25 (from the past 24 hours)   CBC with platelets differential    Narrative    The following orders were created for panel order CBC with platelets differential.  Procedure                               Abnormality         Status                     ---------                               -----------         ------                     CBC with platelets and ...[[id number]]  Abnormal            Final result                 Please view results for these tests on the individual orders.   D dimer quantitative   Result Value Ref Range    D-Dimer Quantitative 3.61 (H) 0.00 - 0.50 ug/mL FEU    Narrative    This D-dimer assay is intended for use in conjunction with a clinical pretest probability assessment model to exclude pulmonary embolism (PE) and deep venous thrombosis (DVT) in outpatients suspected of PE or DVT. The cut-off value is 0.50 ug/mL FEU.    For patients 50 years of age or older, the application of age-adjusted cut-off values for D-Dimer may increase the specificity without significant effect on sensitivity. The literature suggested calculation age adjusted cut-off in ug/L = age in years x 10 ug/L. The results in this laboratory are reported as ug/mL rather than ug/L. The calculation for age adjusted cut off in ug/mL= age in years x 0.01 ug/mL. For example, the cut off for a 76 year old male is 75 x 0.01 ug/mL = 0.76 ug/mL (760 ug/L).    M Brdaley et al. Age adjusted D-dimer cut-off levels to rule out pulmonary embolism: The ADJUST-PE Study. ASHLEE 2014;311:2726-9034.; HJ Arabella et al. Diagnostic accuracy of conventional or age adjusted D-dimer cutoff values in older patients with suspected venous thromboembolism. Systemic review and meta-analysis. BMJ 2013:346:f2492.   INR   Result Value Ref Range    INR 1.15 0.85 - 1.15   Comprehensive metabolic panel   Result Value Ref Range    Sodium 137 135 - 145  mmol/L    Potassium 4.1 3.4 - 5.3 mmol/L    Carbon Dioxide (CO2) 21 (L) 22 - 29 mmol/L    Anion Gap 12 7 - 15 mmol/L    Urea Nitrogen 23.4 (H) 8.0 - 23.0 mg/dL    Creatinine 0.93 0.67 - 1.17 mg/dL    GFR Estimate 88 >60 mL/min/1.73m2    Calcium 8.3 (L) 8.8 - 10.4 mg/dL    Chloride 104 98 - 107 mmol/L    Glucose 103 (H) 70 - 99 mg/dL    Alkaline Phosphatase 68 40 - 150 U/L    AST 27 0 - 45 U/L    ALT 18 0 - 70 U/L    Protein Total 6.2 (L) 6.4 - 8.3 g/dL    Albumin 3.9 3.5 - 5.2 g/dL    Bilirubin Total 0.3 <=1.2 mg/dL   Lipase   Result Value Ref Range    Lipase 29 13 - 60 U/L   Procalcitonin   Result Value Ref Range    Procalcitonin 0.15 <0.50 ng/mL   Troponin T, High Sensitivity   Result Value Ref Range    Troponin T, High Sensitivity 12 <=22 ng/L   Ammonia   Result Value Ref Range    Ammonia 27 16 - 60 umol/L   Magnesium   Result Value Ref Range    Magnesium 1.8 1.7 - 2.3 mg/dL   TSH with free T4 reflex   Result Value Ref Range    TSH 3.94 0.30 - 4.20 uIU/mL   Nt probnp inpatient (BNP)   Result Value Ref Range    N terminal Pro BNP Inpatient 71 0 - 900 pg/mL   Lactic acid whole blood with 1x repeat in 2 hr when >2   Result Value Ref Range    Lactic Acid, Initial 1.2 0.7 - 2.0 mmol/L   CBC with platelets and differential   Result Value Ref Range    WBC Count 6.1 4.0 - 11.0 10e3/uL    RBC Count 4.63 4.40 - 5.90 10e6/uL    Hemoglobin 13.3 13.3 - 17.7 g/dL    Hematocrit 39.4 (L) 40.0 - 53.0 %    MCV 85 78 - 100 fL    MCH 28.7 26.5 - 33.0 pg    MCHC 33.8 31.5 - 36.5 g/dL    RDW 13.3 10.0 - 15.0 %    Platelet Count 185 150 - 450 10e3/uL    % Neutrophils 67 %    % Lymphocytes 16 %    % Monocytes 12 %    % Eosinophils 4 %    % Basophils 1 %    % Immature Granulocytes 0 %    NRBCs per 100 WBC 0 <1 /100    Absolute Neutrophils 4.1 1.6 - 8.3 10e3/uL    Absolute Lymphocytes 1.0 0.8 - 5.3 10e3/uL    Absolute Monocytes 0.7 0.0 - 1.3 10e3/uL    Absolute Eosinophils 0.3 0.0 - 0.7 10e3/uL    Absolute Basophils 0.1 0.0 - 0.2 10e3/uL     Absolute Immature Granulocytes 0.0 <=0.4 10e3/uL    Absolute NRBCs 0.0 10e3/uL   US Lower Extremity Venous Duplex Bilateral    Narrative    EXAM: US LOWER EXTREMITY VENOUS DUPLEX BILATERAL  LOCATION: [facility]  DATE: 3/13/2025    INDICATION: bilateral LE edema  COMPARISON: None.  TECHNIQUE: Venous Duplex ultrasound of bilateral lower extremities with and without compression, augmentation and duplex. Color flow and spectral Doppler with waveform analysis performed.    FINDINGS: Exam includes the common femoral, femoral, popliteal veins as well as segmentally visualized deep calf veins and greater saphenous vein.     RIGHT: No deep vein thrombosis. No superficial thrombophlebitis. There are 2 popliteal cysts measuring 4.0 x 1.9 x 2.3 cm and 4.5 x 1.8 x 3.8 cm.    LEFT: No deep vein thrombosis. No superficial thrombophlebitis. There is a popliteal cyst measuring 6.2 x 2.9 x 4.9 cm.      Impression    IMPRESSION:  1.  No deep venous thrombosis in the bilateral lower extremities.       Medications   iopamidol (ISOVUE-370) solution 500 mL (100 mLs Intravenous $Given 3/13/25 1251)   new 100 ml saline bag (70 mLs Intravenous $Given 3/13/25 5214)       Assessments & Plan (with Medical Decision Making)      69 year old male presents for evaluation of worsening abdominal bloating.  Symptoms advancing with some wheezing, dry cough, and bilateral lower extremity edema.  Workup with fatty liver and ascites on ultrasound but negative laboratory workup to date.  Chart was reviewed in detail including primary care visits, labs, and ultrasound as noted above.  Denies any fevers or chills.  No nausea or vomiting.  No orthopnea.  BP (!) 149/78   Pulse 90   Temp 97.7  F (36.5  C) (Oral)   Resp 26   SpO2 97%    Generally healthy-appearing male in no acute distress.  Cardiopulmonary exam with a midsystolic murmur rated 2 on a scale of 6.  Lung fields are clear but he has a wheezing upper airway  sound.  Abdomen is bloated and tense.  Difficult to ascertain any hepatosplenomegaly.  No pinpoint tenderness.  2+ bilateral lower extremity edema without palpable cord.  Negative Homans' sign.  Skin without rash.  Laboratory levels with an isolated elevated D-dimer up to 3.61.  Otherwise, full laboratory workup without any acute abnormality.  LFTs normal.  Procalcitonin normal.  Cardiac markers all negative.  Lower extremity ultrasound negative for DVT.  At shift change, the patient is in the CT scanner getting the CT chest/abdomen/pelvis with PE protocol.  Dr. Anderson has kindly agreed to accept his care and will review the CT when it returns.  If this does not display any significant abnormality, the plan is for outpatient paracentesis for both diagnostic and therapeutic reasons.  If the CT shows abnormalities, then that will be pursued.  I spoke with the patient's daughter about this plan, and she was in agreement.     I have reviewed the nursing notes.    I have reviewed the findings, diagnosis, plan and need for follow up with the patient.           Medical Decision Making  The patient's presentation was of moderate complexity (an acute illness with systemic symptoms).    The patient's evaluation involved:  review of external note(s) from 1 sources (see separate area of note for details)  review of 3+ test result(s) ordered prior to this encounter (see separate area of note for details)    The patient's management necessitated moderate risk (IV contrast administration).        New Prescriptions    No medications on file       Final diagnoses:   None     Disclaimer: This note consists of symbols derived from keyboarding, dictation and/or voice recognition software. As a result, there may be errors in the script that have gone undetected. Please consider this when interpreting information found in this chart.      3/13/2025   [facility] EMERGENCY DEPT       Dandre Perdomo,  DAKOTA  03/14/25 0008

## 2025-03-14 NOTE — ED NOTES
Called Patient Placement to see if they could page the hospitalist @ [facility] so Dr. Anderson could talk with Hospitalist about patient status

## 2025-03-14 NOTE — PHARMACY-ADMISSION MEDICATION HISTORY
Pharmacist Admission Medication History    Admission medication history is complete. The information provided in this note is only as accurate as the sources available at the time of the update.    Information Source(s): Patient and CareEverywhere/SureScripts via in-person    Pertinent Information:   1) Patient had not yet started furosemide, Miralax or Senna S    Changes made to PTA medication list:  Added: None  Deleted: doxycycline  Changed: albuterol changed to as needed.     Allergies reviewed with patient and updates made in EHR: yes    Medication History Completed By: Re Ledbetter PharmD 3/14/2025 9:15 AM    PTA Med List   Medication Sig Last Dose/Taking    albuterol (PROAIR HFA/PROVENTIL HFA/VENTOLIN HFA) 108 (90 Base) MCG/ACT inhaler Inhale 2 puffs into the lungs every 6 hours. (Patient taking differently: Inhale 1-2 puffs into the lungs every 6 hours as needed.) Taking Differently    aspirin 81 MG chewable tablet Take 1 tablet (81 mg) by mouth daily 3/13/2025    atorvastatin (LIPITOR) 20 MG tablet Take 1 tablet (20 mg) by mouth daily 3/13/2025    losartan (COZAAR) 50 MG tablet Take 1 tablet (50 mg) by mouth daily 3/13/2025    omeprazole (PRILOSEC) 20 MG DR capsule Take 1 capsule (20 mg) by mouth daily 3/13/2025    spironolactone (ALDACTONE) 25 MG tablet Take 1 tablet (25 mg) by mouth daily. 3/13/2025

## 2025-03-14 NOTE — ED NOTES
Assumed care of pt-updated on status. Continuing to wait for call from [facility]. Audible wheezing continues - O2 Sats 95%

## 2025-03-14 NOTE — ED NOTES
Dr. Anderson talked with Dr. Cope. Patient should be admitted. There are no beds @ [facility] hospitalist will not accept patient without consulting with their oncologist which we are unable to get a hold of

## 2025-03-14 NOTE — PROCEDURES
[facility]    Procedure: IR Procedure Note    Date/Time: 3/14/2025 2:07 PM    Performed by: Iron Thomas MD  Authorized by: Iron Thomas MD  IR Fellow Physician:    Pre Procedure Diagnosis: retroperitoneal lymphadenopathy  Post Procedure Diagnosis: retroperitoneal lymphadenopathy    UNIVERSAL PROTOCOL   Site Marked: Yes  Prior Images Obtained and Reviewed:  Yes  Required items: Required blood products, implants, devices and special equipment available    Patient identity confirmed:  Verbally with patient, arm band and hospital-assigned identification number  Patient was reevaluated immediately before administering moderate or deep sedation or anesthesia  Confirmation Checklist:  Patient's identity using two indicators, correct equipment/implants were available, relevant allergies and procedure was appropriate and matched the consent or emergent situation  Time out: Immediately prior to the procedure a time out was called    Universal Protocol: the Joint Commission Universal Protocol was followed    Preparation: Patient was prepped and draped in usual sterile fashion    ESBL (mL):  0     ANESTHESIA    Anesthesia:  Local infiltration  Local Anesthetic:  Lidocaine 1% without epinephrine      SEDATION  Patient Sedated: Yes    Sedation Type:  Moderate (conscious) sedation  Vital signs: Vital signs monitored during sedation    See dictated procedure note for full details.  Findings: Retroperitoneal lymphadenopathy.    Specimens: core needle biopsy specimens sent for pathological analysis    Procedural Complications: None    Condition: Stable    Plan: Can restart heparin at 1800 this evening.      PROCEDURE    Patient Tolerance:  Patient tolerated the procedure well with no immediate complications  Length of time physician/provider present for 1:1 monitoring during sedation:  0-22 min

## 2025-03-14 NOTE — H&P
[facility]    History and Physical - Hospitalist Service       Date of Admission:  3/14/2025    Assessment & Plan   Bhavesh Landeros is a 69 year old male medical history significant for hypertension, aortic stenosis, mitral regurgitation, ?multiple sclerosis, obesity, GERD, hyperlipidemia, had a hernia who was directly admitted from [facility] ED with worsening abdominal bloating and distention and found to have extensive retroperitoneal soft tissue concerning for possible lymphoma with extension to adjacent mesenteric and pancreatic structures/vasculature and associated significant compression of IVC, significant abdominal and pelvic ascites, and acute RLL pulmonary embolism.    Extensive retroperitoneal soft tissue with extension to adjacent mesenteric and pancreatic structures/vasculature concerning for possible lymphoma  Associated significant compression of IVC  Moderately large volume abdominal and pelvic ascites  Generalized abdominal bloating and distention, worsening  Hepatic steatosis and ascites  Peripheral Edema  Splenomegaly  *Presented to [facility] ED with worsening abd bloating/distention. Several visits recently to PCP for SOB, PHELAN, abdominal bloating, there was concern for constipation as well as possible cardiac etiology. Also notes URI sx, worsening lower extremity edema.  Denies fevers or chills.  No sick contacts.  He is a never smoker, nondrinker.  No history of cancer.  No history of DVT/PE. 2+ pitting edema to BLE, scattered wheezing, no hypoxia, large protuberant abdomen.  *CT PE revealed extensive confluent retroperitoneal soft tissue most compatible with lymphoma, likely associated significant compression of the IVC and moderately large volume of abdominal and pelvic ascites.  The soft tissue fills the retroperitoneum in the region of the pancreas and extends into the small bowel mesentery, encasing the central abdominal vasculature and extending into the  mesentery. Also noted to have enlarged discrete mesenteric nodes present throughout the small bowel mesentery.  Abnormal number of pelvic sidewall nodes present. Enlarged spleen. See report for full details. Txr'd to [facility] for Oncology workup.  -Inpatient status  -[facility] consultation, notified on admission  -IR consult for biopsy -  plan for CT guided biopsy after noon of admission, HOLD heparin gtt until Biopsy, NPO  -Will likely need paracentesis, both therapeutic and diagnostic, defer to oncology on when this is appropriate, will need some symptomatic relief but priority is Bx initially  -Hold PTA diuretics for now  -Supportive care  -Compression stockings  -Trend basic labs    Acute RLL pulmonary embolism  Had some shortness of breath and dyspnea on exertion in recent weeks, was being worked up by family medicine and to have cardiac workup.  Elevated Ddimer prompting CT PE study which revealed right lower lobe pulmonary embolism, no right heart strain on imaging.  Troponin is negative which is reassuring.  Started on heparin gtt. in the ED.  -Cardiac monitoring  -Continuous pulse ox  -Heparin GTT, high intensity - on HOLD for Biopsy, to resume when ok with IR post procedure  -IS use  -Pharmacy liaison consultation for DOAC/warfarin    URI symptoms  Dry cough, coryza, intermittent wheezing for 1 week PTA. No fevers or chills. PE as above.  -Rule out covid/flu/rsv, though low suspicion  -Supportive care  -Nebs PRN  -PRN guaifenesin and tessalon pearles     Slow transit constipation  Likely worsened by above.  -Bowel regimen    History of mild aortic stenosis and mitral regurgitation  HTN  Cardiomegaly on recent x-ray  Patient was to have cardiac workup and echocardiogram as an outpatient, he does have systolic murmur on exam though soft.  New diagnosis of PE.  He was having shortness of breath, dyspnea on exertion, likely this is more so related to pulmonary embolism however we will take a look at valves.  " His troponin is negative, no obvious right heart strain on CT.  -Cardiac monitoring  -Echocardiogram  -HOLD PTA antihypertensives as BP is normal without  -HOLD PTA ASA for possible biopsy/paracentesis, resume when able    Chronic stable diagnoses and other pertinent medical history: Appropriate PTA medications will be resumed.   GERD  Obesity class II  HLD  Hiatal hernia  Recurrent abdominal hernia  Multiple sclerosis?        Diet: NPO for Medical/Clinical Reasons Except for: Meds until decision on biopsy is made  DVT Prophylaxis: Heparin GTT, high intensity  Pablo Catheter: Not present  Lines: None     Cardiac Monitoring: ACTIVE order. Indication: PE  Code Status: Full Code, confirmed with patient and daughter on admission    Clinically Significant Risk Factors Present on Admission           # Hypocalcemia: Lowest Ca = 8.3 mg/dL in last 2 days, will monitor and replace as appropriate       # Drug Induced Platelet Defect: home medication list includes an antiplatelet medication   # Hypertension: Noted on problem list           # Obesity: Estimated body mass index is 37.46 kg/m  as calculated from the following:    Height as of this encounter: 1.702 m (5' 7\").    Weight as of this encounter: 108.5 kg (239 lb 3.2 oz).              Disposition Plan     Medically Ready for Discharge: Anticipated in 2-4 Days         The patient's care was discussed with the Attending Physician, Dr. Velasquez, Bedside Nurse, Patient, Patient's Family, Oncology Consultant(s), and page to KARUNA Pena PA-C  Hospitalist Service  [facility]  Securely message with [employer] In [employer] (more info)  Text page via [facility] Paging/Directory     ______________________________________________________________________    Chief Complaint   Abdominal bloating    History is obtained from the patient, electronic health record, and patient's daughter    History of Present Illness   Bhavesh RAMIN Landeros is a 69 year old male medical " history significant for hypertension, aortic stenosis, mitral regurgitation, ?multiple sclerosis, obesity, GERD, hyperlipidemia, had a hernia who presented to the emergency department at [facility] with worsening abdominal bloating and distention.  He said several visits to primary care for dyspnea on exertion, shortness of breath, abdominal bloating there is concern for constipation as well as possible cardiac etiology.  Has referred to cardiology and was to have an echocardiogram on 3/15.  He also notes for the last week he has had wheezing, dry cough, worsening lower extremity edema.  Denies fevers or chills.  No sick contacts.  He is a never smoker, nondrinker.  No history of cancer.  No history of DVT/PE.    The emergency department his workup revealed an elevated D-dimer of 3.61 prompting a CT PE study which revealed small volume right lower lobe pulmonary embolus without right heart strain, extensive confluent retroperitoneal soft tissue most compatible with lymphoma, likely associated significant compression of the IVC and moderately large volume of abdominal and pelvic ascites.  The soft tissue fills the retroperitoneum in the region of the pancreas and extends into the small bowel mesentery, encasing the central abdominal vasculature and extending into the mesentery. Also noted to have enlarged discrete mesenteric nodes present throughout the small bowel mesentery.  Abnormal number of pelvic sidewall nodes present. Enlarged spleen. See report for full details. BLE US neg for DVT. Exam significant for 2+ pitting edema to BLE, scattered wheezing, no hypoxia, large protuberant abdomen, systolic murmur. Due to these findings [facility] [facility] was notified prior to transfer and patient was transferred to [facility] for further workup and management.      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    Cellulitis of leg 8/16/2011    Early satiety  2/24/2022    Leg edema, right 5/16/2013    MEDICAL HISTORY OF -     Leg & Wrist fxs    Multiple sclerosis (H)     Primary osteoarthritis of left knee 10/21/2021    Primary osteoarthritis of right knee 11/20/2019    Ventral hernia without obstruction or gangrene 11/9/2018       Past Surgical History   Past Surgical History:   Procedure Laterality Date    COLONOSCOPY N/A 1/22/2016    Procedure: COLONOSCOPY;  Surgeon: Pb Rodriguez MD;  Location: [facility]    ESOPHAGOSCOPY, GASTROSCOPY, DUODENOSCOPY (EGD), COMBINED N/A 9/10/2020    Procedure: Esophagogastroduodenoscopy with Biopsy;  Surgeon: Emigdio Betancourt DO;  Location: [facility]    HC KNEE SCOPE,MED/LAT MENISECTOMY  07/08/1999    HC REVISE MEDIAN N/CARPAL TUNNEL SURG  1986     UGI ENDOSCOPY DIAG W BIOPSY  10/26/09    ZZ COLONOSCOPY W/WO BRUSH/WASH  03/27/06       Prior to Admission Medications   Prior to Admission Medications   Prescriptions Last Dose Informant Patient Reported? Taking?   SENNA-docusate sodium (SENNA S) 8.6-50 MG tablet  Self No No   Sig: Take 1 tablet by mouth at bedtime.   albuterol (PROAIR HFA/PROVENTIL HFA/VENTOLIN HFA) 108 (90 Base) MCG/ACT inhaler  Self No Yes   Sig: Inhale 2 puffs into the lungs every 6 hours.   Patient taking differently: Inhale 1-2 puffs into the lungs every 6 hours as needed.   aspirin 81 MG chewable tablet 3/13/2025 Self No Yes   Sig: Take 1 tablet (81 mg) by mouth daily   atorvastatin (LIPITOR) 20 MG tablet 3/13/2025 Self, Pharmacy No Yes   Sig: Take 1 tablet (20 mg) by mouth daily   furosemide (LASIX) 20 MG tablet  Self, Pharmacy No No   Sig: Take 1 tablet (20 mg) by mouth daily.   losartan (COZAAR) 50 MG tablet 3/13/2025 Self, Pharmacy No Yes   Sig: Take 1 tablet (50 mg) by mouth daily   omeprazole (PRILOSEC) 20 MG DR capsule 3/13/2025 Self, Pharmacy No Yes   Sig: Take 1 capsule (20 mg) by mouth daily   polyethylene glycol (MIRALAX) 17 GM/Dose powder  Self No No   Sig: Take 17 g (1 Capful) by mouth daily.    spironolactone (ALDACTONE) 25 MG tablet 3/13/2025 Self, Pharmacy No Yes   Sig: Take 1 tablet (25 mg) by mouth daily.      Facility-Administered Medications: None        Review of Systems    The 10 point Review of Systems is negative other than noted in the HPI or here.     Social History   I have reviewed this patient's social history and updated it with pertinent information if needed.  Social History     Tobacco Use    Smoking status: Never    Smokeless tobacco: Never   Vaping Use    Vaping status: Never Used   Substance Use Topics    Alcohol use: Yes     Alcohol/week: 0.0 standard drinks of alcohol     Comment: occ    Drug use: No         Family History   I have reviewed this patient's family history and updated it with pertinent information if needed.  Family History   Problem Relation Age of Onset    Diabetes Brother     Alzheimer Disease Father     Diabetes Sister          Allergies   Allergies   Allergen Reactions    No Known Allergies         Physical Exam   Vital Signs: Temp: 98.1  F (36.7  C) Temp src: Oral BP: 103/82 Pulse: 95     SpO2: 95 % O2 Device: None (Room air)    Weight: 239 lbs 3.2 oz    Physical Exam    General: Awake, alert, very pleasant man who appears stated age. Looks comfortable sitting up in bed. No acute distress.  HEENT: Normocephalic, atraumatic. Extraocular movements intact.   Respiratory: Clear to auscultation bilaterally, no rales, or rhonchi. scattered wheezing, no hypoxia, no increased WOB  Cardiovascular: Regular rate and rhythm, +S1 and S2, no murmur auscultated. 2+ pitting edema to BLE  Gastrointestinal:  Large protuberant abdomen, nontender.   Skin: Warm, dry. No obvious rashes or lesions on exposed skin. Dorsalis pedis pulses palpable bilaterally.  Musculoskeletal: No joint swelling, erythema or tenderness. Moves all extremities equally.  Neurologic: AAO x3.   Psychiatric: Appropriate mood and affect. No obvious anxiety or depression.      Medical Decision Making       >90  MINUTES SPENT BY ME on the date of service doing chart review, history, exam, documentation & further activities per the note.      Data     I have personally reviewed the following data over the past 24 hrs:    5.6  \   13.4   / 172     137 104 23.4 (H) /  103 (H)   4.1 21 (L) 0.93 \     ALT: 18 AST: 27 AP: 68 TBILI: 0.3   ALB: 3.6 TOT PROTEIN: 6.2 (L) LIPASE: 29     Trop: 11 BNP: 71     TSH: 3.94 T4: N/A A1C: N/A     Procal: 0.15 CRP: N/A Lactic Acid: 1.2       INR:  1.15 PTT:  N/A   D-dimer:  3.61 (H) Fibrinogen:  N/A       Imaging results reviewed over the past 24 hrs:   Recent Results (from the past 24 hours)   US Lower Extremity Venous Duplex Bilateral    Narrative    EXAM: US LOWER EXTREMITY VENOUS DUPLEX BILATERAL  LOCATION: [facility]  DATE: 3/13/2025    INDICATION: bilateral LE edema  COMPARISON: None.  TECHNIQUE: Venous Duplex ultrasound of bilateral lower extremities with and without compression, augmentation and duplex. Color flow and spectral Doppler with waveform analysis performed.    FINDINGS: Exam includes the common femoral, femoral, popliteal veins as well as segmentally visualized deep calf veins and greater saphenous vein.     RIGHT: No deep vein thrombosis. No superficial thrombophlebitis. There are 2 popliteal cysts measuring 4.0 x 1.9 x 2.3 cm and 4.5 x 1.8 x 3.8 cm.    LEFT: No deep vein thrombosis. No superficial thrombophlebitis. There is a popliteal cyst measuring 6.2 x 2.9 x 4.9 cm.      Impression    IMPRESSION:  1.  No deep venous thrombosis in the bilateral lower extremities.   CT Chest (PE) Abdomen Pelvis w Contrast    Narrative    EXAM: CT CHEST PE ABDOMEN PELVIS W CONTRAST  LOCATION: [facility]  DATE: 3/14/2025    INDICATION: DYSPNEA AND ABDOMINAL PAIN SUCH AS BLOATING  COMPARISON: CT dated 8/10/2020  TECHNIQUE: CT chest pulmonary angiogram and routine CT abdomen pelvis with IV contrast. Arterial phase through the  chest and venous phase through the abdomen and pelvis. Multiplanar reformats and MIP reconstructions were performed. Dose reduction   techniques were used.   CONTRAST: 100mL Isovue 370    FINDINGS:  ANGIOGRAM CHEST: Assessment of the pulmonary arteries is limited by poor contrast bolus. Despite poor contrast bolus, definite right lower lobe segmental and subsegmental thrombus is identified. Thoracic aorta is negative for dissection. No CT evidence   of right heart strain.     LUNGS AND PLEURA: No acute infiltrates or effusions.    MEDIASTINUM/AXILLAE: Calcifications in the aortic valve may be seen with stenosis. Likely stable heart size. Scattered subcentimeter mediastinal and hilar nodes. Enlarged peridiaphragmatic and retrocrural nodes measuring up to 11 mm, image 170 series 5.    CORONARY ARTERY CALCIFICATION: Moderate.    HEPATOBILIARY: No biliary dilatation    PANCREAS: Extensive confluent soft tissue fills the retroperitoneum in the region of the pancreas, extending into the small bowel mesentery, encasing central abdominal vasculature and extending into the mesentery. Normal pancreatic tissue is not clearly   identified.    SPLEEN: Spleen is enlarged measuring 16.5 cm in AP dimension. Chronic splenic capsular calcification.    ADRENAL GLANDS: Right adrenal gland is within normal limits. Left adrenal gland is nonvisualized, encased by retroperitoneal soft tissue.    KIDNEYS/BLADDER: No hydronephrosis. Normal bladder contour.    BOWEL: No bowel obstruction.    LYMPH NODES: Extensive confluent retroperitoneal adenopathy encasing central abdominal vasculature extending into the mesentery as noted above. Conglomerate dimensions on image 100 9H 13 x 11.5 cm. Confluent soft tissue extends into the gastrohepatic   ligament, sherry hepatis, splenic hilum left periaortic, aortocaval, and retrocaval spaces. Enlarged discrete mesenteric nodes are also present throughout the small bowel mesentery. Although less than a  centimeter in short axis dimension, abnormal number   of pelvic sidewall nodes also present.    VASCULATURE: Extensive vascular encasement as described above. Although not well visualized, there is likely significant compression of the IVC.     PELVIC ORGANS: Prostatic hypertrophy.    MUSCULOSKELETAL: Lucency in the left pubic symphysis is unchanged, with a narrow zone of transition likely nonaggressive. Old left rib fractures. Bilateral gynecomastia. Moderate subcutaneous edema within the abdomen and pelvis.    Other: Moderately large volume of abdominal and pelvic ascites.      Impression    IMPRESSION:  1.  Small volume of right lower lobe PULMONARY EMBOLUS without right heart strain.  2.  Extensive confluent retroperitoneal soft tissue as described most compatible with LYMPHOMA.  3.  Likely associated significant compression of the IVC, not optimally assessed.  4.  Moderately large volume of abdominal and pelvic ASCITES.  5.  Additional findings as above.    Impression was called to [facility]/ED staff  by Dr. Cornelio Lucio on 3/14/2025 12:36 AM CDT.

## 2025-03-15 ENCOUNTER — APPOINTMENT (OUTPATIENT)
Dept: ULTRASOUND IMAGING | Facility: CLINIC | Age: 70
DRG: 823 | End: 2025-03-15
Attending: INTERNAL MEDICINE
Payer: COMMERCIAL

## 2025-03-15 ENCOUNTER — APPOINTMENT (OUTPATIENT)
Dept: CARDIOLOGY | Facility: CLINIC | Age: 70
DRG: 823 | End: 2025-03-15
Attending: PHYSICIAN ASSISTANT
Payer: COMMERCIAL

## 2025-03-15 LAB
ALBUMIN BODY FLUID SOURCE: NORMAL
ALBUMIN FLD-MCNC: 2.8 G/DL
ALBUMIN SERPL BCG-MCNC: 3.7 G/DL (ref 3.5–5.2)
ALP SERPL-CCNC: 69 U/L (ref 40–150)
ALT SERPL W P-5'-P-CCNC: 21 U/L (ref 0–70)
ANION GAP SERPL CALCULATED.3IONS-SCNC: 10 MMOL/L (ref 7–15)
AST SERPL W P-5'-P-CCNC: 31 U/L (ref 0–45)
BILIRUB SERPL-MCNC: 0.4 MG/DL
BUN SERPL-MCNC: 18.8 MG/DL (ref 8–23)
CALCIUM SERPL-MCNC: 8.7 MG/DL (ref 8.8–10.4)
CHLORIDE SERPL-SCNC: 105 MMOL/L (ref 98–107)
CREAT SERPL-MCNC: 0.87 MG/DL (ref 0.67–1.17)
EGFRCR SERPLBLD CKD-EPI 2021: >90 ML/MIN/1.73M2
ERYTHROCYTE [DISTWIDTH] IN BLOOD BY AUTOMATED COUNT: 13.5 % (ref 10–15)
GLUCOSE SERPL-MCNC: 106 MG/DL (ref 70–99)
HCO3 SERPL-SCNC: 24 MMOL/L (ref 22–29)
HCT VFR BLD AUTO: 40 % (ref 40–53)
HGB BLD-MCNC: 13.3 G/DL (ref 13.3–17.7)
LDH SERPL L TO P-CCNC: 158 U/L (ref 0–250)
LVEF ECHO: NORMAL
MAGNESIUM SERPL-MCNC: 2 MG/DL (ref 1.7–2.3)
MCH RBC QN AUTO: 28.4 PG (ref 26.5–33)
MCHC RBC AUTO-ENTMCNC: 33.3 G/DL (ref 31.5–36.5)
MCV RBC AUTO: 86 FL (ref 78–100)
PHOSPHATE SERPL-MCNC: 3.3 MG/DL (ref 2.5–4.5)
PLATELET # BLD AUTO: 168 10E3/UL (ref 150–450)
POTASSIUM SERPL-SCNC: 4.3 MMOL/L (ref 3.4–5.3)
PROT FLD-MCNC: 4 G/DL
PROT SERPL-MCNC: 6.1 G/DL (ref 6.4–8.3)
PROTEIN BODY FLUID SOURCE: NORMAL
RBC # BLD AUTO: 4.68 10E6/UL (ref 4.4–5.9)
SODIUM SERPL-SCNC: 139 MMOL/L (ref 135–145)
UFH PPP CHRO-ACNC: 0.47 IU/ML
UFH PPP CHRO-ACNC: 0.61 IU/ML
URATE SERPL-MCNC: 7.3 MG/DL (ref 3.4–7)
WBC # BLD AUTO: 4.5 10E3/UL (ref 4–11)

## 2025-03-15 PROCEDURE — 250N000013 HC RX MED GY IP 250 OP 250 PS 637: Performed by: PHYSICIAN ASSISTANT

## 2025-03-15 PROCEDURE — 94640 AIRWAY INHALATION TREATMENT: CPT

## 2025-03-15 PROCEDURE — 88341 IMHCHEM/IMCYTCHM EA ADD ANTB: CPT | Mod: TC | Performed by: INTERNAL MEDICINE

## 2025-03-15 PROCEDURE — 85520 HEPARIN ASSAY: CPT | Performed by: INTERNAL MEDICINE

## 2025-03-15 PROCEDURE — 83615 LACTATE (LD) (LDH) ENZYME: CPT | Performed by: INTERNAL MEDICINE

## 2025-03-15 PROCEDURE — 120N000001 HC R&B MED SURG/OB

## 2025-03-15 PROCEDURE — 250N000011 HC RX IP 250 OP 636: Performed by: INTERNAL MEDICINE

## 2025-03-15 PROCEDURE — 85520 HEPARIN ASSAY: CPT | Performed by: PHYSICIAN ASSISTANT

## 2025-03-15 PROCEDURE — 82042 OTHER SOURCE ALBUMIN QUAN EA: CPT | Performed by: INTERNAL MEDICINE

## 2025-03-15 PROCEDURE — 85027 COMPLETE CBC AUTOMATED: CPT | Performed by: PHYSICIAN ASSISTANT

## 2025-03-15 PROCEDURE — 84157 ASSAY OF PROTEIN OTHER: CPT | Performed by: INTERNAL MEDICINE

## 2025-03-15 PROCEDURE — 89051 BODY FLUID CELL COUNT: CPT | Performed by: INTERNAL MEDICINE

## 2025-03-15 PROCEDURE — 84550 ASSAY OF BLOOD/URIC ACID: CPT | Performed by: INTERNAL MEDICINE

## 2025-03-15 PROCEDURE — 36415 COLL VENOUS BLD VENIPUNCTURE: CPT | Performed by: INTERNAL MEDICINE

## 2025-03-15 PROCEDURE — 87340 HEPATITIS B SURFACE AG IA: CPT | Performed by: INTERNAL MEDICINE

## 2025-03-15 PROCEDURE — 84100 ASSAY OF PHOSPHORUS: CPT | Performed by: INTERNAL MEDICINE

## 2025-03-15 PROCEDURE — 0W9G3ZZ DRAINAGE OF PERITONEAL CAVITY, PERCUTANEOUS APPROACH: ICD-10-PCS | Performed by: HOSPITALIST

## 2025-03-15 PROCEDURE — 36415 COLL VENOUS BLD VENIPUNCTURE: CPT | Performed by: PHYSICIAN ASSISTANT

## 2025-03-15 PROCEDURE — 255N000002 HC RX 255 OP 636: Performed by: PHYSICIAN ASSISTANT

## 2025-03-15 PROCEDURE — 83735 ASSAY OF MAGNESIUM: CPT | Performed by: INTERNAL MEDICINE

## 2025-03-15 PROCEDURE — 88112 CYTOPATH CELL ENHANCE TECH: CPT | Mod: TC | Performed by: INTERNAL MEDICINE

## 2025-03-15 PROCEDURE — 86706 HEP B SURFACE ANTIBODY: CPT | Performed by: INTERNAL MEDICINE

## 2025-03-15 PROCEDURE — 272N000706 US PARACENTESIS WITHOUT ALBUMIN

## 2025-03-15 PROCEDURE — 250N000011 HC RX IP 250 OP 636: Performed by: PHYSICIAN ASSISTANT

## 2025-03-15 PROCEDURE — 86704 HEP B CORE ANTIBODY TOTAL: CPT | Performed by: INTERNAL MEDICINE

## 2025-03-15 PROCEDURE — 88184 FLOWCYTOMETRY/ TC 1 MARKER: CPT | Performed by: INTERNAL MEDICINE

## 2025-03-15 PROCEDURE — 250N000009 HC RX 250: Performed by: RADIOLOGY

## 2025-03-15 PROCEDURE — 80053 COMPREHEN METABOLIC PANEL: CPT | Performed by: PHYSICIAN ASSISTANT

## 2025-03-15 PROCEDURE — 93306 TTE W/DOPPLER COMPLETE: CPT | Mod: 26 | Performed by: INTERNAL MEDICINE

## 2025-03-15 PROCEDURE — 87070 CULTURE OTHR SPECIMN AEROBIC: CPT | Performed by: INTERNAL MEDICINE

## 2025-03-15 PROCEDURE — 89050 BODY FLUID CELL COUNT: CPT | Performed by: INTERNAL MEDICINE

## 2025-03-15 PROCEDURE — 250N000013 HC RX MED GY IP 250 OP 250 PS 637: Performed by: INTERNAL MEDICINE

## 2025-03-15 PROCEDURE — 999N000157 HC STATISTIC RCP TIME EA 10 MIN

## 2025-03-15 PROCEDURE — 99233 SBSQ HOSP IP/OBS HIGH 50: CPT | Performed by: INTERNAL MEDICINE

## 2025-03-15 PROCEDURE — 250N000009 HC RX 250: Performed by: PHYSICIAN ASSISTANT

## 2025-03-15 PROCEDURE — P9047 ALBUMIN (HUMAN), 25%, 50ML: HCPCS | Performed by: INTERNAL MEDICINE

## 2025-03-15 PROCEDURE — 999N000127 HC STATISTIC PERIPHERAL IV START W US GUIDANCE

## 2025-03-15 PROCEDURE — 88185 FLOWCYTOMETRY/TC ADD-ON: CPT | Performed by: INTERNAL MEDICINE

## 2025-03-15 RX ORDER — LIDOCAINE 40 MG/G
CREAM TOPICAL
Status: DISCONTINUED | OUTPATIENT
Start: 2025-03-15 | End: 2025-03-16

## 2025-03-15 RX ORDER — ALBUMIN (HUMAN) 12.5 G/50ML
12.5 SOLUTION INTRAVENOUS 3 TIMES DAILY
Status: COMPLETED | OUTPATIENT
Start: 2025-03-15 | End: 2025-03-16

## 2025-03-15 RX ORDER — GABAPENTIN 100 MG/1
100 CAPSULE ORAL 3 TIMES DAILY
Status: DISCONTINUED | OUTPATIENT
Start: 2025-03-15 | End: 2025-03-24 | Stop reason: HOSPADM

## 2025-03-15 RX ORDER — LIDOCAINE HYDROCHLORIDE 10 MG/ML
10 INJECTION, SOLUTION EPIDURAL; INFILTRATION; INTRACAUDAL; PERINEURAL ONCE
Status: DISCONTINUED | OUTPATIENT
Start: 2025-03-15 | End: 2025-03-15

## 2025-03-15 RX ADMIN — PANTOPRAZOLE SODIUM 40 MG: 40 TABLET, DELAYED RELEASE ORAL at 09:14

## 2025-03-15 RX ADMIN — SENNOSIDES AND DOCUSATE SODIUM 1 TABLET: 50; 8.6 TABLET ORAL at 20:29

## 2025-03-15 RX ADMIN — LIDOCAINE HYDROCHLORIDE ANHYDROUS 10 ML: 10 INJECTION, SOLUTION INFILTRATION at 16:26

## 2025-03-15 RX ADMIN — HYDROMORPHONE HYDROCHLORIDE 0.4 MG: 0.2 INJECTION, SOLUTION INTRAMUSCULAR; INTRAVENOUS; SUBCUTANEOUS at 22:18

## 2025-03-15 RX ADMIN — HYDROMORPHONE HYDROCHLORIDE 0.4 MG: 0.2 INJECTION, SOLUTION INTRAMUSCULAR; INTRAVENOUS; SUBCUTANEOUS at 18:10

## 2025-03-15 RX ADMIN — OXYCODONE HYDROCHLORIDE 5 MG: 5 TABLET ORAL at 20:30

## 2025-03-15 RX ADMIN — HEPARIN SODIUM 1800 UNITS/HR: 10000 INJECTION, SOLUTION INTRAVENOUS at 08:45

## 2025-03-15 RX ADMIN — HYDROMORPHONE HYDROCHLORIDE 0.4 MG: 0.2 INJECTION, SOLUTION INTRAMUSCULAR; INTRAVENOUS; SUBCUTANEOUS at 12:52

## 2025-03-15 RX ADMIN — SENNOSIDES AND DOCUSATE SODIUM 1 TABLET: 50; 8.6 TABLET ORAL at 09:14

## 2025-03-15 RX ADMIN — PERFLUTREN 10 ML: 6.52 INJECTION, SUSPENSION INTRAVENOUS at 09:25

## 2025-03-15 RX ADMIN — Medication 1 MG: at 22:18

## 2025-03-15 RX ADMIN — ALBUMIN HUMAN 12.5 G: 0.25 SOLUTION INTRAVENOUS at 22:18

## 2025-03-15 RX ADMIN — GABAPENTIN 100 MG: 100 CAPSULE ORAL at 19:06

## 2025-03-15 RX ADMIN — ALBUTEROL SULFATE 2.5 MG: 2.5 SOLUTION RESPIRATORY (INHALATION) at 15:27

## 2025-03-15 RX ADMIN — HYDROMORPHONE HYDROCHLORIDE 0.4 MG: 0.2 INJECTION, SOLUTION INTRAMUSCULAR; INTRAVENOUS; SUBCUTANEOUS at 15:15

## 2025-03-15 RX ADMIN — OXYCODONE HYDROCHLORIDE 5 MG: 5 TABLET ORAL at 14:13

## 2025-03-15 ASSESSMENT — ACTIVITIES OF DAILY LIVING (ADL)
ADLS_ACUITY_SCORE: 21
ADLS_ACUITY_SCORE: 21
ADLS_ACUITY_SCORE: 22
ADLS_ACUITY_SCORE: 21
ADLS_ACUITY_SCORE: 22
ADLS_ACUITY_SCORE: 21
ADLS_ACUITY_SCORE: 22
ADLS_ACUITY_SCORE: 21
ADLS_ACUITY_SCORE: 22

## 2025-03-15 NOTE — PLAN OF CARE
Goal Outcome Evaluation:      Date/Time: 3/14/2025 [id number]     Trauma/Ortho/Medical (Choose one) Medical     Diagnosis: Ascites / Retroperitoneal mass / PE  POD#: NA  Mental Status: A/O x4  Activity/dangle : independent  Diet: 2 gm Na  Pain: Denied  Pablo/Voiding: continent ,BR  Tele/Restraints/Iso: Tele- NSR  02/LDA: RA /PIV infusing  D/C Date: TBD  Other Info:  K , Mg, and Phos protocol, On Heparin drip 1800 unit(s)/ hr Anti Xa recheck @ 0740 today.

## 2025-03-15 NOTE — PROGRESS NOTES
[facility]    Medicine Progress Note - Hospitalist Service        Date of Admission:  3/14/2025  8:32 AM    Assessment & Plan:   Bhavesh Landeros is a 69 year old male medical history significant for hypertension, aortic stenosis, mitral regurgitation, ?multiple sclerosis, obesity, GERD, hyperlipidemia,  who was directly admitted from [facility] ED with worsening abdominal bloating and distention and found to have extensive retroperitoneal soft tissue concerning for possible lymphoma with extension to adjacent mesenteric and pancreatic structures/vasculature and associated significant compression of IVC, significant abdominal and pelvic ascites, and acute RLL pulmonary embolism.    Extensive retroperitoneal lymphadenopathy concerning for possible lymphoma.  IVC compression due to above.  Symptomatic moderately large volume ascites.  Peripheral Edema  Splenomegaly  *Presented to [facility] ED with worsening abd bloating/distention. Several visits recently to PCP for SOB, PHELNA, abdominal bloating, there was concern for constipation as well as possible cardiac etiology.   *CT PE revealed extensive confluent retroperitoneal soft tissue most compatible with lymphoma, likely associated significant compression of the IVC and moderately large volume of abdominal and pelvic ascites.  The soft tissue fills the retroperitoneum in the region of the pancreas and extends into the small bowel mesentery, encasing the central abdominal vasculature and extending into the mesentery.  Transfer to [facility] for Oncology workup.  -S/p CT-guided retroperitoneal lymphadenopathy biopsy with IR on 3/40/25  -[place] oncology following, plan for further management as outpatient once biopsy results are available  -Diagnostic and therapeutic paracentesis today  -Hold PTA diuretics for now  -Supportive care  -Compression stockings    Acute RLL pulmonary embolism  -Had some shortness of breath and dyspnea on exertion in recent weeks,  "  -CT PE study revealed right lower lobe pulmonary embolism, no right heart strain on imaging.   -Venous Doppler negative for DVT  -Continue heparin drip  -As mentioned above, plan for diagnostic and therapeutic paracentesis later today.  -Anticipate transitioning to DOAC's on 3/16    URI symptoms  Dry cough, coryza, intermittent wheezing for 1 week PTA. No fevers or chills. PE as above.  -COVID-19, influenza A and B and RSV negative  -Suspect his respiratory symptoms are mainly symptomatic due to large volume ascites  -Supportive care  -Nebs PRN  -PRN guaifenesin and tessalon pearles     Slow transit constipation  Likely worsened by above.  -Bowel regimen    History of mild aortic stenosis and mitral regurgitation  HTN  Cardiomegaly on recent x-ray  -Cardiac monitoring  -Echocardiogram  -Resume prior to admission losartan by tomorrow morning pending volume of paracentesis today    Chronic stable diagnoses and other pertinent medical history:  GERD  Obesity class II  HLD  Hiatal hernia  Recurrent abdominal hernia  Multiple sclerosis?      Diet: 2 Gram Sodium Diet     DVT Prophylaxis: Heparin drip  Pablo Catheter: Not present  Code Status: Full Code     Disposition Plan      Expected Discharge Date: 03/16/2025              Entered: Efrain Velasquez MD 03/15/2025, 9:05 AM        Medically Ready for Discharge: Anticipated Tomorrow pending paracentesis today and clinical course       Clinically Significant Risk Factors           # Hypocalcemia: Lowest Ca = 8.3 mg/dL in last 2 days, will monitor and replace as appropriate         # Hypertension: Noted on problem list            # Obesity: Estimated body mass index is 37.45 kg/m  as calculated from the following:    Height as of this encounter: 1.702 m (5' 7\").    Weight as of this encounter: 108.5 kg (239 lb 1.6 oz)., PRESENT ON ADMISSION               The patient's care was discussed with the Bedside Nurse and Patient.    Medical Decision Making       **CLEAR ALL " "SELECTIONS**      Labs/Imaging Reviewed:  See Information above and Data section below  Time SPENT BY ME on the date of service doing chart review, history, exam, documentation & further activities per the note:  50 MINUTES    Chart documentation was completed, in part, with [email or web address] voice-recognition software. Even though reviewed, some grammatical, spelling, and word errors may remain.    Efrain Velasquez MD  Hospitalist Service  [facility]  Text Page 7AM-6PM  Securely message with the Vocera Web Console (learn more here)  Text page via [employer] Paging/Directory    ______________________________________________________________________    Interval History    Patient had a retroperitoneal lymph node biopsy done yesterday.  He still quite symptomatic from large volume ascites with ongoing distention and respiratory issues.  We discussed doing diagnostic and therapeutic paracentesis today.  Tolerating heparin drip    Data reviewed today: I reviewed all medications, new labs and imaging results over the last 24 hours. I personally reviewed no images or EKG's today.    Physical Exam   Vital signs:  Temp: 98.1  F (36.7  C) Temp src: Oral BP: (!) 145/79 Pulse: 76   Resp: 16 SpO2: 96 % O2 Device: None (Room air)   Height: 170.2 cm (5' 7\") Weight: 108.5 kg (239 lb 1.6 oz)  Estimated body mass index is 37.45 kg/m  as calculated from the following:    Height as of this encounter: 1.702 m (5' 7\").    Weight as of this encounter: 108.5 kg (239 lb 1.6 oz).      Wt Readings from Last 2 Encounters:   03/15/25 108.5 kg (239 lb 1.6 oz)   03/14/25 108.4 kg (239 lb)       Gen: AAOX3, NAD, comfortable  HEENT: Supple neck, moist oral mucosa, no pallor  Resp: Diminished air entry bilaterally with scattered wheeze  CVS: RRR, no murmur  Abd/GI: Soft, distended with obvious large volume ascites.  Skin: Warm, dry no rashes  MSK: 2-3+ pedal edema  Neuro- CN- intact. No focal deficits.     Data   Recent Labs   Lab " 03/14/25  1038 03/14/25  0025 03/14/25  0019 03/13/25  2200   WBC 5.6 6.2  --  6.1   HGB 13.4 12.6*  --  13.3   MCV 84 86  --  85    183  --  185   INR  --   --   --  1.15     --   --  137   POTASSIUM 4.1  --   --  4.1   CHLORIDE 106  --   --  104   CO2 23  --   --  21*   BUN 20.5  --   --  23.4*   CR 0.88  --   --  0.93   ANIONGAP 9  --   --  12   SHANNON 8.4*  --   --  8.3*   *  --   --  103*   ALBUMIN 3.8  --  3.6 3.9   PROTTOTAL 6.3*  --   --  6.2*   BILITOTAL 0.4  --   --  0.3   ALKPHOS 75  --   --  68   ALT 21  --   --  18   AST 31  --   --  27   LIPASE  --   --   --  29       Recent Results (from the past 24 hours)   CT Abdomen Retroperitoneal Biopsy    Narrative    EXAM:  1. PERCUTANEOUS BIOPSY RETROPERITONEAL LYMPHADENOPATHY  2. CT GUIDANCE  3. CONSCIOUS SEDATION  LOCATION: [facility]  DATE: 3/14/2025    INDICATION: retroperitoneal lymphadenopathy  TECHNIQUE: Dose reduction techniques were used.    PROCEDURE: Informed consent obtained. Site marked. Prior images reviewed. Required items made available. Patient identity confirmed verbally and with arm band. Patient reevaluated immediately before administering sedation. Universal protocol was   followed. Time out performed. The site was prepped and draped in sterile fashion. 10 mL of 1% lidocaine was infused into the local soft tissues. Using standard technique and under direct CT guidance, a 18-gauge biopsy device was used to obtain 5 core   biopsies.     The patient tolerated the procedure well. No immediate complications.    SEDATION: Versed 1 mg. Fentanyl 50 mcg. The procedure was performed with administration intravenous conscious sedation with appropriate preoperative, intraoperative, and postoperative evaluation.    15 minutes of supervised face to face conscious sedation time was provided by a radiology nurse under my direct supervision.      Impression    IMPRESSION:  1.  Successful CT-guided biopsy retroperitoneal  lymphadenopathy     Medications   Current Facility-Administered Medications   Medication Dose Route Frequency Provider Last Rate Last Admin    heparin 25,000 units in 0.45% NaCl 250 mL ANTICOAGULANT infusion  0-5,000 Units/hr Intravenous Continuous Elmira Pena PA-C 18 mL/hr at 03/15/25 0845 1,800 Units/hr at 03/15/25 0845    Patient is already receiving anticoagulation with heparin, enoxaparin (LOVENOX), warfarin (COUMADIN)  or other anticoagulant medication   Does not apply Continuous PRN Elmira Pena PA-C         Current Facility-Administered Medications   Medication Dose Route Frequency Provider Last Rate Last Admin    [Held by provider] aspirin (ASA) chewable tablet 81 mg  81 mg Oral Daily Elmira Pena PA-C        pantoprazole (PROTONIX) EC tablet 40 mg  40 mg Oral Daily Elmira Pena PA-C   40 mg at 03/14/25 1116    polyethylene glycol (MIRALAX) powder 17 g  17 g Oral Daily Elmira Pena PA-C        senna-docusate (SENOKOT-S/PERICOLACE) 8.6-50 MG per tablet 1 tablet  1 tablet Oral BID Elmira Pena PA-C   1 tablet at 03/14/25 2051    Or    senna-docusate (SENOKOT-S/PERICOLACE) 8.6-50 MG per tablet 2 tablet  2 tablet Oral BID Elmira Pena PA-C        sodium chloride (PF) 0.9% PF flush 3 mL  3 mL Intracatheter Q8H Elmira Pena PA-C   3 mL at 03/14/25 1858

## 2025-03-15 NOTE — PLAN OF CARE
Goal Outcome Evaluation:      Plan of Care Reviewed With: patient    Overall Patient Progress: improving      Trauma/Ortho/Medical (Choose one) Medical    Mental Status: A&O x4  Activity/dangle SBA/indep  Diet: 2 gram NA  Pain: Dilaudid and oxycodone  Pablo/Voiding: Voiding in bathroom  Tele/Restraints/Iso: Tele-SR  02/LDA: RA/ IV heparin infusing  D/C Date: Pending  Other Info: Plan for paracentesis today. On potassium, mag and phos protocol. Recheck in AM.

## 2025-03-15 NOTE — CONSULTS
[facility]    Hematology / Oncology Consultation     Date of Admission:  3/14/2025  Date of Consult (When I saw the patient): 03/14/25    Assessment & Plan   Bhavesh Landeros is a 69 year old male who was admitted on 3/14/2025 with pulmonary embolism, massive ascites and bulky retroperitoneal adenopathy. I was asked to see the patient for suspected lymphoma.    Bhavesh has been struggling with dry cough and wheezing. He has abdominal bloating and distension. He had been to urgent care and they said he was constipated and prescribed laxatives for him.  His abdominal distension is a lot worse and has progressed even over the last week. He has gained about 10 lbs in weight. He has bilateral lower extremity edema in addition to his protuberant abdomen. He had been to his PCP on 3/12/25 and had abdominal ultrasound which was significant for mild to moderate ascites with hepatic steatosis. He was prescribed diuretics (Lasix + spironolactone) and laxatives which did not seem to help.  He had marked wheezing and respiratory distress. He presented to ED at [place] and was worked up with a CT chest, abdomen and pelvis with pulmonary embolism protocol. It showed right lower lobe segmental pulmonary artery embolism, extensive confluent retroperitoneal adenopathy compressing the IVC. He also had moderately large volume ascites. He has been admitted for further work up of his adenopathy and large ascites.     He had biopsy done today. It will take at least few working days to have pathology report and longer sometimes specially with concerns for lymphoma. He does not need to stay inpatient for his pathology results to be back. He would likely need PET-CT and bone marrow biopsy if he indeed has lymphoma. This work up can be different depending on the primary tumor identified. He can be followed at [facility] and have most of his treatments at [place].     He still needs high volume paracentesis  done to address his distress. I will get LDH, uric acid, hepatitis B serology in preparation of his treatment for lymphoma. He needs echocardiogram which was scheduled for today and needs to be completed inpatient  or rescheduled.     We will have more information only after he has results of pathology available. We will not follow over weekend in person but will review chart. Please do feel free to reach us with questions.     Over 80 min spent on day of visit including review of tests, obtaining/reviewing separately obtained history/physical exam, counseling patient, ordering medications/tests/procedures, communicating with PCP/consultants, and documenting in electronic medical record.    Jax Marcum  Hematologist and Medical Oncologist  [facility]     Jax Marcum MD, MD    Code Status    Full Code    Reason for Consult   Reason for consult: I was asked by Elmira Pena to evaluate this patient for retroperitoneal mass.    Primary Care Physician   Buck Emery    Chief Complaint   Shortness of breath, abdominal distension    History is obtained from the patient    History of Present Illness   Bhavesh Landeros is a 69 year old male who presents with as above    Past Medical History   I have reviewed this patient's medical history and updated it with pertinent information if needed.   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  Past Surgical History:   Procedure Laterality Date    COLONOSCOPY N/A 1/22/2016    Procedure:  COLONOSCOPY;  Surgeon: Pb Rodrigeuz MD;  Location: [facility]    ESOPHAGOSCOPY, GASTROSCOPY, DUODENOSCOPY (EGD), COMBINED N/A 9/10/2020    Procedure: Esophagogastroduodenoscopy with Biopsy;  Surgeon: Emigdio Betancourt DO;  Location: [facility]    HC KNEE SCOPE,MED/LAT MENISECTOMY  07/08/1999    HC REVISE MEDIAN N/CARPAL TUNNEL SURG  1986    HC UGI ENDOSCOPY DIAG W BIOPSY  10/26/09    ZZHC COLONOSCOPY W/WO BRUSH/WASH  03/27/06       Prior to Admission Medications   Prior to Admission Medications   Prescriptions Last Dose Informant Patient Reported? Taking?   SENNA-docusate sodium (SENNA S) 8.6-50 MG tablet  Self No No   Sig: Take 1 tablet by mouth at bedtime.   albuterol (PROAIR HFA/PROVENTIL HFA/VENTOLIN HFA) 108 (90 Base) MCG/ACT inhaler  Self No Yes   Sig: Inhale 2 puffs into the lungs every 6 hours.   Patient taking differently: Inhale 1-2 puffs into the lungs every 6 hours as needed.   aspirin 81 MG chewable tablet 3/13/2025 Self No Yes   Sig: Take 1 tablet (81 mg) by mouth daily   atorvastatin (LIPITOR) 20 MG tablet 3/13/2025 Self, Pharmacy No Yes   Sig: Take 1 tablet (20 mg) by mouth daily   furosemide (LASIX) 20 MG tablet  Self, Pharmacy No No   Sig: Take 1 tablet (20 mg) by mouth daily.   losartan (COZAAR) 50 MG tablet 3/13/2025 Self, Pharmacy No Yes   Sig: Take 1 tablet (50 mg) by mouth daily   omeprazole (PRILOSEC) 20 MG DR capsule 3/13/2025 Self, Pharmacy No Yes   Sig: Take 1 capsule (20 mg) by mouth daily   polyethylene glycol (MIRALAX) 17 GM/Dose powder  Self No No   Sig: Take 17 g (1 Capful) by mouth daily.   spironolactone (ALDACTONE) 25 MG tablet 3/13/2025 Self, Pharmacy No Yes   Sig: Take 1 tablet (25 mg) by mouth daily.      Facility-Administered Medications: None     Allergies   Allergies   Allergen Reactions    No Known Allergies        Social History   I have reviewed this patient's social history and updated it with pertinent information if needed. Bhavesh Landeros  reports that he has never  smoked. He has never used smokeless tobacco. He reports current alcohol use. He reports that he does not use drugs.    Family History   I have reviewed this patient's family history and updated it with pertinent information if needed.   Family History   Problem Relation Age of Onset    Diabetes Brother     Alzheimer Disease Father     Diabetes Sister        Review of Systems   The 10 point Review of Systems is negative other than noted in the HPI or here.      Physical Exam   Temp: 98.1  F (36.7  C) Temp src: Oral BP: 137/82 Pulse: 88   Resp: 16 SpO2: 96 % O2 Device: None (Room air)    Vital Signs with Ranges  Temp:  [97.7  F (36.5  C)-98.1  F (36.7  C)] 98.1  F (36.7  C)  Pulse:  [] 88  Resp:  [16-26] 16  BP: (103-170)/() 137/82  SpO2:  [93 %-97 %] 96 %  239 lbs 3.2 oz         Data   Recent Labs   Lab Test 03/14/25  1038 03/13/25  2200 03/04/25  1104 02/14/25  0935 06/03/24  1439    137 141 142 139   POTASSIUM 4.1 4.1 4.6 4.7 4.1   CHLORIDE 106 104 105 107 105   CO2 23 21* 23 21* 23   ANIONGAP 9 12 13 14 11   BUN 20.5 23.4* 12.3 13.3 13.2   CR 0.88 0.93 0.95 0.95 0.87   * 103* 94 106* 91   SHANNON 8.4* 8.3* 9.0 9.0 9.2     Recent Labs   Lab Test 03/14/25  1038 03/13/25 2200   MAG 2.0 1.8   PHOS 3.2  --      Recent Labs   Lab Test 03/14/25  1038 03/14/25  0025 03/13/25  2200 03/04/25  1104 02/14/25  0935 06/03/24  1439 11/09/21  1040 08/13/20  1030 08/07/20  1159 12/28/19  1344 11/25/19  1415 11/24/18  1934   WBC 5.6 6.2 6.1 5.0 5.2   < > 7.2 6.9   < > 9.9   < > 7.4   HGB 13.4 12.6* 13.3 13.6 13.7   < > 14.8 13.3   < > 13.7   < > 13.4    183 185 176 150   < > 194 251   < > 211   < > 212   MCV 84 86 85 88 86   < > 86 86   < > 87   < > 87   NEUTROPHIL  --   --  67  --   --   --  62 54.4  --  82.7  --  57.3    < > = values in this interval not displayed.     Recent Labs   Lab Test 03/14/25  1038 03/14/25  0019 03/13/25  2200 03/04/25  1104   BILITOTAL 0.4  --  0.3 0.5   ALKPHOS 75  --  68  "77   ALT 21  --  18 14   AST 31  --  27 25   ALBUMIN 3.8 3.6 3.9 3.9     TSH   Date Value Ref Range Status   03/13/2025 3.94 0.30 - 4.20 uIU/mL Final   12/28/2019 3.27 0.40 - 4.00 mU/L Final   11/07/2018 2.59 0.40 - 4.00 mU/L Final     No results for input(s): \"CEA\" in the last 82889 hours.  Results for orders placed or performed during the hospital encounter of 03/14/25   CT Abdomen Retroperitoneal Biopsy    Narrative    EXAM:  1. PERCUTANEOUS BIOPSY RETROPERITONEAL LYMPHADENOPATHY  2. CT GUIDANCE  3. CONSCIOUS SEDATION  LOCATION: [facility]  DATE: 3/14/2025    INDICATION: retroperitoneal lymphadenopathy  TECHNIQUE: Dose reduction techniques were used.    PROCEDURE: Informed consent obtained. Site marked. Prior images reviewed. Required items made available. Patient identity confirmed verbally and with arm band. Patient reevaluated immediately before administering sedation. Universal protocol was   followed. Time out performed. The site was prepped and draped in sterile fashion. 10 mL of 1% lidocaine was infused into the local soft tissues. Using standard technique and under direct CT guidance, a 18-gauge biopsy device was used to obtain 5 core   biopsies.     The patient tolerated the procedure well. No immediate complications.    SEDATION: Versed 1 mg. Fentanyl 50 mcg. The procedure was performed with administration intravenous conscious sedation with appropriate preoperative, intraoperative, and postoperative evaluation.    15 minutes of supervised face to face conscious sedation time was provided by a radiology nurse under my direct supervision.      Impression    IMPRESSION:  1.  Successful CT-guided biopsy retroperitoneal lymphadenopathy              "

## 2025-03-16 ENCOUNTER — APPOINTMENT (OUTPATIENT)
Dept: MRI IMAGING | Facility: CLINIC | Age: 70
DRG: 823 | End: 2025-03-16
Attending: INTERNAL MEDICINE
Payer: COMMERCIAL

## 2025-03-16 ENCOUNTER — APPOINTMENT (OUTPATIENT)
Dept: CT IMAGING | Facility: CLINIC | Age: 70
DRG: 823 | End: 2025-03-16
Attending: INTERNAL MEDICINE
Payer: COMMERCIAL

## 2025-03-16 LAB
ABO + RH BLD: NORMAL
ANION GAP SERPL CALCULATED.3IONS-SCNC: 10 MMOL/L (ref 7–15)
BLD GP AB SCN SERPL QL: NEGATIVE
BLD PROD TYP BPU: NORMAL
BLD PROD TYP BPU: NORMAL
BLOOD COMPONENT TYPE: NORMAL
BLOOD COMPONENT TYPE: NORMAL
BUN SERPL-MCNC: 18.7 MG/DL (ref 8–23)
CA-I BLD-MCNC: 4.4 MG/DL (ref 4.4–5.2)
CALCIUM SERPL-MCNC: 8.3 MG/DL (ref 8.8–10.4)
CHLORIDE SERPL-SCNC: 104 MMOL/L (ref 98–107)
CODING SYSTEM: NORMAL
CODING SYSTEM: NORMAL
CREAT SERPL-MCNC: 0.91 MG/DL (ref 0.67–1.17)
CROSSMATCH: NORMAL
CROSSMATCH: NORMAL
EGFRCR SERPLBLD CKD-EPI 2021: >90 ML/MIN/1.73M2
GLUCOSE BLDC GLUCOMTR-MCNC: 130 MG/DL (ref 70–99)
GLUCOSE SERPL-MCNC: 173 MG/DL (ref 70–99)
HBV CORE AB SERPL QL IA: NONREACTIVE
HBV SURFACE AB SERPL IA-ACNC: <3.5 M[IU]/ML
HBV SURFACE AB SERPL IA-ACNC: NONREACTIVE M[IU]/ML
HBV SURFACE AG SERPL QL IA: NONREACTIVE
HCO3 SERPL-SCNC: 22 MMOL/L (ref 22–29)
HGB BLD-MCNC: 10.1 G/DL (ref 13.3–17.7)
HGB BLD-MCNC: 9.1 G/DL (ref 13.3–17.7)
INR PPP: 1.21 (ref 0.85–1.15)
ISSUE DATE AND TIME: NORMAL
ISSUE DATE AND TIME: NORMAL
LACTATE SERPL-SCNC: 2.3 MMOL/L (ref 0.7–2)
LACTATE SERPL-SCNC: 3.3 MMOL/L (ref 0.7–2)
MAGNESIUM SERPL-MCNC: 1.9 MG/DL (ref 1.7–2.3)
MAGNESIUM SERPL-MCNC: <0.2 MG/DL (ref 1.7–2.3)
PHOSPHATE SERPL-MCNC: 4.2 MG/DL (ref 2.5–4.5)
PHOSPHATE SERPL-MCNC: 5 MG/DL (ref 2.5–4.5)
PLATELET # BLD AUTO: 223 10E3/UL (ref 150–450)
POTASSIUM SERPL-SCNC: 4.3 MMOL/L (ref 3.4–5.3)
POTASSIUM SERPL-SCNC: 4.3 MMOL/L (ref 3.4–5.3)
RADIOLOGIST FLAGS: ABNORMAL
SODIUM SERPL-SCNC: 136 MMOL/L (ref 135–145)
SPECIMEN EXP DATE BLD: NORMAL
UFH PPP CHRO-ACNC: 0.18 IU/ML
UNIT ABO/RH: NORMAL
UNIT ABO/RH: NORMAL
UNIT NUMBER: NORMAL
UNIT NUMBER: NORMAL
UNIT STATUS: NORMAL
UNIT STATUS: NORMAL
UNIT TYPE ISBT: 1700
UNIT TYPE ISBT: 1700

## 2025-03-16 PROCEDURE — 84132 ASSAY OF SERUM POTASSIUM: CPT | Performed by: INTERNAL MEDICINE

## 2025-03-16 PROCEDURE — 99233 SBSQ HOSP IP/OBS HIGH 50: CPT | Mod: 24 | Performed by: INTERNAL MEDICINE

## 2025-03-16 PROCEDURE — 250N000011 HC RX IP 250 OP 636: Performed by: PHYSICIAN ASSISTANT

## 2025-03-16 PROCEDURE — 120N000013 HC R&B IMCU

## 2025-03-16 PROCEDURE — 250N000011 HC RX IP 250 OP 636: Performed by: INTERNAL MEDICINE

## 2025-03-16 PROCEDURE — 86900 BLOOD TYPING SEROLOGIC ABO: CPT | Performed by: INTERNAL MEDICINE

## 2025-03-16 PROCEDURE — 83735 ASSAY OF MAGNESIUM: CPT | Performed by: INTERNAL MEDICINE

## 2025-03-16 PROCEDURE — 85610 PROTHROMBIN TIME: CPT | Performed by: NURSE PRACTITIONER

## 2025-03-16 PROCEDURE — 85520 HEPARIN ASSAY: CPT | Performed by: INTERNAL MEDICINE

## 2025-03-16 PROCEDURE — 74174 CTA ABD&PLVS W/CONTRAST: CPT

## 2025-03-16 PROCEDURE — 250N000013 HC RX MED GY IP 250 OP 250 PS 637: Performed by: INTERNAL MEDICINE

## 2025-03-16 PROCEDURE — 99233 SBSQ HOSP IP/OBS HIGH 50: CPT | Performed by: INTERNAL MEDICINE

## 2025-03-16 PROCEDURE — 99291 CRITICAL CARE FIRST HOUR: CPT | Performed by: NURSE PRACTITIONER

## 2025-03-16 PROCEDURE — 86850 RBC ANTIBODY SCREEN: CPT | Performed by: INTERNAL MEDICINE

## 2025-03-16 PROCEDURE — 258N000003 HC RX IP 258 OP 636: Performed by: INTERNAL MEDICINE

## 2025-03-16 PROCEDURE — 36415 COLL VENOUS BLD VENIPUNCTURE: CPT

## 2025-03-16 PROCEDURE — A9585 GADOBUTROL INJECTION: HCPCS | Performed by: INTERNAL MEDICINE

## 2025-03-16 PROCEDURE — 99222 1ST HOSP IP/OBS MODERATE 55: CPT | Performed by: NURSE PRACTITIONER

## 2025-03-16 PROCEDURE — 72158 MRI LUMBAR SPINE W/O & W/DYE: CPT

## 2025-03-16 PROCEDURE — 82330 ASSAY OF CALCIUM: CPT | Performed by: NURSE PRACTITIONER

## 2025-03-16 PROCEDURE — P9047 ALBUMIN (HUMAN), 25%, 50ML: HCPCS | Mod: JZ | Performed by: INTERNAL MEDICINE

## 2025-03-16 PROCEDURE — 255N000002 HC RX 255 OP 636: Performed by: INTERNAL MEDICINE

## 2025-03-16 PROCEDURE — 83735 ASSAY OF MAGNESIUM: CPT | Performed by: NURSE PRACTITIONER

## 2025-03-16 PROCEDURE — 250N000013 HC RX MED GY IP 250 OP 250 PS 637: Performed by: PHYSICIAN ASSISTANT

## 2025-03-16 PROCEDURE — 85018 HEMOGLOBIN: CPT | Performed by: INTERNAL MEDICINE

## 2025-03-16 PROCEDURE — 80048 BASIC METABOLIC PNL TOTAL CA: CPT | Performed by: NURSE PRACTITIONER

## 2025-03-16 PROCEDURE — 83605 ASSAY OF LACTIC ACID: CPT

## 2025-03-16 PROCEDURE — 86923 COMPATIBILITY TEST ELECTRIC: CPT | Performed by: NURSE PRACTITIONER

## 2025-03-16 PROCEDURE — 83605 ASSAY OF LACTIC ACID: CPT | Performed by: NURSE PRACTITIONER

## 2025-03-16 PROCEDURE — 73723 MRI JOINT LWR EXTR W/O&W/DYE: CPT | Mod: LT

## 2025-03-16 PROCEDURE — 84100 ASSAY OF PHOSPHORUS: CPT | Performed by: INTERNAL MEDICINE

## 2025-03-16 PROCEDURE — 84100 ASSAY OF PHOSPHORUS: CPT

## 2025-03-16 PROCEDURE — 250N000009 HC RX 250: Performed by: INTERNAL MEDICINE

## 2025-03-16 PROCEDURE — 82310 ASSAY OF CALCIUM: CPT | Performed by: NURSE PRACTITIONER

## 2025-03-16 PROCEDURE — 36415 COLL VENOUS BLD VENIPUNCTURE: CPT | Performed by: INTERNAL MEDICINE

## 2025-03-16 PROCEDURE — P9016 RBC LEUKOCYTES REDUCED: HCPCS | Performed by: NURSE PRACTITIONER

## 2025-03-16 PROCEDURE — 85049 AUTOMATED PLATELET COUNT: CPT | Performed by: NURSE PRACTITIONER

## 2025-03-16 PROCEDURE — 36415 COLL VENOUS BLD VENIPUNCTURE: CPT | Performed by: NURSE PRACTITIONER

## 2025-03-16 RX ORDER — IOPAMIDOL 755 MG/ML
72 INJECTION, SOLUTION INTRAVASCULAR ONCE
Status: COMPLETED | OUTPATIENT
Start: 2025-03-16 | End: 2025-03-16

## 2025-03-16 RX ORDER — MAGNESIUM SULFATE HEPTAHYDRATE 40 MG/ML
4 INJECTION, SOLUTION INTRAVENOUS EVERY 4 HOURS
Status: DISCONTINUED | OUTPATIENT
Start: 2025-03-16 | End: 2025-03-16

## 2025-03-16 RX ORDER — LIDOCAINE 40 MG/G
CREAM TOPICAL
Status: DISCONTINUED | OUTPATIENT
Start: 2025-03-16 | End: 2025-03-20

## 2025-03-16 RX ORDER — GADOBUTROL 604.72 MG/ML
10 INJECTION INTRAVENOUS ONCE
Status: COMPLETED | OUTPATIENT
Start: 2025-03-16 | End: 2025-03-16

## 2025-03-16 RX ORDER — MAGNESIUM SULFATE HEPTAHYDRATE 40 MG/ML
4 INJECTION, SOLUTION INTRAVENOUS EVERY 4 HOURS
Status: COMPLETED | OUTPATIENT
Start: 2025-03-16 | End: 2025-03-17

## 2025-03-16 RX ORDER — LOSARTAN POTASSIUM 50 MG/1
50 TABLET ORAL DAILY
Status: DISCONTINUED | OUTPATIENT
Start: 2025-03-16 | End: 2025-03-20

## 2025-03-16 RX ADMIN — GABAPENTIN 100 MG: 100 CAPSULE ORAL at 08:34

## 2025-03-16 RX ADMIN — PANTOPRAZOLE SODIUM 40 MG: 40 TABLET, DELAYED RELEASE ORAL at 08:34

## 2025-03-16 RX ADMIN — OXYCODONE HYDROCHLORIDE 5 MG: 5 TABLET ORAL at 01:18

## 2025-03-16 RX ADMIN — OXYCODONE HYDROCHLORIDE 5 MG: 5 TABLET ORAL at 09:39

## 2025-03-16 RX ADMIN — GADOBUTROL 10 ML: 604.72 INJECTION INTRAVENOUS at 12:11

## 2025-03-16 RX ADMIN — ALBUMIN HUMAN 12.5 G: 0.25 SOLUTION INTRAVENOUS at 15:59

## 2025-03-16 RX ADMIN — SENNOSIDES AND DOCUSATE SODIUM 1 TABLET: 50; 8.6 TABLET ORAL at 21:57

## 2025-03-16 RX ADMIN — MAGNESIUM SULFATE HEPTAHYDRATE 4 G: 40 INJECTION, SOLUTION INTRAVENOUS at 21:54

## 2025-03-16 RX ADMIN — HEPARIN SODIUM 1800 UNITS/HR: 10000 INJECTION, SOLUTION INTRAVENOUS at 00:15

## 2025-03-16 RX ADMIN — LOSARTAN POTASSIUM 50 MG: 50 TABLET, FILM COATED ORAL at 13:55

## 2025-03-16 RX ADMIN — Medication 17 G: at 08:39

## 2025-03-16 RX ADMIN — HYDROMORPHONE HYDROCHLORIDE 0.4 MG: 0.2 INJECTION, SOLUTION INTRAMUSCULAR; INTRAVENOUS; SUBCUTANEOUS at 14:01

## 2025-03-16 RX ADMIN — SODIUM CHLORIDE 80 ML: 9 INJECTION, SOLUTION INTRAVENOUS at 16:41

## 2025-03-16 RX ADMIN — IOPAMIDOL 72 ML: 755 INJECTION, SOLUTION INTRAVENOUS at 16:40

## 2025-03-16 RX ADMIN — SODIUM CHLORIDE 500 ML: 0.9 INJECTION, SOLUTION INTRAVENOUS at 21:54

## 2025-03-16 RX ADMIN — HYDROMORPHONE HYDROCHLORIDE 0.4 MG: 0.2 INJECTION, SOLUTION INTRAMUSCULAR; INTRAVENOUS; SUBCUTANEOUS at 07:55

## 2025-03-16 RX ADMIN — GABAPENTIN 100 MG: 100 CAPSULE ORAL at 16:01

## 2025-03-16 RX ADMIN — ACETAMINOPHEN 650 MG: 325 TABLET, FILM COATED ORAL at 09:39

## 2025-03-16 RX ADMIN — ALBUMIN HUMAN 12.5 G: 0.25 SOLUTION INTRAVENOUS at 08:40

## 2025-03-16 RX ADMIN — ACETAMINOPHEN 650 MG: 325 TABLET, FILM COATED ORAL at 05:24

## 2025-03-16 RX ADMIN — SENNOSIDES AND DOCUSATE SODIUM 1 TABLET: 50; 8.6 TABLET ORAL at 08:34

## 2025-03-16 RX ADMIN — OXYCODONE HYDROCHLORIDE 5 MG: 5 TABLET ORAL at 05:24

## 2025-03-16 RX ADMIN — GABAPENTIN 100 MG: 100 CAPSULE ORAL at 21:57

## 2025-03-16 RX ADMIN — HYDROMORPHONE HYDROCHLORIDE 0.2 MG: 0.2 INJECTION, SOLUTION INTRAMUSCULAR; INTRAVENOUS; SUBCUTANEOUS at 17:31

## 2025-03-16 ASSESSMENT — ACTIVITIES OF DAILY LIVING (ADL)
ADLS_ACUITY_SCORE: 23
ADLS_ACUITY_SCORE: 29
ADLS_ACUITY_SCORE: 23
ADLS_ACUITY_SCORE: 21
ADLS_ACUITY_SCORE: 29
ADLS_ACUITY_SCORE: 21
ADLS_ACUITY_SCORE: 23
ADLS_ACUITY_SCORE: 21
ADLS_ACUITY_SCORE: 21
ADLS_ACUITY_SCORE: 23
ADLS_ACUITY_SCORE: 29
ADLS_ACUITY_SCORE: 27
ADLS_ACUITY_SCORE: 23
ADLS_ACUITY_SCORE: 24
ADLS_ACUITY_SCORE: 21
ADLS_ACUITY_SCORE: 23
ADLS_ACUITY_SCORE: 23
ADLS_ACUITY_SCORE: 21

## 2025-03-16 NOTE — PLAN OF CARE
Diagnosis:Ascites, paracentesis   Mental Status:AxOx4  Activity/dangle: SBA due to leg pain   Diet:2 gm sodium   Pain:Iv dilaudid and oxycodone, + gabapentin for shooting leg pain   Pablo/Voiding:Urinal   Tele: NSR  02/LDA:Heparin drip   D/C Date:TBD

## 2025-03-16 NOTE — PROVIDER NOTIFICATION
MD Notification    Notified Person: MD    Notified Person Name: Ron Zuniga     Notification Date/Time: 3/15 11:45 pm     Notification Interaction: Messaging     Purpose of Notification: Patient still having left pain, numbness and tingling, dilaudid only helps a little    Orders Received: yes    Comments:

## 2025-03-16 NOTE — PLAN OF CARE
Goal Outcome Evaluation:         Date/Time: 03/16/25 ([id number])    Trauma/Ortho/Medical (Choose one): Medical    Diagnosis: Retroperitoneal Lymphadenopathy, PE on RLE  POD#: NA  Mental Status: A&O x4  Activity/dangle: Not able to get out of bed due to pain this shift  Diet: Regular, NPO since 1300  Pain: Oxycodone, tylenol and IV dilaudid for severe pain  Pablo/Voiding: Urinal  Tele/Restraints/Iso: NA  02/LDA: Heparin stopped @ 1259, IV-SL  D/C Date: TBD  Other Info: Intermittent severe pain on left lower back- radiating to left groin and left thigh, numbness present on left thigh- MD aware. MRI done this morning

## 2025-03-16 NOTE — PROGRESS NOTES
Addendum:  I was called by radiology with MRI results that was ordered as stat for patient having intractable left groin/hip area pain.  He is found to have 2.5 x 4.6 x 14.6 cm retroperitoneal hemorrhage.  I discussed the situation with  from oncology and Dr. Matias from interventional radiology.  We all agreed that we should turn off heparin drip immediately which was done.  Patient has a very small clot burden as far as his PE is concerned and there is no DVT.  I discussed with Dr. Matias about placing IVC filter prophylactically however patient has a compressed IVC and Dr. Matias felt that this would be a tricky situation and would risk IVC filter clotting given the compression from the retroperitoneal mass.  We decided to follow clinical course and pursue serial lower extremity venous Doppler.  Will order venous Doppler for tomorrow morning and if there is new DVT then it will be a more compelling indication for IVC filter placement.  In the meantime a stat hemoglobin has been ordered.  Will check hemoglobin every 6 hours.  If there is a significant drop in hemoglobin or change in hemodynamics then Dr. Matias recommends CTA of the abdomen and pelvis to evaluate further to localize the site of bleeding for possible intervention.  Also neurosurgery consulted for significant degenerative disc disease with foraminal stenosis with possible nerve root impingement.   Lizzie notified of Dr. Good's instructions as noted below. Lizzie agreed with plan.  Hu Ibarra RN

## 2025-03-16 NOTE — CODE/RAPID RESPONSE
[facility]    RRT Note  3/16/2025   Time Called: 4:51 PM    RRT called for: hypotension in setting of large retroperitoneal hemorrhage    HPI:    Bhavesh Landeros is a 69 year old male w/ PMH of hypertension, aortic stenosis, mitral regurgitation, ?multiple sclerosis, obesity, GERD, hyperlipidemia, who was directly admitted from [facility] ED with worsening abdominal bloating and distention and found to have extensive retroperitoneal soft tissue concerning for possible lymphoma with extension to adjacent mesenteric and pancreatic structures/vasculature and associated significant compression of IVC, significant abdominal and pelvic ascites, and acute RLL pulmonary embolism for which he was started on heparin gtt 3/13/25 .  Since admit at [facility] he's had LN biopsy and diagnostic and therapeutic paracentesis for nearly 5L and is s/p albumin replacement.  On 03/16/25  pt endorsed new back pain w/ radiculopathy with MRI showing  2.5 x 4.6 x 14.6 cm retroperitoneal hemorrhage.  Heparin gtt stopped at 1247pm.  Pt did receive his ARB earlier at ~2pm 03/16/25 his BPs have been running 140 systolic.  Patient were held with IR physician regarding possible need for IVC filter.  There is some compression of the IVC from the retroperitoneal mass.  Current plan is for venous lower extremity duplex on 3/17/2025 and if there is a new DVT present then may be more compelling indication for IVC filter at that time but holding off for now given  small clot burden and no oxygen requirement    Because of the newly identified retroperitoneal hemorrhage repeat hemoglobin was obtained.  There was a 3 g decrease and so the angiogram of the abdomen and pelvis was obtained so as to hopefully identify any active bleeding.  Just prior to and during CT scan patient was hypotensive with SBP's in the 70s prompting RRT activation.  CT was completed and I met patient in his new room on intermediate care    Per family pt  appears pale but is awake, talking.  Patient denies LH, dizziness, CP, dyspnea.  He's not clammy, does endorses back pain and RLE radicular pain ongoing since the a.m 3/16/2025..  HR 93, BP 91/63, RR 16, SpO2 % on RA.  Exp wheezes diffusely over bilat upper lobes, and bilat LE +2 pitting edema, large abdomen.  Blood consent has already been obtained and he has 2 large-bore peripheral IVs.    Assessment & Plan   Acute Hemorrhagic, hypovolemic shock likely from large retroperitoneal bleeding    INTERVENTIONS:  -stat 2 units RBCs transfusion  -add on platelets--> WNL  -add on remainder of BMP--> WNL  -next hgb due at 830pm  -INR, ionized calcium, Mg, lactic acid w/ next lab draw  -I reviewed the results of the CT angiogram abdomen and pelvis which were significant for no active hemorrhage.  I spoke with the on-call interventional radiologist to confirm those findings.  That physician also did not see any active hemorrhage that could be treated via IR procedure.  We discussed continued ongoing supportive therapy with blood product support and if needed hemodynamic support    Last 24H PRN:     acetaminophen (TYLENOL) tablet 650 mg, 650 mg at 03/16/25 0939 **OR** acetaminophen (TYLENOL) Suppository 650 mg    HYDROmorphone (DILAUDID) injection 0.2 mg, 0.2 mg at 03/16/25 1731    HYDROmorphone (DILAUDID) injection 0.4 mg, 0.4 mg at 03/16/25 1401    melatonin tablet 1 mg, 1 mg at 03/15/25 2218    oxyCODONE (ROXICODONE) tablet 5 mg, 5 mg at 03/16/25 0939    Non-Invasive Cardiac Output Monitoring (NICOM)  -deferred    Working diagnosis:hemorrhagic shock from left-sided retroperitoneal hemorrhage    At the end of the RRT BP was 98/48, heart rate 89 first unit of RBC transfusion was nearing completion second unit is available on the unit    disposition continue [facility] level of care    Discussed with and defer further cares to hospitalist attending physician Dr. Velasquez     Code Status: Full Code    Physical Exam   Vital  Signs with Ranges:  Temp:  [97.3  F (36.3  C)-98.4  F (36.9  C)] 98.4  F (36.9  C)  Pulse:  [76-95] 89  Resp:  [14-59] 19  BP: ()/(40-91) 105/91  SpO2:  [90 %-98 %] 96 %  I/O last 3 completed shifts:  In: 100 [P.O.:100]  Out: 450 [Urine:450]    Constitutional: vs as above and/or per EMR  General:  adult pt lying in bed without acute distress, pale appearing   GCS:   Motor 6=Obeys commands   Verbal 5=Oriented   Eye Opening 4=Spontaneous   Total: 15     Neuro: +follows commands wiggle toes and show 2 fingers bilat, face symmetric, tongue midline, speech fluent  Head, ENT & mouth: NC/AT,  dry oral mucosa  Neck: supple  CV S1S2, +murmur  resp: end exp wheeze in bilat upper and lower lobes  gi:normoactive bowel sounds, soft, nontender, nondisteded  Ext: +2 bilat LE pitting edema, no mottling  Skin: no rashes on exposed skin  Musculoskeletal no bony joint deformities      Data     IMAGING: (X-ray/CT/MRI)   Recent Results (from the past 24 hours)   MR Lumbar Spine w/o & w Contrast   Result Value    Radiologist flags Concern for left-sided retroperitoneal hemorrhage. (AA)    Narrative    EXAM: MR LUMBAR SPINE W/O and W CONTRAST  LOCATION: [facility]  DATE: 3/16/2025    INDICATION: Left groin back pain with radicular symptoms, suspected lymphoma  COMPARISON: Chest abdomen and pelvis CT dated 03/13/2025.  CONTRAST: 10mL Gadavist intravenous  TECHNIQUE: Routine Lumbar Spine MRI without and with IV contrast.    FINDINGS:   Nomenclature is based on 5 lumbar type vertebral bodies. Vertebral body heights are maintained. Mild retrolisthesis at L1-L2, L2-L3 and L3-L4. Minimal grade 1 anterolisthesis L4-L5 measuring 2 mm. Modic type I degenerative endplate changes at L2-L3.   Intrinsically T1 hyperintense lesions within the L1, L2 and L5 vertebral bodies may represent lipid rich hemangiomas. Diffuse heterogeneous bone marrow appearance. Normal distal spinal cord and cauda equina with conus medullaris  at L1. There is dorsal   epidural enhancement and interspinous process enhancement at L2-L3, possibly degenerative. Enhancement adjacent to the L4 spinous process is likely degenerative in nature. Partially visualized ascites. No definite abnormal intradural enhancement. Atrophy   of the paravertebral muscles. Heterogeneous appearance of the pelvic bone marrow.    The left psoas muscle is asymmetrically enlarged and demonstrates a fluid collection, hyperintense on T2-weighted images with areas of mild intrinsic hyperintensity on T1-weighted images, measuring up to 2.5 x 4.6 x 14.6 cm, concerning for hemorrhage   (series 105 image 33, series 106 image 7 and series 110 image 44. Antidependent enhancement within the collection may represent tumor. Partially visualized bulky retroperitoneal lymphadenopathy.    T12-L1: Normal disc height. No herniation. Mild bilateral facet arthropathy. No spinal canal or neural foraminal stenosis.    L1-L2: Normal disc height. Disc desiccation. Tiny disc bulge. Bilateral facet arthropathy. No spinal canal or neural foraminal stenosis.    L2-L3: Mild disc height loss. Annular fissure. Mild retrolisthesis. Disc bulge indenting the ventral thecal sac and contacting the bilateral extraforaminal L2 nerve roots. Bilateral facet arthropathy. Mild spinal canal narrowing. Moderate to severe right   and moderate left neural foraminal stenosis.    L3-L4: Mild disc height loss. Mild retrolisthesis. Disc bulge indenting the ventral thecal sac and contacting the descending right L4 nerve root in the lateral recess and the bilateral extra foraminal L3 nerve roots. Ligamentum flavum thickening.   Bilateral facet arthropathy. Dorsal epidural lipomatosis. Moderate spinal canal stenosis. Severe bilateral neural foraminal stenosis.    L4-L5: Mild disc height loss and desiccation. Disc bulge with superimposed central disc protrusion effacing the ventral thecal sac and contacting the bilateral descending L5  nerve roots and bilateral extraforaminal L4 nerve roots. Ligamentum flavum   thickening. Bilateral facet arthropathy. Dorsal epidural lipomatosis. Severe spinal canal stenosis. Severe bilateral neural foraminal stenosis.    L5-S1: Normal disc height and signal. Small disc bulge contacting the bilateral extra foraminal L5 nerve roots.. Bilateral facet arthropathy. Moderate epidural lipomatosis. Moderate spinal canal stenosis. Mild right neural foraminal narrowing. No   significant left neural foraminal stenosis      Impression    IMPRESSION:  1.  New partially visualized large fluid collection in the left psoas muscle measures at least 2.5 x 4.6 x 14.6 cm and is concerning for hemorrhage. Recommend IR consult consider CT abdomen and pelvis with intravenous contrast to further evaluate.  2.  Modic type I degenerative endplate changes at L2-L3 and represent a source of pain.  3.  Enhancement of the interspinous spaces at L2-L3 and L3-L4, extending to the dorsal epidural space likely represent degenerative related inflammation. No definite abnormal intradural enhancement.  4.  At L2-L3, there is a disc bulge contacting the bilateral extraforaminal L2 nerve roots.  5.  At L3-L4, there is a disc bulge contacting the bilateral extraforaminal L3 nerve roots and a dorsal epidural lipomatosis contributing to moderate spinal canal stenosis.  6.  At L4-L5, there is a disc bulge contacting the bilateral L4 and L5 nerve roots and dorsal epidural lipomatosis contributing to severe spinal canal stenosis.  7.  At L5-S1, there is a disc bulge contacting the bilateral extraforaminal L5 nerve roots and dorsal epidural lipomatosis contributing to moderate spinal canal stenosis.  8.  Partially visualized bulky retroperitoneal lymphadenopathy and ascites.      [Critical Result: Concern for left-sided retroperitoneal hemorrhage.]    Finding was identified on 3/16/2025 12:20 PM CDT.     Dr. Velasquez was contacted by me on 3/16/2025 12:58  PM CDT and verbalized understanding of the critical result.   MR Hip Left w/o & w Contrast    Narrative    EXAM: MR HIP LEFT W/O and W CONTRAST  LOCATION: [facility]  DATE: 3/16/2025    INDICATION: intractable left groin pain, being worked up for lymphoma  COMPARISON: CT 03/14/2025  TECHNIQUE: Routine. Additional postgadolinium T1 sequences were obtained.  IV CONTRAST: Gadavist 10 mL    FINDINGS:    LEFT HIP:   -Labrum: Limited evaluation of the labrum.   -Cartilage: Likely grade III chondromalacia of the superior and posterior hip. There is subchondral cyst formation within the acetabulum.  -Joint space: Trace effusion with minimal likely reactive enhancing synovitis.   -Joint capsule/ligaments: Intact joint capsule. Ligamentum teres is intact.    MUSCLES AND TENDONS:   -Gluteal: Mild gluteus minimus and gluteus medius tendinopathy. No significant tear. No muscular atrophy. There is edema within the greater trochanteric bursa.  -Proximal hamstring: Hamstring origin tendinopathy. There is chronic partial tear of the origin. No significant retraction. There is adjacent corticated heterotopic ossification.   -Iliopsoas: There is T2 hyperintensity of the distal tendon which could represent underlying tendinopathy or extension of fluid into the tendon. No definite tear. There is a large amount of fluid partially imaged present within the iliopsoas muscle.   Edema and fluid extend distally along the iliopsoas bursa and extend posterior to the proximal femur and along the fascial planes of the proximal anterior left thigh. There is high T1 signal in portions of the fluid.  -Rectus femoris origin: No tear or tendinopathy.    BONES:   -No fracture. Heterogeneous red marrow. No discrete marrow replacing lesion.   -Mild left sacroiliac joint arthrosis.    SOFT TISSUES:   -Fluid within the partially imaged iliopsoas musculature extending inferiorly as above. No muscular atrophy.    INTRA-PELVIC  CONTENTS:  -Nonorganized presacral and retroperitoneal edema. Soft tissue edema along the subcutaneous left flank/thigh. Fluid extends along the left inguinal canal.      Impression    IMPRESSION:  1.  Large amount of fluid within the partially imaged iliopsoas musculature extending inferiorly along the iliopsoas bursa and extending posterior along the fascial planes of the proximal anterior left thigh. There is high T1 signal in portions of the   fluid concerning for blood products. Further evaluation with contrast enhanced abdomen/pelvis CT is recommended.  2.  No concerning marrow replacing lesion.  3.  Mild to moderate left hip osteoarthritis with a trace effusion and minimal likely reactive enhancing synovitis.  4.  Left hamstring origin tendinopathy with chronic partial tear.      NOTE: ABNORMAL REPORT    THE DICTATION ABOVE DESCRIBES AN ABNORMALITY FOR WHICH FOLLOW-UP IS NEEDED.   CTA Abdomen Pelvis with Contrast    Narrative    EXAM: CTA ABDOMEN PELVIS WITH CONTRAST  LOCATION: [facility]  DATE: 3/16/2025    INDICATION: Retoperitoneal hematoma with drop in HB  COMPARISON: CT abdomen and pelvis dated 3/14/2025  TECHNIQUE: CT angiogram abdomen pelvis during arterial phase of injection of IV contrast. 2D and 3D MIP reconstructions were performed by the CT technologist. Dose reduction techniques were used.  CONTRAST: 72 cc [id number]    FINDINGS:  ANGIOGRAM ABDOMEN/PELVIS: No abdominal aortic aneurysm or dissection. Patent celiac axis, SMA, bilateral renal arteries, GERARD, and runoff to the upper legs. No evidence of active hemorrhage at single phase CT. There is significant stenosis at the origin   of the celiac axis, which may be related to vascular encasement although morphologically is suspicious for acute ligament syndrome. There is mild poststenotic dilatation reaching up to 7 mm.    LOWER CHEST: New small to moderate left pleural effusion with associated compressive atelectasis.  Mild right basilar atelectasis. Peridiaphragmatic adenopathy redemonstrated.    HEPATOBILIARY: Moderate abdominal ascites, slightly decreased in interval. Vicarious excretion of contrast in the gallbladder. No definite ductal dilatation.    PANCREAS: Pancreas remains poorly visualized, and encased by extensive retroperitoneal soft tissue.    SPLEEN: Splenomegaly redemonstrated.    ADRENAL GLANDS: Left adrenal gland is encased by retroperitoneal soft tissue. No discrete adrenal mass.    KIDNEYS/BLADDER: Anterior displacement of the left kidney by new retroperitoneal hematoma. No hydronephrosis. Excreted contrast in the bladder.    BOWEL: No bowel obstruction. Increased stranding and potential nodularity within the anterior intra-abdominal fat, most apparent in the upper abdomen.    LYMPH NODES: Extensive confluent retroperitoneal and mesenteric soft tissue consistent with confluent adenopathy redemonstrated. There is new enlargement of the left psoas musculature, with central low attenuation compatible with intramuscular hematoma.   This measures approximately 5 cm in greatest transverse dimension. This extends approximately 16 cm in length. Lateral to the psoas muscle, a higher density retroperitoneal hematoma measures up to 11 cm in transverse dimension, image 101. This extends   approximately 15 cm in length.    PELVIC ORGANS: Moderate to large volume of pelvic ascites, unchanged. Prostatic hypertrophy redemonstrated.    MUSCULOSKELETAL: Increasing subcutaneous edema. Fluid containing umbilical hernia. No acute bony abnormalities.      Impression    IMPRESSION:  1.  New large left-sided retroperitoneal hematoma with intramuscular component. No evidence of active hemorrhage.  2.  Extensive retroperitoneal and mesenteric adenopathy remains suspicious for lymphoma.  3.  Slight decrease in abdominal and pelvic ascites. Increasing prominence of anterior intra-abdominal fat stranding/nodularity, suspicious for  peritoneal disease/lymphomatosis.  4.  Increasing left pleural effusion.       CBC with Diff:  Recent Labs   Lab Test 03/16/25  1438 03/15/25  0939 03/14/25  0025 03/13/25  2200   WBC  --  4.5   < > 6.1   HGB 9.1* 13.3   < > 13.3   MCV  --  86   < > 85    168   < > 185   INR  --   --   --  1.15    < > = values in this interval not displayed.      Comprehensive Metabolic Panel:  Recent Labs   Lab 03/16/25  1438 03/15/25  0939    139   POTASSIUM 4.3  4.3 4.3   CHLORIDE 104 105   CO2 22 24   ANIONGAP 10 10   * 106*   BUN 18.7 18.8   CR 0.91 0.87   GFRESTIMATED >90 >90   SHANNON 8.3* 8.7*   MAG 1.9 2.0   PHOS 4.2 3.3   PROTTOTAL  --  6.1*   ALBUMIN  --  3.7   BILITOTAL  --  0.4   ALKPHOS  --  69   AST  --  31   ALT  --  21     UA:  Recent Labs   Lab 03/14/25  0029   COLOR Light Yellow   APPEARANCE Clear   URINEGLC Negative   URINEBILI Negative   URINEKETONE Negative   SG 1.015   UBLD Negative   URINEPH 5.5   PROTEIN Negative   NITRITE Negative   LEUKEST Negative   RBCU 1   WBCU <1       Time Spent on this Encounter   I spent 60 minutes of critical care time on the unit/floor managing the care of Bhavesh Landeros. Upon evaluation, this patient had a high probability of imminent or life-threatening deterioration due to hemorrhagic shock, which required my direct attention, intervention, and personal management including emergent transfusion of bld products and coordination of care with interventional radiology physician. 100% of my time was spent at the bedside counseling the patient and/or coordinating care regarding services listed in this note.    PERLA [facility]  Hospitalist Service  [facility]  Securely message with Help/Systems (more info)  Text page via [employer] Paging/Directory

## 2025-03-16 NOTE — PROGRESS NOTES
[facility]    Medicine Progress Note - Hospitalist Service        Date of Admission:  3/14/2025  8:32 AM    Assessment & Plan:   Bhavesh Landeros is a 69 year old male medical history significant for hypertension, aortic stenosis, mitral regurgitation, ?multiple sclerosis, obesity, GERD, hyperlipidemia,  who was directly admitted from [facility] ED with worsening abdominal bloating and distention and found to have extensive retroperitoneal soft tissue concerning for possible lymphoma with extension to adjacent mesenteric and pancreatic structures/vasculature and associated significant compression of IVC, significant abdominal and pelvic ascites, and acute RLL pulmonary embolism.    Extensive retroperitoneal lymphadenopathy concerning for possible lymphoma.  IVC compression due to above.  Symptomatic moderately large volume ascites.  Peripheral Edema  Splenomegaly  *Presented to [facility] ED with worsening abd bloating/distention. Several visits recently to PCP for SOB, PHELAN, abdominal bloating, there was concern for constipation as well as possible cardiac etiology.   *CT PE revealed extensive confluent retroperitoneal soft tissue most compatible with lymphoma, likely associated significant compression of the IVC and moderately large volume of abdominal and pelvic ascites.  The soft tissue fills the retroperitoneum in the region of the pancreas and extends into the small bowel mesentery, encasing the central abdominal vasculature and extending into the mesentery.  Transfer to [facility] for Oncology workup.  -S/p CT-guided retroperitoneal lymphadenopathy biopsy with IR on 3/40/25  -[place] onc[facility] following peripherally, discussed with Dr. Marcum on 3/15/2025, he recommends bone marrow biopsy tomorrow for staging  -S/p diagnostic and therapeutic paracentesis on 3/15/2025 with removal of 4.7 L fluid, serum albumin gradient is less than 1  -Hold PTA diuretics for now  -Supportive care  -Compression  stockings    Acute RLL pulmonary embolism  -Had some shortness of breath and dyspnea on exertion in recent weeks,   -CT PE study revealed right lower lobe pulmonary embolism, no right heart strain on imaging.   -Venous Doppler negative for DVT  -Continue heparin drip  -As mentioned above, plan for bone marrow biopsy tomorrow, therefore we will continue heparin drip    Suspected lumbosacral radicular pain  -Patient complaining of intractable left lower back/groin and thigh pain since yesterday with tingling and numbness  -Patient is quite uncomfortable requiring multiple doses of Dilaudid  -Check MRI of the left hip and lumbar spine  -Symptomatic management of pain with Dilaudid, Tylenol and Neurontin    URI symptoms  Dry cough, coryza, intermittent wheezing for 1 week PTA. No fevers or chills. PE as above.  -COVID-19, influenza A and B and RSV negative  -Respiratory symptoms much improved after paracentesis, suspect this was mechanical  -Supportive care  -Nebs PRN  -PRN guaifenesin and tessalon pearles     Slow transit constipation  Likely worsened by above.  -Bowel regimen    History of mild aortic stenosis and mitral regurgitation  HTN  Cardiomegaly on recent x-ray  -Cardiac monitoring  -Echocardiogram  -Resume prior to admission losartan     Chronic stable diagnoses and other pertinent medical history:  GERD  Obesity class II  HLD  Hiatal hernia  Recurrent abdominal hernia  Multiple sclerosis?      Diet: NPO for Procedure/Surgery per Anesthesia Guidelines Except for: Meds, Ice Chips; Clear liquids before procedure/surgery: ADULT (Age GREATER than or Equal to 18 years) - Clear liquids 2 hours before procedure/surgery     DVT Prophylaxis: Heparin drip  Pablo Catheter: Not present  Code Status: Full Code     Disposition Plan       Expected Discharge Date: 03/17/2025              Entered: Efrain Velasquez MD 03/16/2025, 9:14 AM        Medically Ready for Discharge: Anticipated in 2-4 Days pending further workup of  "radicular symptoms involving the left lower extremity, bone biopsy tomorrow    Clinically Significant Risk Factors           # Hypocalcemia: Lowest Ca = 8.4 mg/dL in last 2 days, will monitor and replace as appropriate         # Hypertension: Noted on problem list            # Obesity: Estimated body mass index is 37.45 kg/m  as calculated from the following:    Height as of this encounter: 1.702 m (5' 7\").    Weight as of this encounter: 108.5 kg (239 lb 1.6 oz)., PRESENT ON ADMISSION               The patient's care was discussed with the Bedside Nurse and Patient.    Medical Decision Making       **CLEAR ALL SELECTIONS**      Labs/Imaging Reviewed:  See Information above and Data section below  Time SPENT BY ME on the date of service doing chart review, history, exam, documentation & further activities per the note:  52 MINUTES    Chart documentation was completed, in part, with doubleTwist voice-recognition software. Even though reviewed, some grammatical, spelling, and word errors may remain.    Efrain Velasquez MD  Hospitalist Service  [facility]  Text Page 7AM-6PM  Securely message with the Vocera Web Console (learn more here)  Text page via [employer] Paging/Directory    ______________________________________________________________________    Interval History   Patient underwent diagnostic and therapeutic paracentesis yesterday with removal of 4.7 L of fluid.  He reports significant pain involving left groin, left lower back and left thigh area since yesterday associated with tingling and numbness.  No fever or chills.  Respiratory symptoms much improved after large-volume paracentesis    Data reviewed today: I reviewed all medications, new labs and imaging results over the last 24 hours. I personally reviewed no images or EKG's today.    Physical Exam   Vital signs:  Temp: 97.3  F (36.3  C) Temp src: Oral BP: (!) 146/73 Pulse: 78   Resp: 18 SpO2: 95 % O2 Device: None (Room air)   Height: " "170.2 cm (5' 7\") Weight: 108.5 kg (239 lb 1.6 oz)  Estimated body mass index is 37.45 kg/m  as calculated from the following:    Height as of this encounter: 1.702 m (5' 7\").    Weight as of this encounter: 108.5 kg (239 lb 1.6 oz).      Wt Readings from Last 2 Encounters:   03/15/25 108.5 kg (239 lb 1.6 oz)   25 108.4 kg (239 lb)       Gen: AAOX3, NAD, comfortable  HEENT: Supple neck, moist oral mucosa, no pallor  Resp: Diminished air entry bilaterally, no active wheezes  CVS: RRR, no murmur  Abd/GI: Soft, mild distention.  Skin: Warm, dry no rashes  MSK: 2-3+ pedal edema  Neuro- CN- intact.  Restricted range of motion of the left groin area, slightly diminished sensation throughout left thigh, difficult to accurately assess motor strength as range of motion is limited by pain.     Data   Recent Labs   Lab 03/15/25  0939 25  1038 25  0025 25  0019 25  2200   WBC 4.5 5.6 6.2  --  6.1   HGB 13.3 13.4 12.6*  --  13.3   MCV 86 84 86  --  85    172 183  --  185   INR  --   --   --   --  1.15    138  --   --  137   POTASSIUM 4.3 4.1  --   --  4.1   CHLORIDE 105 106  --   --  104   CO2 24 23  --   --  21*   BUN 18.8 20.5  --   --  23.4*   CR 0.87 0.88  --   --  0.93   ANIONGAP 10 9  --   --  12   SHANNON 8.7* 8.4*  --   --  8.3*   * 101*  --   --  103*   ALBUMIN 3.7 3.8  --    < > 3.9   PROTTOTAL 6.1* 6.3*  --   --  6.2*   BILITOTAL 0.4 0.4  --   --  0.3   ALKPHOS 69 75  --   --  68   ALT 21 21  --   --  18   AST 31 31  --   --  27   LIPASE  --   --   --   --  29    < > = values in this interval not displayed.       Recent Results (from the past 24 hours)   Echocardiogram Complete   Result Value    LVEF  55-60%    [facility]    [id number]  [id number]  [id number]^BONNIE^SABINA^BEATRIZ     [facility]  Echocardiography Laboratory  [address] [place] [address]     Name: TOM TAYLOR RAMIN  MRN: [id number]  : 1955  Study Date: 03/15/2025 " 08:42 AM  Age: 69 yrs  Gender: Male  Patient Location: [facility]  Reason For Study: Pulmonary Emboli  Ordering Physician: SABINA NICOLE  Referring Physician: MILTON TAN  Performed By: Jacky Parr     BSA: 2.2 m2  Height: 67 in  Weight: 239 lb  HR: 74  BP: 103/82 mmHg  ______________________________________________________________________________  Procedure  Echocardiogram with two-dimensional, color and spectral Doppler. Definity (NDC  #89531-610) given intravenously.  ______________________________________________________________________________  Interpretation Summary     Left ventricular systolic function is normal.  The visual ejection fraction is 55-60%.  No regional wall motion abnormalities noted.  The right ventricle is normal size.  Right ventricular systolic function is lower limits of normal. Evaluation  suboptimal due to poor image quality.  Mild valvular aortic stenosis.  The mean AoV pressure gradient is 16mmHg.  Prior echo from July 2023 revealed a mean gradient across aortic valve of 14  mm. The study was technically difficult.  ______________________________________________________________________________  Left Ventricle  The left ventricle is normal in size. There is normal left ventricular wall  thickness. Left ventricular systolic function is normal. The visual ejection  fraction is 55-60%. Left ventricular diastolic function is normal. No regional  wall motion abnormalities noted.     Right Ventricle  The right ventricle is normal size. Right ventricular systolic function is  lower limits of normal. Evaluation suboptimal due to poor image quality.     Tricuspid Valve  There is trace tricuspid regurgitation. Right ventricular systolic pressure  could not be approximated due to inadequate tricuspid regurgitation. IVC  diameter and respiratory changes fall into an intermediate range suggesting an  RA pressure of 8 mmHg.     Aortic Valve  The aortic valve is trileaflet. There is trace  aortic regurgitation. Mild  valvular aortic stenosis. The mean AoV pressure gradient is 16mmHg.     Pulmonic Valve  The pulmonic valve is not well visualized.     Vessels  The aortic root is normal size. Ascending aorta dilatation is present. (41mm).     Pericardium  There is no pericardial effusion.     Rhythm  Sinus rhythm was noted.     ______________________________________________________________________________  MMode/2D Measurements & Calculations  IVSd: 1.0 cm  LVIDd: 4.5 cm  LVIDs: 2.4 cm  LVPWd: 1.1 cm  FS: 46.7 %  LV mass(C)d: 164.0 grams  LV mass(C)dI: 75.2 grams/m2  Ao root diam: 3.3 cm  asc Aorta Diam: 4.1 cm  LVOT diam: 2.3 cm  LVOT area: 4.2 cm2     Ao root diam index Ht(cm/m): 1.9  Ao root diam index BSA (cm/m2): 1.5  Asc Ao diam index BSA (cm/m2): 1.9  Asc Ao diam index Ht(cm/m): 2.4  LA Volume (BP): 57.9 ml  LA Volume Index (BP): 26.6 ml/m2  RWT: 0.49  TAPSE: 2.7 cm     Doppler Measurements & Calculations  MV E max basim: 80.7 cm/sec  MV A max basim: 74.4 cm/sec  MV E/A: 1.1  MV dec time: 0.21 sec  Ao V2 max: 271.0 cm/sec  Ao max P.0 mmHg  Ao V2 mean: 180.0 cm/sec  Ao mean P.0 mmHg  Ao V2 VTI: 53.0 cm  J CARLOS(I,D): 1.8 cm2  J CARLOS(V,D): 1.6 cm2  LV V1 max P.6 mmHg  LV V1 max: 107.0 cm/sec  LV V1 VTI: 22.5 cm  SV(LVOT): 93.5 ml  SI(LVOT): 42.9 ml/m2  PA acc time: 0.09 sec  AV Basim Ratio (DI): 0.39  J CARLOS Index (cm2/m2): 0.81  E/E' av.1  Lateral E/e': 5.3  Medial E/e': 6.9     ______________________________________________________________________________  Report approved by: Calvin Vegas MD on 03/15/2025 01:23 PM           Medications   Current Facility-Administered Medications   Medication Dose Route Frequency Provider Last Rate Last Admin    heparin 25,000 units in 0.45% NaCl 250 mL ANTICOAGULANT infusion  0-5,000 Units/hr Intravenous Continuous Elmira Pena PA-C 18 mL/hr at 25 1,800 Units/hr at 25    Patient is already receiving anticoagulation with  heparin, enoxaparin (LOVENOX), warfarin (COUMADIN)  or other anticoagulant medication   Does not apply Continuous PRN Elmira Pena PA-C         Current Facility-Administered Medications   Medication Dose Route Frequency Provider Last Rate Last Admin    albumin human 25 % injection 12.5 g  12.5 g Intravenous TID Efrain Velasquez MD 50 mL/hr at 03/15/25 2218 12.5 g at 03/16/25 0840    [Held by provider] aspirin (ASA) chewable tablet 81 mg  81 mg Oral Daily Elmira Pena PA-C        gabapentin (NEURONTIN) capsule 100 mg  100 mg Oral TID Efrain Velasquez MD   100 mg at 03/16/25 0834    lidocaine 1 % 5 mL  5 mL Intradermal Once Efrain Velasquez MD        pantoprazole (PROTONIX) EC tablet 40 mg  40 mg Oral Daily Elmira Pena PA-C   40 mg at 03/16/25 0834    polyethylene glycol (MIRALAX) powder 17 g  17 g Oral Daily Elmira Pena PA-C   17 g at 03/16/25 0839    senna-docusate (SENOKOT-S/PERICOLACE) 8.6-50 MG per tablet 1 tablet  1 tablet Oral BID Elmira Pena PA-C   1 tablet at 03/16/25 0834    Or    senna-docusate (SENOKOT-S/PERICOLACE) 8.6-50 MG per tablet 2 tablet  2 tablet Oral BID Elmira Pena PA-C        sodium chloride (PF) 0.9% PF flush 3 mL  3 mL Intracatheter Q8H Efrain Velasquez MD   3 mL at 03/15/25 1515    sodium chloride (PF) 0.9% PF flush 3 mL  3 mL Intracatheter Q8H Emlira Pena PA-C   3 mL at 03/16/25 0016

## 2025-03-16 NOTE — PLAN OF CARE
From ortho to [facility] approx 1715, RRT initiated on arrival due to low blood pressure and concern for active bleeding. See vitals flowsheet. Pt is AxOx4. Complains of low back pain and left sided flank pain, but pain intensity fluctuates. Dilaudid minimally effective. 1 unit of blood given, second unit of blood to be started at 1845 and currently infusing at 200/hr. Recheck labs as ordered at 830 pm. Room air but lungs with expiratory wheezing, pt recently having URI symptoms prior to admit. Ok for clear liquids ,bedrest with repositioning in bed. Plan to monitor hemoglobin q6 and notify IR for any active bleeding symptoms. US of legs to be done tomorrow AM and possible IVC filter to be placed tomorrow. Also awaiting bone marrow biopsy.

## 2025-03-16 NOTE — PLAN OF CARE
Goal Outcome Evaluation:    Date/Time: 3/15/2025 [id number]     Trauma/Ortho/Medical (Choose one) Medical     Diagnosis: Ascites / Retroperitoneal mass / PE  POD#: NA  Mental Status: A/O x4  Activity/dangle : independent  Diet: 2 gm Na  Pain: Oxycodone and tylenol given  Pablo/Voiding: continent due to void. Bladder scanned @ 0530 365 ml  Tele/Restraints/Iso: Tele- NSR  02/LDA: RA / PIV infusing  D/C Date: TBD  Other Info:  K , Mg, and Phos protocol, On Heparin drip 1800 unit(s)/ hr Anti Xa recheck today.

## 2025-03-16 NOTE — CONSULTS
"Neurosurgery Consultation      Date of Service:  03/16/2025  Date of Admission:  3/14/2025  Hospital Day: 3    Assessment/Plan  Bhavesh Landeros is a 69 year old male with a past medical history of tension, aortic stenosis, admitted on 3/14/2025 for abdominal pain, pulmonary embolism, retroperitoneal bleed.  Neurosurgery consulted for MRI findings of lumbar spine.    24 hour events:    Neuro  # Left psoas hemorrhage  #Degenerative lumbar spine  -No acute neurosurgical intervention warranted  -No neurosurgical follow-up required  -Neurosurgery to sign off  -Defer management to hospitalist and interventional radiology      Multilevel chronic degenerative findings on patient's MRI of lumbar spine.  Given patient's acute symptom development in the absence of trauma symptoms almost certainly related to ascites and left psoas hemorrhage.    Clinically Significant Risk Factors                   # Hypertension: Noted on problem list            # Obesity: Estimated body mass index is 36.67 kg/m  as calculated from the following:    Height as of this encounter: 1.702 m (5' 7\").    Weight as of this encounter: 106.2 kg (234 lb 2.1 oz)., PRESENT ON ADMISSION               TIME SPENT ON THIS ENCOUNTER   I spent 60 minutes of time on the unit/floor managing the care of Bhavesh Landeros excluding time performing procedures. I have personally reviewed the following data/imaging over the past 24 hours.     The patient was discussed with Dr. Figueroa.    Silverio Grimes, [facility]  Vocera messaging strongly preferred  Please always look up on-call provider before messaging/paging    Tel [phone]    History of Present Illness:  Bhavesh Landeros is a 69 year old male with a past medical history of tension, aortic stenosis, admitted on 3/14/2025 for abdominal pain, pulmonary embolism, retroperitoneal bleed.  Neurosurgery consulted for MRI findings of lumbar spine.    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  Significant degenerative findings noted patient's MRI of lumbar spine.  Patient states that he may notice some occasional back pain but does not experience any constant back pain radicular symptoms or paresthesias until shortly before hospital admission at [facility].  MRI also noted fluid collection likely hemorrhage of left psoas muscle which is far more likely to have caused patient's anterior thigh and groin pain.  No neurosurgical intervention warranted at this time.    Allergies   Allergen Reactions    No Known Allergies        Current Medications:  Current Facility-Administered Medications   Medication Dose Route Frequency Provider Last Rate Last Admin    albumin human 25 % injection 12.5 g  12.5 g Intravenous TID Efrain Velasquez MD 50 mL/hr at 03/15/25 2218 12.5 g at 03/16/25 0840    [Held by provider] aspirin (ASA) chewable tablet 81 mg  81 mg Oral Daily Elmira Pena PA-C        gabapentin (NEURONTIN) capsule 100 mg  100 mg Oral TID Efrain Velasquez MD   100 mg at 03/16/25 0834    lidocaine 1 % 5 mL  5 mL Intradermal Once Efrain Velasquez MD        losartan (COZAAR) tablet 50 mg  50 mg Oral Daily Efrain Velasquez MD   50 mg at 03/16/25 1355    pantoprazole (PROTONIX) EC tablet 40 mg  40 mg Oral Daily Elmira Pena PA-C   40 mg at 03/16/25 0834    polyethylene glycol (MIRALAX) powder 17 g  17 g Oral Daily Elmira Pena PA-C   17 g at 03/16/25 0839    senna-docusate (SENOKOT-S/PERICOLACE) 8.6-50 MG per tablet 1 tablet  1 tablet Oral BID Elmira Pena PA-C   1 tablet at 03/16/25 0834    Or    senna-docusate (SENOKOT-S/PERICOLACE) 8.6-50 MG per tablet 2 tablet  2 tablet Oral BID Elmira Pena PA-C        sodium chloride (PF) 0.9% PF flush 3 mL  3 mL Intracatheter Q8H Efrain Velasquez MD   3 mL at 03/16/25 1355    sodium chloride (PF) 0.9% PF flush 3 mL  3 mL Intracatheter Q8H Elmira Pena PA-C   3 mL at 03/16/25  0016       PRN Medications:  Current Facility-Administered Medications   Medication Dose Route Frequency Provider Last Rate Last Admin    acetaminophen (TYLENOL) tablet 650 mg  650 mg Oral Q4H PRN Elmira Pena PA-C   650 mg at 03/16/25 0939    Or    acetaminophen (TYLENOL) Suppository 650 mg  650 mg Rectal Q4H PRN Elmira Pena PA-C        albuterol (PROVENTIL) neb solution 2.5 mg  2.5 mg Nebulization Q2H PRN Elmira Pena PA-C   2.5 mg at 03/15/25 1527    benzocaine-menthol (CHLORASEPTIC) 6-10 MG lozenge 1 lozenge  1 lozenge Buccal Q1H PRN Roberta Morgan MD   1 lozenge at 03/14/25 2050    benzonatate (TESSALON) capsule 100 mg  100 mg Oral TID PRN Elmira Pena PA-C   100 mg at 03/14/25 2051    bisacodyl (DULCOLAX) suppository 10 mg  10 mg Rectal Daily PRN Elmira Pena PA-C        guaiFENesin (ROBITUSSIN) 20 mg/mL solution 200 mg  200 mg Oral Q4H PRN Elmira Pena PA-C        hydrALAZINE (APRESOLINE) tablet 10 mg  10 mg Oral Q4H PRN Elmira Pena PA-C        Or    hydrALAZINE (APRESOLINE) injection 10 mg  10 mg Intravenous Q4H PRN Elmira Pena PA-C        HYDROmorphone (DILAUDID) injection 0.2 mg  0.2 mg Intravenous Q2H PRN Efrain Velasquez MD        HYDROmorphone (DILAUDID) injection 0.4 mg  0.4 mg Intravenous Q2H PRN Elmira Pena PA-C   0.4 mg at 03/16/25 1401    lidocaine (LMX4) cream   Topical Q1H PRN Efrain Velasquez MD        lidocaine (LMX4) cream   Topical Q1H PRN Elmira Pena PA-C        lidocaine 1 % 0.1-1 mL  0.1-1 mL Other Q1H PRN Efrain Velasquez MD        lidocaine 1 % 0.1-1 mL  0.1-1 mL Other Q1H PRN Elmira Pena PA-C        melatonin tablet 1 mg  1 mg Oral At Bedtime PRN Elmira Pena PA-C   1 mg at 03/15/25 2218    naloxone (NARCAN) injection 0.2 mg  0.2 mg Intravenous Q2 Min PRN Diane Norris MD        Or    naloxone (NARCAN)  injection 0.4 mg  0.4 mg Intravenous Q2 Min PRN Diane Norris MD        Or    naloxone (NARCAN) injection 0.2 mg  0.2 mg Intramuscular Q2 Min PRN Diane Norris MD        Or    naloxone (NARCAN) injection 0.4 mg  0.4 mg Intramuscular Q2 Min PRN Diane Norris MD        ondansetron (ZOFRAN ODT) ODT tab 4 mg  4 mg Oral Q6H PRN Elmira Pena PA-C        Or    ondansetron (ZOFRAN) injection 4 mg  4 mg Intravenous Q6H PRN Elmira Pena PA-C        oxyCODONE (ROXICODONE) tablet 5 mg  5 mg Oral Q4H PRN Elmira Pena PA-C   5 mg at 03/16/25 0939    oxyCODONE IR (ROXICODONE) half-tab 2.5 mg  2.5 mg Oral Q4H PRN Elmira Pena PA-C        Patient is already receiving anticoagulation with heparin, enoxaparin (LOVENOX), warfarin (COUMADIN)  or other anticoagulant medication   Does not apply Continuous PRN Elmira Pena PA-C        polyethylene glycol (MIRALAX) Packet 17 g  17 g Oral BID PRN Elmira Pena PA-C        prochlorperazine (COMPAZINE) injection 5 mg  5 mg Intravenous Q6H PRN Elmira Pena PA-C        Or    prochlorperazine (COMPAZINE) tablet 5 mg  5 mg Oral Q6H PRN Elmira Pena PA-C        senna-docusate (SENOKOT-S/PERICOLACE) 8.6-50 MG per tablet 1 tablet  1 tablet Oral BID PRN Elmira Pena PA-C        Or    senna-docusate (SENOKOT-S/PERICOLACE) 8.6-50 MG per tablet 2 tablet  2 tablet Oral BID PRN Elmira Pena PA-C        sodium chloride (PF) 0.9% PF flush 3 mL  3 mL Intracatheter q1 min prn Efrain Velasquez MD   3 mL at 03/15/25 1848    sodium chloride (PF) 0.9% PF flush 3 mL  3 mL Intracatheter q1 min prn Elmira Pena PA-C           Infusions:  Current Facility-Administered Medications   Medication Dose Route Frequency Provider Last Rate Last Admin    Patient is already receiving anticoagulation with heparin, enoxaparin (LOVENOX), warfarin (COUMADIN)  " or other anticoagulant medication   Does not apply Continuous PRN Elmira Pena PA-C           Physical Examination:  Vitals: BP (!) 146/73 (BP Location: Right arm)   Pulse 78   Temp 97.3  F (36.3  C) (Oral)   Resp 18   Ht 1.702 m (5' 7\")   Wt 106.2 kg (234 lb 2.1 oz)   SpO2 95%   BMI 36.67 kg/m    Patient is alert and oriented lying in bed.  Patient is endorsing left groin and left anterior thigh pain and numbness.  States that it hurts to move his left leg but still able to move all extremities.  Cannot 100% exclude lumbar radiculopathy given hemorrhage but thought to be unlikely..  Strength testing limited by pain.        Labs and Imaging:    Results for orders placed or performed during the hospital encounter of 03/14/25 (from the past 24 hours)   Cell count with differential fluid    Narrative    The following orders were created for panel order Cell count with differential fluid.  Procedure                               Abnormality         Status                     ---------                               -----------         ------                     Cell Count Body Fluid[[id number]]       Abnormal            Preliminary result         Differential Body Fluid[[id number]]                         Preliminary result           Please view results for these tests on the individual orders.   Albumin fluid   Result Value Ref Range    Albumin Fluid Source Abdomen     Albumin fluid 2.8 g/dL    Narrative    No reference ranges have been established. This result should be interpreted in the context of the patient's clinical condition and compared to simultaneous measurement in the patient's blood. This is a lab developed test. It has not been cleared or approved by the FDA. FDA clearance is not required for clinical use.   Protein fluid   Result Value Ref Range    Protein Fluid Source Abdomen     Protein Total Fluid 4.0 g/dL    Narrative    No reference ranges have been established. This result " should be interpreted in the context of the patient's clinical condition and compared to simultaneous measurement in the patient's blood. This is a lab developed test. It has not been cleared or approved by the FDA. FDA clearance is not required for clinical use.   Ascites Fluid Aerobic Bacterial Culture Routine With Gram Stain    Specimen: Peritoneum; Ascites Fluid   Result Value Ref Range    Culture No Growth     Gram Stain Result No organisms seen     Gram Stain Result 3+ WBC seen    Cell Count Body Fluid   Result Value Ref Range    Color Orange (A) Colorless, Yellow    Clarity Turbid (A) Clear    Cell Count Fluid Source Abdomen     Total Nucleated Cells 3,231 /uL    Narrative    No reference ranges have been established.  This result  should be interpreted in the context of the patient's clinical condition and   compared to simultaneous measurement in the patient's blood.        Sent to pathologist for review. Differential results will be updated after review.   Differential Body Fluid   Result Value Ref Range    % Neutrophils 2 %    % Lymphocytes 90 %    % Monocyte/Macrophages 4 %    % Lining Cells 4 %    Absolute Neutrophils, Body Fluid 64.6   /uL    Narrative    Sent to pathologist for review. Differential results will be updated after review.   US Paracentesis without Albumin    Narrative    EXAM:  1. PARACENTESIS  2. ULTRASOUND GUIDANCE  LOCATION: [facility]  DATE: 3/15/2025    INDICATION: Ascites.    PROCEDURE: Informed consent obtained. Time out performed. The abdomen was prepped and draped in a sterile fashion. 10 mL of 1% lidocaine was infused into local soft tissues. A 5 Divehi catheter system was introduced into the abdominal ascites under   ultrasound guidance.    4.7 liters of clear fluid were removed and sent to lab if requested.    Patient tolerated procedure well.    Ultrasound imaging was obtained and placed in the patient's permanent medical record.      Impression     IMPRESSION:  1.  Status post ultrasound-guided paracentesis.    Reference CPT Code: 77112   MR Lumbar Spine w/o & w Contrast   Result Value Ref Range    Radiologist flags Concern for left-sided retroperitoneal hemorrhage. (AA)     Narrative    EXAM: MR LUMBAR SPINE W/O and W CONTRAST  LOCATION: [facility]  DATE: 3/16/2025    INDICATION: Left groin back pain with radicular symptoms, suspected lymphoma  COMPARISON: Chest abdomen and pelvis CT dated 03/13/2025.  CONTRAST: 10mL Gadavist intravenous  TECHNIQUE: Routine Lumbar Spine MRI without and with IV contrast.    FINDINGS:   Nomenclature is based on 5 lumbar type vertebral bodies. Vertebral body heights are maintained. Mild retrolisthesis at L1-L2, L2-L3 and L3-L4. Minimal grade 1 anterolisthesis L4-L5 measuring 2 mm. Modic type I degenerative endplate changes at L2-L3.   Intrinsically T1 hyperintense lesions within the L1, L2 and L5 vertebral bodies may represent lipid rich hemangiomas. Diffuse heterogeneous bone marrow appearance. Normal distal spinal cord and cauda equina with conus medullaris at L1. There is dorsal   epidural enhancement and interspinous process enhancement at L2-L3, possibly degenerative. Enhancement adjacent to the L4 spinous process is likely degenerative in nature. Partially visualized ascites. No definite abnormal intradural enhancement. Atrophy   of the paravertebral muscles. Heterogeneous appearance of the pelvic bone marrow.    The left psoas muscle is asymmetrically enlarged and demonstrates a fluid collection, hyperintense on T2-weighted images with areas of mild intrinsic hyperintensity on T1-weighted images, measuring up to 2.5 x 4.6 x 14.6 cm, concerning for hemorrhage   (series 105 image 33, series 106 image 7 and series 110 image 44. Antidependent enhancement within the collection may represent tumor. Partially visualized bulky retroperitoneal lymphadenopathy.    T12-L1: Normal disc height. No herniation.  Mild bilateral facet arthropathy. No spinal canal or neural foraminal stenosis.    L1-L2: Normal disc height. Disc desiccation. Tiny disc bulge. Bilateral facet arthropathy. No spinal canal or neural foraminal stenosis.    L2-L3: Mild disc height loss. Annular fissure. Mild retrolisthesis. Disc bulge indenting the ventral thecal sac and contacting the bilateral extraforaminal L2 nerve roots. Bilateral facet arthropathy. Mild spinal canal narrowing. Moderate to severe right   and moderate left neural foraminal stenosis.    L3-L4: Mild disc height loss. Mild retrolisthesis. Disc bulge indenting the ventral thecal sac and contacting the descending right L4 nerve root in the lateral recess and the bilateral extra foraminal L3 nerve roots. Ligamentum flavum thickening.   Bilateral facet arthropathy. Dorsal epidural lipomatosis. Moderate spinal canal stenosis. Severe bilateral neural foraminal stenosis.    L4-L5: Mild disc height loss and desiccation. Disc bulge with superimposed central disc protrusion effacing the ventral thecal sac and contacting the bilateral descending L5 nerve roots and bilateral extraforaminal L4 nerve roots. Ligamentum flavum   thickening. Bilateral facet arthropathy. Dorsal epidural lipomatosis. Severe spinal canal stenosis. Severe bilateral neural foraminal stenosis.    L5-S1: Normal disc height and signal. Small disc bulge contacting the bilateral extra foraminal L5 nerve roots.. Bilateral facet arthropathy. Moderate epidural lipomatosis. Moderate spinal canal stenosis. Mild right neural foraminal narrowing. No   significant left neural foraminal stenosis      Impression    IMPRESSION:  1.  New partially visualized large fluid collection in the left psoas muscle measures at least 2.5 x 4.6 x 14.6 cm and is concerning for hemorrhage. Recommend IR consult consider CT abdomen and pelvis with intravenous contrast to further evaluate.  2.  Modic type I degenerative endplate changes at L2-L3 and  represent a source of pain.  3.  Enhancement of the interspinous spaces at L2-L3 and L3-L4, extending to the dorsal epidural space likely represent degenerative related inflammation. No definite abnormal intradural enhancement.  4.  At L2-L3, there is a disc bulge contacting the bilateral extraforaminal L2 nerve roots.  5.  At L3-L4, there is a disc bulge contacting the bilateral extraforaminal L3 nerve roots and a dorsal epidural lipomatosis contributing to moderate spinal canal stenosis.  6.  At L4-L5, there is a disc bulge contacting the bilateral L4 and L5 nerve roots and dorsal epidural lipomatosis contributing to severe spinal canal stenosis.  7.  At L5-S1, there is a disc bulge contacting the bilateral extraforaminal L5 nerve roots and dorsal epidural lipomatosis contributing to moderate spinal canal stenosis.  8.  Partially visualized bulky retroperitoneal lymphadenopathy and ascites.      [Critical Result: Concern for left-sided retroperitoneal hemorrhage.]    Finding was identified on 3/16/2025 12:20 PM CDT.     Dr. Velasquez was contacted by me on 3/16/2025 12:58 PM CDT and verbalized understanding of the critical result.   MR Hip Left w/o & w Contrast    Narrative    EXAM: MR HIP LEFT W/O and W CONTRAST  LOCATION: [facility]  DATE: 3/16/2025    INDICATION: intractable left groin pain, being worked up for lymphoma  COMPARISON: CT 03/14/2025  TECHNIQUE: Routine. Additional postgadolinium T1 sequences were obtained.  IV CONTRAST: Gadavist 10 mL    FINDINGS:    LEFT HIP:   -Labrum: Limited evaluation of the labrum.   -Cartilage: Likely grade III chondromalacia of the superior and posterior hip. There is subchondral cyst formation within the acetabulum.  -Joint space: Trace effusion with minimal likely reactive enhancing synovitis.   -Joint capsule/ligaments: Intact joint capsule. Ligamentum teres is intact.    MUSCLES AND TENDONS:   -Gluteal: Mild gluteus minimus and gluteus medius  tendinopathy. No significant tear. No muscular atrophy. There is edema within the greater trochanteric bursa.  -Proximal hamstring: Hamstring origin tendinopathy. There is chronic partial tear of the origin. No significant retraction. There is adjacent corticated heterotopic ossification.   -Iliopsoas: There is T2 hyperintensity of the distal tendon which could represent underlying tendinopathy or extension of fluid into the tendon. No definite tear. There is a large amount of fluid partially imaged present within the iliopsoas muscle.   Edema and fluid extend distally along the iliopsoas bursa and extend posterior to the proximal femur and along the fascial planes of the proximal anterior left thigh. There is high T1 signal in portions of the fluid.  -Rectus femoris origin: No tear or tendinopathy.    BONES:   -No fracture. Heterogeneous red marrow. No discrete marrow replacing lesion.   -Mild left sacroiliac joint arthrosis.    SOFT TISSUES:   -Fluid within the partially imaged iliopsoas musculature extending inferiorly as above. No muscular atrophy.    INTRA-PELVIC CONTENTS:  -Nonorganized presacral and retroperitoneal edema. Soft tissue edema along the subcutaneous left flank/thigh. Fluid extends along the left inguinal canal.      Impression    IMPRESSION:  1.  Large amount of fluid within the partially imaged iliopsoas musculature extending inferiorly along the iliopsoas bursa and extending posterior along the fascial planes of the proximal anterior left thigh. There is high T1 signal in portions of the   fluid concerning for blood products. Further evaluation with contrast enhanced abdomen/pelvis CT is recommended.  2.  No concerning marrow replacing lesion.  3.  Mild to moderate left hip osteoarthritis with a trace effusion and minimal likely reactive enhancing synovitis.  4.  Left hamstring origin tendinopathy with chronic partial tear.      NOTE: ABNORMAL REPORT    THE DICTATION ABOVE DESCRIBES AN  ABNORMALITY FOR WHICH FOLLOW-UP IS NEEDED.       All relevant imaging and laboratory values personally reviewed.    Dragon Disclosure   This was created at least in part with a voice recognition software. Mistakes/typos may be present.

## 2025-03-17 ENCOUNTER — APPOINTMENT (OUTPATIENT)
Dept: ULTRASOUND IMAGING | Facility: CLINIC | Age: 70
DRG: 823 | End: 2025-03-17
Attending: INTERNAL MEDICINE
Payer: COMMERCIAL

## 2025-03-17 LAB
ANION GAP SERPL CALCULATED.3IONS-SCNC: 10 MMOL/L (ref 7–15)
BUN SERPL-MCNC: 24.7 MG/DL (ref 8–23)
CALCIUM SERPL-MCNC: 8.3 MG/DL (ref 8.8–10.4)
CHLORIDE SERPL-SCNC: 103 MMOL/L (ref 98–107)
CREAT SERPL-MCNC: 1.55 MG/DL (ref 0.67–1.17)
EGFRCR SERPLBLD CKD-EPI 2021: 48 ML/MIN/1.73M2
ERYTHROCYTE [DISTWIDTH] IN BLOOD BY AUTOMATED COUNT: 14 % (ref 10–15)
GLUCOSE SERPL-MCNC: 108 MG/DL (ref 70–99)
HCO3 SERPL-SCNC: 24 MMOL/L (ref 22–29)
HCT VFR BLD AUTO: 28 % (ref 40–53)
HGB BLD-MCNC: 9.3 G/DL (ref 13.3–17.7)
HGB BLD-MCNC: 9.8 G/DL (ref 13.3–17.7)
HGB BLD-MCNC: 9.9 G/DL (ref 13.3–17.7)
LACTATE SERPL-SCNC: 1.7 MMOL/L (ref 0.7–2)
MAGNESIUM SERPL-MCNC: 3.8 MG/DL (ref 1.7–2.3)
MCH RBC QN AUTO: 28.4 PG (ref 26.5–33)
MCHC RBC AUTO-ENTMCNC: 33.2 G/DL (ref 31.5–36.5)
MCV RBC AUTO: 85 FL (ref 78–100)
PATH REPORT.COMMENTS IMP SPEC: ABNORMAL
PATH REPORT.COMMENTS IMP SPEC: YES
PATH REPORT.COMMENTS IMP SPEC: YES
PATH REPORT.FINAL DX SPEC: ABNORMAL
PATH REPORT.FINAL DX SPEC: ABNORMAL
PATH REPORT.GROSS SPEC: ABNORMAL
PATH REPORT.MICROSCOPIC SPEC OTHER STN: ABNORMAL
PATH REPORT.MICROSCOPIC SPEC OTHER STN: ABNORMAL
PATH REPORT.RELEVANT HX SPEC: ABNORMAL
PATH REPORT.RELEVANT HX SPEC: ABNORMAL
PHOSPHATE SERPL-MCNC: 4.5 MG/DL (ref 2.5–4.5)
PHOTO IMAGE: ABNORMAL
PLATELET # BLD AUTO: 195 10E3/UL (ref 150–450)
POTASSIUM SERPL-SCNC: 4.3 MMOL/L (ref 3.4–5.3)
RBC # BLD AUTO: 3.28 10E6/UL (ref 4.4–5.9)
SODIUM SERPL-SCNC: 137 MMOL/L (ref 135–145)
WBC # BLD AUTO: 8.8 10E3/UL (ref 4–11)

## 2025-03-17 PROCEDURE — 99233 SBSQ HOSP IP/OBS HIGH 50: CPT | Performed by: INTERNAL MEDICINE

## 2025-03-17 PROCEDURE — 93970 EXTREMITY STUDY: CPT

## 2025-03-17 PROCEDURE — 85018 HEMOGLOBIN: CPT | Performed by: INTERNAL MEDICINE

## 2025-03-17 PROCEDURE — 258N000003 HC RX IP 258 OP 636: Performed by: INTERNAL MEDICINE

## 2025-03-17 PROCEDURE — 94640 AIRWAY INHALATION TREATMENT: CPT

## 2025-03-17 PROCEDURE — 99418 PROLNG IP/OBS E/M EA 15 MIN: CPT | Mod: FS | Performed by: INTERNAL MEDICINE

## 2025-03-17 PROCEDURE — 250N000009 HC RX 250: Performed by: PHYSICIAN ASSISTANT

## 2025-03-17 PROCEDURE — 999N000157 HC STATISTIC RCP TIME EA 10 MIN

## 2025-03-17 PROCEDURE — 88305 TISSUE EXAM BY PATHOLOGIST: CPT | Mod: 26 | Performed by: PATHOLOGY

## 2025-03-17 PROCEDURE — 84100 ASSAY OF PHOSPHORUS: CPT | Performed by: INTERNAL MEDICINE

## 2025-03-17 PROCEDURE — 250N000013 HC RX MED GY IP 250 OP 250 PS 637: Performed by: INTERNAL MEDICINE

## 2025-03-17 PROCEDURE — 99233 SBSQ HOSP IP/OBS HIGH 50: CPT | Mod: FS | Performed by: INTERNAL MEDICINE

## 2025-03-17 PROCEDURE — 120N000013 HC R&B IMCU

## 2025-03-17 PROCEDURE — 83735 ASSAY OF MAGNESIUM: CPT | Performed by: INTERNAL MEDICINE

## 2025-03-17 PROCEDURE — 88341 IMHCHEM/IMCYTCHM EA ADD ANTB: CPT | Mod: 26 | Performed by: PATHOLOGY

## 2025-03-17 PROCEDURE — 80048 BASIC METABOLIC PNL TOTAL CA: CPT | Performed by: INTERNAL MEDICINE

## 2025-03-17 PROCEDURE — 250N000013 HC RX MED GY IP 250 OP 250 PS 637: Performed by: PHYSICIAN ASSISTANT

## 2025-03-17 PROCEDURE — 83605 ASSAY OF LACTIC ACID: CPT

## 2025-03-17 PROCEDURE — 36415 COLL VENOUS BLD VENIPUNCTURE: CPT

## 2025-03-17 PROCEDURE — 88342 IMHCHEM/IMCYTCHM 1ST ANTB: CPT | Mod: 26 | Performed by: PATHOLOGY

## 2025-03-17 PROCEDURE — 36415 COLL VENOUS BLD VENIPUNCTURE: CPT | Performed by: INTERNAL MEDICINE

## 2025-03-17 PROCEDURE — 250N000011 HC RX IP 250 OP 636: Performed by: INTERNAL MEDICINE

## 2025-03-17 PROCEDURE — 88360 TUMOR IMMUNOHISTOCHEM/MANUAL: CPT | Mod: 26 | Performed by: PATHOLOGY

## 2025-03-17 PROCEDURE — 85014 HEMATOCRIT: CPT | Performed by: INTERNAL MEDICINE

## 2025-03-17 RX ORDER — SODIUM CHLORIDE 9 MG/ML
INJECTION, SOLUTION INTRAVENOUS CONTINUOUS
Status: DISCONTINUED | OUTPATIENT
Start: 2025-03-17 | End: 2025-03-18

## 2025-03-17 RX ADMIN — GABAPENTIN 100 MG: 100 CAPSULE ORAL at 08:53

## 2025-03-17 RX ADMIN — GABAPENTIN 100 MG: 100 CAPSULE ORAL at 15:36

## 2025-03-17 RX ADMIN — ALBUTEROL SULFATE 2.5 MG: 2.5 SOLUTION RESPIRATORY (INHALATION) at 09:33

## 2025-03-17 RX ADMIN — Medication 17 G: at 10:35

## 2025-03-17 RX ADMIN — SODIUM CHLORIDE: 0.9 INJECTION, SOLUTION INTRAVENOUS at 15:37

## 2025-03-17 RX ADMIN — SENNOSIDES AND DOCUSATE SODIUM 2 TABLET: 50; 8.6 TABLET ORAL at 08:52

## 2025-03-17 RX ADMIN — PANTOPRAZOLE SODIUM 40 MG: 40 TABLET, DELAYED RELEASE ORAL at 08:53

## 2025-03-17 RX ADMIN — MAGNESIUM SULFATE HEPTAHYDRATE 4 G: 40 INJECTION, SOLUTION INTRAVENOUS at 01:16

## 2025-03-17 RX ADMIN — SENNOSIDES AND DOCUSATE SODIUM 1 TABLET: 50; 8.6 TABLET ORAL at 21:28

## 2025-03-17 RX ADMIN — GABAPENTIN 100 MG: 100 CAPSULE ORAL at 21:28

## 2025-03-17 RX ADMIN — SODIUM CHLORIDE: 0.9 INJECTION, SOLUTION INTRAVENOUS at 09:01

## 2025-03-17 ASSESSMENT — ACTIVITIES OF DAILY LIVING (ADL)
ADLS_ACUITY_SCORE: 39
ADLS_ACUITY_SCORE: 39
ADLS_ACUITY_SCORE: 36
ADLS_ACUITY_SCORE: 36
ADLS_ACUITY_SCORE: 29
ADLS_ACUITY_SCORE: 39
ADLS_ACUITY_SCORE: 36
ADLS_ACUITY_SCORE: 38
ADLS_ACUITY_SCORE: 38
ADLS_ACUITY_SCORE: 29
DEPENDENT_IADLS:: INDEPENDENT
ADLS_ACUITY_SCORE: 38
ADLS_ACUITY_SCORE: 29
ADLS_ACUITY_SCORE: 29
ADLS_ACUITY_SCORE: 38
ADLS_ACUITY_SCORE: 36
ADLS_ACUITY_SCORE: 36
ADLS_ACUITY_SCORE: 38
ADLS_ACUITY_SCORE: 36
ADLS_ACUITY_SCORE: 39

## 2025-03-17 NOTE — PLAN OF CARE
[id number]    Orientations: AxOx4.  Vitals/Pain: VSS on RA. Denies pain.  Tele: NSR  Lines/Drains: PIV x2 SL.   Skin/Wounds: R abdomen paracentesis site, L flank biopsy site.  GI/: NPO since ~0400 for procedure today, was previously clear liquids. Urinal at bedside with no output-- straight cath done for 350ml at 0400. No BM.  Labs: Trending Hgb: 9.1 --> 10.1 --> 9.3. Trending LA 3.3 --> 2.3 --> 1.7. On Mg, K, Phos replacement protocols-- replaced Mg + awaiting redraw w/ AM labs, K + Phos to recheck w/ AM labs.  Ambulation/Assist: Assist x1 to stand at bedside.  Sleep Quality: Sleeping between cares.    Plan: BLE ultrasound today, then possibly IVC filter. Bone marrow biopsy tentatively today. May need repeat paracentesis.

## 2025-03-17 NOTE — PROGRESS NOTES
[facility]  Hematology-Oncology Follow-up Note      Today's Date: 03/16/25  Date of Admission:  3/14/2025  Reason for Consult: Bulky retroperitoneal adenopathy compressing IVC with large ascites       ASSESSMENT:  Bhavesh Landeros is a 69 year old male  who was admitted on 3/14/2025 with pulmonary embolism, massive ascites and bulky retroperitoneal adenopathy. I was asked to see the patient for suspected lymphoma.     Bhavesh was in the room with multiple family members. He was in a lot of pain. He had biopsy done yesterday and has developed retroperitoneal hematoma. He had been on anticoagulation with heparin drip. Despite discontinuing the anticoagulation prior to procedure, he seems have significant bleeding. He was in a lot of discomfort. He expresses decreased sensation along the lateral left thigh. He has pain and sensations in left leg are not normal. He has bilateral pedal edema. His edema in both legs is due to compression of IVC. This will improve once his disease is treated. I explained them that lymphoma remains highest on differential. I offered him pain medication for his low back pain and leg discomfort. He has already been prescribed pain medication and also had additional Tylenol after pain medication.     They wondered if he would need additional paracentesis. It is likely that he could need additional paracentesis procedure but this is done with palliative intent. If he does not have pain or discomfort, we could defer his paracentesis. Eventually treatment of his cancer should help his ascites.     I again reviewed bone marrow biopsy. I will work on getting this scheduled. We will keep him NPO for possible bone marrow biopsy.     RECOMMENDATIONS:  - Severe pain and retroperitoneal hematoma   Agree holding anticoagulation   Recommend temporary IVC filter  - Await biopsy results  - Despite biopsy, he needs bone marrow biopsy. Will coordinate for Monday / Tuesday   Will keep him  fasting / NPO for possible bone marrow biopsy.     Over 55 min spent on day of visit including review of tests, obtaining/reviewing separately obtained history/physical exam, counseling patient, ordering medications/tests/procedures, communicating with PCP/consultants, and documenting in electronic medical record.    Jax Marcum  Hematologist and Medical Oncologist  [facility]     INTERIM HISTORY:  Bhavesh in room with multiple family members in the room.     He is in a lot of pain and discomfort after his biopsy yesterday. He was worked up with MRI and he has retroperitoneal bleed post biopsy.      MEDICATIONS:  Current Facility-Administered Medications   Medication Dose Route Frequency Provider Last Rate Last Admin    acetaminophen (TYLENOL) tablet 650 mg  650 mg Oral Q4H PRN Elmira Pena PA-C   650 mg at 03/16/25 0939    Or    acetaminophen (TYLENOL) Suppository 650 mg  650 mg Rectal Q4H PRN Elmira Pena PA-C        albuterol (PROVENTIL) neb solution 2.5 mg  2.5 mg Nebulization Q2H PRN Elmira Pena PA-C   2.5 mg at 03/15/25 1527    [Held by provider] aspirin (ASA) chewable tablet 81 mg  81 mg Oral Daily Elmira Pena PA-C        benzocaine-menthol (CHLORASEPTIC) 6-10 MG lozenge 1 lozenge  1 lozenge Buccal Q1H PRN Roberta Morgan MD   1 lozenge at 03/14/25 2050    benzonatate (TESSALON) capsule 100 mg  100 mg Oral TID PRN Elmira Pena PA-C   100 mg at 03/14/25 2051    bisacodyl (DULCOLAX) suppository 10 mg  10 mg Rectal Daily PRN Elmira Pena PA-C        gabapentin (NEURONTIN) capsule 100 mg  100 mg Oral TID Efrain Velasquez MD   100 mg at 03/16/25 2157    guaiFENesin (ROBITUSSIN) 20 mg/mL solution 200 mg  200 mg Oral Q4H PRN Elmira Pena PA-C        hydrALAZINE (APRESOLINE) tablet 10 mg  10 mg Oral Q4H PRN Elmira Pena PA-C        Or    hydrALAZINE (APRESOLINE) injection 10 mg  10 mg Intravenous Q4H  PRN Elmira Pena PA-C        HYDROmorphone (DILAUDID) injection 0.2 mg  0.2 mg Intravenous Q2H PRN Efrain Velasquez MD   0.2 mg at 03/16/25 1731    HYDROmorphone (DILAUDID) injection 0.4 mg  0.4 mg Intravenous Q2H PRN Elmira Pena PA-C   0.4 mg at 03/16/25 1401    lidocaine (LMX4) cream   Topical Q1H PRN Efrain Velasquez MD        lidocaine (LMX4) cream   Topical Q1H PRN Efrain Velasquez MD        lidocaine (LMX4) cream   Topical Q1H PRN Elmira Pena PA-C        lidocaine 1 % 0.1-1 mL  0.1-1 mL Other Q1H PRN Efrain Velasquez MD        lidocaine 1 % 0.1-1 mL  0.1-1 mL Other Q1H PREfrain Storey MD        lidocaine 1 % 0.1-1 mL  0.1-1 mL Other Q1H PRN Elmira Pena PA-C        lidocaine 1 % 5 mL  5 mL Intradermal Once Efrain Velasquez MD        [Held by provider] losartan (COZAAR) tablet 50 mg  50 mg Oral Daily Efrain Velasquez MD   50 mg at 03/16/25 1355    magnesium sulfate 4 g in 100 mL sterile water intermittent infusion  4 g Intravenous Q4H Efrain Velasquez MD 50 mL/hr at 03/16/25 2154 4 g at 03/16/25 2154    melatonin tablet 1 mg  1 mg Oral At Bedtime PRN Elmira Pena PA-C   1 mg at 03/15/25 2218    naloxone (NARCAN) injection 0.2 mg  0.2 mg Intravenous Q2 Min PRN Diane Norris MD        Or    naloxone (NARCAN) injection 0.4 mg  0.4 mg Intravenous Q2 Min PRN Diane Norris MD        Or    naloxone (NARCAN) injection 0.2 mg  0.2 mg Intramuscular Q2 Min PRN Diane Norris MD        Or    naloxone (NARCAN) injection 0.4 mg  0.4 mg Intramuscular Q2 Min PRN Diane Norris MD        ondansetron (ZOFRAN ODT) ODT tab 4 mg  4 mg Oral Q6H PRN Elmira Pena PA-C        Or    ondansetron (ZOFRAN) injection 4 mg  4 mg Intravenous Q6H PRN Elmira Pena PA-C        oxyCODONE (ROXICODONE) tablet 5 mg  5 mg Oral Q4H PRN Elmira Pena PA-C   5 mg at 03/16/25 0939     oxyCODONE IR (ROXICODONE) half-tab 2.5 mg  2.5 mg Oral Q4H PRN Elmira Pena PA-C        pantoprazole (PROTONIX) EC tablet 40 mg  40 mg Oral Daily Elmira Pena PA-C   40 mg at 03/16/25 0834    Patient is already receiving anticoagulation with heparin, enoxaparin (LOVENOX), warfarin (COUMADIN)  or other anticoagulant medication   Does not apply Continuous PRN Elmira Pena PA-C        polyethylene glycol (MIRALAX) Packet 17 g  17 g Oral BID PRN Elmira Pena PA-C        polyethylene glycol (MIRALAX) powder 17 g  17 g Oral Daily lEmira Pena PA-C   17 g at 03/16/25 0839    prochlorperazine (COMPAZINE) injection 5 mg  5 mg Intravenous Q6H PRN Elmira Pena PA-C        Or    prochlorperazine (COMPAZINE) tablet 5 mg  5 mg Oral Q6H PRN Elmira Pena PA-C        senna-docusate (SENOKOT-S/PERICOLACE) 8.6-50 MG per tablet 1 tablet  1 tablet Oral BID PRN Elmira Pena PA-C        Or    senna-docusate (SENOKOT-S/PERICOLACE) 8.6-50 MG per tablet 2 tablet  2 tablet Oral BID PRN Elmira Pena PA-C        senna-docusate (SENOKOT-S/PERICOLACE) 8.6-50 MG per tablet 1 tablet  1 tablet Oral BID Elmira Pena PA-C   1 tablet at 03/16/25 2157    Or    senna-docusate (SENOKOT-S/PERICOLACE) 8.6-50 MG per tablet 2 tablet  2 tablet Oral BID Elmira Pena PA-C        sodium chloride (PF) 0.9% PF flush 3 mL  3 mL Intracatheter Q8H Efrain Velasquez MD   3 mL at 03/16/25 1601    sodium chloride (PF) 0.9% PF flush 3 mL  3 mL Intracatheter q1 min prn Efrain Velasquez MD        sodium chloride (PF) 0.9% PF flush 3 mL  3 mL Intracatheter Q8H Efrain Velasquez MD   3 mL at 03/16/25 2018    sodium chloride (PF) 0.9% PF flush 3 mL  3 mL Intracatheter q1 min prn Efrain Velasquez MD   3 mL at 03/15/25 1848    sodium chloride (PF) 0.9% PF flush 3 mL  3 mL Intracatheter Q8H Elmira Pena PA-C   3 mL at  "03/16/25 1601    sodium chloride (PF) 0.9% PF flush 3 mL  3 mL Intracatheter q1 min prn Elmira Pena PA-C        sodium chloride 0.9% BOLUS 500 mL  500 mL Intravenous Once Roberta Morgan  mL/hr at 03/16/25 2154 500 mL at 03/16/25 2154           ALLERGIES:  Allergies   Allergen Reactions    No Known Allergies          PHYSICAL EXAM:  Vital signs:  Temp: 97.8  F (36.6  C) Temp src: Oral BP: 94/49 Pulse: 81   Resp: 15 SpO2: 94 % O2 Device: None (Room air)   Height: 170.2 cm (5' 7\") Weight: 106.2 kg (234 lb 2.1 oz)  Estimated body mass index is 36.67 kg/m  as calculated from the following:    Height as of this encounter: 1.702 m (5' 7\").    Weight as of this encounter: 106.2 kg (234 lb 2.1 oz).      LABS:  Recent Labs   Lab Test 03/16/25  2123 03/16/25  1438 03/15/25  0939 03/14/25  1038 03/13/25 2200 03/04/25  1104   NA  --  136 139 138 137 141   POTASSIUM  --  4.3  4.3 4.3 4.1 4.1 4.6   CHLORIDE  --  104 105 106 104 105   CO2  --  22 24 23 21* 23   ANIONGAP  --  10 10 9 12 13   BUN  --  18.7 18.8 20.5 23.4* 12.3   CR  --  0.91 0.87 0.88 0.93 0.95   * 173* 106* 101* 103* 94   SHANNON  --  8.3* 8.7* 8.4* 8.3* 9.0     Recent Labs   Lab Test 03/16/25  2055 03/16/25  1438 03/15/25  0939 03/14/25  1038 03/13/25 2200   MAG <0.2* 1.9 2.0 2.0 1.8   PHOS  --  4.2 3.3 3.2  --      Recent Labs   Lab Test 03/16/25  1438 03/15/25  0939 03/14/25  1038 03/14/25  0025 03/13/25 2200 03/04/25  1104 06/03/24  1439 11/09/21  1040 08/13/20  1030 08/07/20  1159 12/28/19  1344 11/25/19  1415 11/24/18  1934   WBC  --  4.5 5.6 6.2 6.1 5.0   < > 7.2 6.9   < > 9.9   < > 7.4   HGB 9.1* 13.3 13.4 12.6* 13.3 13.6   < > 14.8 13.3   < > 13.7   < > 13.4    168 172 183 185 176   < > 194 251   < > 211   < > 212   MCV  --  86 84 86 85 88   < > 86 86   < > 87   < > 87   NEUTROPHIL  --   --   --   --  67  --   --  62 54.4  --  82.7  --  57.3    < > = values in this interval not displayed.     Recent Labs   Lab Test " "03/15/25  0939 03/14/25  1038 03/14/25  0019 03/13/25  2200   BILITOTAL 0.4 0.4  --  0.3   ALKPHOS 69 75  --  68   ALT 21 21  --  18   AST 31 31  --  27   ALBUMIN 3.7 3.8 3.6 3.9     --   --   --      TSH   Date Value Ref Range Status   03/13/2025 3.94 0.30 - 4.20 uIU/mL Final   12/28/2019 3.27 0.40 - 4.00 mU/L Final   11/07/2018 2.59 0.40 - 4.00 mU/L Final       PATHOLOGY:  Lab Results   Component Value Date    PATH  09/10/2020     Patient Name: TOM TAYLOR  MR#: [id number]  Specimen #: [id number]  Collected: 9/10/2020  Received: 9/11/2020  Reported: 9/14/2020 08:30  Ordering Phy(s): TRISTA HERNANDEZ    For improved result formatting, select 'View Enhanced Report Format' under   Linked Documents section.    SPECIMEN(S):  Gastroesophageal junction biopsy    FINAL DIAGNOSIS:  Gastroesophageal junction biopsy:  - Stratified squamous mucosa with mild reactive changes, nonspecific.  - Cardia columnar mucinous mucosa with mild chronic inflammation and   reactive changes  - No evidence of goblet cells, dysplasia or malignancy    Electronically signed out by:    Олег Winters M.D., PhD    CLINICAL HISTORY:  64 year old male.  Epigastric pain.  Irregular Z-line on endoscopy.    GROSS:  The specimen is received in formalin, labeled with the patient's name and   date of birth, and designated  \"gastroesophageal junction biopsy\".  It consists of two tan tissue   fragments measuring 0.2 cm and 0.3 cm in  greatest dimension.  Entirely submitted in one cassette. (Dictated by:   DON Bearden([facility]) 9/11/2020  08:30 AM)    MICROSCOPIC:  Microscopic examination is performed.    The technical component of this testing was completed at the [facility], with the professional component performed   at the [facility], [address] [place] [id number] ([phone])    CPT Codes:  A: " 82403-TH3    COLLECTION SITE:  Client: [facility]  Location: [facility] (P)         IMAGING:  Results for orders placed or performed during the hospital encounter of 03/14/25   CT Abdomen Retroperitoneal Biopsy    Narrative    EXAM:  1. PERCUTANEOUS BIOPSY RETROPERITONEAL LYMPHADENOPATHY  2. CT GUIDANCE  3. CONSCIOUS SEDATION  LOCATION: [facility]  DATE: 3/14/2025    INDICATION: retroperitoneal lymphadenopathy  TECHNIQUE: Dose reduction techniques were used.    PROCEDURE: Informed consent obtained. Site marked. Prior images reviewed. Required items made available. Patient identity confirmed verbally and with arm band. Patient reevaluated immediately before administering sedation. Universal protocol was   followed. Time out performed. The site was prepped and draped in sterile fashion. 10 mL of 1% lidocaine was infused into the local soft tissues. Using standard technique and under direct CT guidance, a 18-gauge biopsy device was used to obtain 5 core   biopsies.     The patient tolerated the procedure well. No immediate complications.    SEDATION: Versed 1 mg. Fentanyl 50 mcg. The procedure was performed with administration intravenous conscious sedation with appropriate preoperative, intraoperative, and postoperative evaluation.    15 minutes of supervised face to face conscious sedation time was provided by a radiology nurse under my direct supervision.      Impression    IMPRESSION:  1.  Successful CT-guided biopsy retroperitoneal lymphadenopathy   US Paracentesis without Albumin    Narrative    EXAM:  1. PARACENTESIS  2. ULTRASOUND GUIDANCE  LOCATION: [facility]  DATE: 3/15/2025    INDICATION: Ascites.    PROCEDURE: Informed consent obtained. Time out performed. The abdomen was prepped and draped in a sterile fashion. 10 mL of 1% lidocaine was infused into local soft tissues. A 5 German catheter system was introduced into the abdominal ascites under    ultrasound guidance.    4.7 liters of clear fluid were removed and sent to lab if requested.    Patient tolerated procedure well.    Ultrasound imaging was obtained and placed in the patient's permanent medical record.      Impression    IMPRESSION:  1.  Status post ultrasound-guided paracentesis.    Reference CPT Code: 81971   MR Lumbar Spine w/o & w Contrast     Value    Radiologist flags Concern for left-sided retroperitoneal hemorrhage. (AA)    Narrative    EXAM: MR LUMBAR SPINE W/O and W CONTRAST  LOCATION: [facility]  DATE: 3/16/2025    INDICATION: Left groin back pain with radicular symptoms, suspected lymphoma  COMPARISON: Chest abdomen and pelvis CT dated 03/13/2025.  CONTRAST: 10mL Gadavist intravenous  TECHNIQUE: Routine Lumbar Spine MRI without and with IV contrast.    FINDINGS:   Nomenclature is based on 5 lumbar type vertebral bodies. Vertebral body heights are maintained. Mild retrolisthesis at L1-L2, L2-L3 and L3-L4. Minimal grade 1 anterolisthesis L4-L5 measuring 2 mm. Modic type I degenerative endplate changes at L2-L3.   Intrinsically T1 hyperintense lesions within the L1, L2 and L5 vertebral bodies may represent lipid rich hemangiomas. Diffuse heterogeneous bone marrow appearance. Normal distal spinal cord and cauda equina with conus medullaris at L1. There is dorsal   epidural enhancement and interspinous process enhancement at L2-L3, possibly degenerative. Enhancement adjacent to the L4 spinous process is likely degenerative in nature. Partially visualized ascites. No definite abnormal intradural enhancement. Atrophy   of the paravertebral muscles. Heterogeneous appearance of the pelvic bone marrow.    The left psoas muscle is asymmetrically enlarged and demonstrates a fluid collection, hyperintense on T2-weighted images with areas of mild intrinsic hyperintensity on T1-weighted images, measuring up to 2.5 x 4.6 x 14.6 cm, concerning for hemorrhage   (series 105  image 33, series 106 image 7 and series 110 image 44. Antidependent enhancement within the collection may represent tumor. Partially visualized bulky retroperitoneal lymphadenopathy.    T12-L1: Normal disc height. No herniation. Mild bilateral facet arthropathy. No spinal canal or neural foraminal stenosis.    L1-L2: Normal disc height. Disc desiccation. Tiny disc bulge. Bilateral facet arthropathy. No spinal canal or neural foraminal stenosis.    L2-L3: Mild disc height loss. Annular fissure. Mild retrolisthesis. Disc bulge indenting the ventral thecal sac and contacting the bilateral extraforaminal L2 nerve roots. Bilateral facet arthropathy. Mild spinal canal narrowing. Moderate to severe right   and moderate left neural foraminal stenosis.    L3-L4: Mild disc height loss. Mild retrolisthesis. Disc bulge indenting the ventral thecal sac and contacting the descending right L4 nerve root in the lateral recess and the bilateral extra foraminal L3 nerve roots. Ligamentum flavum thickening.   Bilateral facet arthropathy. Dorsal epidural lipomatosis. Moderate spinal canal stenosis. Severe bilateral neural foraminal stenosis.    L4-L5: Mild disc height loss and desiccation. Disc bulge with superimposed central disc protrusion effacing the ventral thecal sac and contacting the bilateral descending L5 nerve roots and bilateral extraforaminal L4 nerve roots. Ligamentum flavum   thickening. Bilateral facet arthropathy. Dorsal epidural lipomatosis. Severe spinal canal stenosis. Severe bilateral neural foraminal stenosis.    L5-S1: Normal disc height and signal. Small disc bulge contacting the bilateral extra foraminal L5 nerve roots.. Bilateral facet arthropathy. Moderate epidural lipomatosis. Moderate spinal canal stenosis. Mild right neural foraminal narrowing. No   significant left neural foraminal stenosis      Impression    IMPRESSION:  1.  New partially visualized large fluid collection in the left psoas muscle  measures at least 2.5 x 4.6 x 14.6 cm and is concerning for hemorrhage. Recommend IR consult consider CT abdomen and pelvis with intravenous contrast to further evaluate.  2.  Modic type I degenerative endplate changes at L2-L3 and represent a source of pain.  3.  Enhancement of the interspinous spaces at L2-L3 and L3-L4, extending to the dorsal epidural space likely represent degenerative related inflammation. No definite abnormal intradural enhancement.  4.  At L2-L3, there is a disc bulge contacting the bilateral extraforaminal L2 nerve roots.  5.  At L3-L4, there is a disc bulge contacting the bilateral extraforaminal L3 nerve roots and a dorsal epidural lipomatosis contributing to moderate spinal canal stenosis.  6.  At L4-L5, there is a disc bulge contacting the bilateral L4 and L5 nerve roots and dorsal epidural lipomatosis contributing to severe spinal canal stenosis.  7.  At L5-S1, there is a disc bulge contacting the bilateral extraforaminal L5 nerve roots and dorsal epidural lipomatosis contributing to moderate spinal canal stenosis.  8.  Partially visualized bulky retroperitoneal lymphadenopathy and ascites.      [Critical Result: Concern for left-sided retroperitoneal hemorrhage.]    Finding was identified on 3/16/2025 12:20 PM CDT.     Dr. Velasquez was contacted by me on 3/16/2025 12:58 PM CDT and verbalized understanding of the critical result.   MR Hip Left w/o & w Contrast    Narrative    EXAM: MR HIP LEFT W/O and W CONTRAST  LOCATION: [facility]  DATE: 3/16/2025    INDICATION: intractable left groin pain, being worked up for lymphoma  COMPARISON: CT 03/14/2025  TECHNIQUE: Routine. Additional postgadolinium T1 sequences were obtained.  IV CONTRAST: Gadavist 10 mL    FINDINGS:    LEFT HIP:   -Labrum: Limited evaluation of the labrum.   -Cartilage: Likely grade III chondromalacia of the superior and posterior hip. There is subchondral cyst formation within the  acetabulum.  -Joint space: Trace effusion with minimal likely reactive enhancing synovitis.   -Joint capsule/ligaments: Intact joint capsule. Ligamentum teres is intact.    MUSCLES AND TENDONS:   -Gluteal: Mild gluteus minimus and gluteus medius tendinopathy. No significant tear. No muscular atrophy. There is edema within the greater trochanteric bursa.  -Proximal hamstring: Hamstring origin tendinopathy. There is chronic partial tear of the origin. No significant retraction. There is adjacent corticated heterotopic ossification.   -Iliopsoas: There is T2 hyperintensity of the distal tendon which could represent underlying tendinopathy or extension of fluid into the tendon. No definite tear. There is a large amount of fluid partially imaged present within the iliopsoas muscle.   Edema and fluid extend distally along the iliopsoas bursa and extend posterior to the proximal femur and along the fascial planes of the proximal anterior left thigh. There is high T1 signal in portions of the fluid.  -Rectus femoris origin: No tear or tendinopathy.    BONES:   -No fracture. Heterogeneous red marrow. No discrete marrow replacing lesion.   -Mild left sacroiliac joint arthrosis.    SOFT TISSUES:   -Fluid within the partially imaged iliopsoas musculature extending inferiorly as above. No muscular atrophy.    INTRA-PELVIC CONTENTS:  -Nonorganized presacral and retroperitoneal edema. Soft tissue edema along the subcutaneous left flank/thigh. Fluid extends along the left inguinal canal.      Impression    IMPRESSION:  1.  Large amount of fluid within the partially imaged iliopsoas musculature extending inferiorly along the iliopsoas bursa and extending posterior along the fascial planes of the proximal anterior left thigh. There is high T1 signal in portions of the   fluid concerning for blood products. Further evaluation with contrast enhanced abdomen/pelvis CT is recommended.  2.  No concerning marrow replacing lesion.  3.  Mild  to moderate left hip osteoarthritis with a trace effusion and minimal likely reactive enhancing synovitis.  4.  Left hamstring origin tendinopathy with chronic partial tear.      NOTE: ABNORMAL REPORT    THE DICTATION ABOVE DESCRIBES AN ABNORMALITY FOR WHICH FOLLOW-UP IS NEEDED.   CTA Abdomen Pelvis with Contrast    Narrative    EXAM: CTA ABDOMEN PELVIS WITH CONTRAST  LOCATION: [facility]  DATE: 3/16/2025    INDICATION: Retoperitoneal hematoma with drop in HB  COMPARISON: CT abdomen and pelvis dated 3/14/2025  TECHNIQUE: CT angiogram abdomen pelvis during arterial phase of injection of IV contrast. 2D and 3D MIP reconstructions were performed by the CT technologist. Dose reduction techniques were used.  CONTRAST: 72 cc [id number]    FINDINGS:  ANGIOGRAM ABDOMEN/PELVIS: No abdominal aortic aneurysm or dissection. Patent celiac axis, SMA, bilateral renal arteries, GERARD, and runoff to the upper legs. No evidence of active hemorrhage at single phase CT. There is significant stenosis at the origin   of the celiac axis, which may be related to vascular encasement although morphologically is suspicious for acute ligament syndrome. There is mild poststenotic dilatation reaching up to 7 mm.    LOWER CHEST: New small to moderate left pleural effusion with associated compressive atelectasis. Mild right basilar atelectasis. Peridiaphragmatic adenopathy redemonstrated.    HEPATOBILIARY: Moderate abdominal ascites, slightly decreased in interval. Vicarious excretion of contrast in the gallbladder. No definite ductal dilatation.    PANCREAS: Pancreas remains poorly visualized, and encased by extensive retroperitoneal soft tissue.    SPLEEN: Splenomegaly redemonstrated.    ADRENAL GLANDS: Left adrenal gland is encased by retroperitoneal soft tissue. No discrete adrenal mass.    KIDNEYS/BLADDER: Anterior displacement of the left kidney by new retroperitoneal hematoma. No hydronephrosis. Excreted contrast in  the bladder.    BOWEL: No bowel obstruction. Increased stranding and potential nodularity within the anterior intra-abdominal fat, most apparent in the upper abdomen.    LYMPH NODES: Extensive confluent retroperitoneal and mesenteric soft tissue consistent with confluent adenopathy redemonstrated. There is new enlargement of the left psoas musculature, with central low attenuation compatible with intramuscular hematoma.   This measures approximately 5 cm in greatest transverse dimension. This extends approximately 16 cm in length. Lateral to the psoas muscle, a higher density retroperitoneal hematoma measures up to 11 cm in transverse dimension, image 101. This extends   approximately 15 cm in length.    PELVIC ORGANS: Moderate to large volume of pelvic ascites, unchanged. Prostatic hypertrophy redemonstrated.    MUSCULOSKELETAL: Increasing subcutaneous edema. Fluid containing umbilical hernia. No acute bony abnormalities.      Impression    IMPRESSION:  1.  New large left-sided retroperitoneal hematoma with intramuscular component. No evidence of active hemorrhage.  2.  Extensive retroperitoneal and mesenteric adenopathy remains suspicious for lymphoma.  3.  Slight decrease in abdominal and pelvic ascites. Increasing prominence of anterior intra-abdominal fat stranding/nodularity, suspicious for peritoneal disease/lymphomatosis.  4.  Increasing left pleural effusion.   Echocardiogram Complete     Value    LVEF  55-60%    Narrative    [id number]  [id number]  [id number]^BONNIE^SABINA^BEATRIZ     [facility]  Echocardiography Laboratory  [address] [place] [address]     Name: TOM TAYLOR  MRN: [id number]  : 1955  Study Date: 03/15/2025 08:42 AM  Age: 69 yrs  Gender: Male  Patient Location: [facility]  Reason For Study: Pulmonary Emboli  Ordering Physician: SABINA NICOLE  Referring Physician: MILTON TAN  Performed By: Jacky Parr     BSA: 2.2 m2  Height: 67  in  Weight: 239 lb  HR: 74  BP: 103/82 mmHg  ______________________________________________________________________________  Procedure  Echocardiogram with two-dimensional, color and spectral Doppler. Definity ([facility]C  #[id number]) given intravenously.  ______________________________________________________________________________  Interpretation Summary     Left ventricular systolic function is normal.  The visual ejection fraction is 55-60%.  No regional wall motion abnormalities noted.  The right ventricle is normal size.  Right ventricular systolic function is lower limits of normal. Evaluation  suboptimal due to poor image quality.  Mild valvular aortic stenosis.  The mean AoV pressure gradient is 16mmHg.  Prior echo from July 2023 revealed a mean gradient across aortic valve of 14  mm. The study was technically difficult.  ______________________________________________________________________________  Left Ventricle  The left ventricle is normal in size. There is normal left ventricular wall  thickness. Left ventricular systolic function is normal. The visual ejection  fraction is 55-60%. Left ventricular diastolic function is normal. No regional  wall motion abnormalities noted.     Right Ventricle  The right ventricle is normal size. Right ventricular systolic function is  lower limits of normal. Evaluation suboptimal due to poor image quality.     Tricuspid Valve  There is trace tricuspid regurgitation. Right ventricular systolic pressure  could not be approximated due to inadequate tricuspid regurgitation. IVC  diameter and respiratory changes fall into an intermediate range suggesting an  RA pressure of 8 mmHg.     Aortic Valve  The aortic valve is trileaflet. There is trace aortic regurgitation. Mild  valvular aortic stenosis. The mean AoV pressure gradient is 16mmHg.     Pulmonic Valve  The pulmonic valve is not well visualized.     Vessels  The aortic root is normal size. Ascending aorta dilatation is  present. (41mm).     Pericardium  There is no pericardial effusion.     Rhythm  Sinus rhythm was noted.     ______________________________________________________________________________  MMode/2D Measurements & Calculations  IVSd: 1.0 cm  LVIDd: 4.5 cm  LVIDs: 2.4 cm  LVPWd: 1.1 cm  FS: 46.7 %  LV mass(C)d: 164.0 grams  LV mass(C)dI: 75.2 grams/m2  Ao root diam: 3.3 cm  asc Aorta Diam: 4.1 cm  LVOT diam: 2.3 cm  LVOT area: 4.2 cm2     Ao root diam index Ht(cm/m): 1.9  Ao root diam index BSA (cm/m2): 1.5  Asc Ao diam index BSA (cm/m2): 1.9  Asc Ao diam index Ht(cm/m): 2.4  LA Volume (BP): 57.9 ml  LA Volume Index (BP): 26.6 ml/m2  RWT: 0.49  TAPSE: 2.7 cm     Doppler Measurements & Calculations  MV E max basim: 80.7 cm/sec  MV A max basim: 74.4 cm/sec  MV E/A: 1.1  MV dec time: 0.21 sec  Ao V2 max: 271.0 cm/sec  Ao max P.0 mmHg  Ao V2 mean: 180.0 cm/sec  Ao mean P.0 mmHg  Ao V2 VTI: 53.0 cm  J CARLOS(I,D): 1.8 cm2  J CARLOS(V,D): 1.6 cm2  LV V1 max P.6 mmHg  LV V1 max: 107.0 cm/sec  LV V1 VTI: 22.5 cm  SV(LVOT): 93.5 ml  SI(LVOT): 42.9 ml/m2  PA acc time: 0.09 sec  AV Basim Ratio (DI): 0.39  J CARLOS Index (cm2/m2): 0.81  E/E' av.1  Lateral E/e': 5.3  Medial E/e': 6.9     ______________________________________________________________________________  Report approved by: Calvin Vegas MD on 03/15/2025 01:23 PM

## 2025-03-17 NOTE — PROVIDER NOTIFICATION
MD Notification    Notified Person: MD    Notified Person Name: Efrain Velasquez     Notification Date/Time: 3/17 3:35 pm    Notification Interaction: Messaging     Purpose of Notification: Hg 9.1, soft bp, pallor, would be good to move to [facility]    Orders Received: yes    Comments:

## 2025-03-17 NOTE — PROGRESS NOTES
[facility]    Medicine Progress Note - Hospitalist Service        Date of Admission:  3/14/2025  8:32 AM    Assessment & Plan:   Bhavesh Landeros is a 69 year old male medical history significant for hypertension, aortic stenosis, mitral regurgitation, ?multiple sclerosis, obesity, GERD, hyperlipidemia,  who was directly admitted from [facility] ED with worsening abdominal bloating and distention and found to have extensive retroperitoneal soft tissue concerning for possible lymphoma with extension to adjacent mesenteric and pancreatic structures/vasculature and associated significant compression of IVC, significant abdominal and pelvic ascites, and acute RLL pulmonary embolism.    Extensive retroperitoneal lymphadenopathy concerning for possible lymphoma.  IVC compression due to above.  Symptomatic moderately large volume ascites.  Peripheral Edema  Splenomegaly  *Presented to [facility] ED with worsening abd bloating/distention. Several visits recently to PCP for SOB, PHELAN, abdominal bloating, there was concern for constipation as well as possible cardiac etiology.   *CT PE revealed extensive confluent retroperitoneal soft tissue most compatible with lymphoma, likely associated significant compression of the IVC and moderately large volume of abdominal and pelvic ascites.  The soft tissue fills the retroperitoneum in the region of the pancreas and extends into the small bowel mesentery, encasing the central abdominal vasculature and extending into the mesentery.  Transfer to [facility] for Oncology workup.  -S/p CT-guided retroperitoneal lymphadenopathy biopsy with IR on 3/40/25.  Hospital course complicated by retroperitoneal hemorrhage following this.  Please see discussion below  -[facility] oncology following, plan for bone marrow biopsy on 3/18/2025 for staging  -S/p diagnostic and therapeutic paracentesis on 3/15/2025 with removal of 4.7 L fluid, serum albumin gradient is less than 1, patient does  appear to have significant residual ascites, will attempt another therapeutic paracentesis tomorrow  -Hold PTA diuretics for now  -Supportive care  -Compression stockings    Acute RLL pulmonary embolism  -Had some shortness of breath and dyspnea on exertion in recent weeks,   -CT PE study revealed right lower lobe pulmonary embolism, no right heart strain on imaging.   -Venous Doppler negative for DVT  -Initially started on heparin drip.  Patient reported new and worsening pain on 3/16 in his groin/hip area.  Workup revealed new retroperitoneal hemorrhage.  Heparin drip discontinued on 3/16  -Repeat venous Doppler today is negative for DVT  -IR consulted, he does have compressed IVC from the mass and therefore no place for a filter to be placed safely.  Radiology does not recommend temporary filter  -We will consider reinitiation of anticoagulation in a week or so pending clinical course and stability from retroperitoneal hemorrhage standpoint.    Large left retroperitoneal hemorrhage  Acute blood loss anemia due to #1 above  -Patient complained of intractable pain since 3/15 evening, multiple imaging including MRI and CTA shows large left retroperitoneal hemorrhage measuring up to 16 cm in length.  Most likely this was bleeding from the lymph node biopsy site on 3/14/2025 as he was concurrently on anticoagulation.  -Hemoglobin dropped from 13.3 at presentation to 9.3 and was hypotensive yesterday evening  -He is s/p 2 units of PRBC on 3/16  -Hemoglobin is stable, pain much better today.  Hemodynamically stable  -Continue to hold anticoagulation  -Reimaging based on clinical symptoms  -If he is clinically stable, can discontinue IMC by the end of the day    Acute kidney injury  -Creatinine bumped up overnight to 1.55, this is likely multifactorial as he was hypotensive with blood loss, received contrast  -Continue normal saline at 75 mL/h, recheck BMP in the morning  -Hold prior to admission losartan    Degenerative  disc disease  -Patient reported intractable left lower back/groin and thigh pain on 3/16 with radicular symptoms.  -MRI of the lumbar spine shows multilevel disc bulges with bilateral extraforaminal nerve root compression at multiple levels.  Evaluated by neurosurgery who felt that these are all chronic changes with no acute intervention needed at this point  -Pain control as needed with Tylenol, Neurontin, oxycodone and IV Dilaudid    URI symptoms  Dry cough, coryza, intermittent wheezing for 1 week PTA. No fevers or chills. PE as above.  -COVID-19, influenza A and B and RSV negative  -Respiratory symptoms much improved after paracentesis, suspect this was mechanical  -Supportive care  -Nebs PRN  -PRN guaifenesin and tessalon pearles     Slow transit constipation  Likely worsened by above.  -Bowel regimen    History of mild aortic stenosis and mitral regurgitation  HTN  -Echocardiogram is fairly unremarkable  -Continue to hold losartan due to soft blood pressure from retroperitoneal hemorrhage.    Chronic stable diagnoses and other pertinent medical history:  GERD  Obesity class II  HLD  Hiatal hernia  Recurrent abdominal hernia  Multiple sclerosis?      Diet: NPO for Procedure/Surgery per Anesthesia Guidelines Except for: Meds, Ice Chips; Clear liquids before procedure/surgery: ADULT (Age GREATER than or Equal to 18 years) - Clear liquids 2 hours before procedure/surgery     DVT Prophylaxis: PCDs  Pablo Catheter: Not present  Code Status: Full Code     Disposition Plan       Expected Discharge Date: 03/21/2025              Entered: Efrain Velasquez MD 03/17/2025, 10:29 AM        Medically Ready for Discharge: Anticipated in 2-4 Days pending bone marrow biopsy tomorrow, clinical stability from retroperitoneal hemorrhage and clinical improvement    Clinically Significant Risk Factors             # Hypomagnesemia: Lowest Mg = 0.18 mg/dL in last 2 days, will replace as needed    # Coagulation Defect: INR = 1.21 (Ref  "range: 0.85 - 1.15) and/or PTT = N/A, will monitor for bleeding   # Acute Kidney Injury, unspecified: based on a >150% or 0.3 mg/dL increase in last creatinine compared to past 90 day average, will monitor renal function  # Hypertension: Noted on problem list            # Obesity: Estimated body mass index is 36.67 kg/m  as calculated from the following:    Height as of this encounter: 1.702 m (5' 7\").    Weight as of this encounter: 106.2 kg (234 lb 2.1 oz)., PRESENT ON ADMISSION               The patient's care was discussed with the Bedside Nurse and Patient.    Medical Decision Making       **CLEAR ALL SELECTIONS**      Labs/Imaging Reviewed:  See Information above and Data section below  Time SPENT BY ME on the date of service doing chart review, history, exam, documentation & further activities per the note:  55 MINUTES    Chart documentation was completed, in part, with [employer] voice-recognition software. Even though reviewed, some grammatical, spelling, and word errors may remain.    Efrain Velasquez MD  Hospitalist Service  [facility]  Text Page 7AM-6PM  Securely message with the Vocera Web Console (learn more here)  Text page via [employer] Paging/Directory    ______________________________________________________________________    Interval History   Patient was found to have retroperitoneal hemorrhage on multiple imaging on 3/16.  Left groin/hip area pain is much better today with significantly improved range of motion.  Patient required 2 units of PRBC.  Blood pressure and drop in hemoglobin.  He is clinically stable today.  Abdominal is slightly more distended with what appears like worsening ascites.  Bone marrow planned for tomorrow    Data reviewed today: I reviewed all medications, new labs and imaging results over the last 24 hours. I personally reviewed no images or EKG's today.    Physical Exam   Vital signs:  Temp: 97.8  F (36.6  C) Temp src: Oral BP: 103/71 Pulse: 81   " "Resp: 22 SpO2: 94 % O2 Device: None (Room air)   Height: 170.2 cm (5' 7\") Weight: 106.2 kg (234 lb 2.1 oz)  Estimated body mass index is 36.67 kg/m  as calculated from the following:    Height as of this encounter: 1.702 m (5' 7\").    Weight as of this encounter: 106.2 kg (234 lb 2.1 oz).      Wt Readings from Last 2 Encounters:   03/16/25 106.2 kg (234 lb 2.1 oz)   03/14/25 108.4 kg (239 lb)       Gen: AAOX3, NAD, comfortable  HEENT: Supple neck, moist oral mucosa, no pallor  Resp: Diminished air entry bilaterally, scattered wheezes  CVS: RRR, no murmur  Abd/GI: Soft, distended, no obvious focal tenderness  Skin: Warm, dry no rashes  MSK: 2-3+ pedal edema  Neuro- CN- intact.  Range of motion of the left hip is much better today.     Data   Recent Labs   Lab 03/17/25  0631 03/16/25  2238 03/16/25  2123 03/16/25  2055 03/16/25  1438 03/15/25  0939 03/14/25  1038 03/14/25  0019 03/13/25  2200   WBC 8.8  --   --   --   --  4.5 5.6   < > 6.1   HGB 9.3* 10.1*  --   --  9.1* 13.3 13.4   < > 13.3   MCV 85  --   --   --   --  86 84   < > 85     --   --   --  223 168 172   < > 185   INR  --   --   --  1.21*  --   --   --   --  1.15     --   --   --  136 139 138  --  137   POTASSIUM 4.3  --   --   --  4.3  4.3 4.3 4.1  --  4.1   CHLORIDE 103  --   --   --  104 105 106  --  104   CO2 24  --   --   --  22 24 23  --  21*   BUN 24.7*  --   --   --  18.7 18.8 20.5  --  23.4*   CR 1.55*  --   --   --  0.91 0.87 0.88  --  0.93   ANIONGAP 10  --   --   --  10 10 9  --  12   SHANNON 8.3*  --   --   --  8.3* 8.7* 8.4*  --  8.3*   *  --  130*  --  173* 106* 101*  --  103*   ALBUMIN  --   --   --   --   --  3.7 3.8   < > 3.9   PROTTOTAL  --   --   --   --   --  6.1* 6.3*  --  6.2*   BILITOTAL  --   --   --   --   --  0.4 0.4  --  0.3   ALKPHOS  --   --   --   --   --  69 75  --  68   ALT  --   --   --   --   --  21 21  --  18   AST  --   --   --   --   --  31 31  --  27   LIPASE  --   --   --   --   --   --   --   --  29 "    < > = values in this interval not displayed.       Recent Results (from the past 24 hours)   MR Lumbar Spine w/o & w Contrast   Result Value    Radiologist flags Concern for left-sided retroperitoneal hemorrhage. (AA)    Narrative    EXAM: MR LUMBAR SPINE W/O and W CONTRAST  LOCATION: [facility]  DATE: 3/16/2025    INDICATION: Left groin back pain with radicular symptoms, suspected lymphoma  COMPARISON: Chest abdomen and pelvis CT dated 03/13/2025.  CONTRAST: 10mL Gadavist intravenous  TECHNIQUE: Routine Lumbar Spine MRI without and with IV contrast.    FINDINGS:   Nomenclature is based on 5 lumbar type vertebral bodies. Vertebral body heights are maintained. Mild retrolisthesis at L1-L2, L2-L3 and L3-L4. Minimal grade 1 anterolisthesis L4-L5 measuring 2 mm. Modic type I degenerative endplate changes at L2-L3.   Intrinsically T1 hyperintense lesions within the L1, L2 and L5 vertebral bodies may represent lipid rich hemangiomas. Diffuse heterogeneous bone marrow appearance. Normal distal spinal cord and cauda equina with conus medullaris at L1. There is dorsal   epidural enhancement and interspinous process enhancement at L2-L3, possibly degenerative. Enhancement adjacent to the L4 spinous process is likely degenerative in nature. Partially visualized ascites. No definite abnormal intradural enhancement. Atrophy   of the paravertebral muscles. Heterogeneous appearance of the pelvic bone marrow.    The left psoas muscle is asymmetrically enlarged and demonstrates a fluid collection, hyperintense on T2-weighted images with areas of mild intrinsic hyperintensity on T1-weighted images, measuring up to 2.5 x 4.6 x 14.6 cm, concerning for hemorrhage   (series 105 image 33, series 106 image 7 and series 110 image 44. Antidependent enhancement within the collection may represent tumor. Partially visualized bulky retroperitoneal lymphadenopathy.    T12-L1: Normal disc height. No herniation. Mild  bilateral facet arthropathy. No spinal canal or neural foraminal stenosis.    L1-L2: Normal disc height. Disc desiccation. Tiny disc bulge. Bilateral facet arthropathy. No spinal canal or neural foraminal stenosis.    L2-L3: Mild disc height loss. Annular fissure. Mild retrolisthesis. Disc bulge indenting the ventral thecal sac and contacting the bilateral extraforaminal L2 nerve roots. Bilateral facet arthropathy. Mild spinal canal narrowing. Moderate to severe right   and moderate left neural foraminal stenosis.    L3-L4: Mild disc height loss. Mild retrolisthesis. Disc bulge indenting the ventral thecal sac and contacting the descending right L4 nerve root in the lateral recess and the bilateral extra foraminal L3 nerve roots. Ligamentum flavum thickening.   Bilateral facet arthropathy. Dorsal epidural lipomatosis. Moderate spinal canal stenosis. Severe bilateral neural foraminal stenosis.    L4-L5: Mild disc height loss and desiccation. Disc bulge with superimposed central disc protrusion effacing the ventral thecal sac and contacting the bilateral descending L5 nerve roots and bilateral extraforaminal L4 nerve roots. Ligamentum flavum   thickening. Bilateral facet arthropathy. Dorsal epidural lipomatosis. Severe spinal canal stenosis. Severe bilateral neural foraminal stenosis.    L5-S1: Normal disc height and signal. Small disc bulge contacting the bilateral extra foraminal L5 nerve roots.. Bilateral facet arthropathy. Moderate epidural lipomatosis. Moderate spinal canal stenosis. Mild right neural foraminal narrowing. No   significant left neural foraminal stenosis      Impression    IMPRESSION:  1.  New partially visualized large fluid collection in the left psoas muscle measures at least 2.5 x 4.6 x 14.6 cm and is concerning for hemorrhage. Recommend IR consult consider CT abdomen and pelvis with intravenous contrast to further evaluate.  2.  Modic type I degenerative endplate changes at L2-L3 and represent  a source of pain.  3.  Enhancement of the interspinous spaces at L2-L3 and L3-L4, extending to the dorsal epidural space likely represent degenerative related inflammation. No definite abnormal intradural enhancement.  4.  At L2-L3, there is a disc bulge contacting the bilateral extraforaminal L2 nerve roots.  5.  At L3-L4, there is a disc bulge contacting the bilateral extraforaminal L3 nerve roots and a dorsal epidural lipomatosis contributing to moderate spinal canal stenosis.  6.  At L4-L5, there is a disc bulge contacting the bilateral L4 and L5 nerve roots and dorsal epidural lipomatosis contributing to severe spinal canal stenosis.  7.  At L5-S1, there is a disc bulge contacting the bilateral extraforaminal L5 nerve roots and dorsal epidural lipomatosis contributing to moderate spinal canal stenosis.  8.  Partially visualized bulky retroperitoneal lymphadenopathy and ascites.      [Critical Result: Concern for left-sided retroperitoneal hemorrhage.]    Finding was identified on 3/16/2025 12:20 PM CDT.     Dr. Velasquez was contacted by me on 3/16/2025 12:58 PM CDT and verbalized understanding of the critical result.   MR Hip Left w/o & w Contrast    Narrative    EXAM: MR HIP LEFT W/O and W CONTRAST  LOCATION: [facility]  DATE: 3/16/2025    INDICATION: intractable left groin pain, being worked up for lymphoma  COMPARISON: CT 03/14/2025  TECHNIQUE: Routine. Additional postgadolinium T1 sequences were obtained.  IV CONTRAST: Gadavist 10 mL    FINDINGS:    LEFT HIP:   -Labrum: Limited evaluation of the labrum.   -Cartilage: Likely grade III chondromalacia of the superior and posterior hip. There is subchondral cyst formation within the acetabulum.  -Joint space: Trace effusion with minimal likely reactive enhancing synovitis.   -Joint capsule/ligaments: Intact joint capsule. Ligamentum teres is intact.    MUSCLES AND TENDONS:   -Gluteal: Mild gluteus minimus and gluteus medius  tendinopathy. No significant tear. No muscular atrophy. There is edema within the greater trochanteric bursa.  -Proximal hamstring: Hamstring origin tendinopathy. There is chronic partial tear of the origin. No significant retraction. There is adjacent corticated heterotopic ossification.   -Iliopsoas: There is T2 hyperintensity of the distal tendon which could represent underlying tendinopathy or extension of fluid into the tendon. No definite tear. There is a large amount of fluid partially imaged present within the iliopsoas muscle.   Edema and fluid extend distally along the iliopsoas bursa and extend posterior to the proximal femur and along the fascial planes of the proximal anterior left thigh. There is high T1 signal in portions of the fluid.  -Rectus femoris origin: No tear or tendinopathy.    BONES:   -No fracture. Heterogeneous red marrow. No discrete marrow replacing lesion.   -Mild left sacroiliac joint arthrosis.    SOFT TISSUES:   -Fluid within the partially imaged iliopsoas musculature extending inferiorly as above. No muscular atrophy.    INTRA-PELVIC CONTENTS:  -Nonorganized presacral and retroperitoneal edema. Soft tissue edema along the subcutaneous left flank/thigh. Fluid extends along the left inguinal canal.      Impression    IMPRESSION:  1.  Large amount of fluid within the partially imaged iliopsoas musculature extending inferiorly along the iliopsoas bursa and extending posterior along the fascial planes of the proximal anterior left thigh. There is high T1 signal in portions of the   fluid concerning for blood products. Further evaluation with contrast enhanced abdomen/pelvis CT is recommended.  2.  No concerning marrow replacing lesion.  3.  Mild to moderate left hip osteoarthritis with a trace effusion and minimal likely reactive enhancing synovitis.  4.  Left hamstring origin tendinopathy with chronic partial tear.      NOTE: ABNORMAL REPORT    THE DICTATION ABOVE DESCRIBES AN  ABNORMALITY FOR WHICH FOLLOW-UP IS NEEDED.   CTA Abdomen Pelvis with Contrast    Narrative    EXAM: CTA ABDOMEN PELVIS WITH CONTRAST  LOCATION: [facility]  DATE: 3/16/2025    INDICATION: Retoperitoneal hematoma with drop in HB  COMPARISON: CT abdomen and pelvis dated 3/14/2025  TECHNIQUE: CT angiogram abdomen pelvis during arterial phase of injection of IV contrast. 2D and 3D MIP reconstructions were performed by the CT technologist. Dose reduction techniques were used.  CONTRAST: 72 cc [id number]    FINDINGS:  ANGIOGRAM ABDOMEN/PELVIS: No abdominal aortic aneurysm or dissection. Patent celiac axis, SMA, bilateral renal arteries, GERARD, and runoff to the upper legs. No evidence of active hemorrhage at single phase CT. There is significant stenosis at the origin   of the celiac axis, which may be related to vascular encasement although morphologically is suspicious for acute ligament syndrome. There is mild poststenotic dilatation reaching up to 7 mm.    LOWER CHEST: New small to moderate left pleural effusion with associated compressive atelectasis. Mild right basilar atelectasis. Peridiaphragmatic adenopathy redemonstrated.    HEPATOBILIARY: Moderate abdominal ascites, slightly decreased in interval. Vicarious excretion of contrast in the gallbladder. No definite ductal dilatation.    PANCREAS: Pancreas remains poorly visualized, and encased by extensive retroperitoneal soft tissue.    SPLEEN: Splenomegaly redemonstrated.    ADRENAL GLANDS: Left adrenal gland is encased by retroperitoneal soft tissue. No discrete adrenal mass.    KIDNEYS/BLADDER: Anterior displacement of the left kidney by new retroperitoneal hematoma. No hydronephrosis. Excreted contrast in the bladder.    BOWEL: No bowel obstruction. Increased stranding and potential nodularity within the anterior intra-abdominal fat, most apparent in the upper abdomen.    LYMPH NODES: Extensive confluent retroperitoneal and mesenteric  soft tissue consistent with confluent adenopathy redemonstrated. There is new enlargement of the left psoas musculature, with central low attenuation compatible with intramuscular hematoma.   This measures approximately 5 cm in greatest transverse dimension. This extends approximately 16 cm in length. Lateral to the psoas muscle, a higher density retroperitoneal hematoma measures up to 11 cm in transverse dimension, image 101. This extends   approximately 15 cm in length.    PELVIC ORGANS: Moderate to large volume of pelvic ascites, unchanged. Prostatic hypertrophy redemonstrated.    MUSCULOSKELETAL: Increasing subcutaneous edema. Fluid containing umbilical hernia. No acute bony abnormalities.      Impression    IMPRESSION:  1.  New large left-sided retroperitoneal hematoma with intramuscular component. No evidence of active hemorrhage.  2.  Extensive retroperitoneal and mesenteric adenopathy remains suspicious for lymphoma.  3.  Slight decrease in abdominal and pelvic ascites. Increasing prominence of anterior intra-abdominal fat stranding/nodularity, suspicious for peritoneal disease/lymphomatosis.  4.  Increasing left pleural effusion.   US Lower Extremity Venous Duplex Bilateral    Narrative    EXAM: US LOWER EXTREMITY VENOUS DUPLEX BILATERAL  LOCATION: [facility]  DATE: 3/17/2025    INDICATION: PE.  COMPARISON: 3/13/2025.  TECHNIQUE: Venous Duplex ultrasound of bilateral lower extremities with and without compression, augmentation and duplex. Color flow and spectral Doppler with waveform analysis performed.    FINDINGS: Exam includes the common femoral, femoral, popliteal veins as well as segmentally visualized deep calf veins and greater saphenous vein.     RIGHT: No deep vein thrombosis. No superficial thrombophlebitis. Baker's cyst suggested measuring 4.9 x 3.8 x 1.9 cm. There were two discrete fluid collection seen on the prior exam and only one is currently seen.    LEFT: No deep  vein thrombosis. No superficial thrombophlebitis. Baker's cyst suggested measuring 7.0 x 4.5 x 2.7 cm. This is similar to the prior exam.      Impression    IMPRESSION:  1.  No deep venous thrombosis in the bilateral lower extremities.  2.  Bilateral lower extremity Baker's cysts as above.     Medications   Current Facility-Administered Medications   Medication Dose Route Frequency Provider Last Rate Last Admin    Patient is already receiving anticoagulation with heparin, enoxaparin (LOVENOX), warfarin (COUMADIN)  or other anticoagulant medication   Does not apply Continuous PRN Elmira Pena PA-C        sodium chloride 0.9 % infusion   Intravenous Continuous Efrain Velasquez MD 75 mL/hr at 03/17/25 0901 New Bag at 03/17/25 0901     Current Facility-Administered Medications   Medication Dose Route Frequency Provider Last Rate Last Admin    [Held by provider] aspirin (ASA) chewable tablet 81 mg  81 mg Oral Daily Elmira Pena PA-C        gabapentin (NEURONTIN) capsule 100 mg  100 mg Oral TID Efrain Velasquez MD   100 mg at 03/17/25 0853    lidocaine 1 % 5 mL  5 mL Intradermal Once Efrain Velasquez MD        [Held by provider] losartan (COZAAR) tablet 50 mg  50 mg Oral Daily Efrain Velasquez MD   50 mg at 03/16/25 1355    pantoprazole (PROTONIX) EC tablet 40 mg  40 mg Oral Daily Elmira Pena PA-C   40 mg at 03/17/25 0853    polyethylene glycol (MIRALAX) powder 17 g  17 g Oral Daily Elmira Pena PA-C   17 g at 03/16/25 0839    senna-docusate (SENOKOT-S/PERICOLACE) 8.6-50 MG per tablet 1 tablet  1 tablet Oral BID Elmira Pena PA-C   1 tablet at 03/16/25 2157    Or    senna-docusate (SENOKOT-S/PERICOLACE) 8.6-50 MG per tablet 2 tablet  2 tablet Oral BID Elmira Pena PA-C   2 tablet at 03/17/25 0852    sodium chloride (PF) 0.9% PF flush 3 mL  3 mL Intracatheter Q8H Efrain Velasquez MD   3 mL at 03/17/25 0900

## 2025-03-17 NOTE — CONSULTS
Interventional Radiology - Consult Note:  3/17/2025    Procedure Requested: History of PE with retroperitoneal hematoma,?  Need for IVC filter   Requested by: Efrain Velasquez MD       Brief HPI: Bhavesh Landeros is a 69 year old male with a past medical history significant for hypertension, aortic stenosis, mitral regurgitation, ?multiple sclerosis, obesity, GERD, and hyperlipidemia. Pt was directly admitted from [facility] ED with worsening abdominal bloating and distention and found to have extensive retroperitoneal soft tissue concerning for possible lymphoma with extension to adjacent mesenteric and pancreatic structures/vasculature and associated significant compression of IVC, significant abdominal and pelvic ascites, and acute RLL pulmonary embolism. S/p CT-guided retroperitoneal lymphadenopathy biopsy with IR on 3/14/25. US LE on 3/13 was negative for DVT.  Pt was placed on a Heparin drip for his RLL PE. On 3/16 pt had a RRT called for hypotension in setting of large retroperitoneal hemorrhage.  Heparin gtt stopped at 1247pm. Repeat US is scheduled for 3/17. IR was asked 3/16 regarding possible IVC filter placement, but noted compression of the IVC from the retroperitoneal mass, so no IVC filter placement was recommended. Plan was made  for venous lower extremity duplex on 3/17/2025 and if there is a new DVT present then may be more compelling indication for IVC filter at that time but holding off for now given  small clot burden and no oxygen requirement      IMAGING:  EXAM: CTA ABDOMEN PELVIS WITH CONTRAST  LOCATION: [facility]  DATE: 3/16/2025     INDICATION: Retoperitoneal hematoma with drop in HB  COMPARISON: CT abdomen and pelvis dated 3/14/2025  TECHNIQUE: CT angiogram abdomen pelvis during arterial phase of injection of IV contrast. 2D and 3D MIP reconstructions were performed by the CT technologist. Dose reduction techniques were used.  CONTRAST: 72 cc [id number]      FINDINGS:  ANGIOGRAM ABDOMEN/PELVIS: No abdominal aortic aneurysm or dissection. Patent celiac axis, SMA, bilateral renal arteries, GERARD, and runoff to the upper legs. No evidence of active hemorrhage at single phase CT. There is significant stenosis at the origin   of the celiac axis, which may be related to vascular encasement although morphologically is suspicious for acute ligament syndrome. There is mild poststenotic dilatation reaching up to 7 mm.     LOWER CHEST: New small to moderate left pleural effusion with associated compressive atelectasis. Mild right basilar atelectasis. Peridiaphragmatic adenopathy redemonstrated.     HEPATOBILIARY: Moderate abdominal ascites, slightly decreased in interval. Vicarious excretion of contrast in the gallbladder. No definite ductal dilatation.     PANCREAS: Pancreas remains poorly visualized, and encased by extensive retroperitoneal soft tissue.     SPLEEN: Splenomegaly redemonstrated.     ADRENAL GLANDS: Left adrenal gland is encased by retroperitoneal soft tissue. No discrete adrenal mass.     KIDNEYS/BLADDER: Anterior displacement of the left kidney by new retroperitoneal hematoma. No hydronephrosis. Excreted contrast in the bladder.     BOWEL: No bowel obstruction. Increased stranding and potential nodularity within the anterior intra-abdominal fat, most apparent in the upper abdomen.     LYMPH NODES: Extensive confluent retroperitoneal and mesenteric soft tissue consistent with confluent adenopathy redemonstrated. There is new enlargement of the left psoas musculature, with central low attenuation compatible with intramuscular hematoma.   This measures approximately 5 cm in greatest transverse dimension. This extends approximately 16 cm in length. Lateral to the psoas muscle, a higher density retroperitoneal hematoma measures up to 11 cm in transverse dimension, image 101. This extends   approximately 15 cm in length.     PELVIC ORGANS: Moderate to large volume of  pelvic ascites, unchanged. Prostatic hypertrophy redemonstrated.     MUSCULOSKELETAL: Increasing subcutaneous edema. Fluid containing umbilical hernia. No acute bony abnormalities.                                                                      IMPRESSION:  1.  New large left-sided retroperitoneal hematoma with intramuscular component. No evidence of active hemorrhage.  2.  Extensive retroperitoneal and mesenteric adenopathy remains suspicious for lymphoma.  3.  Slight decrease in abdominal and pelvic ascites. Increasing prominence of anterior intra-abdominal fat stranding/nodularity, suspicious for peritoneal disease/lymphomatosis.  4.  Increasing left pleural effusion.    EXAM: US LOWER EXTREMITY VENOUS DUPLEX BILATERAL  LOCATION: [facility]  DATE: 3/13/2025     INDICATION: bilateral LE edema  COMPARISON: None.  TECHNIQUE: Venous Duplex ultrasound of bilateral lower extremities with and without compression, augmentation and duplex. Color flow and spectral Doppler with waveform analysis performed.     FINDINGS: Exam includes the common femoral, femoral, popliteal veins as well as segmentally visualized deep calf veins and greater saphenous vein.      RIGHT: No deep vein thrombosis. No superficial thrombophlebitis. There are 2 popliteal cysts measuring 4.0 x 1.9 x 2.3 cm and 4.5 x 1.8 x 3.8 cm.     LEFT: No deep vein thrombosis. No superficial thrombophlebitis. There is a popliteal cyst measuring 6.2 x 2.9 x 4.9 cm.                                                                      IMPRESSION:  1. No deep venous thrombosis in the bilateral lower extremities.       ANTICOAGULANTS: Heparin drip held      ALLERGIES  Allergies   Allergen Reactions    No Known Allergies      LABS:  INR   Date Value Ref Range Status   03/16/2025 1.21 (H) 0.85 - 1.15 Final      Hemoglobin   Date Value Ref Range Status   03/17/2025 9.3 (L) 13.3 - 17.7 g/dL Final   08/13/2020 13.3 13.3 - 17.7 g/dL Final  "  ]  Platelet Count   Date Value Ref Range Status   03/17/2025 195 150 - 450 10e3/uL Final   08/13/2020 251 150 - 450 10e9/L Final     Creatinine   Date Value Ref Range Status   03/17/2025 1.55 (H) 0.67 - 1.17 mg/dL Final   08/13/2020 0.90 0.66 - 1.25 mg/dL Final       EXAM:  /62   Pulse 73   Temp 98.3  F (36.8  C) (Oral)   Resp 13   Ht 1.702 m (5' 7\")   Wt 106.2 kg (234 lb 2.1 oz)   SpO2 93%   BMI 36.67 kg/m        ASSESSMENT/PLAN:   69 year old male with a past medical history significant for hypertension, aortic stenosis, mitral regurgitation, ?multiple sclerosis, obesity, GERD, and hyperlipidemia.Pt admitted with extensive retroperitoneal soft tissue concerning for possible lymphoma, PE, and hypotension in setting of large retroperitoneal hemorrhage  -NO LE DVT present on prior US or today's US  -Pt's Mass is encasing and compressing the IVC, which should act somewhat as a natural IVC filter. Also, given this compression of the IVC, there is no place for a filter to be safely placed  -No plan for IR intervention at this time, IR will sign off    The patient's care was discussed with the Attending Physician, Dr. Regan, who is in agreement with the above assessment and plan    Total time: 60 minutes    Whit Jordan PA-C  Interventional Radiology  [facility] IR PA desk phone *[phone]  Covering IR 7a-3:30pm    "

## 2025-03-17 NOTE — CONSULTS
Care Management Initial Consult    General Information  Assessment completed with: Patient,    Type of CM/SW Visit: Initial Assessment    Primary Care Provider verified and updated as needed: Yes   Readmission within the last 30 days: no previous admission in last 30 days      Reason for Consult: discharge planning  Advance Care Planning: Advance Care Planning Reviewed: no concerns identified          Communication Assessment  Patient's communication style: spoken language (English or Bilingual)    Hearing Difficulty or Deaf: no   Wear Glasses or Blind: yes    Cognitive  Cognitive/Neuro/Behavioral: unchanged from my previous assessment  Level of Consciousness: alert  Arousal Level: opens eyes spontaneously  Orientation: oriented x 4  Mood/Behavior: calm, cooperative, behavior appropriate to situation  Best Language: 0 - No aphasia  Speech: clear, spontaneous, logical    Living Environment:   People in home: alone     Current living Arrangements: house      Able to return to prior arrangements: yes       Family/Social Support:  Care provided by: self  Provides care for: no one  Marital Status:   Support system: Children          Description of Support System: Supportive    Support Assessment: Adequate family and caregiver support    Current Resources:   Patient receiving home care services: No        Community Resources: None  Equipment currently used at home: cane, straight  Supplies currently used at home: None    Employment/Financial:  Employment Status: retired        Financial Concerns: none           Does the patient's insurance plan have a 3 day qualifying hospital stay waiver?  No    Lifestyle & Psychosocial Needs:  Social Drivers of Health     Food Insecurity: Low Risk  (3/14/2025)    Food Insecurity     Within the past 12 months, did you worry that your food would run out before you got money to buy more?: No     Within the past 12 months, did the food you bought just not last and you didn t have  money to get more?: No   Depression: Not at risk (2/14/2025)    PHQ-2     PHQ-2 Score: 0   Housing Stability: Low Risk  (3/14/2025)    Housing Stability     Do you have housing? : Yes     Are you worried about losing your housing?: No   Tobacco Use: Low Risk  (3/4/2025)    Patient History     Smoking Tobacco Use: Never     Smokeless Tobacco Use: Never     Passive Exposure: Not on file   Financial Resource Strain: Low Risk  (3/14/2025)    Financial Resource Strain     Within the past 12 months, have you or your family members you live with been unable to get utilities (heat, electricity) when it was really needed?: No   Alcohol Use: Not on file   Transportation Needs: Low Risk  (3/14/2025)    Transportation Needs     Within the past 12 months, has lack of transportation kept you from medical appointments, getting your medicines, non-medical meetings or appointments, work, or from getting things that you need?: No   Physical Activity: Sufficiently Active (6/2/2024)    Exercise Vital Sign     Days of Exercise per Week: 7 days     Minutes of Exercise per Session: 110 min   Interpersonal Safety: Low Risk  (3/14/2025)    Interpersonal Safety     Do you feel physically and emotionally safe where you currently live?: Yes     Within the past 12 months, have you been hit, slapped, kicked or otherwise physically hurt by someone?: No     Within the past 12 months, have you been humiliated or emotionally abused in other ways by your partner or ex-partner?: No   Stress: No Stress Concern Present (6/2/2024)    [employer] - Occupational Stress Questionnaire     Feeling of Stress : Not at all   Social Connections: Unknown (6/2/2024)    Social Connection and Isolation Panel [NHANES]     Frequency of Communication with Friends and Family: Not on file     Frequency of Social Gatherings with Friends and Family: Patient declined     Attends Temple Services: Not on file     Active Member of Clubs or  Organizations: Not on file     Attends Club or Organization Meetings: Not on file     Marital Status: Not on file   Health Literacy: Not on file       Functional Status:  Prior to admission patient needed assistance:   Dependent ADLs:: Independent  Dependent IADLs:: Independent  Assesssment of Functional Status: Not at baseline with mobility, Not at  functional baseline    Mental Health Status:  Mental Health Status: No Current Concerns       Chemical Dependency Status:  Chemical Dependency Status: No Current Concerns             Values/Beliefs:  Spiritual, Cultural Beliefs, Temple Practices, Values that affect care: no               Discussed  Partnership in Safe Discharge Planning  document with patient/family: No    Additional Information:  Met with patient in room, introduced self and role in discharge planning.     Patient reports he lives independently in a house. He is ind with adls and iadls at baseline. Patient is retired however has a daughter that will be able to drive him if needed. Patient has been managing house chores at this time. .  Patient was admitted with    SOB,abdominal bloating, there was concern for constipation as well as possible cardiac etiology. .  Patient is on oxygen at this time and noted wheezing. States he would feel better after they remove fluid off .       Would benefit from home care at discharge.     Angy Mckeon RN  [phone]

## 2025-03-17 NOTE — PROVIDER NOTIFICATION
"Paged on-call hospitalist Eddie:    \"Lactic 3.3; had a unit of blood completed at 2035, is next recheck okay to stay at 0000 or do you want it drawn sooner?\"    Response:  500ml bolus over 2h and timed Hgb draw ordered.  "

## 2025-03-17 NOTE — PROVIDER NOTIFICATION
"Paged on-call hospitalist Eddie:    \"Patient asked if it would be okay to use a Halls menthol cough drop his daughter brought to help his throat.\"    Response:  Okay to use, no order needed.  "

## 2025-03-17 NOTE — PROGRESS NOTES
House ESTELLE brief follow up:     Was asked by colleague, PERLA Robert CNP, to follow on repeat labs; please refer to Jakob's initial RRT note for further details.    Recent Labs   Lab 03/16/25  2238 03/16/25  1438 03/15/25  0939   HGB 10.1* 9.1* 13.3     Recent Labs   Lab 03/16/25 2055 03/13/25  2200   INR 1.21* 1.15     Recent Labs   Lab 03/16/25  2336 03/16/25 2055 03/13/25  2200   LACT 2.3* 3.3* 1.2     I shaan   4.4    Magnesium   <0.2    Patient has received 2 units of red cells.  He has a 500 mL bolus running elevated lactic acid ordered by cross covering hospitalist.  Will place orders for repeat lactic acid 0200.  Is due to receive a total of 8 g magnesium the course of the night with repeat check around 6 AM this morning.    On exam patient is alert and oriented in no acute distress.  He has an upper airway wheeze otherwise lung sounds clear.  Abdomen is distended and taut (daughter says this is improved from prior to paracentesis).  Patient denies back pain.  He reports his left lower extremity pain is significantly improved since his transfer to [facility].  Skin is warm and dry.  VSS.      Continue previously outlined plan of care.        Addendum: At 00: 3 to notified by nursing staff a PSVT 9-10 beat run while patient was sleeping and asymptomatic.  BP remains stable and will continue to monitor while replating electrolytes.    Rohini Kenny NP  [facility]  Securely message with the Vocera Web Console (learn more here)  Text page via VSS Monitoring Paging/Directory    No charge note.

## 2025-03-17 NOTE — PLAN OF CARE
Orientations: A&O x4  Vitals/Pain: VSS, pt denies pain, on RA  Tele: NSR  Lines/Drains: PIV R AC, PIV L forearm.   Skin/Wounds: L biopsy site on flank, R paracentesis site.  GI/: Continent of bowel and bladder. No BM this shift. 2g Na+ diet.   Labs: Abnormal/Trends, Electrolyte Replacement- Protocols ran, no replacements needed, recheck in the AM. Hgb 9.3, recheck 9.9.  Ambulation/Assist: A1  GBW  Sleep Quality: Resting between cares, patient reported little sleep last night.  Plan: Bone marrow biopsy tomorrow, possible paracentesis tomorrow, surgical pathology pending, increase OOB activity, monitor hemoglobin levels.

## 2025-03-17 NOTE — PROGRESS NOTES
Hematology/Oncology Follow-up Note  [facility]    Date of Admission:  3/14/2025   Reason for Consult: retroperitoneal adenopathy      ASSESSMENT : Bhvaesh Landeros is a 69 year old year old male who presented this hospitalization with shortness of breath and abdominal bloating. Found to have significant ascites, PE, and extensive retroperitoneal soft tissue concerning for malignancy.   Retroperitoneal adenopathy  PE  Ascites    PLAN:  Bone marrow biopsy to be performed at the bedside tomorrow 03/18/2025 at 0900. Please obtain CBC early AM so this is final prior to biopsy. Patient does not need to remain NPO for procedure.   Patient feeling bloated in abdomen today, would benefit from repeat thoracentesis   Surgical pathology still pending. Likely this is lymphoma and would benefit from cycle 1 therapy inpatient. Recommend to stay inpatient for final pathology, patient is agreeable.   Agree to continue to hold anticoagulation, if hemoglobin remains stable could start on low dose heparin drip tomorrow.   Hem/Onc will follow     PE  Hematoma   Likely related to new malignancy. Would indicate life-long anticoagulation needs  03/13/2025 CT CAP Small volume of right lower lobe pulmonary embolus without right heart strain.   03/13/2025 BLE US negative for DVT  03/17/2025 BLE US negative for DVT  Heparin gtt initiated on admission. Later discontinued on 03/16/2025 d/t hematoma concerns from biopsy as described below.   03/16/2025 CT AP showing New large left-sided retroperitoneal hematoma with intramuscular component.   Currently not on anticoagulation, IVC filter not being placed as of now- IVC thrombus is compressed and DVT negative     Retroperitoneal adenopathy  Ascites  03/13/2025 CT CAP Extensive confluent retroperitoneal soft tissue as described most compatible with lymphoma.  Likely associated significant compression of the IVC, not optimally assessed. Moderately large volume of abdominal and pelvic  ascites.  03/14/2025 CT guided retroperitoneal lymph node biopsy- pending   03/16/2025 MR lumbar spine, New partially visualized large fluid collection in the left psoas muscle measures at least 2.5 x 4.6 x 14.6 cm and is concerning for hemorrhage   03/15/2025 US paracentesis 4.7 L removed - cytology pending       EXAM:  Subjective  Denies fever, chills, night sweats. Denies nausea, vomiting, diarrhea, constipation. Denies lightheaded or dizziness.Denies headache, fatigue, lack of appetite. Denies bleeding, bruising, rashes.   Shortness of breath and some wheezing. Feels abdomen is bigger today. Numbness/ tingling in LLE form hematoma still present.     Objective  GENERAL/CONSTITUTIONAL: No acute distress.  NEUROLOGIC: Alert, oriented, answers questions appropriately.   INTEGUMENTARY: No rashes or jaundice.       Labs Reviewed: CBC, CMP, labs as above   Imaging Reviewed: as above      40 minutes spent on the date of the encounter doing review of outside records, review of test results, interpretation of tests,  implementing/ creating plan, coordinating care, and documentation     Malu DUNCAN CNP  Hematology/Oncology  [facility]   Securely message with [employer] [place]

## 2025-03-18 ENCOUNTER — APPOINTMENT (OUTPATIENT)
Dept: ULTRASOUND IMAGING | Facility: CLINIC | Age: 70
DRG: 823 | End: 2025-03-18
Attending: INTERNAL MEDICINE
Payer: COMMERCIAL

## 2025-03-18 ENCOUNTER — PATIENT OUTREACH (OUTPATIENT)
Dept: ONCOLOGY | Facility: CLINIC | Age: 70
End: 2025-03-18
Payer: COMMERCIAL

## 2025-03-18 PROBLEM — C82.18 GRADE 2 FOLLICULAR LYMPHOMA OF LYMPH NODES OF MULTIPLE REGIONS (H): Status: ACTIVE | Noted: 2025-03-18

## 2025-03-18 LAB
% LINING CELLS, BODY FLUID: 4 %
ANION GAP SERPL CALCULATED.3IONS-SCNC: 9 MMOL/L (ref 7–15)
APPEARANCE FLD: ABNORMAL
BASOPHILS # BLD AUTO: 0.1 10E3/UL (ref 0–0.2)
BASOPHILS NFR BLD AUTO: 1 %
BUN SERPL-MCNC: 23.9 MG/DL (ref 8–23)
CALCIUM SERPL-MCNC: 8.2 MG/DL (ref 8.8–10.4)
CHLORIDE SERPL-SCNC: 102 MMOL/L (ref 98–107)
COLOR FLD: ABNORMAL
CREAT SERPL-MCNC: 1.05 MG/DL (ref 0.67–1.17)
EGFRCR SERPLBLD CKD-EPI 2021: 76 ML/MIN/1.73M2
EOSINOPHIL # BLD AUTO: 0.4 10E3/UL (ref 0–0.7)
EOSINOPHIL NFR BLD AUTO: 5 %
ERYTHROCYTE [DISTWIDTH] IN BLOOD BY AUTOMATED COUNT: 14 % (ref 10–15)
ERYTHROCYTE [DISTWIDTH] IN BLOOD BY AUTOMATED COUNT: 14.2 % (ref 10–15)
GLUCOSE SERPL-MCNC: 111 MG/DL (ref 70–99)
HCO3 SERPL-SCNC: 26 MMOL/L (ref 22–29)
HCT VFR BLD AUTO: 27 % (ref 40–53)
HCT VFR BLD AUTO: 30 % (ref 40–53)
HGB BLD-MCNC: 10.1 G/DL (ref 13.3–17.7)
HGB BLD-MCNC: 9.1 G/DL (ref 13.3–17.7)
HGB BLD-MCNC: 9.7 G/DL (ref 13.3–17.7)
IMM GRANULOCYTES # BLD: 0.1 10E3/UL
IMM GRANULOCYTES NFR BLD: 1 %
LYMPHOCYTES # BLD AUTO: 1.1 10E3/UL (ref 0.8–5.3)
LYMPHOCYTES NFR BLD AUTO: 16 %
LYMPHOCYTES NFR FLD MANUAL: 90 %
MAGNESIUM SERPL-MCNC: 2.9 MG/DL (ref 1.7–2.3)
MCH RBC QN AUTO: 29 PG (ref 26.5–33)
MCH RBC QN AUTO: 29.1 PG (ref 26.5–33)
MCHC RBC AUTO-ENTMCNC: 33.7 G/DL (ref 31.5–36.5)
MCHC RBC AUTO-ENTMCNC: 33.7 G/DL (ref 31.5–36.5)
MCV RBC AUTO: 86 FL (ref 78–100)
MCV RBC AUTO: 86 FL (ref 78–100)
MONOCYTES # BLD AUTO: 0.8 10E3/UL (ref 0–1.3)
MONOCYTES NFR BLD AUTO: 11 %
MONOS+MACROS NFR FLD MANUAL: 4 %
NEUTROPHILS # BLD AUTO: 4.8 10E3/UL (ref 1.6–8.3)
NEUTROPHILS # FLD: 64.6 /UL
NEUTROPHILS NFR BLD AUTO: 66 %
NEUTS BAND NFR FLD MANUAL: 2 %
NRBC # BLD AUTO: 0 10E3/UL
NRBC BLD AUTO-RTO: 0 /100
PATH REV: NORMAL
PHOSPHATE SERPL-MCNC: 3 MG/DL (ref 2.5–4.5)
PLATELET # BLD AUTO: 183 10E3/UL (ref 150–450)
PLATELET # BLD AUTO: 207 10E3/UL (ref 150–450)
POTASSIUM SERPL-SCNC: 4.3 MMOL/L (ref 3.4–5.3)
RBC # BLD AUTO: 3.13 10E6/UL (ref 4.4–5.9)
RBC # BLD AUTO: 3.48 10E6/UL (ref 4.4–5.9)
RETICS # AUTO: 0.08 10E6/UL (ref 0.03–0.1)
RETICS/RBC NFR AUTO: 2.2 % (ref 0.5–2)
SODIUM SERPL-SCNC: 137 MMOL/L (ref 135–145)
SPECIMEN SOURCE FLD: ABNORMAL
UFH PPP CHRO-ACNC: 0.3 IU/ML
WBC # BLD AUTO: 6.3 10E3/UL (ref 4–11)
WBC # BLD AUTO: 6.9 10E3/UL (ref 4–11)
WBC # BLD AUTO: 7.2 10E3/UL (ref 4–11)
WBC # FLD AUTO: 3231 /UL

## 2025-03-18 PROCEDURE — 88185 FLOWCYTOMETRY/TC ADD-ON: CPT | Performed by: INTERNAL MEDICINE

## 2025-03-18 PROCEDURE — 250N000011 HC RX IP 250 OP 636: Performed by: INTERNAL MEDICINE

## 2025-03-18 PROCEDURE — 250N000013 HC RX MED GY IP 250 OP 250 PS 637: Performed by: INTERNAL MEDICINE

## 2025-03-18 PROCEDURE — 88188 FLOWCYTOMETRY/READ 9-15: CPT | Mod: GC | Performed by: STUDENT IN AN ORGANIZED HEALTH CARE EDUCATION/TRAINING PROGRAM

## 2025-03-18 PROCEDURE — 99233 SBSQ HOSP IP/OBS HIGH 50: CPT | Mod: FS | Performed by: INTERNAL MEDICINE

## 2025-03-18 PROCEDURE — 49083 ABD PARACENTESIS W/IMAGING: CPT

## 2025-03-18 PROCEDURE — 88264 CHROMOSOME ANALYSIS 20-25: CPT | Performed by: INTERNAL MEDICINE

## 2025-03-18 PROCEDURE — 88271 CYTOGENETICS DNA PROBE: CPT | Performed by: INTERNAL MEDICINE

## 2025-03-18 PROCEDURE — 250N000013 HC RX MED GY IP 250 OP 250 PS 637: Performed by: PHYSICIAN ASSISTANT

## 2025-03-18 PROCEDURE — 87389 HIV-1 AG W/HIV-1&-2 AB AG IA: CPT

## 2025-03-18 PROCEDURE — 272N000706 US PARACENTESIS WITHOUT ALBUMIN

## 2025-03-18 PROCEDURE — 120N000013 HC R&B IMCU

## 2025-03-18 PROCEDURE — 85520 HEPARIN ASSAY: CPT | Performed by: INTERNAL MEDICINE

## 2025-03-18 PROCEDURE — 99233 SBSQ HOSP IP/OBS HIGH 50: CPT | Performed by: INTERNAL MEDICINE

## 2025-03-18 PROCEDURE — 258N000003 HC RX IP 258 OP 636: Performed by: INTERNAL MEDICINE

## 2025-03-18 PROCEDURE — 36415 COLL VENOUS BLD VENIPUNCTURE: CPT | Performed by: INTERNAL MEDICINE

## 2025-03-18 PROCEDURE — 88305 TISSUE EXAM BY PATHOLOGIST: CPT | Mod: TC | Performed by: INTERNAL MEDICINE

## 2025-03-18 PROCEDURE — 83735 ASSAY OF MAGNESIUM: CPT | Performed by: INTERNAL MEDICINE

## 2025-03-18 PROCEDURE — 85045 AUTOMATED RETICULOCYTE COUNT: CPT | Performed by: HOSPITALIST

## 2025-03-18 PROCEDURE — 87340 HEPATITIS B SURFACE AG IA: CPT

## 2025-03-18 PROCEDURE — 85018 HEMOGLOBIN: CPT | Performed by: INTERNAL MEDICINE

## 2025-03-18 PROCEDURE — 80048 BASIC METABOLIC PNL TOTAL CA: CPT | Performed by: INTERNAL MEDICINE

## 2025-03-18 PROCEDURE — 88237 TISSUE CULTURE BONE MARROW: CPT | Performed by: INTERNAL MEDICINE

## 2025-03-18 PROCEDURE — 88342 IMHCHEM/IMCYTCHM 1ST ANTB: CPT | Mod: TC | Performed by: INTERNAL MEDICINE

## 2025-03-18 PROCEDURE — 0W9G3ZZ DRAINAGE OF PERITONEAL CAVITY, PERCUTANEOUS APPROACH: ICD-10-PCS | Performed by: HOSPITALIST

## 2025-03-18 PROCEDURE — 84132 ASSAY OF SERUM POTASSIUM: CPT | Performed by: INTERNAL MEDICINE

## 2025-03-18 PROCEDURE — 88184 FLOWCYTOMETRY/ TC 1 MARKER: CPT | Performed by: INTERNAL MEDICINE

## 2025-03-18 PROCEDURE — 88291 CYTO/MOLECULAR REPORT: CPT | Performed by: MEDICAL GENETICS

## 2025-03-18 PROCEDURE — 07DR3ZX EXTRACTION OF ILIAC BONE MARROW, PERCUTANEOUS APPROACH, DIAGNOSTIC: ICD-10-PCS

## 2025-03-18 PROCEDURE — 85025 COMPLETE CBC W/AUTO DIFF WBC: CPT | Performed by: INTERNAL MEDICINE

## 2025-03-18 PROCEDURE — 99418 PROLNG IP/OBS E/M EA 15 MIN: CPT | Mod: FS | Performed by: INTERNAL MEDICINE

## 2025-03-18 PROCEDURE — 250N000009 HC RX 250: Performed by: HOSPITALIST

## 2025-03-18 PROCEDURE — 84100 ASSAY OF PHOSPHORUS: CPT | Performed by: INTERNAL MEDICINE

## 2025-03-18 PROCEDURE — 88368 INSITU HYBRIDIZATION MANUAL: CPT | Mod: 26 | Performed by: MEDICAL GENETICS

## 2025-03-18 PROCEDURE — 86704 HEP B CORE ANTIBODY TOTAL: CPT

## 2025-03-18 PROCEDURE — 86803 HEPATITIS C AB TEST: CPT

## 2025-03-18 RX ORDER — LIDOCAINE 40 MG/G
CREAM TOPICAL
Status: DISCONTINUED | OUTPATIENT
Start: 2025-03-18 | End: 2025-03-23

## 2025-03-18 RX ORDER — ALLOPURINOL 100 MG/1
100 TABLET ORAL DAILY
Status: DISCONTINUED | OUTPATIENT
Start: 2025-03-18 | End: 2025-03-21

## 2025-03-18 RX ORDER — HEPARIN SODIUM 10000 [USP'U]/100ML
0-5000 INJECTION, SOLUTION INTRAVENOUS CONTINUOUS
Status: DISCONTINUED | OUTPATIENT
Start: 2025-03-18 | End: 2025-03-20

## 2025-03-18 RX ORDER — LIDOCAINE HYDROCHLORIDE 10 MG/ML
10 INJECTION, SOLUTION EPIDURAL; INFILTRATION; INTRACAUDAL; PERINEURAL ONCE
Status: COMPLETED | OUTPATIENT
Start: 2025-03-18 | End: 2025-03-18

## 2025-03-18 RX ADMIN — GABAPENTIN 100 MG: 100 CAPSULE ORAL at 16:54

## 2025-03-18 RX ADMIN — SODIUM CHLORIDE: 0.9 INJECTION, SOLUTION INTRAVENOUS at 02:34

## 2025-03-18 RX ADMIN — Medication 1 MG: at 22:10

## 2025-03-18 RX ADMIN — PANTOPRAZOLE SODIUM 40 MG: 40 TABLET, DELAYED RELEASE ORAL at 08:04

## 2025-03-18 RX ADMIN — Medication 17 G: at 08:08

## 2025-03-18 RX ADMIN — ALLOPURINOL 100 MG: 100 TABLET ORAL at 13:07

## 2025-03-18 RX ADMIN — SENNOSIDES AND DOCUSATE SODIUM 1 TABLET: 50; 8.6 TABLET ORAL at 08:03

## 2025-03-18 RX ADMIN — LIDOCAINE HYDROCHLORIDE ANHYDROUS 10 ML: 10 INJECTION, SOLUTION INFILTRATION at 10:53

## 2025-03-18 RX ADMIN — GABAPENTIN 100 MG: 100 CAPSULE ORAL at 21:11

## 2025-03-18 RX ADMIN — GABAPENTIN 100 MG: 100 CAPSULE ORAL at 08:03

## 2025-03-18 RX ADMIN — HEPARIN SODIUM 1800 UNITS/HR: 10000 INJECTION, SOLUTION INTRAVENOUS at 14:04

## 2025-03-18 ASSESSMENT — ACTIVITIES OF DAILY LIVING (ADL)
ADLS_ACUITY_SCORE: 39
ADLS_ACUITY_SCORE: 38
ADLS_ACUITY_SCORE: 38
ADLS_ACUITY_SCORE: 39
ADLS_ACUITY_SCORE: 38
ADLS_ACUITY_SCORE: 39
ADLS_ACUITY_SCORE: 38
ADLS_ACUITY_SCORE: 39
ADLS_ACUITY_SCORE: 38
ADLS_ACUITY_SCORE: 39
ADLS_ACUITY_SCORE: 39
ADLS_ACUITY_SCORE: 38
ADLS_ACUITY_SCORE: 38
ADLS_ACUITY_SCORE: 39
ADLS_ACUITY_SCORE: 39
ADLS_ACUITY_SCORE: 38
ADLS_ACUITY_SCORE: 39
ADLS_ACUITY_SCORE: 38

## 2025-03-18 NOTE — PROGRESS NOTES
Discussed with Dr. Delaney.  Pathology came back positive for low-grade lymphoma, plan for outpatient follow-up.  Bone marrow biopsy completed this morning.  Once paracentesis is completed we will start him on heparin drip without bolus.  If stable on heparin drip, transition to Eliquis on the morning of 3/19 and if hemoglobin stable and no evidence of recurrent bleed, home later tomorrow or 3/20

## 2025-03-18 NOTE — PROGRESS NOTES
New Patient Oncology Nurse Navigator Note     Referral Received: 03/18/25      Referring provider: Malu Oneill APRN CNP     Referring Clinic/Organization: [facility]     Referred to: Malignant Hematology    Requested provider (if applicable): Dr. 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 Found to have significant ascites, PE, and extensive retroperitoneal soft tissue concerning for malignancy.   Retroperitoneal adenopathy  PE  Ascites     PLAN:  Bone marrow biopsy completed this morning.  Pathology showing follicular lymphoma, treatment will be outpatient. Hopefully to start early next week.  Will contact [place] oncology group to coordinate follow up, treatment, and outpatient paracentesis   Likely patient will need weekly paracentesis   Plan to resume heparin gtt today and will plan to discharge on DOAC, will need lifelong therapy.   Hem/Onc will follow   Follicular Lymphoma grade 1-2   Ascites  03/13/2025 CT CAP Extensive confluent retroperitoneal soft tissue as described most compatible with lymphoma.  Likely associated significant compression of the IVC, not optimally assessed. Moderately large volume of abdominal and pelvic ascites.  03/14/2025 CT guided retroperitoneal lymph node biopsy showing follicular lymphoma grade 1-2   03/15/2025 US paracentesis 4.7 L removed - cytology pending   03/18/2025 US paracentesis 2.7 L removed   [facility]     Additional testing needed prior to consult:     N/A    Referral updates and Plan:     03/18/2025 3:32 PM -  Referral received and reviewed. Message sent to Dr. Serra for timing.      [place]  MARK De AndaN, RN  Hematology/Oncology Nurse Navigator  [facility]  [phone] / [phone]

## 2025-03-18 NOTE — PLAN OF CARE
Orientations: A&O x4  Vitals/Pain: VSS on RA, lung sounds wheezy on expiration.   Tele: SR  Lines/Drains: PIV  R AC saline locked, PIV L forearm infusing heparin 1,800 Units/hr.   Skin/Wounds: L flank biopsy site, R paracentesis site.  GI/: Continent of bowel and bladder. 2g Na+ diet. 1 BM today.   Labs: Abnormal/Trends, Electrolyte Replacement- Hgb 9.8, Mg 2.9, K+ 4.3, protocols ran, no replacements, recheck in AM.   Ambulation/Assist: SBA gait belt walker  Sleep Quality: Pt sleeping between cares.  Plan: Patient transferring to station 88. Report given to receiving RN, pt and family updated, all questions and concerns answered. Hgb recheck at 2000. If stable, transition to DOAC tomorrow. Possible discharge tomorrow. Lymphoma follow up outpatient.

## 2025-03-18 NOTE — PROGRESS NOTES
[facility]    Medicine Progress Note - Hospitalist Service        Date of Admission:  3/14/2025  8:32 AM    Assessment & Plan:   Bhavesh Landeros is a 69 year old male medical history significant for hypertension, aortic stenosis, mitral regurgitation, ?multiple sclerosis, obesity, GERD, hyperlipidemia,  who was directly admitted from [facility] ED with worsening abdominal bloating and distention and found to have extensive retroperitoneal soft tissue concerning for possible lymphoma with extension to adjacent mesenteric and pancreatic structures/vasculature and associated significant compression of IVC, significant abdominal and pelvic ascites, and acute RLL pulmonary embolism.    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  Symptomatic moderately large volume ascites.  Peripheral Edema  Splenomegaly  *Presented to [facility] ED with worsening abd bloating/distention.   *CT PE revealed extensive confluent retroperitoneal soft tissue most compatible with lymphoma, likely associated significant compression of the IVC and moderately large volume of abdominal and pelvic ascites.  -Transferred to [facility] for Oncology workup.  -S/p CT-guided retroperitoneal lymphadenopathy biopsy with IR on 3/40/25.  Hospital course complicated by retroperitoneal hemorrhage following this.  Please see discussion below.  -Pathology came back positive for follicle lymphoma grade 1-2  -[place] [facility] following, plan for bone marrow biopsy today, 3/18/2025 for staging  -S/p diagnostic and therapeutic paracentesis on 3/15/2025 with removal of 4.7 L fluid, serum albumin gradient is less than 1, patient does appear to have significant residual ascites, will order another therapeutic paracentesis today.  -Hold PTA diuretics for now  -Supportive care  -Compression stockings    Acute RLL pulmonary embolism  -Had some shortness of breath and dyspnea on exertion in recent weeks,   -CT PE study revealed  right lower lobe pulmonary embolism, no right heart strain on imaging.   -Venous Doppler negative for DVT  -Initially started on heparin drip.  Patient reported new and worsening pain of the left groin area on 3/16.  Workup revealed new retroperitoneal hemorrhage.  Heparin drip discontinued on 3/16 and has been off heparin since then.  -Repeat venous Doppler on 3/17 is negative for DVT  -IR consulted, he does have compressed IVC from the mass and therefore no place for a filter to be placed safely.  Radiology does not recommend temporary filter  -Reviewed oncology notes, they recommend rechallenging him with heparin drip later tonight after bone marrow biopsy is done.  -Will need to monitor him closely in the hospital and heparin drip was resumed to make sure that he does not have worsening retroperitoneal hemorrhage.  If he is stable on heparin drip for 24 hours then could be switched to DOAC    Large left retroperitoneal hemorrhage  Acute blood loss anemia due to #1 above  -Patient complained of intractable pain since 3/15 evening, multiple imaging including MRI and CTA shows large left retroperitoneal hemorrhage measuring up to 16 cm in length.  Most likely this was bleeding from the lymph node biopsy site on 3/14/2025 as he was concurrently on anticoagulation.  -Hemoglobin dropped from 13.3 at presentation to 9.3   -He is s/p 2 units of PRBC on 3/16  -Hemoglobin subsequently has remained stable around 10 and pain has significantly improved  -Anticoagulation remains on hold however plans for resumption later tonight after bone marrow biopsy is completed  -Monitor clinically for worsening pain symptoms and hemoglobin after initiation of heparin drip later tonight  -She remains stable on heparin drip in the next 24 hours then transition to DOAC    Acute kidney injury  -Creatinine bumped up overnight to 1.55, this is likely multifactorial as he was hypotensive with blood loss, received contrast  -Improved with IV  hydration.  Discontinue IV fluids.  Consider albumin after paracentesis today.  Encourage oral intake    Degenerative disc disease  -Patient reported intractable left lower back/groin and thigh pain on 3/16 with radicular symptoms.  -MRI of the lumbar spine shows multilevel disc bulges with bilateral extraforaminal nerve root compression at multiple levels.  Evaluated by neurosurgery who felt that these are all chronic changes with no acute intervention needed at this point  -Pain control as needed with Tylenol, Neurontin, oxycodone and IV Dilaudid    URI symptoms  Dry cough, coryza, intermittent wheezing for 1 week PTA. No fevers or chills. PE as above.  -COVID-19, influenza A and B and RSV negative  -Respiratory symptoms much improved after paracentesis, suspect this was mechanical  -Supportive care  -Nebs PRN  -PRN guaifenesin and tessalon pearles     Slow transit constipation  Likely worsened by above.  -Bowel regimen    History of mild aortic stenosis and mitral regurgitation  HTN  -Echocardiogram is fairly unremarkable  -Losartan held due to soft blood pressure after retroperitoneal hemorrhage and acute kidney injury.  Consider resuming losartan in the next day or 2  -Discontinue prior to admission aspirin as he will be on anticoagulation now    Chronic stable diagnoses and other pertinent medical history:  GERD  Obesity class II  HLD  Hiatal hernia  Recurrent abdominal hernia  Multiple sclerosis?      Diet: 2 Gram Sodium Diet     DVT Prophylaxis: PCDs  Pablo Catheter: Not present  Code Status: Full Code     Disposition Plan      Expected Discharge Date: 03/21/2025      Destination: home        Entered: Efrain Velasquez MD 03/18/2025, 9:31 AM      Medically Ready for Discharge: Anticipated in 2-4 Days pending bone marrow biopsy today, reinitiation of anticoagulation later tonight and tolerance to anticoagulation    Clinically Significant Risk Factors             # Hypomagnesemia: Lowest Mg = 0.18 mg/dL in  "last 2 days, will replace as needed    # Coagulation Defect: INR = 1.21 (Ref range: 0.85 - 1.15) and/or PTT = N/A, will monitor for bleeding   # Acute Kidney Injury, unspecified: based on a >150% or 0.3 mg/dL increase in last creatinine compared to past 90 day average, will monitor renal function  # Hypertension: Noted on problem list            # Obesity: Estimated body mass index is 36.67 kg/m  as calculated from the following:    Height as of this encounter: 1.702 m (5' 7\").    Weight as of this encounter: 106.2 kg (234 lb 2.1 oz).      # Financial/Environmental Concerns: none            The patient's care was discussed with the Bedside Nurse and Patient.    Medical Decision Making       **CLEAR ALL SELECTIONS**      Labs/Imaging Reviewed:  See Information above and Data section below  Time SPENT BY ME on the date of service doing chart review, history, exam, documentation & further activities per the note:  50 MINUTES    Chart documentation was completed, in part, with [employer] voice-recognition software. Even though reviewed, some grammatical, spelling, and word errors may remain.    Efrain Velasqeuz MD  Hospitalist Service  [facility]  Text Page 7AM-6PM  Securely message with the [employer] Web Console (learn more here)  Text page via [employer] Paging/Directory    ______________________________________________________________________    Interval History   Left groin/hip area pain continues to improve.  Hemoglobin has been stable.  Plan for bone marrow biopsy and second therapeutic paracentesis today.  Respiratory status is stable.    Data reviewed today: I reviewed all medications, new labs and imaging results over the last 24 hours. I personally reviewed no images or EKG's today.    Physical Exam   Vital signs:  Temp: 98  F (36.7  C) Temp src: Axillary BP: 127/68 Pulse: 80   Resp: 19 SpO2: 94 % O2 Device: Nasal cannula Oxygen Delivery: 2 LPM Height: 170.2 cm (5' 7\") Weight: 106.2 kg (234 lb " "2.1 oz)  Estimated body mass index is 36.67 kg/m  as calculated from the following:    Height as of this encounter: 1.702 m (5' 7\").    Weight as of this encounter: 106.2 kg (234 lb 2.1 oz).      Wt Readings from Last 2 Encounters:   03/16/25 106.2 kg (234 lb 2.1 oz)   03/14/25 108.4 kg (239 lb)       Gen: AAOX3, NAD, comfortable  HEENT: Supple neck, moist oral mucosa, no pallor  Resp: Diminished air entry bilaterally, scattered wheezes  CVS: RRR, no murmur  Abd/GI: Soft, distended, no obvious focal tenderness  Skin: Warm, dry no rashes  MSK: 2-3+ pedal edema  Neuro- CN- intact.  Range of motion of the left hip is much better today.     Data   Recent Labs   Lab 03/18/25  0851 03/17/25  1823 03/17/25  1149 03/17/25  0631 03/16/25  2238 03/16/25  2123 03/16/25  2055 03/16/25  1438 03/15/25  0939 03/14/25  1038 03/14/25  0019 03/13/25  2200   WBC 6.9  --   --  8.8  --   --   --   --  4.5 5.6   < > 6.1   HGB 10.1* 9.8* 9.9* 9.3*   < >  --   --  9.1* 13.3 13.4   < > 13.3   MCV 86  --   --  85  --   --   --   --  86 84   < > 85     --   --  195  --   --   --  223 168 172   < > 185   INR  --   --   --   --   --   --  1.21*  --   --   --   --  1.15   NA  --   --   --  137  --   --   --  136 139 138  --  137   POTASSIUM  --   --   --  4.3  --   --   --  4.3  4.3 4.3 4.1  --  4.1   CHLORIDE  --   --   --  103  --   --   --  104 105 106  --  104   CO2  --   --   --  24  --   --   --  22 24 23  --  21*   BUN  --   --   --  24.7*  --   --   --  18.7 18.8 20.5  --  23.4*   CR  --   --   --  1.55*  --   --   --  0.91 0.87 0.88  --  0.93   ANIONGAP  --   --   --  10  --   --   --  10 10 9  --  12   SHANNON  --   --   --  8.3*  --   --   --  8.3* 8.7* 8.4*  --  8.3*   GLC  --   --   --  108*  --  130*  --  173* 106* 101*  --  103*   ALBUMIN  --   --   --   --   --   --   --   --  3.7 3.8   < > 3.9   PROTTOTAL  --   --   --   --   --   --   --   --  6.1* 6.3*  --  6.2*   BILITOTAL  --   --   --   --   --   --   --   --  0.4 0.4  " --  0.3   ALKPHOS  --   --   --   --   --   --   --   --  69 75  --  68   ALT  --   --   --   --   --   --   --   --  21 21  --  18   AST  --   --   --   --   --   --   --   --  31 31  --  27   LIPASE  --   --   --   --   --   --   --   --   --   --   --  29    < > = values in this interval not displayed.       No results found for this or any previous visit (from the past 24 hours).    Medications   Current Facility-Administered Medications   Medication Dose Route Frequency Provider Last Rate Last Admin    Patient is already receiving anticoagulation with heparin, enoxaparin (LOVENOX), warfarin (COUMADIN)  or other anticoagulant medication   Does not apply Continuous PRN Elmira Pena PA-C         Current Facility-Administered Medications   Medication Dose Route Frequency Provider Last Rate Last Admin    [Held by provider] aspirin (ASA) chewable tablet 81 mg  81 mg Oral Daily Elmira Pena PA-C        gabapentin (NEURONTIN) capsule 100 mg  100 mg Oral TID Efrain Velasquez MD   100 mg at 03/18/25 0803    lidocaine 1 % 5 mL  5 mL Intradermal Once Efrain Velasquez MD        [Held by provider] losartan (COZAAR) tablet 50 mg  50 mg Oral Daily Efrain Velasquez MD   50 mg at 03/16/25 1355    pantoprazole (PROTONIX) EC tablet 40 mg  40 mg Oral Daily Elmira Pena PA-C   40 mg at 03/18/25 0804    polyethylene glycol (MIRALAX) powder 17 g  17 g Oral Daily Elmira Pena PA-C   17 g at 03/18/25 0808    senna-docusate (SENOKOT-S/PERICOLACE) 8.6-50 MG per tablet 1 tablet  1 tablet Oral BID Elmira Pena PA-C   1 tablet at 03/18/25 0803    Or    senna-docusate (SENOKOT-S/PERICOLACE) 8.6-50 MG per tablet 2 tablet  2 tablet Oral BID Elmira Pena PA-C   2 tablet at 03/17/25 0852    sodium chloride (PF) 0.9% PF flush 3 mL  3 mL Intracatheter Q8H Efrain Velasquez MD   3 mL at 03/18/25 0837

## 2025-03-18 NOTE — PLAN OF CARE
Goal Outcome Evaluation:       0700-1900  Orientation: AOx4  Aggression Stop Light: green  Activity: IND/SBA  Diet/BS Checks: 2gNA  Tele:  NSR  IV Access/Drains: PIV SL. Other PIV infusing Heparin gtt at 1800. Next recheck is 1944  Pain Management: denies  Abnormal VS/Results: hgb 9.1  Bowel/Bladder: cont. No bm  Skin/Wounds: para site and biopsy site CDI. LE edema bilat  Consults: hemOnc,  D/C Disposition: pending  Other Info: Plan for inpatient chemo tomorrow (Rituxan and Bendamustine)

## 2025-03-18 NOTE — PROGRESS NOTES
"Adult Bone Marrow Biopsy Procedure Note  March 18, 2025  /68   Pulse 80   Temp 98  F (36.7  C) (Axillary)   Resp 19   Ht 1.702 m (5' 7\")   Wt 106.2 kg (234 lb 2.1 oz)   SpO2 94%   BMI 36.67 kg/m       ** Please have patient on back until 10:00, dressing can be removed 03/19/2025 at 10:00**    DIAGNOSIS: follicular lymphoma      PROCEDURE: Unilateral Bone Marrow Biopsy and Unilateral Aspirate    LOCATION: [facility]     Patient s identification was positively verified by verbal identification and invasive procedure safety checklist was completed. Informed consent was obtained. Patient was placed in the prone position and prepped and draped in a sterile manner. Approximately 10 cc of 1% Lidocaine was used over the right posterior iliac spine. Following this a 3 mm incision was made. Trephine bone marrow core(s) was (were) obtained from the [facility]. Bone marrow aspirates were obtained from the [facility]. Aspirates were sent for morphology, immunophenotyping, and cytogenetics. A total of approximately 25 ml of marrow was aspirated. Following this procedure a sterile dressing was applied to the bone marrow biopsy site(s). The patient was placed in the supine position to maintain pressure on the biopsy site. Post-procedure wound care instructions were given.     Complications: NO    Pre-procedural pain: 0 out of 10 on the numeric pain rating scale.     Procedural pain: 0 out of 10 on the numeric pain rating scale.     Post-procedural pain assessment: 0 out of 10 on the numeric pain rating scale.     Interventions: NO    Length of procedure:21 minutes to 45 minutes    Procedure performed by: Malu DUNCAN CNP    "

## 2025-03-18 NOTE — PLAN OF CARE
Goal Outcome Evaluation:  1900-0730       Pt is alert, oriented X 4. AVSS on RA. SOB, PHELAN, LS expiratory wheezes. Tele is SR. Up with A1 GBW. +abdominal distention. PIV running with NS X 75 mL/hr. Tolerating clear liquid diet. Plan for bone marrow biopsy, paracentesis today. Continue to monitor.

## 2025-03-18 NOTE — PROGRESS NOTES
Hematology/Oncology Follow-up Note  [facility]    Date of Admission:  3/14/2025   Reason for Consult: retroperitoneal adenopathy      ASSESSMENT : Bhavesh Landeros is a 69 year old year old male who presented this hospitalization with shortness of breath and abdominal bloating. Found to have significant ascites, PE, and extensive retroperitoneal soft tissue concerning for malignancy.   Retroperitoneal adenopathy  PE  Ascites    PLAN:  Bone marrow biopsy completed this morning.  Pathology showing follicular lymphoma, cycle 1 to start inpatient after discussion with outpatient oncology team.  Patient transferred to  this afternoon, likely to start 03/19.   Will contact [place] oncology group to coordinate follow up, treatment, and outpatient paracentesis   Likely patient will need weekly paracentesis   Plan to resume heparin gtt today and will plan to discharge on DOAC, will need lifelong therapy.   Hem/Onc will follow     PE  Hematoma   Likely related to new malignancy. Would indicate life-long anticoagulation needs  03/13/2025 CT CAP Small volume of right lower lobe pulmonary embolus without right heart strain.   03/13/2025 BLE US negative for DVT  03/17/2025 BLE US negative for DVT  Heparin gtt initiated on admission. Later discontinued on 03/16/2025 d/t hematoma concerns from biopsy as described below.   03/16/2025 CT AP showing New large left-sided retroperitoneal hematoma with intramuscular component.   Currently not on anticoagulation, IVC filter not being placed as of now- IVC thrombus is compressed and DVT negative     Follicular Lymphoma grade 1-2   Ascites  03/13/2025 CT CAP Extensive confluent retroperitoneal soft tissue as described most compatible with lymphoma.  Likely associated significant compression of the IVC, not optimally assessed. Moderately large volume of abdominal and pelvic ascites.  03/14/2025 CT guided retroperitoneal lymph node biopsy showing follicular lymphoma  grade 1-2   03/15/2025 US paracentesis 4.7 L removed - cytology pending   03/18/2025 US paracentesis 2.7 L removed       EXAM:  Subjective  Denies fever, chills, night sweats. Denies nausea, vomiting, diarrhea, constipation. Denies lightheaded or dizziness.Denies headache, fatigue, lack of appetite. Denies bleeding, bruising, rashes.     Shortness of breath and abdominal tightness much improved after paracentesis. Numbness and tingling remain on LLE from hematoma.     Objective  GENERAL/CONSTITUTIONAL: No acute distress.  NEUROLOGIC: Alert, oriented, answers questions appropriately.   INTEGUMENTARY: No rashes or jaundice. Bone marrow biopsy site is clean, dry, intact       Labs Reviewed: CBC, CMP, labs as above   Imaging Reviewed: as above    35 minutes spent on the date of the encounter doing review of outside records, review of test results, interpretation of tests,  implementing/ creating plan, coordinating care, and documentation     Malu DUNCAN CNP  Hematology/Oncology  [facility]   Securely message with [employer]

## 2025-03-19 LAB
ALBUMIN SERPL BCG-MCNC: 2.9 G/DL (ref 3.5–5.2)
ALBUMIN SERPL BCG-MCNC: 3.2 G/DL (ref 3.5–5.2)
ALP SERPL-CCNC: 45 U/L (ref 40–150)
ALP SERPL-CCNC: 58 U/L (ref 40–150)
ALT SERPL W P-5'-P-CCNC: 20 U/L (ref 0–70)
ALT SERPL W P-5'-P-CCNC: 28 U/L (ref 0–70)
ANION GAP SERPL CALCULATED.3IONS-SCNC: 10 MMOL/L (ref 7–15)
ANION GAP SERPL CALCULATED.3IONS-SCNC: 8 MMOL/L (ref 7–15)
AST SERPL W P-5'-P-CCNC: 36 U/L (ref 0–45)
AST SERPL W P-5'-P-CCNC: 43 U/L (ref 0–45)
BASOPHILS # BLD AUTO: 0.1 10E3/UL (ref 0–0.2)
BASOPHILS # BLD AUTO: 0.1 10E3/UL (ref 0–0.2)
BASOPHILS NFR BLD AUTO: 1 %
BASOPHILS NFR BLD AUTO: 1 %
BILIRUB SERPL-MCNC: 0.5 MG/DL
BILIRUB SERPL-MCNC: 0.7 MG/DL
BUN SERPL-MCNC: 16.7 MG/DL (ref 8–23)
BUN SERPL-MCNC: 18.4 MG/DL (ref 8–23)
CALCIUM SERPL-MCNC: 7.6 MG/DL (ref 8.8–10.4)
CALCIUM SERPL-MCNC: 8.2 MG/DL (ref 8.8–10.4)
CHLORIDE SERPL-SCNC: 102 MMOL/L (ref 98–107)
CHLORIDE SERPL-SCNC: 103 MMOL/L (ref 98–107)
CREAT SERPL-MCNC: 0.87 MG/DL (ref 0.67–1.17)
CREAT SERPL-MCNC: 0.93 MG/DL (ref 0.67–1.17)
EGFRCR SERPLBLD CKD-EPI 2021: 88 ML/MIN/1.73M2
EGFRCR SERPLBLD CKD-EPI 2021: >90 ML/MIN/1.73M2
EOSINOPHIL # BLD AUTO: 0.3 10E3/UL (ref 0–0.7)
EOSINOPHIL # BLD AUTO: 0.3 10E3/UL (ref 0–0.7)
EOSINOPHIL NFR BLD AUTO: 4 %
EOSINOPHIL NFR BLD AUTO: 5 %
ERYTHROCYTE [DISTWIDTH] IN BLOOD BY AUTOMATED COUNT: 13.7 % (ref 10–15)
ERYTHROCYTE [DISTWIDTH] IN BLOOD BY AUTOMATED COUNT: 13.9 % (ref 10–15)
ERYTHROCYTE [DISTWIDTH] IN BLOOD BY AUTOMATED COUNT: 14 % (ref 10–15)
GLUCOSE SERPL-MCNC: 106 MG/DL (ref 70–99)
GLUCOSE SERPL-MCNC: 108 MG/DL (ref 70–99)
HBV CORE AB SERPL QL IA: NONREACTIVE
HBV SURFACE AG SERPL QL IA: NONREACTIVE
HCO3 SERPL-SCNC: 23 MMOL/L (ref 22–29)
HCO3 SERPL-SCNC: 25 MMOL/L (ref 22–29)
HCT VFR BLD AUTO: 26 % (ref 40–53)
HCT VFR BLD AUTO: 26.4 % (ref 40–53)
HCT VFR BLD AUTO: 31 % (ref 40–53)
HCV AB SERPL QL IA: NONREACTIVE
HGB BLD-MCNC: 10.2 G/DL (ref 13.3–17.7)
HGB BLD-MCNC: 8.9 G/DL (ref 13.3–17.7)
HGB BLD-MCNC: 9 G/DL (ref 13.3–17.7)
HIV 1+2 AB+HIV1 P24 AG SERPL QL IA: NONREACTIVE
IMM GRANULOCYTES # BLD: 0.1 10E3/UL
IMM GRANULOCYTES # BLD: 0.1 10E3/UL
IMM GRANULOCYTES NFR BLD: 1 %
IMM GRANULOCYTES NFR BLD: 1 %
LYMPHOCYTES # BLD AUTO: 1.1 10E3/UL (ref 0.8–5.3)
LYMPHOCYTES # BLD AUTO: 1.3 10E3/UL (ref 0.8–5.3)
LYMPHOCYTES NFR BLD AUTO: 17 %
LYMPHOCYTES NFR BLD AUTO: 23 %
MAGNESIUM SERPL-MCNC: 2.4 MG/DL (ref 1.7–2.3)
MCH RBC QN AUTO: 28.4 PG (ref 26.5–33)
MCH RBC QN AUTO: 28.9 PG (ref 26.5–33)
MCH RBC QN AUTO: 29.1 PG (ref 26.5–33)
MCHC RBC AUTO-ENTMCNC: 32.9 G/DL (ref 31.5–36.5)
MCHC RBC AUTO-ENTMCNC: 33.7 G/DL (ref 31.5–36.5)
MCHC RBC AUTO-ENTMCNC: 34.6 G/DL (ref 31.5–36.5)
MCV RBC AUTO: 84 FL (ref 78–100)
MCV RBC AUTO: 86 FL (ref 78–100)
MCV RBC AUTO: 86 FL (ref 78–100)
MONOCYTES # BLD AUTO: 0.6 10E3/UL (ref 0–1.3)
MONOCYTES # BLD AUTO: 0.6 10E3/UL (ref 0–1.3)
MONOCYTES NFR BLD AUTO: 10 %
MONOCYTES NFR BLD AUTO: 11 %
NEUTROPHILS # BLD AUTO: 3.2 10E3/UL (ref 1.6–8.3)
NEUTROPHILS # BLD AUTO: 4.2 10E3/UL (ref 1.6–8.3)
NEUTROPHILS NFR BLD AUTO: 58 %
NEUTROPHILS NFR BLD AUTO: 67 %
NRBC # BLD AUTO: 0 10E3/UL
NRBC # BLD AUTO: 0 10E3/UL
NRBC BLD AUTO-RTO: 0 /100
NRBC BLD AUTO-RTO: 0 /100
PATH REPORT.COMMENTS IMP SPEC: ABNORMAL
PATH REPORT.COMMENTS IMP SPEC: NORMAL
PATH REPORT.COMMENTS IMP SPEC: YES
PATH REPORT.COMMENTS IMP SPEC: YES
PATH REPORT.FINAL DX SPEC: ABNORMAL
PATH REPORT.FINAL DX SPEC: ABNORMAL
PATH REPORT.FINAL DX SPEC: NORMAL
PATH REPORT.GROSS SPEC: NORMAL
PATH REPORT.MICROSCOPIC SPEC OTHER STN: ABNORMAL
PATH REPORT.MICROSCOPIC SPEC OTHER STN: ABNORMAL
PATH REPORT.RELEVANT HX SPEC: ABNORMAL
PATH REPORT.RELEVANT HX SPEC: ABNORMAL
PATH REPORT.RELEVANT HX SPEC: NORMAL
PHOSPHATE SERPL-MCNC: 3 MG/DL (ref 2.5–4.5)
PLATELET # BLD AUTO: 181 10E3/UL (ref 150–450)
PLATELET # BLD AUTO: 186 10E3/UL (ref 150–450)
PLATELET # BLD AUTO: 209 10E3/UL (ref 150–450)
POTASSIUM SERPL-SCNC: 4.4 MMOL/L (ref 3.4–5.3)
POTASSIUM SERPL-SCNC: 4.6 MMOL/L (ref 3.4–5.3)
POTASSIUM SERPL-SCNC: 4.7 MMOL/L (ref 3.4–5.3)
PROT SERPL-MCNC: 4.8 G/DL (ref 6.4–8.3)
PROT SERPL-MCNC: 5.7 G/DL (ref 6.4–8.3)
RBC # BLD AUTO: 3.08 10E6/UL (ref 4.4–5.9)
RBC # BLD AUTO: 3.09 10E6/UL (ref 4.4–5.9)
RBC # BLD AUTO: 3.59 10E6/UL (ref 4.4–5.9)
SODIUM SERPL-SCNC: 134 MMOL/L (ref 135–145)
SODIUM SERPL-SCNC: 137 MMOL/L (ref 135–145)
UFH PPP CHRO-ACNC: 0.64 IU/ML
WBC # BLD AUTO: 5.6 10E3/UL (ref 4–11)
WBC # BLD AUTO: 5.8 10E3/UL (ref 4–11)
WBC # BLD AUTO: 6.2 10E3/UL (ref 4–11)

## 2025-03-19 PROCEDURE — 83735 ASSAY OF MAGNESIUM: CPT | Performed by: INTERNAL MEDICINE

## 2025-03-19 PROCEDURE — 258N000003 HC RX IP 258 OP 636: Performed by: NURSE PRACTITIONER

## 2025-03-19 PROCEDURE — 85014 HEMATOCRIT: CPT | Performed by: INTERNAL MEDICINE

## 2025-03-19 PROCEDURE — 250N000011 HC RX IP 250 OP 636: Performed by: INTERNAL MEDICINE

## 2025-03-19 PROCEDURE — 82247 BILIRUBIN TOTAL: CPT | Performed by: INTERNAL MEDICINE

## 2025-03-19 PROCEDURE — 85025 COMPLETE CBC W/AUTO DIFF WBC: CPT | Performed by: INTERNAL MEDICINE

## 2025-03-19 PROCEDURE — 36415 COLL VENOUS BLD VENIPUNCTURE: CPT | Performed by: INTERNAL MEDICINE

## 2025-03-19 PROCEDURE — 88305 TISSUE EXAM BY PATHOLOGIST: CPT | Mod: 26 | Performed by: PATHOLOGY

## 2025-03-19 PROCEDURE — 99291 CRITICAL CARE FIRST HOUR: CPT | Performed by: NURSE PRACTITIONER

## 2025-03-19 PROCEDURE — 85018 HEMOGLOBIN: CPT | Performed by: INTERNAL MEDICINE

## 2025-03-19 PROCEDURE — 3E0430M INTRODUCTION OF MONOCLONAL ANTIBODY INTO CENTRAL VEIN, PERCUTANEOUS APPROACH: ICD-10-PCS | Performed by: HOSPITALIST

## 2025-03-19 PROCEDURE — 258N000003 HC RX IP 258 OP 636: Performed by: INTERNAL MEDICINE

## 2025-03-19 PROCEDURE — 250N000009 HC RX 250: Performed by: INTERNAL MEDICINE

## 2025-03-19 PROCEDURE — 88112 CYTOPATH CELL ENHANCE TECH: CPT | Mod: 26 | Performed by: PATHOLOGY

## 2025-03-19 PROCEDURE — 250N000013 HC RX MED GY IP 250 OP 250 PS 637: Performed by: INTERNAL MEDICINE

## 2025-03-19 PROCEDURE — 120N000013 HC R&B IMCU

## 2025-03-19 PROCEDURE — 84155 ASSAY OF PROTEIN SERUM: CPT | Performed by: INTERNAL MEDICINE

## 2025-03-19 PROCEDURE — 85520 HEPARIN ASSAY: CPT | Performed by: HOSPITALIST

## 2025-03-19 PROCEDURE — 99232 SBSQ HOSP IP/OBS MODERATE 35: CPT

## 2025-03-19 PROCEDURE — 85004 AUTOMATED DIFF WBC COUNT: CPT | Performed by: INTERNAL MEDICINE

## 2025-03-19 PROCEDURE — 84100 ASSAY OF PHOSPHORUS: CPT | Performed by: INTERNAL MEDICINE

## 2025-03-19 PROCEDURE — 250N000009 HC RX 250: Performed by: HOSPITALIST

## 2025-03-19 PROCEDURE — 250N000009 HC RX 250: Performed by: NURSE PRACTITIONER

## 2025-03-19 PROCEDURE — 250N000013 HC RX MED GY IP 250 OP 250 PS 637: Performed by: PHYSICIAN ASSISTANT

## 2025-03-19 PROCEDURE — 84132 ASSAY OF SERUM POTASSIUM: CPT | Performed by: INTERNAL MEDICINE

## 2025-03-19 PROCEDURE — 82040 ASSAY OF SERUM ALBUMIN: CPT | Performed by: INTERNAL MEDICINE

## 2025-03-19 PROCEDURE — 99233 SBSQ HOSP IP/OBS HIGH 50: CPT | Mod: 25 | Performed by: HOSPITALIST

## 2025-03-19 PROCEDURE — 999N000157 HC STATISTIC RCP TIME EA 10 MIN

## 2025-03-19 PROCEDURE — 84460 ALANINE AMINO (ALT) (SGPT): CPT | Performed by: INTERNAL MEDICINE

## 2025-03-19 PROCEDURE — 999N000127 HC STATISTIC PERIPHERAL IV START W US GUIDANCE

## 2025-03-19 RX ORDER — METHYLPREDNISOLONE SODIUM SUCCINATE 40 MG/ML
40 INJECTION INTRAMUSCULAR; INTRAVENOUS
Status: DISCONTINUED | OUTPATIENT
Start: 2025-03-19 | End: 2025-03-20

## 2025-03-19 RX ORDER — DIPHENHYDRAMINE HYDROCHLORIDE 50 MG/ML
50 INJECTION, SOLUTION INTRAMUSCULAR; INTRAVENOUS
Status: DISCONTINUED | OUTPATIENT
Start: 2025-03-19 | End: 2025-03-20

## 2025-03-19 RX ORDER — EPINEPHRINE 1 MG/ML
0.3 INJECTION, SOLUTION, CONCENTRATE INTRAVENOUS EVERY 5 MIN PRN
Status: DISCONTINUED | OUTPATIENT
Start: 2025-03-19 | End: 2025-03-20

## 2025-03-19 RX ORDER — ACETAMINOPHEN 325 MG/1
650 TABLET ORAL ONCE
Status: COMPLETED | OUTPATIENT
Start: 2025-03-19 | End: 2025-03-19

## 2025-03-19 RX ORDER — MEPERIDINE HYDROCHLORIDE 25 MG/ML
25 INJECTION INTRAMUSCULAR; INTRAVENOUS; SUBCUTANEOUS
Status: DISCONTINUED | OUTPATIENT
Start: 2025-03-19 | End: 2025-03-20

## 2025-03-19 RX ORDER — DIPHENHYDRAMINE HYDROCHLORIDE 50 MG/ML
25 INJECTION, SOLUTION INTRAMUSCULAR; INTRAVENOUS
Status: DISCONTINUED | OUTPATIENT
Start: 2025-03-19 | End: 2025-03-20

## 2025-03-19 RX ORDER — HEPARIN SODIUM,PORCINE 10 UNIT/ML
5-20 VIAL (ML) INTRAVENOUS DAILY PRN
Status: DISCONTINUED | OUTPATIENT
Start: 2025-03-19 | End: 2025-03-20

## 2025-03-19 RX ORDER — DIPHENHYDRAMINE HYDROCHLORIDE 50 MG/ML
50 INJECTION, SOLUTION INTRAMUSCULAR; INTRAVENOUS
Status: COMPLETED | OUTPATIENT
Start: 2025-03-19 | End: 2025-03-19

## 2025-03-19 RX ORDER — IPRATROPIUM BROMIDE AND ALBUTEROL SULFATE 2.5; .5 MG/3ML; MG/3ML
SOLUTION RESPIRATORY (INHALATION)
Status: COMPLETED
Start: 2025-03-19 | End: 2025-03-19

## 2025-03-19 RX ORDER — PROCHLORPERAZINE MALEATE 10 MG
10 TABLET ORAL EVERY 6 HOURS PRN
Qty: 30 TABLET | Refills: 2 | Status: SHIPPED | OUTPATIENT
Start: 2025-03-19

## 2025-03-19 RX ORDER — EPINEPHRINE 1 MG/ML
0.3 INJECTION, SOLUTION, CONCENTRATE INTRAVENOUS EVERY 5 MIN PRN
Status: DISCONTINUED | OUTPATIENT
Start: 2025-03-19 | End: 2025-03-19

## 2025-03-19 RX ORDER — MEPERIDINE HYDROCHLORIDE 25 MG/ML
25 INJECTION INTRAMUSCULAR; INTRAVENOUS; SUBCUTANEOUS
Status: COMPLETED | OUTPATIENT
Start: 2025-03-19 | End: 2025-03-19

## 2025-03-19 RX ORDER — METHYLPREDNISOLONE SODIUM SUCCINATE 125 MG/2ML
125 INJECTION INTRAMUSCULAR; INTRAVENOUS
Status: COMPLETED | OUTPATIENT
Start: 2025-03-19 | End: 2025-03-19

## 2025-03-19 RX ORDER — ALBUTEROL SULFATE 0.83 MG/ML
2.5 SOLUTION RESPIRATORY (INHALATION)
Status: DISCONTINUED | OUTPATIENT
Start: 2025-03-19 | End: 2025-03-20

## 2025-03-19 RX ORDER — ALBUTEROL SULFATE 90 UG/1
1-2 INHALANT RESPIRATORY (INHALATION)
Status: DISCONTINUED | OUTPATIENT
Start: 2025-03-19 | End: 2025-03-20

## 2025-03-19 RX ORDER — IPRATROPIUM BROMIDE AND ALBUTEROL SULFATE 2.5; .5 MG/3ML; MG/3ML
3 SOLUTION RESPIRATORY (INHALATION) ONCE
Status: COMPLETED | OUTPATIENT
Start: 2025-03-19 | End: 2025-03-19

## 2025-03-19 RX ORDER — ALBUTEROL SULFATE 90 UG/1
1-2 INHALANT RESPIRATORY (INHALATION)
Status: COMPLETED | OUTPATIENT
Start: 2025-03-19 | End: 2025-03-19

## 2025-03-19 RX ORDER — HEPARIN SODIUM (PORCINE) LOCK FLUSH IV SOLN 100 UNIT/ML 100 UNIT/ML
5 SOLUTION INTRAVENOUS
Status: DISCONTINUED | OUTPATIENT
Start: 2025-03-19 | End: 2025-03-20

## 2025-03-19 RX ORDER — IPRATROPIUM BROMIDE AND ALBUTEROL SULFATE 2.5; .5 MG/3ML; MG/3ML
3 SOLUTION RESPIRATORY (INHALATION)
Status: DISCONTINUED | OUTPATIENT
Start: 2025-03-19 | End: 2025-03-24 | Stop reason: HOSPADM

## 2025-03-19 RX ORDER — DIPHENHYDRAMINE HCL 50 MG
50 CAPSULE ORAL ONCE
Status: COMPLETED | OUTPATIENT
Start: 2025-03-19 | End: 2025-03-19

## 2025-03-19 RX ORDER — LORAZEPAM 2 MG/ML
0.5 INJECTION INTRAMUSCULAR EVERY 4 HOURS PRN
Status: DISCONTINUED | OUTPATIENT
Start: 2025-03-19 | End: 2025-03-20

## 2025-03-19 RX ADMIN — EPINEPHRINE 0.3 MG: 1 INJECTION INTRAMUSCULAR; INTRAVENOUS; SUBCUTANEOUS at 17:15

## 2025-03-19 RX ADMIN — Medication 17 G: at 08:49

## 2025-03-19 RX ADMIN — SODIUM CHLORIDE 250 ML: 9 INJECTION, SOLUTION INTRAVENOUS at 18:29

## 2025-03-19 RX ADMIN — MEPERIDINE HYDROCHLORIDE 25 MG: 25 INJECTION, SOLUTION INTRAMUSCULAR; INTRAVENOUS; SUBCUTANEOUS at 18:05

## 2025-03-19 RX ADMIN — LORAZEPAM 0.5 MG: 2 INJECTION INTRAMUSCULAR; INTRAVENOUS at 16:25

## 2025-03-19 RX ADMIN — METHYLPREDNISOLONE SODIUM SUCCINATE 125 MG: 125 INJECTION, POWDER, FOR SOLUTION INTRAMUSCULAR; INTRAVENOUS at 16:46

## 2025-03-19 RX ADMIN — GABAPENTIN 100 MG: 100 CAPSULE ORAL at 22:38

## 2025-03-19 RX ADMIN — ALBUTEROL SULFATE 2.5 MG: 2.5 SOLUTION RESPIRATORY (INHALATION) at 18:06

## 2025-03-19 RX ADMIN — POLYETHYLENE GLYCOL 3350 17 G: 17 POWDER, FOR SOLUTION ORAL at 10:23

## 2025-03-19 RX ADMIN — BENDAMUSTINE HYDROCHLORIDE 200 MG: 25 INJECTION, SOLUTION INTRAVENOUS at 15:17

## 2025-03-19 RX ADMIN — FAMOTIDINE 20 MG: 10 INJECTION, SOLUTION INTRAVENOUS at 16:46

## 2025-03-19 RX ADMIN — HEPARIN SODIUM 1800 UNITS/HR: 10000 INJECTION, SOLUTION INTRAVENOUS at 02:55

## 2025-03-19 RX ADMIN — PANTOPRAZOLE SODIUM 40 MG: 40 TABLET, DELAYED RELEASE ORAL at 08:43

## 2025-03-19 RX ADMIN — GABAPENTIN 100 MG: 100 CAPSULE ORAL at 16:33

## 2025-03-19 RX ADMIN — DEXAMETHASONE SODIUM PHOSPHATE: 10 INJECTION, SOLUTION INTRAMUSCULAR; INTRAVENOUS at 14:49

## 2025-03-19 RX ADMIN — DIPHENHYDRAMINE HYDROCHLORIDE 50 MG: 50 INJECTION, SOLUTION INTRAMUSCULAR; INTRAVENOUS at 16:46

## 2025-03-19 RX ADMIN — RITUXIMAB-ABBS 800 MG: 10 INJECTION, SOLUTION INTRAVENOUS at 15:52

## 2025-03-19 RX ADMIN — MEPERIDINE HYDROCHLORIDE 25 MG: 25 INJECTION INTRAMUSCULAR; INTRAVENOUS; SUBCUTANEOUS at 17:00

## 2025-03-19 RX ADMIN — ACETAMINOPHEN 650 MG: 325 TABLET, FILM COATED ORAL at 14:48

## 2025-03-19 RX ADMIN — IPRATROPIUM BROMIDE AND ALBUTEROL SULFATE: .5; 3 SOLUTION RESPIRATORY (INHALATION) at 18:06

## 2025-03-19 RX ADMIN — ALLOPURINOL 100 MG: 100 TABLET ORAL at 08:43

## 2025-03-19 RX ADMIN — HEPARIN SODIUM 1800 UNITS/HR: 10000 INJECTION, SOLUTION INTRAVENOUS at 16:08

## 2025-03-19 RX ADMIN — DIPHENHYDRAMINE HYDROCHLORIDE 50 MG: 50 CAPSULE ORAL at 14:49

## 2025-03-19 RX ADMIN — ALBUTEROL SULFATE 2 PUFF: 108 INHALANT RESPIRATORY (INHALATION) at 17:12

## 2025-03-19 RX ADMIN — SODIUM CHLORIDE 250 ML: 9 INJECTION, SOLUTION INTRAVENOUS at 17:30

## 2025-03-19 RX ADMIN — SENNOSIDES AND DOCUSATE SODIUM 1 TABLET: 50; 8.6 TABLET ORAL at 08:44

## 2025-03-19 RX ADMIN — GABAPENTIN 100 MG: 100 CAPSULE ORAL at 08:43

## 2025-03-19 ASSESSMENT — ACTIVITIES OF DAILY LIVING (ADL)
ADLS_ACUITY_SCORE: 37
ADLS_ACUITY_SCORE: 38
ADLS_ACUITY_SCORE: 37
ADLS_ACUITY_SCORE: 38
ADLS_ACUITY_SCORE: 37
ADLS_ACUITY_SCORE: 38
ADLS_ACUITY_SCORE: 37
ADLS_ACUITY_SCORE: 38
ADLS_ACUITY_SCORE: 37
ADLS_ACUITY_SCORE: 37
ADLS_ACUITY_SCORE: 38
ADLS_ACUITY_SCORE: 38
ADLS_ACUITY_SCORE: 37
ADLS_ACUITY_SCORE: 38

## 2025-03-19 NOTE — PROGRESS NOTES
RT responded to rapid response call. Upon arrival, patient had audible wheezing, accessory muscle use, SpO2 94-99%.  RN gave albuterol inhaler x1, after this I administered duoneb x1. Patients breathing improved, SpO2 99% on room air.    Sung Ruth, RRT

## 2025-03-19 NOTE — PLAN OF CARE
Orientation: A&Ox4, sleepy towards the end of the shift  Aggression Stop Light: green  Activity: SBA  Diet/BS Checks: 2g Na  Tele:  n/a  IV Access/Drains: R PIV infusing Hep gtt @ 1800 units/hr. L PIV infusing bolus at 250ml/hr  Pain Management: denies  Abnormal VS/Results: VSS on RA  Bowel/Bladder: continent  Skin/Wounds: para site and biopsy site CDI, BLE edema  Consults: hem/onc  D/C Disposition: pending  Other Info: pt received bendeka today. Pt started rituxan infusion 5 minutes in at 50 ml/hr pt developed SOB and cough, PRN ativan given for anxiety. Pt then developed auditory wheezes per anaphylaxis protocol received benadryl, pepcid and solu-medrol. Developed rigors and received demerol x2. RRT called, see provider note. Rituxan held for the night.

## 2025-03-19 NOTE — PROGRESS NOTES
Hematology/Oncology Follow-up Note  [facility]    Date of Admission:  3/14/2025   Reason for Consult: retroperitoneal adenopathy      ASSESSMENT : Bhavesh Landeros is a 69 year old year old male who presented this hospitalization with shortness of breath and abdominal bloating. Found to have significant ascites, PE, and extensive retroperitoneal soft tissue concerning for malignancy.   Retroperitoneal adenopathy  PE  Ascites    PLAN:  Planning to initiate on C1D1 rituximab and bendamustine   Discussed with [facility] oncology team who will establish care and coordinate follow up at discharge.  From hem/onc perspective, can plan to discharge after C1D2 bendamustine infusion 03/20/2025  Hemoglobin is stable on resumption of heparin gtt. Okay to transition to DOAC, would recommend no loading dose.   Hem/Onc will follow     PE  Hematoma   Pulmonary embolism secondary to malignancy, life-long anticoagulation   03/13/2025 CT CAP Small volume of right lower lobe pulmonary embolus without right heart strain.   03/13/2025 BLE US negative for DVT  03/17/2025 BLE US negative for DVT  Heparin gtt initiated on admission. Later discontinued on 03/16/2025 d/t hematoma concerns from biopsy as described below.   03/16/2025 CT AP showing New large left-sided retroperitoneal hematoma with intramuscular component.   IVC filter was not being placed-  IVC thrombus is compressed and DVT negative   03/18/2025 heparin gtt re-initiated     Follicular Lymphoma grade 1-2   Ascites  03/13/2025 CT CAP Extensive confluent retroperitoneal soft tissue as described most compatible with lymphoma.  Likely associated significant compression of the IVC, not optimally assessed. Moderately large volume of abdominal and pelvic ascites.  03/14/2025 CT guided retroperitoneal lymph node biopsy showing follicular lymphoma grade 1-2   03/15/2025 US paracentesis 4.7 L removed - cytology pending   03/18/2025 US paracentesis 2.7 L removed   03/18/2025 Bone marrow  biopsy pending   Treatment History   03/19/2025 C1D1 IP Rituximab & Bendamustine       EXAM:  Subjective  Denies fever, chills, night sweats. Denies nausea, vomiting, diarrhea, constipation. Denies lightheaded or dizziness.Denies headache, fatigue, lack of appetite. Denies bleeding, bruising, rashes. Denies shortness of breath, cough. Denies abdominal bloating.   Patient reports increased appetite today    Objective  GENERAL/CONSTITUTIONAL: No acute distress.  NEUROLOGIC: Alert, oriented, answers questions appropriately.   INTEGUMENTARY: No rashes or jaundice. Bone marrow biopsy site is clean, dry, intact       Labs Reviewed: CBC, CMP, labs as above   Imaging Reviewed: as above    40 minutes spent on the date of the encounter doing review of outside records, review of test results, interpretation of tests,  implementing/ creating plan, coordinating care, and documentation     Malu DUNCAN CNP  Hematology/Oncology  [facility]   Securely message with [employer]

## 2025-03-19 NOTE — CODE/RAPID RESPONSE
[facility]    RRT Note  3/19/2025   Time Called: 1702    Code Status: Full Code    I was called to evaluate Bhavesh Landeros for allergic reaction to new medication, who is a 69 year old male medical history significant for hypertension, aortic stenosis, mitral regurgitation, ?multiple sclerosis, obesity, GERD, hyperlipidemia, who was directly admitted from [facility] ED with worsening abdominal bloating and distention and found to have extensive retroperitoneal soft tissue concerning for possible lymphoma with extension to adjacent mesenteric and pancreatic structures/vasculature and associated significant compression of IVC, significant abdominal and pelvic ascites, and acute RLL pulmonary embolism.    Assessment & Plan     Anaphylaxis secondary to rituximab  RRT called due to reaction within 5 min of of rigors, tachypnea, wheezing. Upon entering room rapid team is present to include RN, RT, as well as bedside nursing present. Anaphylactic protocol already initiated as instructed by self prior to arrival. Patient has significant audible wheezing and rigors are present. He has received benadryl, pepcid, solumedrol, neb, and IM epi given just prior to arrival .    Patient does arouse easily to voice, despite significant wheezing his airway is patent and does not have stridor or angioedema noted. , HR 78, oxygen 95% on open facemask    Upon end of RRT, patient no longer has wheezing noted, respirations are relaxed, and absent of rigors.       INTERVENTIONS:  -anaphylaxis protocol stat   -Rituximab placed on allergy list   -keep on q 15 vitals and continuous spo2 monitor x 1 hr, then continue spot check spo2   -can stay on 88   -can repeat epi as noted in protocol orders   -duo nebs stacked then q 1 hr prn for wheezing         Discussed with nursing staff and patient's daughter       Josy Ayoub, DNP APRN CNP  [facility]  Securely message with the  [employer] (learn more here)  Text page via [employer] Paging/Directory          Physical Exam   Vital Signs with Ranges:  Temp:  [96.9  F (36.1  C)-98.1  F (36.7  C)] 96.9  F (36.1  C)  Pulse:  [] 107  Resp:  [18-20] 18  BP: ()/(57-87) 97/87  SpO2:  [95 %-99 %] 95 %  I/O last 3 completed shifts:  In: 240 [P.O.:240]  Out: 400 [Urine:400]    Orthostatic:                Physical Exam  Constitutional:       General: He is in acute distress.      Appearance: He is obese.   HENT:      Mouth/Throat:      Mouth: Mucous membranes are dry.   Cardiovascular:      Rate and Rhythm: Normal rate.   Pulmonary:      Effort: Respiratory distress present.      Breath sounds: Wheezing present.   Abdominal:      General: There is distension.      Palpations: Abdomen is soft.   Skin:     General: Skin is warm and dry.   Neurological:      General: No focal deficit present.          Data       IMAGING: (X-ray/CT/MRI)   No results found for this or any previous visit (from the past 24 hours).    CBC with Diff:  Recent Labs   Lab Test 03/19/25  1538 03/16/25  2238 03/16/25  2055   WBC 6.2   < >  --    HGB 10.2*   < >  --    MCV 86   < >  --       < >  --    INR  --   --  1.21*    < > = values in this interval not displayed.      Absolute Retic (10e9/L)   Date Value   08/13/2020 59.9     Absolute Reticulocyte (10e6/uL)   Date Value   03/18/2025 0.079     % Retic (%)   Date Value   08/13/2020 1.3     % Reticulocyte (%)   Date Value   03/18/2025 2.2 (H)       Comprehensive Metabolic Panel:  Recent Labs   Lab 03/19/25  1538 03/19/25  0428    134*   POTASSIUM 4.7 4.6  4.4   CHLORIDE 102 103   CO2 25 23   ANIONGAP 10 8   * 108*   BUN 16.7 18.4   CR 0.87 0.93   GFRESTIMATED >90 88   SHANNON 8.2* 7.6*   MAG  --  2.4*   PHOS  --  3.0   PROTTOTAL 5.7* 4.8*   ALBUMIN 3.2* 2.9*   BILITOTAL 0.7 0.5   ALKPHOS 58 45   AST 43 36   ALT 28 20         Time Spent on this Encounter       CRITICAL CARE TIME  I spent 51 minutes of  critical care time on the unit/floor managing the care of Bhavesh Mirandareinierjailyn. Upon evaluation, this patient had a high probability of imminent or life-threatening deterioration due to anaphylaxis which required my direct attention, intervention, and personal management. 100% of my time was spent at the bedside counseling the patient and/or coordinating care regarding services listed in this note.

## 2025-03-19 NOTE — PROVIDER NOTIFICATION
MD Notification    Notified Person: MD    Notified Person Name:  mary     Notification Date/Time: 3/19 1748    Notification Interaction: phone call    Purpose of Notification: Pt was 5 minutes into the rituximab at 50 ml/hr. Developed SOB, rigors, wheezes. Gave medications per anaphylaxis protocol. RRT called.    Orders Received: Hold rituximab tonight. Will address tomorrow during rounds.    Comments:

## 2025-03-19 NOTE — PLAN OF CARE
Goal Outcome Evaluation:       [id number]  Orientation: A&O x4   Aggression Stop Light: green   Activity: SBA- patient refuses bed alarm. Education provided   Diet/BS Checks: 2 g Na+  Tele:  NSR   IV Access/Drains: R PIV SL; L PIV infusing Hep at 1800 units/hr- next hep Xa recheck bessie AM  Pain Management: denies    Abnormal VS/Results: VSS on RA   Bowel/Bladder: continent  Skin/Wounds: para site and biopsy site CDI; BLE edema   Consults: Hem/Onc  D/C Disposition: pending   Other Info: Plan for rituxan and bendeka today.

## 2025-03-19 NOTE — PROGRESS NOTES
[facility]    Medicine Progress Note - Hospitalist Service    Date of Admission:  3/14/2025    Assessment & Plan     Bhavesh Landeros is a 69 year old male medical history significant for hypertension, aortic stenosis, mitral regurgitation, ?multiple sclerosis, obesity, GERD, hyperlipidemia,  who was directly admitted from [facility] ED with worsening abdominal bloating and distention and found to have extensive retroperitoneal soft tissue concerning for possible lymphoma with extension to adjacent mesenteric and pancreatic structures/vasculature and associated significant compression of IVC, significant abdominal and pelvic ascites, and acute RLL pulmonary embolism.     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  Symptomatic moderately large volume ascites.  Peripheral Edema  Splenomegaly  *Presented to [facility] ED with worsening abd bloating/distention.   *CT PE revealed extensive confluent retroperitoneal soft tissue most compatible with lymphoma, likely associated significant compression of the IVC and moderately large volume of abdominal and pelvic ascites. [facility]up.    -S/p CT-guided retroperitoneal lymphadenopathy biopsy with IR on 3/40/25.  Hospital course complicated by retroperitoneal hemorrhage following this.  Please see discussion below.  -Pathology came back positive for follicle lymphoma grade 1-2  -[place] oncology following,  s/p bone marrow biopsy  3/18  for staging.  -Started on chemotherapy 3/19.  -S/p diagnostic and therapeutic paracentesis on 3/15/2025 with removal of 4.7 L fluid, repeat paracentesis with 2.7 mL removal 3/18.  Serum albumin gradient is less than 1   -Resume PTA diuretic  -Supportive care  -Compression stockings     Acute RLL pulmonary embolism  -Had some shortness of breath and dyspnea on exertion in recent weeks,   -CT PE study revealed right lower lobe pulmonary embolism, no right  heart strain on imaging.   -Venous Doppler negative for DVT  -Initially started on heparin drip.  Patient reported new and worsening pain of the left groin area on 3/16. Workup revealed new retroperitoneal hemorrhage.  Heparin drip discontinued on 3/16. Reviewed with oncology and rechallenged with heparin drip after bone marrow biopsy 3/18.  Monitor closely with serial hemoglobin.  Repeat daily, more frequently if worsening pain again.  -If a.m. hemoglobin is stable, plan to transition to DOAC in a.m.  No loading dose.  -Repeat venous Doppler on 3/17 is negative for DVT  -IR consulted, he does have compressed IVC from the mass and therefore no place for a filter to be placed safely.  Radiology does not recommend temporary filter       Large left retroperitoneal hemorrhage  Acute blood loss anemia due to #1 above  Patient complained of intractable pain since 3/15 evening, multiple imaging including MRI and CTA shows large left retroperitoneal hemorrhage measuring up to 16 cm in length.  Most likely this was bleeding from the lymph node biopsy site on 3/14/2025 as he was concurrently on anticoagulation.  -Hemoglobin dropped from 13.3 at presentation to 9.3 ,  s/p 2 units of PRBC on 3/16  -Hemoglobin subsequently has remained stable around 10 -> 9 and pain has significantly improved  -Anticoagulation managed as above.     Acute kidney injury  -Creatinine bumped up overnight to 1.55, this is likely multifactorial as he was hypotensive with blood loss, received contrast  -Improved with IV hydration and now off IV fluids.    -Weight is trending down.  Creatinine has improved.  Resume Lasix and monitor.     Degenerative disc disease  -Patient reported intractable left lower back/groin and thigh pain on 3/16 with radicular symptoms.  -MRI of the lumbar spine shows multilevel disc bulges with bilateral extraforaminal nerve root compression at multiple levels.  Evaluated by neurosurgery who felt that these are all chronic  "changes with no acute intervention needed at this point  -Pain control as needed with Tylenol, Neurontin, oxycodone and IV Dilaudid     URI symptoms  Dry cough, coryza, intermittent wheezing for 1 week PTA. No fevers or chills. PE as above.  -COVID-19, influenza A and B and RSV negative  -Respiratory symptoms much improved after paracentesis, suspect this was mechanical  -Supportive care  -Nebs PRN  -PRN guaifenesin and tessalon pearles      Slow transit constipation  Likely worsened by above.  -Bowel regimen     History of mild aortic stenosis and mitral regurgitation  HTN  -Echocardiogram is fairly unremarkable  -Losartan held due to soft blood pressure after retroperitoneal hemorrhage and acute kidney injury.  Consider resuming losartan in the next day or 2  -Discontinue prior to admission aspirin as he will be on anticoagulation now     Chronic stable diagnoses and other pertinent medical history:  GERD  Obesity class II  HLD  Hiatal hernia  Recurrent abdominal hernia  Multiple sclerosis?          Diet: 2 Gram Sodium Diet    DVT Prophylaxis: Anticoagulation as above  Pablo Catheter: Not present  Lines: None     Cardiac Monitoring: ACTIVE order. Indication: Electrolyte Imbalance (24 hours)- Magnesium <1.3 mg/ml; Potassium < =2.8 or > 5.5 mg/ml  Code Status: Full Code      Clinically Significant Risk Factors                     # Obesity: Estimated body mass index is 36.56 kg/m  as calculated from the following:    Height as of this encounter: 1.702 m (5' 7\").    Weight as of this encounter: 105.9 kg (233 lb 6.4 oz).      # Financial/Environmental Concerns: none              Disposition Plan     Medically Ready for Discharge: Anticipated Tomorrow 1-2 more days pending chemotherapy.         Tripp Olson MD  Hospitalist Service  [facility]  Securely message with [employer] (more info)  Text page via [facility] Paging/Directory "   ______________________________________________________________________    Interval History   Chart reviewed and patient was seen this morning.  Up in the chair.  Feels mildly sore on the back at the biopsy site.  Denies nausea, dyspnea, chest pain, diarrhea or abdominal pain.  Remains on heparin drip, hemoglobin has fluctuated 9-10.  8.9/9 this morning.    Physical Exam   Vital Signs: Temp: 98  F (36.7  C) Temp src: Axillary BP: 128/62 Pulse: 77   Resp: 18 SpO2: 98 % O2 Device: None (Room air)    Weight: 233 lbs 6.4 oz    General: AAOx3, appears comfortable.  HEENT: PERRLA EOMI. Mucosa moist.   Lungs: Bilateral equal air entry. Clear to auscultation, normal work of breathing.   CVS: S1S2 regular, no tachycardia or murmur.   Abdomen: Soft, NT, ND. BS heard.  MSK: Bilateral 2+ leg edema.  Neuro: AAOX3. CN 2-12 normal. Strength symmetrical.  Skin: No rash.  Pale      Medical Decision Making       52 MINUTES SPENT BY ME on the date of service doing chart review, history, exam, documentation & further activities per the note.      Data     I have personally reviewed the following data over the past 24 hrs:    5.8; 5.6  \   9.0 (L); 8.9 (L)   / 181; 186     134 (L) 103 18.4 /  108 (H)   4.4; 4.6 23 0.93 \     ALT: 20 AST: 36 AP: 45 TBILI: 0.5   ALB: 2.9 (L) TOT PROTEIN: 4.8 (L) LIPASE: N/A       Imaging results reviewed over the past 24 hrs:   No results found for this or any previous visit (from the past 24 hours).

## 2025-03-20 PROBLEM — N17.8 OTHER ACUTE KIDNEY FAILURE: Status: ACTIVE | Noted: 2025-03-20

## 2025-03-20 LAB
ABO + RH BLD: NORMAL
ALBUMIN SERPL BCG-MCNC: 3.2 G/DL (ref 3.5–5.2)
ALP SERPL-CCNC: 51 U/L (ref 40–150)
ALT SERPL W P-5'-P-CCNC: 19 U/L (ref 0–70)
ANION GAP SERPL CALCULATED.3IONS-SCNC: 10 MMOL/L (ref 7–15)
ANION GAP SERPL CALCULATED.3IONS-SCNC: 10 MMOL/L (ref 7–15)
AST SERPL W P-5'-P-CCNC: 26 U/L (ref 0–45)
BACTERIA FLD CULT: NO GROWTH
BASOPHILS # BLD AUTO: 0 10E3/UL (ref 0–0.2)
BASOPHILS NFR BLD AUTO: 0 %
BILIRUB DIRECT SERPL-MCNC: 0.17 MG/DL (ref 0–0.3)
BILIRUB SERPL-MCNC: 0.5 MG/DL
BLD GP AB SCN SERPL QL: NEGATIVE
BLD PROD TYP BPU: NORMAL
BLOOD COMPONENT TYPE: NORMAL
BUN SERPL-MCNC: 21.9 MG/DL (ref 8–23)
BUN SERPL-MCNC: 26.8 MG/DL (ref 8–23)
CALCIUM SERPL-MCNC: 7.8 MG/DL (ref 8.8–10.4)
CALCIUM SERPL-MCNC: 7.8 MG/DL (ref 8.8–10.4)
CHLORIDE SERPL-SCNC: 102 MMOL/L (ref 98–107)
CHLORIDE SERPL-SCNC: 103 MMOL/L (ref 98–107)
CODING SYSTEM: NORMAL
CREAT SERPL-MCNC: 1.15 MG/DL (ref 0.67–1.17)
CREAT SERPL-MCNC: 1.43 MG/DL (ref 0.67–1.17)
CROSSMATCH: NORMAL
EGFRCR SERPLBLD CKD-EPI 2021: 53 ML/MIN/1.73M2
EGFRCR SERPLBLD CKD-EPI 2021: 68 ML/MIN/1.73M2
EOSINOPHIL # BLD AUTO: 0 10E3/UL (ref 0–0.7)
EOSINOPHIL NFR BLD AUTO: 0 %
ERYTHROCYTE [DISTWIDTH] IN BLOOD BY AUTOMATED COUNT: 14 % (ref 10–15)
GLUCOSE SERPL-MCNC: 138 MG/DL (ref 70–99)
GLUCOSE SERPL-MCNC: 168 MG/DL (ref 70–99)
GRAM STAIN RESULT: NORMAL
GRAM STAIN RESULT: NORMAL
HAPTOGLOB SERPL-MCNC: 223 MG/DL (ref 30–200)
HCO3 SERPL-SCNC: 21 MMOL/L (ref 22–29)
HCO3 SERPL-SCNC: 22 MMOL/L (ref 22–29)
HCT VFR BLD AUTO: 22.7 % (ref 40–53)
HCT VFR BLD AUTO: 24.8 % (ref 40–53)
HGB BLD-MCNC: 7.7 G/DL (ref 13.3–17.7)
HGB BLD-MCNC: 8.3 G/DL (ref 13.3–17.7)
IMM GRANULOCYTES # BLD: 0.1 10E3/UL
IMM GRANULOCYTES NFR BLD: 1 %
INTERPRETATION SERPL IEP-IMP: NORMAL
ISSUE DATE AND TIME: NORMAL
LDH SERPL L TO P-CCNC: 201 U/L (ref 0–250)
LYMPHOCYTES # BLD AUTO: 0.5 10E3/UL (ref 0.8–5.3)
LYMPHOCYTES NFR BLD AUTO: 5 %
MAGNESIUM SERPL-MCNC: 2.5 MG/DL (ref 1.7–2.3)
MCH RBC QN AUTO: 29 PG (ref 26.5–33)
MCHC RBC AUTO-ENTMCNC: 33.5 G/DL (ref 31.5–36.5)
MCV RBC AUTO: 87 FL (ref 78–100)
MONOCYTES # BLD AUTO: 0.6 10E3/UL (ref 0–1.3)
MONOCYTES NFR BLD AUTO: 5 %
NEUTROPHILS # BLD AUTO: 10.6 10E3/UL (ref 1.6–8.3)
NEUTROPHILS NFR BLD AUTO: 89 %
NRBC # BLD AUTO: 0 10E3/UL
NRBC BLD AUTO-RTO: 0 /100
PATH REPORT.COMMENTS IMP SPEC: ABNORMAL
PATH REPORT.COMMENTS IMP SPEC: ABNORMAL
PATH REPORT.COMMENTS IMP SPEC: YES
PATH REPORT.FINAL DX SPEC: ABNORMAL
PATH REPORT.MICROSCOPIC SPEC OTHER STN: ABNORMAL
PATH REPORT.MICROSCOPIC SPEC OTHER STN: ABNORMAL
PATH REPORT.RELEVANT HX SPEC: ABNORMAL
PATH REPORT.SUPPLEMENTAL REPORTS SPEC: ABNORMAL
PHOSPHATE SERPL-MCNC: 4.3 MG/DL (ref 2.5–4.5)
PLATELET # BLD AUTO: 252 10E3/UL (ref 150–450)
POTASSIUM SERPL-SCNC: 5.1 MMOL/L (ref 3.4–5.3)
POTASSIUM SERPL-SCNC: 5.4 MMOL/L (ref 3.4–5.3)
PROT SERPL-MCNC: 5.1 G/DL (ref 6.4–8.3)
RBC # BLD AUTO: 2.86 10E6/UL (ref 4.4–5.9)
SODIUM SERPL-SCNC: 134 MMOL/L (ref 135–145)
SODIUM SERPL-SCNC: 134 MMOL/L (ref 135–145)
SPECIMEN EXP DATE BLD: NORMAL
UFH PPP CHRO-ACNC: >1.1 IU/ML
UNIT ABO/RH: NORMAL
UNIT NUMBER: NORMAL
UNIT STATUS: NORMAL
UNIT TYPE ISBT: 1700
URATE SERPL-MCNC: 7.1 MG/DL (ref 3.4–7)
WBC # BLD AUTO: 11.9 10E3/UL (ref 4–11)

## 2025-03-20 PROCEDURE — 120N000013 HC R&B IMCU

## 2025-03-20 PROCEDURE — 258N000003 HC RX IP 258 OP 636: Performed by: HOSPITALIST

## 2025-03-20 PROCEDURE — 84550 ASSAY OF BLOOD/URIC ACID: CPT | Performed by: HOSPITALIST

## 2025-03-20 PROCEDURE — 85060 BLOOD SMEAR INTERPRETATION: CPT | Performed by: PATHOLOGY

## 2025-03-20 PROCEDURE — P9016 RBC LEUKOCYTES REDUCED: HCPCS | Performed by: HOSPITALIST

## 2025-03-20 PROCEDURE — 99233 SBSQ HOSP IP/OBS HIGH 50: CPT | Performed by: HOSPITALIST

## 2025-03-20 PROCEDURE — 250N000011 HC RX IP 250 OP 636: Performed by: INTERNAL MEDICINE

## 2025-03-20 PROCEDURE — 82248 BILIRUBIN DIRECT: CPT | Performed by: HOSPITALIST

## 2025-03-20 PROCEDURE — 88341 IMHCHEM/IMCYTCHM EA ADD ANTB: CPT | Mod: 26 | Performed by: PATHOLOGY

## 2025-03-20 PROCEDURE — 86850 RBC ANTIBODY SCREEN: CPT | Performed by: HOSPITALIST

## 2025-03-20 PROCEDURE — 88313 SPECIAL STAINS GROUP 2: CPT | Mod: 26 | Performed by: PATHOLOGY

## 2025-03-20 PROCEDURE — 88342 IMHCHEM/IMCYTCHM 1ST ANTB: CPT | Mod: 26 | Performed by: PATHOLOGY

## 2025-03-20 PROCEDURE — 84100 ASSAY OF PHOSPHORUS: CPT | Performed by: HOSPITALIST

## 2025-03-20 PROCEDURE — 85018 HEMOGLOBIN: CPT | Performed by: HOSPITALIST

## 2025-03-20 PROCEDURE — 85097 BONE MARROW INTERPRETATION: CPT | Performed by: PATHOLOGY

## 2025-03-20 PROCEDURE — 99232 SBSQ HOSP IP/OBS MODERATE 35: CPT

## 2025-03-20 PROCEDURE — 88311 DECALCIFY TISSUE: CPT | Mod: 26 | Performed by: PATHOLOGY

## 2025-03-20 PROCEDURE — 83615 LACTATE (LD) (LDH) ENZYME: CPT | Performed by: HOSPITALIST

## 2025-03-20 PROCEDURE — 80053 COMPREHEN METABOLIC PANEL: CPT | Performed by: HOSPITALIST

## 2025-03-20 PROCEDURE — 88305 TISSUE EXAM BY PATHOLOGIST: CPT | Mod: 26 | Performed by: PATHOLOGY

## 2025-03-20 PROCEDURE — 86900 BLOOD TYPING SEROLOGIC ABO: CPT | Performed by: HOSPITALIST

## 2025-03-20 PROCEDURE — 85520 HEPARIN ASSAY: CPT | Performed by: HOSPITALIST

## 2025-03-20 PROCEDURE — 83735 ASSAY OF MAGNESIUM: CPT | Performed by: HOSPITALIST

## 2025-03-20 PROCEDURE — 258N000003 HC RX IP 258 OP 636: Performed by: INTERNAL MEDICINE

## 2025-03-20 PROCEDURE — 83010 ASSAY OF HAPTOGLOBIN QUANT: CPT | Performed by: HOSPITALIST

## 2025-03-20 PROCEDURE — 250N000011 HC RX IP 250 OP 636: Mod: JZ | Performed by: INTERNAL MEDICINE

## 2025-03-20 PROCEDURE — 86923 COMPATIBILITY TEST ELECTRIC: CPT | Performed by: HOSPITALIST

## 2025-03-20 PROCEDURE — 250N000013 HC RX MED GY IP 250 OP 250 PS 637: Performed by: INTERNAL MEDICINE

## 2025-03-20 PROCEDURE — 250N000013 HC RX MED GY IP 250 OP 250 PS 637: Performed by: PHYSICIAN ASSISTANT

## 2025-03-20 PROCEDURE — 85025 COMPLETE CBC W/AUTO DIFF WBC: CPT | Performed by: INTERNAL MEDICINE

## 2025-03-20 PROCEDURE — 36415 COLL VENOUS BLD VENIPUNCTURE: CPT | Performed by: HOSPITALIST

## 2025-03-20 PROCEDURE — 250N000013 HC RX MED GY IP 250 OP 250 PS 637: Performed by: HOSPITALIST

## 2025-03-20 RX ORDER — SODIUM CHLORIDE 9 MG/ML
INJECTION, SOLUTION INTRAVENOUS CONTINUOUS
Status: ACTIVE | OUTPATIENT
Start: 2025-03-20 | End: 2025-03-20

## 2025-03-20 RX ORDER — OXYCODONE HYDROCHLORIDE 5 MG/1
2.5 TABLET ORAL EVERY 8 HOURS PRN
Qty: 10 TABLET | Refills: 0 | Status: SHIPPED | OUTPATIENT
Start: 2025-03-20

## 2025-03-20 RX ORDER — MEPERIDINE HYDROCHLORIDE 25 MG/ML
25 INJECTION INTRAMUSCULAR; INTRAVENOUS; SUBCUTANEOUS
Status: DISCONTINUED | OUTPATIENT
Start: 2025-03-20 | End: 2025-03-23

## 2025-03-20 RX ORDER — GABAPENTIN 100 MG/1
100 CAPSULE ORAL 3 TIMES DAILY
Qty: 90 CAPSULE | Refills: 0 | Status: SHIPPED | OUTPATIENT
Start: 2025-03-20 | End: 2025-04-01

## 2025-03-20 RX ORDER — ALBUTEROL SULFATE 0.83 MG/ML
2.5 SOLUTION RESPIRATORY (INHALATION)
Status: DISCONTINUED | OUTPATIENT
Start: 2025-03-20 | End: 2025-03-23

## 2025-03-20 RX ORDER — METHYLPREDNISOLONE SODIUM SUCCINATE 40 MG/ML
40 INJECTION INTRAMUSCULAR; INTRAVENOUS
Status: DISCONTINUED | OUTPATIENT
Start: 2025-03-20 | End: 2025-03-23

## 2025-03-20 RX ORDER — EPINEPHRINE 1 MG/ML
0.3 INJECTION, SOLUTION, CONCENTRATE INTRAVENOUS EVERY 5 MIN PRN
Status: DISCONTINUED | OUTPATIENT
Start: 2025-03-20 | End: 2025-03-23

## 2025-03-20 RX ORDER — HEPARIN SODIUM (PORCINE) LOCK FLUSH IV SOLN 100 UNIT/ML 100 UNIT/ML
5 SOLUTION INTRAVENOUS
Status: DISCONTINUED | OUTPATIENT
Start: 2025-03-20 | End: 2025-03-24 | Stop reason: HOSPADM

## 2025-03-20 RX ORDER — ALBUTEROL SULFATE 90 UG/1
1-2 INHALANT RESPIRATORY (INHALATION)
Status: DISCONTINUED | OUTPATIENT
Start: 2025-03-20 | End: 2025-03-23

## 2025-03-20 RX ORDER — ALLOPURINOL 100 MG/1
100 TABLET ORAL DAILY
Qty: 30 TABLET | Refills: 0 | Status: SHIPPED | OUTPATIENT
Start: 2025-03-21 | End: 2025-04-01

## 2025-03-20 RX ORDER — FUROSEMIDE 20 MG/1
20 TABLET ORAL DAILY
Status: DISCONTINUED | OUTPATIENT
Start: 2025-03-20 | End: 2025-03-24 | Stop reason: HOSPADM

## 2025-03-20 RX ORDER — LORAZEPAM 2 MG/ML
0.5 INJECTION INTRAMUSCULAR EVERY 4 HOURS PRN
Status: DISCONTINUED | OUTPATIENT
Start: 2025-03-20 | End: 2025-03-24 | Stop reason: HOSPADM

## 2025-03-20 RX ORDER — DIPHENHYDRAMINE HYDROCHLORIDE 50 MG/ML
50 INJECTION, SOLUTION INTRAMUSCULAR; INTRAVENOUS
Status: DISCONTINUED | OUTPATIENT
Start: 2025-03-20 | End: 2025-03-23

## 2025-03-20 RX ORDER — FUROSEMIDE 20 MG/1
20 TABLET ORAL DAILY
Qty: 30 TABLET | Refills: 0 | Status: SHIPPED | OUTPATIENT
Start: 2025-03-20 | End: 2025-03-24

## 2025-03-20 RX ORDER — DIPHENHYDRAMINE HYDROCHLORIDE 50 MG/ML
25 INJECTION, SOLUTION INTRAMUSCULAR; INTRAVENOUS
Status: DISCONTINUED | OUTPATIENT
Start: 2025-03-20 | End: 2025-03-23

## 2025-03-20 RX ORDER — HEPARIN SODIUM,PORCINE 10 UNIT/ML
5-20 VIAL (ML) INTRAVENOUS DAILY PRN
Status: DISCONTINUED | OUTPATIENT
Start: 2025-03-20 | End: 2025-03-24 | Stop reason: HOSPADM

## 2025-03-20 RX ADMIN — APIXABAN 5 MG: 5 TABLET, FILM COATED ORAL at 09:23

## 2025-03-20 RX ADMIN — OXYCODONE HYDROCHLORIDE 2.5 MG: 5 TABLET ORAL at 13:11

## 2025-03-20 RX ADMIN — FUROSEMIDE 20 MG: 20 TABLET ORAL at 10:45

## 2025-03-20 RX ADMIN — GABAPENTIN 100 MG: 100 CAPSULE ORAL at 08:17

## 2025-03-20 RX ADMIN — DEXAMETHASONE SODIUM PHOSPHATE: 10 INJECTION, SOLUTION INTRAMUSCULAR; INTRAVENOUS at 14:26

## 2025-03-20 RX ADMIN — HEPARIN SODIUM 1800 UNITS/HR: 10000 INJECTION, SOLUTION INTRAVENOUS at 06:10

## 2025-03-20 RX ADMIN — OXYCODONE HYDROCHLORIDE 5 MG: 5 TABLET ORAL at 18:22

## 2025-03-20 RX ADMIN — GABAPENTIN 100 MG: 100 CAPSULE ORAL at 21:37

## 2025-03-20 RX ADMIN — OXYCODONE HYDROCHLORIDE 5 MG: 5 TABLET ORAL at 23:44

## 2025-03-20 RX ADMIN — SODIUM CHLORIDE: 0.9 INJECTION, SOLUTION INTRAVENOUS at 18:52

## 2025-03-20 RX ADMIN — BENDAMUSTINE HYDROCHLORIDE 200 MG: 25 INJECTION, SOLUTION INTRAVENOUS at 15:00

## 2025-03-20 RX ADMIN — PANTOPRAZOLE SODIUM 40 MG: 40 TABLET, DELAYED RELEASE ORAL at 08:17

## 2025-03-20 RX ADMIN — HYDROMORPHONE HYDROCHLORIDE 0.2 MG: 0.2 INJECTION, SOLUTION INTRAMUSCULAR; INTRAVENOUS; SUBCUTANEOUS at 15:19

## 2025-03-20 RX ADMIN — GABAPENTIN 100 MG: 100 CAPSULE ORAL at 15:18

## 2025-03-20 RX ADMIN — ALLOPURINOL 100 MG: 100 TABLET ORAL at 08:17

## 2025-03-20 RX ADMIN — ACETAMINOPHEN 650 MG: 325 TABLET, FILM COATED ORAL at 06:16

## 2025-03-20 ASSESSMENT — ACTIVITIES OF DAILY LIVING (ADL)
ADLS_ACUITY_SCORE: 37

## 2025-03-20 NOTE — TELEPHONE ENCOUNTER
RECORDS STATUS - ALL OTHER DIAGNOSIS      RECORDS RECEIVED FROM: [facility]   NOTES STATUS DETAILS   OFFICE NOTE from referring provider PERLA Narayanan CNP   DISCHARGE SUMMARY from hospital [facility] 3/14/2025 - [facility]   DISCHARGE REPORT from the ER [facility] 3/13/2025 - [facility]   MEDICATION LIST [facility]    LABS     PATHOLOGY REPORTS     ANYTHING RELATED TO DIAGNOSIS Epic 3/20/2025

## 2025-03-20 NOTE — PLAN OF CARE
Goal Outcome Evaluation:    Orientation: AO x4  Aggression Stop Light: Green  Activity: SBA+W  Diet/BS Checks: 2g Na, good appetite  Tele:  n/a  IV Access/Drains: R PIV SL, L PIV SL  Pain Management: Continued L hip pain, PRN oxycodone x2, dilaudid IV x1, hot packs  Abnormal VS/Results: VSS on RA, Hgb 7.7, Creat 1.43  Bowel/Bladder: Cont b/b, multiple BMs  Skin/Wounds: BLE edema +2-3, para and biopsy sites intact, abdomen distended  Consults: Hem/onc  D/C Disposition: Home tomorrow pending  Other Info:   - Tolerated bendamustine infusion w/ no reactions  - Chemo precautions maintained  - Plan for paracentesis tomorrow

## 2025-03-20 NOTE — PROGRESS NOTES
[facility]    Medicine Progress Note - Hospitalist Service    Date of Admission:  3/14/2025    Assessment & Plan     Bhavesh Landeros is a 69 year old male medical history significant for hypertension, aortic stenosis, mitral regurgitation, ?multiple sclerosis, obesity, GERD, hyperlipidemia,  who was directly admitted from [facility] ED with worsening abdominal bloating and distention and found to have extensive retroperitoneal soft tissue concerning for possible lymphoma with extension to adjacent mesenteric and pancreatic structures/vasculature and associated significant compression of IVC, significant abdominal and pelvic ascites, and acute RLL pulmonary embolism.     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  Symptomatic moderately large volume ascites.  Peripheral Edema  Splenomegaly  *Presented to [facility] ED with worsening abd bloating/distention.   *CT PE revealed extensive confluent retroperitoneal soft tissue most compatible with lymphoma, likely associated significant compression of the IVC and moderately large volume of abdominal and pelvic ascites. Transferred to [facility] for Oncology workup.    -S/p CT-guided retroperitoneal lymphadenopathy biopsy with IR on 3/40/25.  Hospital course complicated by retroperitoneal hemorrhage following this.  Please see discussion below.  -Pathology came back positive for follicle lymphoma grade 1-2  -[place] oncology following,  s/p bone marrow biopsy  3/18  for staging.  -Started on chemotherapy 3/19 rituximab and Bendamustine.  Had reaction with rituximab infusion, and this was discontinued, not rechallenging per heme-onc.  -S/p diagnostic and therapeutic paracentesis on 3/15/2025 with removal of 4.7 L fluid, repeat paracentesis with 2.7 mL removal 3/18.  Serum albumin gradient is less than 1.  Lymphocytic predominance and culture negative.  Cytology as well negative.  -PTA Lasix resumed but creatinine  slightly elevated and hold.  Continue to hold PTA losartan and Aldactone.  -Supportive care  -Compression stockings  -Has buildup significant ascites again,     Acute RLL pulmonary embolism  -Had some shortness of breath and dyspnea on exertion in recent weeks,   -CT PE study revealed right lower lobe pulmonary embolism, no right heart strain on imaging.   -Venous Doppler negative for DVT  -Initially started on heparin drip.  Patient reported new and worsening pain of the left groin area on 3/16. Workup revealed new retroperitoneal hemorrhage.  Heparin drip discontinued on 3/16. Reviewed with oncology and rechallenged with heparin drip after bone marrow biopsy 3/18.  Monitor closely with serial hemoglobin.  Repeat daily, more frequently if worsening pain again.  -Transitioned to DOAC as Hb stable, no loading dose. Hb has fluctuated, repeat at 1600.  -Repeat venous Doppler on 3/17 is negative for DVT  -IR consulted, he does have compressed IVC from the mass and therefore no place for a filter to be placed safely.  Radiology does not recommend temporary filter       Large left retroperitoneal hemorrhage  Acute blood loss anemia due to #1 above  Patient complained of intractable pain since 3/15 evening, multiple imaging including MRI and CTA shows large left retroperitoneal hemorrhage measuring up to 16 cm in length.  Most likely this was bleeding from the lymph node biopsy site on 3/14/2025 as he was concurrently on anticoagulation.  -Hemoglobin dropped from 13.3 at presentation to 9.3 ,  s/p 2 units of PRBC on 3/16  -Hemoglobin subsequently has fluctuated pain improved.   -Anticoagulation managed as above.     Acute kidney injury  Hyperkalemia  -Creatinine bumped to 1.55, this is likely multifactorial as he was hypotensive with blood loss, received contrast  -Improved with IV hydration and now off IV fluids.    -Cr up and K as well up, repeat BMP at 1600 and treat if needed.      Degenerative disc disease  Patient  "reported intractable left lower back/groin and thigh pain on 3/16 with radicular symptoms.  -MRI of the lumbar spine shows multilevel disc bulges with bilateral extraforaminal nerve root compression at multiple levels.  Evaluated by neurosurgery who felt that these are all chronic changes with no acute intervention needed at this point  -Pain control as needed with Tylenol, Neurontin, oxycodone and IV Dilaudid     URI symptoms  Dry cough, coryza, intermittent wheezing for 1 week PTA. No fevers or chills. PE as above.  -COVID-19, influenza A and B and RSV negative  -Respiratory symptoms much improved after paracentesis, suspect this was mechanical  -Supportive care  -Nebs PRN  -PRN guaifenesin and tessalon pearles      Slow transit constipation  Likely worsened by above.  -Bowel regimen     History of mild aortic stenosis and mitral regurgitation  HTN  -Echocardiogram is fairly unremarkable  -Losartan held due to soft blood pressure after retroperitoneal hemorrhage and acute kidney injury.  Consider resuming losartan in the next day or 2  -Discontinue prior to admission aspirin as he will be on anticoagulation now     Chronic stable diagnoses and other pertinent medical history:  GERD  Obesity class II  HLD  Hiatal hernia  Recurrent abdominal hernia  Multiple sclerosis        Diet: 2 Gram Sodium Diet    DVT Prophylaxis: Anticoagulation as above  Pablo Catheter: Not present  Lines: None     Cardiac Monitoring: None  Code Status: Full Code      Clinically Significant Risk Factors                     # Obesity: Estimated body mass index is 36.74 kg/m  as calculated from the following:    Height as of this encounter: 1.702 m (5' 7\").    Weight as of this encounter: 106.4 kg (234 lb 9.6 oz).        # Financial/Environmental Concerns: none              Disposition Plan     Medically Ready for Discharge: Anticipated Tomorrow once potassium improves, hemoglobin is stable, thoracentesis completed.  Discussed with patient, " patient's daughter in the room, nursing staff, hematologist, SW/CC         Tripp Olson MD  Hospitalist Service  [facility]  Securely message with [employer] (more info)  Text page via [facility] Paging/Directory   ______________________________________________________________________    Interval History   Events reviewed, discussed with nursing staff and patient was seen this morning.  Revisited this afternoon again for follow-up and updates.  -Patient had symptoms including rigors, tachypnea, wheezing 5 minutes after the rituximab infusion was initiated.  Was treated with Benadryl, Pepcid, Solu-Medrol, IM epinephrine and symptoms improved.  He denies dyspnea, itching, tongue swelling or rash today.  -Abdomen is slightly more distended today but denies dyspnea or pain.   -Hemoglobin late yesterday was up to 10.2 and heparin drip was transitioned to apixaban this morning but hemoglobin this morning is 8.3.  Of note this is fluctuated between 8-10.  Denies any worsening back pain or groin pain.  Difficulty moving with left leg, feels weaker.    Physical Exam   Vital Signs: Temp: 97.7  F (36.5  C) Temp src: Oral BP: 112/63 Pulse: 95   Resp: 18 SpO2: 99 % O2 Device: None (Room air) Oxygen Delivery: 2 LPM  Weight: 234 lbs 9.6 oz    General: AAOx3, appears comfortable.  HEENT: PERRLA EOMI. Mucosa moist.   Lungs: Bilateral equal air entry. Clear to auscultation, normal work of breathing.   CVS: S1S2 regular, no tachycardia or murmur.   Abdomen: Soft, markedly distended but soft, nontender.  MSK: Bilateral 3 + leg edema.  Neuro: AAOX3. CN 2-12 normal. Strength symmetrical.  Skin: No rash.  Pale      Medical Decision Making       55 MINUTES SPENT BY ME on the date of service doing chart review, history, exam, documentation & further activities per the note.      Data     I have personally reviewed the following data over the past 24 hrs:    11.9 (H)  \   8.3 (L)   / 252     134 (L) 103 21.9 /  168 (H)    5.4 (H) 21 (L) 1.15 \     ALT: 28 AST: 43 AP: 58 TBILI: 0.7   ALB: 3.2 (L) TOT PROTEIN: 5.7 (L) LIPASE: N/A       Imaging results reviewed over the past 24 hrs:   No results found for this or any previous visit (from the past 24 hours).

## 2025-03-20 NOTE — PROGRESS NOTES
Hematology/Oncology Follow-up Note  [facility]    Date of Admission:  3/14/2025   Reason for Consult: follicular lymphoma       ASSESSMENT : Bhavesh Landeros is a 69 year old year old male who presented this hospitalization with shortness of breath and abdominal bloating. Found to have significant ascites, PE, and extensive retroperitoneal soft tissue concerning for malignancy. He was diagnosed with follicular lymphoma this admission.   Retroperitoneal adenopathy  PE  Ascites    PLAN:  Continue with C1D2 Bendamustine as planned. Discussed with Dr. Pressley, due to the severity of Rituximab infusion reaction. We are not going to re-challenge.    Patient is starting to feel bloated in the abdomen, no wheezing or respiratory changes. Discussed with hospitalist and we will see if coordination for outpatient paracentesis can be done tomorrow.   Will discharge on 5 mg Eliquis BID for pulmonary embolism   Hem/Onc will follow     PE  Hematoma   Pulmonary embolism secondary to malignancy, life-long anticoagulation   03/13/2025 CT CAP Small volume of right lower lobe pulmonary embolus without right heart strain.   03/13/2025 BLE US negative for DVT  03/17/2025 BLE US negative for DVT  Heparin gtt initiated on admission. Later discontinued on 03/16/2025 d/t hematoma concerns from biopsy as described below.   03/16/2025 CT AP showing New large left-sided retroperitoneal hematoma with intramuscular component.   IVC filter was not being placed-  IVC thrombus is compressed and DVT negative   03/18/2025 heparin gtt re-initiated   03/20/2025 Eliquis 5 mg BID    Follicular Lymphoma grade 1-2   Ascites  03/13/2025 CT CAP Extensive confluent retroperitoneal soft tissue as described most compatible with lymphoma.  Likely associated significant compression of the IVC, not optimally assessed. Moderately large volume of abdominal and pelvic ascites.  03/14/2025 CT guided retroperitoneal lymph node biopsy showing follicular lymphoma  grade 1-2   03/15/2025 US paracentesis 4.7 L removed - cytology negative for malignancy   03/18/2025 US paracentesis 2.7 L removed   03/18/2025 Bone marrow biopsy   Bone marrow involvement by low-grade B-cell lymphoma follicular center origin.  B-cell lymphoma comprises an estimated 10 to 20% of marrow cellularity.  Flow showing CD10 positive Lambda-monotypic B cells ( 6 %)   Treatment History   03/19/2025 C1D1 IP Rituximab & Bendamustine   Severe reaction to Rituximab. Not re-challenged.       EXAM:  Subjective  Denies fever, chills, night sweats. Denies nausea, vomiting, diarrhea, constipation. Denies lightheaded or dizziness.Denies headache, fatigue, lack of appetite. Denies bleeding, bruising, rashes. Denies shortness of breath, cough.     Abdomen is starting to feel bloated. Denies any lasting symptoms from Rituximab infusion last evening.     Objective  GENERAL/CONSTITUTIONAL: No acute distress.  NEUROLOGIC: Alert, oriented, answers questions appropriately.   INTEGUMENTARY: No rashes or jaundice.      Labs Reviewed: CBC, CMP, labs as above   Imaging Reviewed: as above    40 minutes spent on the date of the encounter doing review of outside records, review of test results, interpretation of tests,  implementing/ creating plan, coordinating care, and documentation     Malu DUNCAN CNP  Hematology/Oncology  [facility]   Securely message with [employer]

## 2025-03-20 NOTE — PLAN OF CARE
Goal Outcome Evaluation:    [id number]  Orientation: A&O x4   Aggression Stop Light: green   Activity: SBA  Diet/BS Checks: 2 g Na+  Tele:  NSR   IV Access/Drains: R PIV SL; L PIV infusing Hep at 1800 units/hr- next hep Xa in AM  Pain Management: pain to left hip this AM- ranking 3/10. PRN tylenol given x1  Abnormal VS/Results: VSS on RA   Bowel/Bladder: continent  Skin/Wounds: para site and biopsy site CDI; BLE edema   Consults: Hem/Onc  D/C Disposition: pending   Other Info:

## 2025-03-21 ENCOUNTER — APPOINTMENT (OUTPATIENT)
Dept: ULTRASOUND IMAGING | Facility: CLINIC | Age: 70
DRG: 823 | End: 2025-03-21
Attending: HOSPITALIST
Payer: COMMERCIAL

## 2025-03-21 VITALS
WEIGHT: 234.6 LBS | HEART RATE: 82 BPM | SYSTOLIC BLOOD PRESSURE: 127 MMHG | DIASTOLIC BLOOD PRESSURE: 76 MMHG | HEIGHT: 67 IN | OXYGEN SATURATION: 95 % | RESPIRATION RATE: 18 BRPM | TEMPERATURE: 98.5 F | BODY MASS INDEX: 36.82 KG/M2

## 2025-03-21 LAB
ANION GAP SERPL CALCULATED.3IONS-SCNC: 10 MMOL/L (ref 7–15)
BUN SERPL-MCNC: 35.3 MG/DL (ref 8–23)
CALCIUM SERPL-MCNC: 7.9 MG/DL (ref 8.8–10.4)
CHLORIDE SERPL-SCNC: 101 MMOL/L (ref 98–107)
CREAT SERPL-MCNC: 1.68 MG/DL (ref 0.67–1.17)
EGFRCR SERPLBLD CKD-EPI 2021: 43 ML/MIN/1.73M2
GLUCOSE SERPL-MCNC: 96 MG/DL (ref 70–99)
HCO3 SERPL-SCNC: 23 MMOL/L (ref 22–29)
HGB BLD-MCNC: 8.7 G/DL (ref 13.3–17.7)
MAGNESIUM SERPL-MCNC: 2.6 MG/DL (ref 1.7–2.3)
PHOSPHATE SERPL-MCNC: 4.8 MG/DL (ref 2.5–4.5)
POTASSIUM SERPL-SCNC: 5.1 MMOL/L (ref 3.4–5.3)
SODIUM SERPL-SCNC: 134 MMOL/L (ref 135–145)
URATE SERPL-MCNC: 8.1 MG/DL (ref 3.4–7)
URATE SERPL-MCNC: 8.2 MG/DL (ref 3.4–7)

## 2025-03-21 PROCEDURE — 250N000013 HC RX MED GY IP 250 OP 250 PS 637: Performed by: PHYSICIAN ASSISTANT

## 2025-03-21 PROCEDURE — 84100 ASSAY OF PHOSPHORUS: CPT | Performed by: HOSPITALIST

## 2025-03-21 PROCEDURE — 250N000009 HC RX 250: Performed by: RADIOLOGY

## 2025-03-21 PROCEDURE — 84550 ASSAY OF BLOOD/URIC ACID: CPT

## 2025-03-21 PROCEDURE — 120N000001 HC R&B MED SURG/OB

## 2025-03-21 PROCEDURE — 49083 ABD PARACENTESIS W/IMAGING: CPT

## 2025-03-21 PROCEDURE — 84550 ASSAY OF BLOOD/URIC ACID: CPT | Performed by: HOSPITALIST

## 2025-03-21 PROCEDURE — 272N000706 US PARACENTESIS WITH ALBUMIN

## 2025-03-21 PROCEDURE — 85018 HEMOGLOBIN: CPT | Performed by: HOSPITALIST

## 2025-03-21 PROCEDURE — 80048 BASIC METABOLIC PNL TOTAL CA: CPT | Performed by: HOSPITALIST

## 2025-03-21 PROCEDURE — 250N000011 HC RX IP 250 OP 636

## 2025-03-21 PROCEDURE — 99233 SBSQ HOSP IP/OBS HIGH 50: CPT | Performed by: HOSPITALIST

## 2025-03-21 PROCEDURE — P9047 ALBUMIN (HUMAN), 25%, 50ML: HCPCS | Performed by: HOSPITALIST

## 2025-03-21 PROCEDURE — 250N000013 HC RX MED GY IP 250 OP 250 PS 637: Performed by: INTERNAL MEDICINE

## 2025-03-21 PROCEDURE — 258N000003 HC RX IP 258 OP 636

## 2025-03-21 PROCEDURE — 36415 COLL VENOUS BLD VENIPUNCTURE: CPT

## 2025-03-21 PROCEDURE — 83735 ASSAY OF MAGNESIUM: CPT | Performed by: HOSPITALIST

## 2025-03-21 PROCEDURE — 36415 COLL VENOUS BLD VENIPUNCTURE: CPT | Performed by: HOSPITALIST

## 2025-03-21 PROCEDURE — 250N000011 HC RX IP 250 OP 636: Performed by: HOSPITALIST

## 2025-03-21 PROCEDURE — 99232 SBSQ HOSP IP/OBS MODERATE 35: CPT

## 2025-03-21 RX ORDER — ALLOPURINOL 100 MG/1
200 TABLET ORAL DAILY
Status: DISCONTINUED | OUTPATIENT
Start: 2025-03-22 | End: 2025-03-24 | Stop reason: HOSPADM

## 2025-03-21 RX ORDER — ALBUMIN (HUMAN) 12.5 G/50ML
12.5 SOLUTION INTRAVENOUS EVERY 8 HOURS
Status: COMPLETED | OUTPATIENT
Start: 2025-03-21 | End: 2025-03-22

## 2025-03-21 RX ORDER — LIDOCAINE HYDROCHLORIDE 10 MG/ML
10 INJECTION, SOLUTION EPIDURAL; INFILTRATION; INTRACAUDAL; PERINEURAL ONCE
Status: COMPLETED | OUTPATIENT
Start: 2025-03-21 | End: 2025-03-21

## 2025-03-21 RX ADMIN — GABAPENTIN 100 MG: 100 CAPSULE ORAL at 16:01

## 2025-03-21 RX ADMIN — ACETAMINOPHEN 650 MG: 325 TABLET, FILM COATED ORAL at 22:25

## 2025-03-21 RX ADMIN — PANTOPRAZOLE SODIUM 40 MG: 40 TABLET, DELAYED RELEASE ORAL at 10:00

## 2025-03-21 RX ADMIN — ALLOPURINOL 100 MG: 100 TABLET ORAL at 10:00

## 2025-03-21 RX ADMIN — SENNOSIDES AND DOCUSATE SODIUM 2 TABLET: 50; 8.6 TABLET ORAL at 22:11

## 2025-03-21 RX ADMIN — ALBUMIN HUMAN 12.5 G: 0.25 SOLUTION INTRAVENOUS at 22:13

## 2025-03-21 RX ADMIN — SENNOSIDES AND DOCUSATE SODIUM 1 TABLET: 50; 8.6 TABLET ORAL at 10:00

## 2025-03-21 RX ADMIN — GABAPENTIN 100 MG: 100 CAPSULE ORAL at 10:00

## 2025-03-21 RX ADMIN — OXYCODONE HYDROCHLORIDE 2.5 MG: 5 TABLET ORAL at 10:11

## 2025-03-21 RX ADMIN — LIDOCAINE HYDROCHLORIDE ANHYDROUS 10 ML: 10 INJECTION, SOLUTION INFILTRATION at 09:00

## 2025-03-21 RX ADMIN — GABAPENTIN 100 MG: 100 CAPSULE ORAL at 22:12

## 2025-03-21 RX ADMIN — ACETAMINOPHEN 650 MG: 325 TABLET, FILM COATED ORAL at 10:11

## 2025-03-21 RX ADMIN — SODIUM CHLORIDE 6 MG: 9 INJECTION, SOLUTION INTRAVENOUS at 15:30

## 2025-03-21 RX ADMIN — ALBUMIN HUMAN 12.5 G: 0.25 SOLUTION INTRAVENOUS at 16:01

## 2025-03-21 ASSESSMENT — ACTIVITIES OF DAILY LIVING (ADL)
ADLS_ACUITY_SCORE: 46
ADLS_ACUITY_SCORE: 43
ADLS_ACUITY_SCORE: 37
ADLS_ACUITY_SCORE: 38
ADLS_ACUITY_SCORE: 42
ADLS_ACUITY_SCORE: 43
ADLS_ACUITY_SCORE: 42
ADLS_ACUITY_SCORE: 43
ADLS_ACUITY_SCORE: 43
ADLS_ACUITY_SCORE: 46
ADLS_ACUITY_SCORE: 42
ADLS_ACUITY_SCORE: 46
ADLS_ACUITY_SCORE: 43
ADLS_ACUITY_SCORE: 43
ADLS_ACUITY_SCORE: 38
ADLS_ACUITY_SCORE: 43
ADLS_ACUITY_SCORE: 43
ADLS_ACUITY_SCORE: 38
ADLS_ACUITY_SCORE: 46
ADLS_ACUITY_SCORE: 46
ADLS_ACUITY_SCORE: 42
ADLS_ACUITY_SCORE: 38
ADLS_ACUITY_SCORE: 38

## 2025-03-21 NOTE — PLAN OF CARE
3/20/25 -- [id number]    Orientation: A&Ox4; calm and cooperative  Aggression Stop Light: Green  Activity: SBA with walker  Diet/BS Checks: 2g Na diet  Tele:  N/A  IV Access/Drains: R PIV SL  Pain Management: Complained of ongoing L hip pain, oxy given x1  Abnormal VS/Results: VSS on RA  Bowel/Bladder: Continent of bowel/bladder  Skin/Wounds: BLE edema +2-3. R abdominal para site, CDI  Consults: Hem/onc  D/C Disposition: tomorrow after paracentesis pending improvement     Other Info:   - Chemo precautions maintained  - Paracentesis planned for tomorrow 3/21  - K, Mg, & Phos replacement protocol, rechecks in AM 3/21  - 1 unit of blood completed this shift with no adverse effects, Hgb recheck in AM 3/21  - Hepatoglobin resulted, see results

## 2025-03-21 NOTE — PROGRESS NOTES
[facility]    Medicine Progress Note - Hospitalist Service    Date of Admission:  3/14/2025    Assessment & Plan     Bhavesh Landeros is a 69 year old male medical history significant for hypertension, aortic stenosis, mitral regurgitation, ?multiple sclerosis, obesity, GERD, hyperlipidemia,  who was directly admitted from [facility] ED with worsening abdominal bloating and distention and found to have extensive retroperitoneal soft tissue concerning for possible lymphoma with extension to adjacent mesenteric and pancreatic structures/vasculature and associated significant compression of IVC, significant abdominal and pelvic ascites, and acute RLL pulmonary embolism.     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  Symptomatic moderately large volume ascites.  Peripheral Edema  Splenomegaly  *Presented to [facility] ED with worsening abd bloating/distention.   *CT PE revealed extensive confluent retroperitoneal soft tissue most compatible with lymphoma, likely associated significant compression of the IVC and moderately large volume of abdominal and pelvic ascites. [facility] Oncology workup.    -S/p CT-guided retroperitoneal lymphadenopathy biopsy with IR on 3/40/25.  Hospital course complicated by retroperitoneal hemorrhage following this.  Please see discussion below.  -Pathology came back positive for follicle lymphoma grade 1-2  -[facility] oncology following,  s/p bone marrow biopsy  3/18  for staging.  -Started on chemotherapy 3/19 rituximab and Bendamustine.  Had reaction with rituximab infusion and this was discontinued, not rechallenging inpatient per heme-onc.  -S/p diagnostic and therapeutic paracentesis on 3/15/2025 with removal of 4.7 L fluid, repeat paracentesis with 2.7 mL removal 3/18.  Serum albumin gradient is less than 1.  Lymphocytic predominance and culture negative.  Cytology as well negative.  Worsening abdominal distention  and paracentesis again 3/21 with 2.7 hemorrhagic fluid.    -PTA Lasix resumed but creatinine worsening and on hold.    -Supportive care  -Compression stockings     Tumor lysis syndrome  Acute kidney injury  Hyperkalemia  Uric acid elevated, creatinine trending up.  Also hyperkalemic  -Potassium is now normal  -Creatinine trending up to 1.6.  Uric acid 8.2. Rasburicase per heme-onc, follow labs  -Allopurinol dose increased  -Holding losartan and Aldactone and Lasix.  -IV albumin and follow labs     Acute RLL pulmonary embolism   Had some shortness of breath and dyspnea on exertion in recent weeks. CT PE study revealed right lower lobe pulmonary embolism, no right heart strain on imaging. Venous Doppler negative for DVT including repeat 3/17  -Initially started on heparin drip.  Patient reported new and worsening pain of the left groin area on 3/16. Workup revealed new retroperitoneal hemorrhage.  Heparin drip discontinued on 3/16. Reviewed with oncology and rechallenged with heparin drip after bone marrow biopsy 3/18.     -Transitioned to DOAC as Hb was stable but drop in Hb again 3/20, anticoagulation held.  -IR consulted, he does have compressed IVC from the mass and therefore no place for a filter to be placed safely.  Radiology does not recommend temporary filter    -Discussed with heme-onc, follow hemoglobin for another 1-2 days and rechallenge with heparin if Hb stable      Large left retroperitoneal hemorrhage  Acute blood loss anemia due to #1 above  Patient complained of intractable pain since 3/15 evening, multiple imaging including MRI and CTA shows large left retroperitoneal hemorrhage measuring up to 16 cm in length.  Most likely this was bleeding from the lymph node biopsy site on 3/14/2025 as he was concurrently on anticoagulation.  -Hemoglobin dropped from 13.3 at presentation to 9.3 ,  s/p 2 units of PRBC on 3/16  -Hemoglobin subsequently has fluctuated pain improved. Hb drop 3/20.  1 unit for 7.7,  up to 8.7 appropriately.  -Recheck hemoglobin again tonight, transfuse to keep Hb above 8.  If worsening hemoglobin, will get noncontrast CT to evaluate for any expanding hematoma.  Suspect disc hematoma could be tracking into abdominal cavity resulting in hemorrhagic tap.    -Anticoagulation managed as above.      Degenerative disc disease  Patient reported intractable left lower back/groin and thigh pain on 3/16 with radicular symptoms.  -MRI of the lumbar spine shows multilevel disc bulges with bilateral extraforaminal nerve root compression at multiple levels.  Evaluated by neurosurgery who felt that these are all chronic changes with no acute intervention needed at this point  -Pain control as needed with Tylenol, Neurontin, oxycodone and IV Dilaudid     URI symptoms  Dry cough, coryza, intermittent wheezing for 1 week PTA. No fevers or chills. PE as above.  -COVID-19, influenza A and B and RSV negative  -Respiratory symptoms much improved after paracentesis, suspect this was mechanical  -Supportive care  -Nebs PRN  -PRN guaifenesin and tessalon pearles      Slow transit constipation  Likely worsened by above.  -Bowel regimen     History of mild aortic stenosis and mitral regurgitation  HTN  -Echocardiogram is fairly unremarkable  -Losartan held due to soft blood pressure after retroperitoneal hemorrhage and acute kidney injury.  Consider resuming losartan in the next day or 2  -Discontinue prior to admission aspirin as he will be on anticoagulation now     Chronic stable diagnoses and other pertinent medical history:  GERD  Obesity class II  HLD  Hiatal hernia  Recurrent abdominal hernia  Multiple sclerosis        Diet: 2 Gram Sodium Diet    DVT Prophylaxis: Anticoagulation as above  Pablo Catheter: Not present  Lines: None     Cardiac Monitoring: None  Code Status: Full Code      Clinically Significant Risk Factors          # Acute Kidney Injury, unspecified: based on a >150% or 0.3 mg/dL increase in last  "creatinine compared to past 90 day average, will monitor renal function            # Obesity: Estimated body mass index is 37.48 kg/m  as calculated from the following:    Height as of this encounter: 1.702 m (5' 7\").    Weight as of this encounter: 108.5 kg (239 lb 4.8 oz).        # Financial/Environmental Concerns: none              Disposition Plan     Medically Ready for Discharge: Anticipated in 2-4 Days once hemoglobin is stable, rechallenged with anticoagulation, creatinine improves.  Discussed with patient, patient's daughter, nursing staff, hematologist         Tripp Olson MD  Hospitalist Service  [facility]  Securely message with [employer] (more info)  Text page via [facility] Paging/Directory   ______________________________________________________________________    Interval History     Chart reviewed, discussed with nursing staff and patient was seen this afternoon.  Had paracentesis and 2.7 mL dark bloody fluid removed.  Patient denies abdominal pain.  No hypotension.  Hemoglobin has increased appropriately with transfusion, 7.7-8.7 this morning.  Noted rising creatinine and uric acid.  Potassium high normal.  Denies dyspnea.  Feels tired.  No chest pain.    Physical Exam   Vital Signs: Temp: 97.9  F (36.6  C) Temp src: Oral BP: 104/57 Pulse: 75   Resp: 16 SpO2: 98 % O2 Device: None (Room air)    Weight: 239 lbs 4.8 oz    General: AAOx3, appears comfortable.  HEENT: PERRLA EOMI. Mucosa moist.   Lungs: Bilateral equal air entry. Clear to auscultation, normal work of breathing.   CVS: S1S2 regular, no tachycardia or murmur.   Abdomen: Soft, markedly distended but soft, nontender.  MSK: Bilateral 3 + leg edema.  Neuro: AAOX3. CN 2-12 normal. Strength symmetrical.  Skin: No rash.  Pale      Medical Decision Making       52 MINUTES SPENT BY ME on the date of service doing chart review, history, exam, documentation & further activities per the note.      Data     I have personally " reviewed the following data over the past 24 hrs:    N/A  \   8.7 (L)   / N/A     134 (L) 101 35.3 (H) /  96   5.1 23 1.68 (H) \     ALT: 19 AST: 26 AP: 51 TBILI: 0.5   ALB: 3.2 (L) TOT PROTEIN: 5.1 (L) LIPASE: N/A     Ferritin:  N/A % Retic:  N/A LDH:  201       Imaging results reviewed over the past 24 hrs:   Recent Results (from the past 24 hours)   US Paracentesis with Albumin    Narrative    EXAM:  1. PARACENTESIS  2. ULTRASOUND GUIDANCE  LOCATION: [facility]  DATE: 3/21/2025    INDICATION: Ascites.    PROCEDURE: Informed consent obtained. Time out performed. The abdomen was prepped and draped in a sterile fashion. 10 mL of 1% lidocaine was infused into local soft tissues. A 5 [place] citizen catheter system was introduced into the abdominal ascites under   ultrasound guidance.    2.4 liters of dark, bloody fluid were removed and sent to lab if requested.    Patient tolerated procedure well.    Ultrasound imaging was obtained and placed in the patient's permanent medical record.      Impression    IMPRESSION:  1.  Status post ultrasound-guided paracentesis.

## 2025-03-21 NOTE — PROGRESS NOTES
CLINICAL NUTRITION SERVICES - BRIEF NOTE    Reviewed nutrition risk factors for LOS. Pt is tolerating 2 g Na diet, ordering adequately, and good/adequate oral intake per nursing documentation. Reviewed wt hx, no unintentional wt loss PTA. NO pressure injuries. No nutrition interventions at this time.     Malnutrition Risk Screen Score: 0  Have you recently lost weight without trying? NO  Have you eaten poorly because of a decreased appetite? NO    - Please formally consult should a nutrition concern arise.     Monitoring/Evaluation  Will continue to monitor and evaluate per protocol.    Alexander Mark MS, RD, LD

## 2025-03-21 NOTE — PROGRESS NOTES
Care Management Follow Up    Length of Stay (days): 7    Expected Discharge Date: 03/22/2025     Concerns to be Addressed: adjustment to diagnosis/illness, discharge planning     Patient plan of care discussed at interdisciplinary rounds: Yes    Anticipated Discharge Disposition: Home, possible Home Care RN & PT              Anticipated Discharge Services: Home Care  Anticipated Discharge DME:  [facility]    Patient/family educated on Medicare website which has current facility and service quality ratings:    Education Provided on the Discharge Plan: Yes  Patient/Family in Agreement with the Plan: yes    Referrals Placed by CM/SW:    Private pay costs discussed: Not applicable    Discussed  Partnership in Safe Discharge Planning  document with patient/family: No     Handoff Completed: Yes, [facility] PCP: Internal handoff referral completed    Additional Information:  Per chart review, patient not ready for discharge home today and pt up with ARIN and elias.  Messaged Dr. Olson for PT eval order.    Next Steps: Await PT eval.  Care Management will continue to follow for discharge planning.     GILLIAN Currie RN, BSN, OCN   Inpatient Care Coordination [address]  Office: [phone]

## 2025-03-21 NOTE — PROVIDER NOTIFICATION
MD Notification    Notified Person: MD    Notified Person Name: ladonna Olson    Notification Date/Time: 03/21/25   11:04 AM     Notification Interaction: barb     Purpose of Notification: Hey did you want albumin ordered? the order says para with albumin, they removed 2.35L    Orders Received: not needed, will wait for AM labs    Comments:

## 2025-03-21 NOTE — PLAN OF CARE
3/20/2025 3860 - [id number]    Orientation: A&Ox4; calm and cooperative  Aggression Stop Light: Green  Activity: SBA with walker  Diet/BS Checks: 2g Na diet, good appetite and tolerating without problems per patient report  Tele:  N/A  IV Access/Drains: R PIV SL; L PIV infusing 1 unit PRBCs  Pain Management: Complained of ongoing L hip pain, heat packs/scheduled gabapentin; no PRNs needed this shift  Abnormal VS/Results: VSS; RA.  Hb.7; conditional orders to transfuse <8  Bowel/Bladder: Continent of bowel/bladder  Skin/Wounds: BLE edema +2-3. R abdominal para site and biopsy sites covered  Consults: Hem/onc  D/C Disposition: pending progress/plan; patient hoping to go home tomorrow after paracentesis  Other Info:   - Chemo precautions maintained  - Paracentesis planned for tomorrow

## 2025-03-21 NOTE — PLAN OF CARE
Goal Outcome Evaluation:    0700-1930    Orientation: A&Ox4; calm and cooperative  Aggression Stop Light: Green  Activity: SBA with walker  Diet/BS Checks: 2g Na diet  Tele:  N/A  IV Access/Drains: R PIV SL, old dried drainage  Pain Management: LLE pain, oxy given x1  Abnormal VS/Results: VSS on RA. Hgb 8.7, recheck 2000, conditional orders to transfuse <8; urice acid rasburicase 8.1, recheck at 2000; Cr 1.68  Bowel/Bladder: Continent of bowel/bladder  Skin/Wounds: BLE edema +2-3. para site, CDI, previous biopsy CDI  Consults: Hem/onc; PT  D/C Disposition: TBD    Other Info:   - Chemo precautions maintained  - Paracentesis with albumin today, removed 2.4 L   - K, Mg, & Phos replacement protocol, rechecks in AM 3/22

## 2025-03-22 ENCOUNTER — APPOINTMENT (OUTPATIENT)
Dept: PHYSICAL THERAPY | Facility: CLINIC | Age: 70
DRG: 823 | End: 2025-03-22
Attending: HOSPITALIST
Payer: COMMERCIAL

## 2025-03-22 LAB
ANION GAP SERPL CALCULATED.3IONS-SCNC: 7 MMOL/L (ref 7–15)
ANION GAP SERPL CALCULATED.3IONS-SCNC: 9 MMOL/L (ref 7–15)
BASOPHILS # BLD AUTO: 0 10E3/UL (ref 0–0.2)
BASOPHILS NFR BLD AUTO: 0 %
BLD PROD TYP BPU: NORMAL
BLOOD COMPONENT TYPE: NORMAL
BUN SERPL-MCNC: 34.4 MG/DL (ref 8–23)
BUN SERPL-MCNC: 37.4 MG/DL (ref 8–23)
CALCIUM SERPL-MCNC: 7.7 MG/DL (ref 8.8–10.4)
CALCIUM SERPL-MCNC: 8.1 MG/DL (ref 8.8–10.4)
CHLORIDE SERPL-SCNC: 102 MMOL/L (ref 98–107)
CHLORIDE SERPL-SCNC: 103 MMOL/L (ref 98–107)
CODING SYSTEM: NORMAL
CREAT SERPL-MCNC: 1.51 MG/DL (ref 0.67–1.17)
CREAT SERPL-MCNC: 1.63 MG/DL (ref 0.67–1.17)
CROSSMATCH: NORMAL
EGFRCR SERPLBLD CKD-EPI 2021: 45 ML/MIN/1.73M2
EGFRCR SERPLBLD CKD-EPI 2021: 49 ML/MIN/1.73M2
EOSINOPHIL # BLD AUTO: 0.2 10E3/UL (ref 0–0.7)
EOSINOPHIL NFR BLD AUTO: 2 %
ERYTHROCYTE [DISTWIDTH] IN BLOOD BY AUTOMATED COUNT: 15.2 % (ref 10–15)
GLUCOSE SERPL-MCNC: 87 MG/DL (ref 70–99)
GLUCOSE SERPL-MCNC: 94 MG/DL (ref 70–99)
HCO3 SERPL-SCNC: 23 MMOL/L (ref 22–29)
HCO3 SERPL-SCNC: 24 MMOL/L (ref 22–29)
HCT VFR BLD AUTO: 22.7 % (ref 40–53)
HCT VFR BLD AUTO: 28.4 % (ref 40–53)
HGB BLD-MCNC: 7.7 G/DL (ref 13.3–17.7)
HGB BLD-MCNC: 9.5 G/DL (ref 13.3–17.7)
IMM GRANULOCYTES # BLD: 0.1 10E3/UL
IMM GRANULOCYTES NFR BLD: 1 %
ISSUE DATE AND TIME: NORMAL
LYMPHOCYTES # BLD AUTO: 0.8 10E3/UL (ref 0.8–5.3)
LYMPHOCYTES NFR BLD AUTO: 8 %
MAGNESIUM SERPL-MCNC: 2.6 MG/DL (ref 1.7–2.3)
MCH RBC QN AUTO: 28.7 PG (ref 26.5–33)
MCHC RBC AUTO-ENTMCNC: 33.5 G/DL (ref 31.5–36.5)
MCV RBC AUTO: 86 FL (ref 78–100)
MONOCYTES # BLD AUTO: 1.2 10E3/UL (ref 0–1.3)
MONOCYTES NFR BLD AUTO: 12 %
NEUTROPHILS # BLD AUTO: 7.2 10E3/UL (ref 1.6–8.3)
NEUTROPHILS NFR BLD AUTO: 76 %
NRBC # BLD AUTO: 0 10E3/UL
NRBC BLD AUTO-RTO: 0 /100
PHOSPHATE SERPL-MCNC: 4.1 MG/DL (ref 2.5–4.5)
PLATELET # BLD AUTO: 231 10E3/UL (ref 150–450)
POTASSIUM SERPL-SCNC: 4.8 MMOL/L (ref 3.4–5.3)
POTASSIUM SERPL-SCNC: 5.3 MMOL/L (ref 3.4–5.3)
RBC # BLD AUTO: 3.31 10E6/UL (ref 4.4–5.9)
SODIUM SERPL-SCNC: 133 MMOL/L (ref 135–145)
SODIUM SERPL-SCNC: 135 MMOL/L (ref 135–145)
UNIT ABO/RH: NORMAL
UNIT NUMBER: NORMAL
UNIT STATUS: NORMAL
UNIT TYPE ISBT: 1700
URATE SERPL-MCNC: 2.6 MG/DL (ref 3.4–7)
URATE SERPL-MCNC: 4 MG/DL (ref 3.4–7)
WBC # BLD AUTO: 9.5 10E3/UL (ref 4–11)

## 2025-03-22 PROCEDURE — 80048 BASIC METABOLIC PNL TOTAL CA: CPT | Performed by: HOSPITALIST

## 2025-03-22 PROCEDURE — 120N000001 HC R&B MED SURG/OB

## 2025-03-22 PROCEDURE — 97530 THERAPEUTIC ACTIVITIES: CPT | Mod: GP

## 2025-03-22 PROCEDURE — 85025 COMPLETE CBC W/AUTO DIFF WBC: CPT | Performed by: HOSPITALIST

## 2025-03-22 PROCEDURE — 84550 ASSAY OF BLOOD/URIC ACID: CPT

## 2025-03-22 PROCEDURE — 250N000013 HC RX MED GY IP 250 OP 250 PS 637

## 2025-03-22 PROCEDURE — P9016 RBC LEUKOCYTES REDUCED: HCPCS | Performed by: HOSPITALIST

## 2025-03-22 PROCEDURE — 97161 PT EVAL LOW COMPLEX 20 MIN: CPT | Mod: GP

## 2025-03-22 PROCEDURE — P9047 ALBUMIN (HUMAN), 25%, 50ML: HCPCS | Mod: JZ | Performed by: HOSPITALIST

## 2025-03-22 PROCEDURE — 85014 HEMATOCRIT: CPT | Performed by: HOSPITALIST

## 2025-03-22 PROCEDURE — 99232 SBSQ HOSP IP/OBS MODERATE 35: CPT | Performed by: INTERNAL MEDICINE

## 2025-03-22 PROCEDURE — 36415 COLL VENOUS BLD VENIPUNCTURE: CPT

## 2025-03-22 PROCEDURE — 250N000013 HC RX MED GY IP 250 OP 250 PS 637: Performed by: HOSPITALIST

## 2025-03-22 PROCEDURE — 99232 SBSQ HOSP IP/OBS MODERATE 35: CPT | Performed by: HOSPITALIST

## 2025-03-22 PROCEDURE — 83735 ASSAY OF MAGNESIUM: CPT | Performed by: HOSPITALIST

## 2025-03-22 PROCEDURE — 250N000011 HC RX IP 250 OP 636: Mod: JZ | Performed by: HOSPITALIST

## 2025-03-22 PROCEDURE — 250N000013 HC RX MED GY IP 250 OP 250 PS 637: Performed by: INTERNAL MEDICINE

## 2025-03-22 PROCEDURE — 250N000013 HC RX MED GY IP 250 OP 250 PS 637: Performed by: PHYSICIAN ASSISTANT

## 2025-03-22 PROCEDURE — 36415 COLL VENOUS BLD VENIPUNCTURE: CPT | Performed by: HOSPITALIST

## 2025-03-22 PROCEDURE — 84100 ASSAY OF PHOSPHORUS: CPT | Performed by: HOSPITALIST

## 2025-03-22 PROCEDURE — 97110 THERAPEUTIC EXERCISES: CPT | Mod: GP

## 2025-03-22 RX ORDER — ALBUMIN (HUMAN) 12.5 G/50ML
25 SOLUTION INTRAVENOUS EVERY 8 HOURS
Status: COMPLETED | OUTPATIENT
Start: 2025-03-22 | End: 2025-03-23

## 2025-03-22 RX ADMIN — GABAPENTIN 100 MG: 100 CAPSULE ORAL at 21:14

## 2025-03-22 RX ADMIN — ACETAMINOPHEN 650 MG: 325 TABLET, FILM COATED ORAL at 10:12

## 2025-03-22 RX ADMIN — PSYLLIUM HUSK 1 PACKET: 3.4 POWDER ORAL at 21:14

## 2025-03-22 RX ADMIN — ACETAMINOPHEN 650 MG: 325 TABLET, FILM COATED ORAL at 18:44

## 2025-03-22 RX ADMIN — ALBUMIN HUMAN 12.5 G: 0.25 SOLUTION INTRAVENOUS at 06:12

## 2025-03-22 RX ADMIN — ALLOPURINOL 200 MG: 100 TABLET ORAL at 10:12

## 2025-03-22 RX ADMIN — ALBUMIN (HUMAN) 25 G: 0.25 INJECTION, SOLUTION INTRAVENOUS at 21:14

## 2025-03-22 RX ADMIN — GABAPENTIN 100 MG: 100 CAPSULE ORAL at 17:11

## 2025-03-22 RX ADMIN — PANTOPRAZOLE SODIUM 40 MG: 40 TABLET, DELAYED RELEASE ORAL at 10:12

## 2025-03-22 RX ADMIN — ALBUMIN (HUMAN) 25 G: 0.25 INJECTION, SOLUTION INTRAVENOUS at 12:15

## 2025-03-22 RX ADMIN — PSYLLIUM HUSK 1 PACKET: 3.4 POWDER ORAL at 10:12

## 2025-03-22 RX ADMIN — GABAPENTIN 100 MG: 100 CAPSULE ORAL at 10:12

## 2025-03-22 ASSESSMENT — ACTIVITIES OF DAILY LIVING (ADL)
ADLS_ACUITY_SCORE: 42
ADLS_ACUITY_SCORE: 42
ADLS_ACUITY_SCORE: 41
ADLS_ACUITY_SCORE: 43
ADLS_ACUITY_SCORE: 41
ADLS_ACUITY_SCORE: 41
ADLS_ACUITY_SCORE: 42
ADLS_ACUITY_SCORE: 43
ADLS_ACUITY_SCORE: 42
ADLS_ACUITY_SCORE: 41
ADLS_ACUITY_SCORE: 43
ADLS_ACUITY_SCORE: 41
ADLS_ACUITY_SCORE: 43
ADLS_ACUITY_SCORE: 42
ADLS_ACUITY_SCORE: 41
ADLS_ACUITY_SCORE: 42

## 2025-03-22 NOTE — PLAN OF CARE
Goal Outcome Evaluation:    Orientation: A&Ox4; calm and cooperative  Aggression Stop Light: Green  Activity: SBA with walker  Diet/BS Checks: 2g Na diet  Tele:  N/A  IV Access/Drains: R PIV SL  Pain Management: Tylenol given for LLE pain and abd discomfort  Abnormal VS/Results: VSS on RA. Hgb 7.7 this shift, 1 unit PRBC given, rehceck this morning  Bowel/Bladder: Cont b/b  Skin/Wounds: BLE edema, para site CDI, previous biopsy CDI  Consults: Hem/onc, PT  D/C Disposition: Pending  Other Info:   - Chemo precautions maintained  - K, Mg, & Phos rechecks this AM

## 2025-03-22 NOTE — PROGRESS NOTES
[facility]    Medicine Progress Note - Hospitalist Service    Date of Admission:  3/14/2025    Assessment & Plan     Bhavesh Landeros is a 69 year old male medical history significant for hypertension, aortic stenosis, mitral regurgitation, ?multiple sclerosis, obesity, GERD, hyperlipidemia,  who was directly admitted from [facility] ED with worsening abdominal bloating and distention and found to have extensive retroperitoneal soft tissue concerning for possible lymphoma with extension to adjacent mesenteric and pancreatic structures/vasculature and associated significant compression of IVC, significant abdominal and pelvic ascites, and acute RLL pulmonary embolism.     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  Symptomatic moderately large volume ascites.  Peripheral Edema  Splenomegaly  *Presented to [facility] ED with worsening abd bloating/distention.   *CT PE revealed extensive confluent retroperitoneal soft tissue most compatible with lymphoma, likely associated significant compression of the IVC and moderately large volume of abdominal and pelvic ascites. Transferred to [facility] for Oncology workup.    -S/p CT-guided retroperitoneal lymphadenopathy biopsy with IR on 3/40/25.  Hospital course complicated by retroperitoneal hemorrhage following this.  Please see discussion below.  -Pathology came back positive for follicle lymphoma grade 1-2  -[facility] [facility] following,  s/p bone marrow biopsy  3/18  for staging.  -Started on chemotherapy 3/19 rituximab and Bendamustine.  Had reaction with rituximab infusion and this was discontinued, not rechallenging inpatient per heme-onc.  -S/p diagnostic and therapeutic paracentesis on 3/15 (removal of 4.7 L) 3/18 (with 2.7 mL)  and 3/21 with 2.7 hemorrhagic fluid. Serum albumin gradient is less than 1.  Lymphocytic predominance and culture negative.  Cytology as well negative.     -PTA Lasix resumed but creatinine  worsening and on hold.    -Supportive care  -Compression stockings     Tumor lysis syndrome  Acute kidney injury  Hyperkalemia  Uric acid elevated, creatinine trended up to 1.6.  Also hyperkalemic  -Received rasburicase per heme-onc, uric acid trended down.  -Allopurinol dose increased  -Holding losartan and Aldactone and Lasix.  -IV albumin x 3 given, creatinine slightly improved.  Repeat 3 doses again.       Acute RLL pulmonary embolism   Had some shortness of breath and dyspnea on exertion in recent weeks. CT PE study revealed right lower lobe pulmonary embolism, no right heart strain on imaging. Venous Doppler negative for DVT including repeat 3/17  -Initially started on heparin drip.  Patient reported new and worsening pain of the left groin area on 3/16. Workup revealed new retroperitoneal hemorrhage.  Heparin drip discontinued on 3/16. Reviewed with oncology and rechallenged with heparin drip after bone marrow biopsy 3/18.     -Transitioned to DOAC as Hb was stable but drop in Hb again 3/20, anticoagulation held.  -IR consulted, he does have compressed IVC from the mass and therefore no place for a filter to be placed safely.  Radiology does not recommend temporary filter    -Discussed with heme-onc, follow hemoglobin for another 1-2 days and rechallenge with heparin if Hb stable      Large left retroperitoneal hemorrhage  Acute blood loss anemia due to #1 above  Patient complained of intractable pain since 3/15 evening, multiple imaging including MRI and CTA shows large left retroperitoneal hemorrhage measuring up to 16 cm in length.  Most likely this was bleeding from the lymph node biopsy site on 3/14/2025 as he was concurrently on anticoagulation.  -Hemoglobin dropped from 13.3 at presentation to 9.3 ,  s/p 2 units of PRBC on 3/16  -Heparin drip resumed subsequently but with dropping hemoglobin, on hold again.  Hb 7.7, has received 2 units PRBC.  - Transfuse to keep Hb above 8.  If worsening hemoglobin,  "will get noncontrast CT to evaluate for any expanding hematoma.  Suspect retroperitoneal hematoma could be tracking into abdominal cavity resulting in hemorrhagic ascites  -Anticoagulation managed as above.      Degenerative disc disease  Patient reported intractable left lower back/groin and thigh pain on 3/16 with radicular symptoms.  -MRI of the lumbar spine shows multilevel disc bulges with bilateral extraforaminal nerve root compression at multiple levels.  Evaluated by neurosurgery who felt that these are all chronic changes with no acute intervention needed at this point  -Pain control as needed with Tylenol, Neurontin, oxycodone and IV Dilaudid     URI symptoms  Dry cough, coryza, intermittent wheezing for 1 week PTA. No fevers or chills. PE as above.  -COVID-19, influenza A and B and RSV negative  -Respiratory symptoms much improved after paracentesis, suspect this was mechanical  -Supportive care  -Nebs PRN  -PRN guaifenesin and tessalon pearles      Slow transit constipation  Likely worsened by above.  -Bowel regimen, Metamucil ordered     History of mild aortic stenosis and mitral regurgitation  HTN  -Echocardiogram is fairly unremarkable  -Losartan held due to soft blood pressure after retroperitoneal hemorrhage and acute kidney injury.  Consider resuming losartan in the next day or 2  -Discontinue prior to admission aspirin as he will be on anticoagulation now     Chronic stable diagnoses and other pertinent medical history:  GERD  Obesity class II  HLD  Hiatal hernia  Recurrent abdominal hernia  Multiple sclerosis        Diet: 2 Gram Sodium Diet    DVT Prophylaxis: Anticoagulation as above  Pablo Catheter: Not present  Lines: None     Cardiac Monitoring: None  Code Status: Full Code      Clinically Significant Risk Factors                    # Obesity: Estimated body mass index is 37.48 kg/m  as calculated from the following:    Height as of this encounter: 1.702 m (5' 7\").    Weight as of this " "encounter: 108.5 kg (239 lb 4.8 oz).        # Financial/Environmental Concerns: none              Disposition Plan     Medically Ready for Discharge: Anticipated in 2-4 Days once hemoglobin is stable, rechallenged with anticoagulation, creatinine improves further.  Discussed with patient and RN         Tripp Olson MD  Hospitalist Service  [facility]  Securely message with [employer] (more info)  Text page via [employer] Paging/Directory   ______________________________________________________________________    Interval History     Chart reviewed, discussed with nursing staff and patient was seen this morning    Reports he is making \"quite a lot of urine\".  Denies dysuria.  No hematuria.  Feels abdomen is bloated, some discomfort but not significant pain, denies dyspnea.  Feels leg edema is improved       Physical Exam   Vital Signs: Temp: 97.8  F (36.6  C) Temp src: Oral BP: 129/59 Pulse: 76   Resp: 18 SpO2: 99 % O2 Device: None (Room air)    Weight: 239 lbs 4.8 oz    General: AAOx3, appears comfortable.  HEENT: PERRLA EOMI. Mucosa moist.   Lungs: Bilateral equal air entry. Clear to auscultation, normal work of breathing.   CVS: S1S2 regular, no tachycardia or murmur.   Abdomen: Soft, markedly distended but soft, nontender.  MSK: Bilateral 3 + leg edema.  Neuro: AAOX3. CN 2-12 normal. Strength symmetrical.  Skin: No rash.  Pale      Medical Decision Making       40 MINUTES SPENT BY ME on the date of service doing chart review, history, exam, documentation & further activities per the note.      Data     I have personally reviewed the following data over the past 24 hrs:    9.5  \   9.5 (L)   / 231     135 103 34.4 (H) /  87   4.8 23 1.51 (H) \       Imaging results reviewed over the past 24 hrs:   Recent Results (from the past 24 hours)   US Paracentesis with Albumin    Narrative    EXAM:  1. PARACENTESIS  2. ULTRASOUND GUIDANCE  LOCATION: [facility]  DATE: " 3/21/2025    INDICATION: Ascites.    PROCEDURE: Informed consent obtained. Time out performed. The abdomen was prepped and draped in a sterile fashion. 10 mL of 1% lidocaine was infused into local soft tissues. A 5 Romansh catheter system was introduced into the abdominal ascites under   ultrasound guidance.    2.4 liters of dark, bloody fluid were removed and sent to lab if requested.    Patient tolerated procedure well.    Ultrasound imaging was obtained and placed in the patient's permanent medical record.      Impression    IMPRESSION:  1.  Status post ultrasound-guided paracentesis.

## 2025-03-22 NOTE — PROGRESS NOTES
"[facility] Hematology / Oncology  Progress Note  Name: Bhavesh Landeros  :  1955  MRN:  [id number]    --------------------    Subjective:  Bhavesh seen at the bedside this morning.  All in all, feeling fairly well.  Starting to make more urine which is encouraging.  Curt feels less bloated overall.  Getting a little stir crazy understandably.  Looking forward to a visit from his family.    --------------------    Objective:  VS: /61 (BP Location: Left arm)   Pulse 73   Temp 98.2  F (36.8  C) (Oral)   Resp 16   Ht 1.702 m (5' 7\")   Wt 108.5 kg (239 lb 4.8 oz)   SpO2 93%   BMI 37.48 kg/m    Gen: Well-appearing.  Msk: Bilateral lower extremity edema.    We reviewed CBC, CMP, uric acid.    --------------------    Assessment / Plan:  Follicular lymphoma, grade 1-2 with retroperitoneal involvement and possible serous involvement (ascites).  Status post inpatient cycle 1 Bendamustine with rituximab; rituximab complicated by infusion reaction.  Pulmonary embolism secondary to malignancy requiring chronic anticoagulation.  Retroperitoneal biopsy complicated by retroperitoneal hemorrhage in the setting of anticoagulation.  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  Pancytopenia, likely multifactorial secondary to chemotherapy, lymphoma, acute illness.    Continue allopurinol; tumor lysis labs improved status post rasburicase.  Transfuse to keep hemoglobin greater than 7; no indication today.  Anticipate improvement in kidney function as he mobilizes fluid and uric acid improves.  Planning anticoagulation 3 months beyond completion of chemotherapy assuming he achieves complete remission.  Holding anticoagulation given recent retroperitoneal hemorrhage and hemoglobin drop; heparin drip planned.  May continue to require as needed paracenteses and transfusion support as an outpatient, but anticipate improvement.  Planning 6 cycles of Bendamustine rituximab as an outpatient; " may or may not need port.  Will continue to follow.    Jason Serra MD.

## 2025-03-22 NOTE — PROGRESS NOTES
"   03/22/25 0929   Appointment Info   Signing Clinician's Name / Credentials (PT) Ermelinda Singh DPT   Living Environment   People in Home alone   Current Living Arrangements house   Home Accessibility stairs to enter home   Number of Stairs, Main Entrance 1   Stair Railings, Main Entrance none   Transportation Anticipated family or friend will provide   Living Environment Comments Pt reports he live alone at baseline in a rambler. 1 ARAMIS through garage, no railing. Pt reports tub shower, no grab bars or shower chair. Pt reports no concerns for returning home from a mobility perspective.   Self-Care   Activity/Exercise/Self-Care Comment He is ind with adls and iadls at baseline, dgtr lives \"down the street\" and can drive him. His dgtr got him a FWW.   General Information   Onset of Illness/Injury or Date of Surgery 03/14/25   Referring Physician Tripp Olson MD   Patient/Family Therapy Goals Statement (PT) improve mobility   Pertinent History of Current Problem (include personal factors and/or comorbidities that impact the POC) Per chart review: \"69 year old male medical history significant for hypertension, aortic stenosis, mitral regurgitation, ?multiple sclerosis, obesity, GERD, hyperlipidemia,  who was directly admitted from [facility] ED with worsening abdominal bloating and distention and found to have extensive retroperitoneal soft tissue concerning for possible lymphoma with extension to adjacent mesenteric and pancreatic structures/vasculature and associated significant compression of IVC, significant abdominal and pelvic ascites, and acute RLL pulmonary embolism\"   Weight-Bearing Status - LUE full weight-bearing   Weight-Bearing Status - RUE full weight-bearing   Weight-Bearing Status - LLE weight-bearing as tolerated;full weight-bearing  (though does report pain and weakness in LLE)   Weight-Bearing Status - RLE full weight-bearing   General Observations Pt in chair upon arrival, with nursing " "staff and a visitor. Pt agreeable to PT.   Cognition   Affect/Mental Status (Cognition) WFL   Orientation Status (Cognition) oriented x 4   Follows Commands (Cognition) follows multi-step commands   Cognitive Status Comments No concerns   Pain Assessment   Patient Currently in Pain Yes, see Vital Sign flowsheet  (LLE is painful, \"I don't have a lo of power to lift it up\")   Integumentary/Edema   Integumentary/Edema Comments BLE edema L>R   Posture    Posture Forward head position;Protracted shoulders   Range of Motion (ROM)   ROM Comment WFL, however decreased hip flexion due to pain and weakness   Strength (Manual Muscle Testing)   Strength Comments Global anti-gravity with functional movement, global weakness and 3/5 L hip flexion and L knee ext   Transfers   Comment, (Transfers) STS ind. with FWW   Gait/Stairs (Locomotion)   Schleicher Level (Gait) supervision   Assistive Device (Gait) walker, standard   Distance in Feet (Gait) 5   Comment, (Gait/Stairs) 5' with SBA and FWW   Balance   Balance Comments slight unsteadiness with pivot turn, no overt LOB   Sensory Examination   Sensory Perception Comments Pt reports no sensation issues   Clinical Impression   Criteria for Skilled Therapeutic Intervention Yes, treatment indicated   PT Diagnosis (PT) impaired mobility   Influenced by the following impairments activity tolerance, strength, ROM   Functional limitations due to impairments ambulation, stairs   Clinical Presentation (PT Evaluation Complexity) stable   Clinical Presentation Rationale clinical judgment   Clinical Decision Making (Complexity) low complexity   Planned Therapy Interventions (PT) gait training;home exercise program;manual therapy techniques;neuromuscular re-education;patient/family education;ROM (range of motion);strengthening;progressive activity/exercise;home program guidelines;risk factor education   Risk & Benefits of therapy have been explained evaluation/treatment results reviewed;care " "plan/treatment goals reviewed;risks/benefits reviewed;participants included;patient   Clinical Impression Comments main limitation is LLE pain and weakness   PT Total Evaluation Time   PT Eval, Low Complexity Minutes (47246) 8   Physical Therapy Goals   PT Frequency 6x/week   PT Predicted Duration/Target Date for Goal Attainment 03/27/25   PT Goals Gait;Stairs;Transfers   PT: Transfers Independent;Assistive device;Goal Met   PT: Gait Rolling walker;Modified independent;150 feet   PT: Stairs 1 stair;Independent   Interventions   Interventions Quick Adds Therapeutic Activity;Therapeutic Procedure   Therapeutic Procedure/Exercise   Ther. Procedure: strength, endurance, ROM, flexibillity Minutes (57649) 8   Symptoms Noted During/After Treatment increased pain   Treatment Detail/Skilled Intervention Initiated HEP: seated marches (pt has much difficulty on L side, performed in standing instead), LAQ, heel slides (w/ assist of RLE), ankle pumps, attempted SLR (much difficulty with LLE), repeated STS. Encouraged pt to walk with nursing when they are available.   Therapeutic Activity   Therapeutic Activities: dynamic activities to improve functional performance Minutes (29737) 8   Treatment Detail/Skilled Intervention Pt without chair alarm on upon entry. ambulated 400' in hallway progressing from CGA to SBA with use of FWW, slight unsteadiness with pivot turn no overt LOB pt attributes this to the socks he's wearing \"I can't wear fitted ones, those hurt my feet.\" Progressing to SBA with no FWW for 5' within the room. Pt performed 1 stair backward with CGA and use of L HR. Pt reports that he performs the stair backwards at home d/t knee pain. Brought up possible benefits of HHPT, pt reports \"let's see how the next day or so goes.\" Pt returned to chair with call light within reach and breakfast tray. Pt will elevate his legs once he is done for assist with edema management.   PT Discharge Planning   PT Plan ambulation (can " trial also without FWW), 1 stair without use of railing (pt ambulates backward on these), progress activity tolerance, strength and ROM   PT Discharge Recommendation (DC Rec) home with assist;home;home with home care physical therapy   PT Rationale for DC Rec Pt is slightly below baseline. Pt requires CGA on stairs and SBA with gait for slight unsteadiness with turns. Pt has 1 ARAMIS home with no railing and no stairs within the home. Pt's daughter is nearby to support as needed. Current recommendation is discharge home once medically stable, with family support as needed. Pt would benefit from HHPT for progress strength, activity tolerance and independence.   PT Brief overview of current status SBA with FWW; Goals of therapy will be to address safe mobility and make recs for d/c to next level of care. Pt and RN will continue to follow all falls risk precautions as documented by RN staff while hospitalized.   PT Total Distance Amb During Session (feet) 410   Physical Therapy Time and Intention   Timed Code Treatment Minutes 16   Total Session Time (sum of timed and untimed services) 24

## 2025-03-23 LAB
ANION GAP SERPL CALCULATED.3IONS-SCNC: 13 MMOL/L (ref 7–15)
BASOPHILS # BLD AUTO: 0 10E3/UL (ref 0–0.2)
BASOPHILS NFR BLD AUTO: 0 %
BUN SERPL-MCNC: 28.4 MG/DL (ref 8–23)
CALCIUM SERPL-MCNC: 8.3 MG/DL (ref 8.8–10.4)
CHLORIDE SERPL-SCNC: 105 MMOL/L (ref 98–107)
CREAT SERPL-MCNC: 1.22 MG/DL (ref 0.67–1.17)
EGFRCR SERPLBLD CKD-EPI 2021: 64 ML/MIN/1.73M2
EOSINOPHIL # BLD AUTO: 0.2 10E3/UL (ref 0–0.7)
EOSINOPHIL NFR BLD AUTO: 3 %
ERYTHROCYTE [DISTWIDTH] IN BLOOD BY AUTOMATED COUNT: 14.8 % (ref 10–15)
GLUCOSE SERPL-MCNC: 105 MG/DL (ref 70–99)
HCO3 SERPL-SCNC: 20 MMOL/L (ref 22–29)
HCT VFR BLD AUTO: 25.6 % (ref 40–53)
HGB BLD-MCNC: 8.5 G/DL (ref 13.3–17.7)
IMM GRANULOCYTES # BLD: 0.1 10E3/UL
IMM GRANULOCYTES NFR BLD: 1 %
LYMPHOCYTES # BLD AUTO: 0.3 10E3/UL (ref 0.8–5.3)
LYMPHOCYTES NFR BLD AUTO: 4 %
MAGNESIUM SERPL-MCNC: 2.2 MG/DL (ref 1.7–2.3)
MCH RBC QN AUTO: 28.9 PG (ref 26.5–33)
MCHC RBC AUTO-ENTMCNC: 33.2 G/DL (ref 31.5–36.5)
MCV RBC AUTO: 87 FL (ref 78–100)
MONOCYTES # BLD AUTO: 0.6 10E3/UL (ref 0–1.3)
MONOCYTES NFR BLD AUTO: 8 %
NEUTROPHILS # BLD AUTO: 5.8 10E3/UL (ref 1.6–8.3)
NEUTROPHILS NFR BLD AUTO: 84 %
NRBC # BLD AUTO: 0 10E3/UL
NRBC BLD AUTO-RTO: 0 /100
PHOSPHATE SERPL-MCNC: 3.8 MG/DL (ref 2.5–4.5)
PLATELET # BLD AUTO: 207 10E3/UL (ref 150–450)
POTASSIUM SERPL-SCNC: 4.9 MMOL/L (ref 3.4–5.3)
RBC # BLD AUTO: 2.94 10E6/UL (ref 4.4–5.9)
SODIUM SERPL-SCNC: 138 MMOL/L (ref 135–145)
URATE SERPL-MCNC: 1.5 MG/DL (ref 3.4–7)
WBC # BLD AUTO: 6.9 10E3/UL (ref 4–11)

## 2025-03-23 PROCEDURE — 99232 SBSQ HOSP IP/OBS MODERATE 35: CPT | Performed by: INTERNAL MEDICINE

## 2025-03-23 PROCEDURE — 84550 ASSAY OF BLOOD/URIC ACID: CPT

## 2025-03-23 PROCEDURE — 250N000011 HC RX IP 250 OP 636: Mod: JZ | Performed by: HOSPITALIST

## 2025-03-23 PROCEDURE — 36415 COLL VENOUS BLD VENIPUNCTURE: CPT | Performed by: HOSPITALIST

## 2025-03-23 PROCEDURE — 120N000001 HC R&B MED SURG/OB

## 2025-03-23 PROCEDURE — 250N000011 HC RX IP 250 OP 636: Performed by: INTERNAL MEDICINE

## 2025-03-23 PROCEDURE — 85025 COMPLETE CBC W/AUTO DIFF WBC: CPT | Performed by: HOSPITALIST

## 2025-03-23 PROCEDURE — 83735 ASSAY OF MAGNESIUM: CPT | Performed by: HOSPITALIST

## 2025-03-23 PROCEDURE — P9047 ALBUMIN (HUMAN), 25%, 50ML: HCPCS | Mod: JZ | Performed by: HOSPITALIST

## 2025-03-23 PROCEDURE — 80048 BASIC METABOLIC PNL TOTAL CA: CPT | Performed by: HOSPITALIST

## 2025-03-23 PROCEDURE — 99232 SBSQ HOSP IP/OBS MODERATE 35: CPT | Performed by: HOSPITALIST

## 2025-03-23 PROCEDURE — 250N000013 HC RX MED GY IP 250 OP 250 PS 637: Performed by: PHYSICIAN ASSISTANT

## 2025-03-23 PROCEDURE — 250N000013 HC RX MED GY IP 250 OP 250 PS 637: Performed by: INTERNAL MEDICINE

## 2025-03-23 PROCEDURE — 84100 ASSAY OF PHOSPHORUS: CPT | Performed by: HOSPITALIST

## 2025-03-23 PROCEDURE — 250N000013 HC RX MED GY IP 250 OP 250 PS 637: Performed by: HOSPITALIST

## 2025-03-23 PROCEDURE — 250N000013 HC RX MED GY IP 250 OP 250 PS 637

## 2025-03-23 RX ORDER — ENOXAPARIN SODIUM 100 MG/ML
40 INJECTION SUBCUTANEOUS EVERY 24 HOURS
Status: DISCONTINUED | OUTPATIENT
Start: 2025-03-23 | End: 2025-03-24 | Stop reason: HOSPADM

## 2025-03-23 RX ADMIN — PSYLLIUM HUSK 1 PACKET: 3.4 POWDER ORAL at 21:06

## 2025-03-23 RX ADMIN — ALBUMIN (HUMAN) 25 G: 0.25 INJECTION, SOLUTION INTRAVENOUS at 05:56

## 2025-03-23 RX ADMIN — GABAPENTIN 100 MG: 100 CAPSULE ORAL at 16:37

## 2025-03-23 RX ADMIN — GABAPENTIN 100 MG: 100 CAPSULE ORAL at 21:06

## 2025-03-23 RX ADMIN — ACETAMINOPHEN 650 MG: 325 TABLET, FILM COATED ORAL at 16:37

## 2025-03-23 RX ADMIN — ENOXAPARIN SODIUM 40 MG: 40 INJECTION SUBCUTANEOUS at 21:06

## 2025-03-23 RX ADMIN — PANTOPRAZOLE SODIUM 40 MG: 40 TABLET, DELAYED RELEASE ORAL at 09:09

## 2025-03-23 RX ADMIN — ALLOPURINOL 200 MG: 100 TABLET ORAL at 09:09

## 2025-03-23 RX ADMIN — GABAPENTIN 100 MG: 100 CAPSULE ORAL at 09:09

## 2025-03-23 RX ADMIN — PSYLLIUM HUSK 1 PACKET: 3.4 POWDER ORAL at 08:56

## 2025-03-23 ASSESSMENT — ACTIVITIES OF DAILY LIVING (ADL)
ADLS_ACUITY_SCORE: 41
ADLS_ACUITY_SCORE: 42
ADLS_ACUITY_SCORE: 41
ADLS_ACUITY_SCORE: 42
ADLS_ACUITY_SCORE: 42
ADLS_ACUITY_SCORE: 41
ADLS_ACUITY_SCORE: 42
ADLS_ACUITY_SCORE: 41
ADLS_ACUITY_SCORE: 41
ADLS_ACUITY_SCORE: 42
ADLS_ACUITY_SCORE: 41
ADLS_ACUITY_SCORE: 42
ADLS_ACUITY_SCORE: 41
ADLS_ACUITY_SCORE: 42

## 2025-03-23 NOTE — PROGRESS NOTES
"[facility] Hematology / Oncology  Progress Note  Name: Bhavesh Landeros  :  1955  MRN:  [id number]    --------------------    Subjective:  Bhavesh seen at the bedside this morning.  All in all, feeling better and better.  Lots of urine output.  Dropping weight and losing water retention.    --------------------    Objective:  VS: /67 (BP Location: Left arm)   Pulse 78   Temp 98.4  F (36.9  C) (Oral)   Resp 16   Ht 1.702 m (5' 7\")   Wt 103.9 kg (229 lb)   SpO2 100%   BMI 35.87 kg/m    Gen: Well-appearing.  Msk: Bilateral lower extremity edema.    We reviewed CBC, CMP, uric acid.    --------------------    Assessment / Plan:  Follicular lymphoma, grade 1-2 with retroperitoneal involvement and possible serous involvement (ascites).  Status post inpatient cycle 1 Bendamustine with rituximab; rituximab complicated by infusion reaction.  Pulmonary embolism secondary to malignancy requiring chronic anticoagulation.  Retroperitoneal biopsy complicated by retroperitoneal hemorrhage in the setting of anticoagulation.  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  Pancytopenia, likely multifactorial secondary to chemotherapy, lymphoma, acute illness.    Continue allopurinol; tumor lysis labs improved status post rasburicase.  Transfuse to keep hemoglobin greater than 7; no indication today.  Anticipate improvement in kidney function as he mobilizes fluid and uric acid improves.  Planning anticoagulation 3 months beyond completion of chemotherapy assuming he achieves complete remission.  Okay to start lovenox 40 mg daily; if hemoglobin stable in AM, will transition to DOAC as outpatient.  May continue to require as needed paracenteses and transfusion support as an outpatient, but anticipate improvement.  Planning 6 cycles of Bendamustine rituximab as an outpatient; may or may not need port.  Will continue to follow.    Jason Serra MD.    "

## 2025-03-23 NOTE — PROGRESS NOTES
[facility]    Medicine Progress Note - Hospitalist Service    Date of Admission:  3/14/2025    Assessment & Plan     Bhavesh Landeros is a 69 year old male medical history significant for hypertension, aortic stenosis, mitral regurgitation, ?multiple sclerosis, obesity, GERD, hyperlipidemia,  who was directly admitted from [facility] ED with worsening abdominal bloating and distention and found to have extensive retroperitoneal soft tissue concerning for possible lymphoma with extension to adjacent mesenteric and pancreatic structures/vasculature and associated significant compression of IVC, significant abdominal and pelvic ascites, and acute RLL pulmonary embolism.     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  Symptomatic moderately large volume ascites.  Peripheral Edema  Splenomegaly  *Presented to [facility] ED with worsening abd bloating/distention.   *CT PE revealed extensive confluent retroperitoneal soft tissue most compatible with lymphoma, likely associated significant compression of the IVC and moderately large volume of abdominal and pelvic ascites. Transferred to [facility] for Oncology workup.    -S/p CT-guided retroperitoneal lymphadenopathy biopsy with IR on 3/40/25.  Hospital course complicated by retroperitoneal hemorrhage following this.  Please see discussion below.  -Pathology came back positive for follicle lymphoma grade 1-2  -[facility] oncology following,  s/p bone marrow biopsy  3/18  for staging.  -Started on chemotherapy 3/19 rituximab and Bendamustine.  Had reaction with rituximab infusion and this was discontinued, not rechallenging inpatient per heme-onc.  -S/p diagnostic and therapeutic paracentesis on 3/15 (removal of 4.7 L) 3/18 (with 2.7 mL)  and 3/21 with 2.7 hemorrhagic fluid. Serum albumin gradient is less than 1.  Lymphocytic predominance and culture negative.  Cytology as well negative.     -PTA Lasix resumed but creatinine  worsened and on hold.  Has had good diuresis of Lasix.  Weight trending down  -Supportive care  -Compression stockings     Tumor lysis syndrome  Acute kidney injury  Hyperkalemia  Uric acid elevated, creatinine trended up to 1.6.  Also hyperkalemic  -Received rasburicase per heme-onc, uric acid trended down.  -Allopurinol dose increased  -Holding losartan and Aldactone and Lasix.  -IV albumin x 6 given, creatinine has improved to 1.2.     Acute RLL pulmonary embolism   Had some shortness of breath and dyspnea on exertion in recent weeks. CT PE study revealed right lower lobe pulmonary embolism, no right heart strain on imaging. Venous Doppler negative for DVT including repeat 3/17  -Initially started on heparin drip.  Patient reported new and worsening pain of the left groin area on 3/16. Workup revealed new retroperitoneal hemorrhage.  Heparin drip discontinued on 3/16. Reviewed with oncology and rechallenged with heparin drip after bone marrow biopsy 3/18.     -Transitioned to DOAC as Hb was stable but drop in Hb again 3/20, anticoagulation held.  -IR consulted, he does have compressed IVC from the mass and therefore no place for a filter to be placed safely.  Radiology does not recommend temporary filter    -At this point, defer to hematology regarding timing for anticoagulation challenge.    Large left retroperitoneal hemorrhage  Acute blood loss anemia due to #1 above  Patient complained of intractable pain since 3/15 evening, multiple imaging including MRI and CTA shows large left retroperitoneal hemorrhage measuring up to 16 cm in length.  Most likely this was bleeding from the lymph node biopsy site on 3/14/2025 as he was concurrently on anticoagulation.  -Hemoglobin dropped from 13.3 at presentation to 9.3 ,  s/p 2 units of PRBC on 3/16  -Heparin drip resumed subsequently but with dropping hemoglobin, heparin on hold again.  Hb 7.7, has received 2 units PRBC, and up to 8.5  - Transfuse to keep Hb above 8.   "If worsening hemoglobin, will get noncontrast CT to evaluate for any expanding hematoma.  Suspect retroperitoneal hematoma could be tracking into abdominal cavity resulting in hemorrhagic ascites  -Anticoagulation managed as above.      Degenerative disc disease  Patient reported intractable left lower back/groin and thigh pain on 3/16 with radicular symptoms.  -MRI of the lumbar spine shows multilevel disc bulges with bilateral extraforaminal nerve root compression at multiple levels.  Evaluated by neurosurgery who felt that these are all chronic changes with no acute intervention needed at this point  -Pain control as needed with Tylenol, Neurontin, oxycodone and IV Dilaudid     URI symptoms  Dry cough, coryza, intermittent wheezing for 1 week PTA. No fevers or chills. PE as above.  -COVID-19, influenza A and B and RSV negative  -Respiratory symptoms much improved after paracentesis, suspect this was mechanical  -Supportive care  -Nebs PRN  -PRN guaifenesin and tessalon pearles      Slow transit constipation  Likely worsened by above.  -Bowel regimen, Metamucil ordered     History of mild aortic stenosis and mitral regurgitation  HTN  -Echocardiogram is fairly unremarkable  -Losartan held due to soft blood pressure after retroperitoneal hemorrhage and acute kidney injury.  Consider resuming losartan in the next day or 2  -Discontinue prior to admission aspirin as he will be on anticoagulation now     Chronic stable diagnoses and other pertinent medical history:  GERD  Obesity class II  HLD  Hiatal hernia  Recurrent abdominal hernia  Multiple sclerosis        Diet: 2 Gram Sodium Diet    DVT Prophylaxis: Anticoagulation as above  Pablo Catheter: Not present  Lines: None     Cardiac Monitoring: None  Code Status: Full Code      Clinically Significant Risk Factors                    # Obesity: Estimated body mass index is 37.48 kg/m  as calculated from the following:    Height as of this encounter: 1.702 m (5' 7\").    " Weight as of this encounter: 108.5 kg (239 lb 4.8 oz).        # Financial/Environmental Concerns: none              Disposition Plan     Medically Ready for Discharge: Anticipated in 2-4 Days once hemoglobin is stable, rechallenged with anticoagulation, creatinine improves/stable , Discussed with patient and RN         Tripp Olson MD  Hospitalist Service  [facility]  Securely message with [employer] (more info)  Text page via [employer] Paging/Directory   ______________________________________________________________________    Interval History     Chart reviewed, discussed with nursing staff and patient was seen this morning    Reports he continues to make a lot of urine.  Denies dysuria.  No hematuria.  Feels abdominal bloating has improved.  No significant pain.  Weaker on left leg but feels his strength is coming back.  Leg swelling also has slightly improved.          Physical Exam   Vital Signs: Temp: 98.4  F (36.9  C) Temp src: Oral BP: 130/67 Pulse: 78   Resp: 16 SpO2: 100 % O2 Device: None (Room air)    Weight: 239 lbs 4.8 oz    General: AAOx3, appears comfortable.  HEENT: PERRLA EOMI. Mucosa moist.   Lungs: Bilateral equal air entry. Clear to auscultation, normal work of breathing.   CVS: S1S2 regular, no tachycardia or murmur.   Abdomen: Soft, markedly distended but soft, nontender.  In fact distention is less today.  MSK: Bilateral 3 + leg edema.  Neuro: AAOX3. CN 2-12 normal. Strength symmetrical.  Skin: No rash.  Pale      Medical Decision Making       38 MINUTES SPENT BY ME on the date of service doing chart review, history, exam, documentation & further activities per the note.      Data     I have personally reviewed the following data over the past 24 hrs:    N/A  \   N/A   / N/A     N/A N/A N/A /  N/A   N/A N/A N/A \       Imaging results reviewed over the past 24 hrs:   No results found for this or any previous visit (from the past 24 hours).

## 2025-03-23 NOTE — PLAN OF CARE
Orientation: A&Ox4; calm and cooperative  Aggression Stop Light: Green  Activity: SBA with walker  Diet/BS Checks: 2g Na diet  Tele:  N/A  IV Access/Drains: R PIV SL  Pain Management: Tylenol given for LLE pain and abd discomfort xs 2  Abnormal VS/Results: VSS on RA. Hgb 9.5 this shift,  Bowel/Bladder: Cont b/b  Skin/Wounds: BLE edema, para site CDI, previous biopsy CDI  Consults: Hem/onc, PT  D/C Disposition: Pending  Other Info:   - Chemo precautions maintained  - K, Mg, & Phos rechecks this AM

## 2025-03-23 NOTE — PLAN OF CARE
Goal Outcome Evaluation:    Orientation: A&Ox4  Aggression Stop Light: Green  Activity: Ind  Diet/BS Checks: 2g Na diet  Tele:  N/A  IV Access/Drains: R PIV SL  Pain Management: Declines  Abnormal VS/Results: VSS on RA  Bowel/Bladder: Cont b/b  Skin/Wounds: BLE edema, para site CDI, previous biopsy CDI  Consults: Hem/onc, PT  D/C Disposition: Pending  Other Info:   -Chemo precautions maintained  -K, Mg, & Phos rechecks this AM

## 2025-03-24 VITALS
WEIGHT: 225.75 LBS | SYSTOLIC BLOOD PRESSURE: 139 MMHG | OXYGEN SATURATION: 99 % | HEIGHT: 67 IN | HEART RATE: 90 BPM | BODY MASS INDEX: 35.43 KG/M2 | TEMPERATURE: 97.6 F | DIASTOLIC BLOOD PRESSURE: 65 MMHG | RESPIRATION RATE: 18 BRPM

## 2025-03-24 LAB
ANION GAP SERPL CALCULATED.3IONS-SCNC: 10 MMOL/L (ref 7–15)
BASOPHILS # BLD AUTO: 0 10E3/UL (ref 0–0.2)
BASOPHILS NFR BLD AUTO: 0 %
BUN SERPL-MCNC: 21.9 MG/DL (ref 8–23)
CALCIUM SERPL-MCNC: 8.4 MG/DL (ref 8.8–10.4)
CHLORIDE SERPL-SCNC: 105 MMOL/L (ref 98–107)
CREAT SERPL-MCNC: 1.02 MG/DL (ref 0.67–1.17)
EGFRCR SERPLBLD CKD-EPI 2021: 79 ML/MIN/1.73M2
EOSINOPHIL # BLD AUTO: 0.2 10E3/UL (ref 0–0.7)
EOSINOPHIL NFR BLD AUTO: 3 %
ERYTHROCYTE [DISTWIDTH] IN BLOOD BY AUTOMATED COUNT: 14.5 % (ref 10–15)
GLUCOSE SERPL-MCNC: 115 MG/DL (ref 70–99)
HCO3 SERPL-SCNC: 24 MMOL/L (ref 22–29)
HCT VFR BLD AUTO: 30 % (ref 40–53)
HGB BLD-MCNC: 9.6 G/DL (ref 13.3–17.7)
IMM GRANULOCYTES # BLD: 0.1 10E3/UL
IMM GRANULOCYTES NFR BLD: 1 %
LYMPHOCYTES # BLD AUTO: 0.2 10E3/UL (ref 0.8–5.3)
LYMPHOCYTES NFR BLD AUTO: 4 %
MCH RBC QN AUTO: 28.3 PG (ref 26.5–33)
MCHC RBC AUTO-ENTMCNC: 32 G/DL (ref 31.5–36.5)
MCV RBC AUTO: 89 FL (ref 78–100)
MONOCYTES # BLD AUTO: 0.5 10E3/UL (ref 0–1.3)
MONOCYTES NFR BLD AUTO: 7 %
NEUTROPHILS # BLD AUTO: 5.7 10E3/UL (ref 1.6–8.3)
NEUTROPHILS NFR BLD AUTO: 85 %
NRBC # BLD AUTO: 0 10E3/UL
NRBC BLD AUTO-RTO: 0 /100
PHOSPHATE SERPL-MCNC: 3.4 MG/DL (ref 2.5–4.5)
PLATELET # BLD AUTO: 224 10E3/UL (ref 150–450)
POTASSIUM SERPL-SCNC: 4.6 MMOL/L (ref 3.4–5.3)
RBC # BLD AUTO: 3.39 10E6/UL (ref 4.4–5.9)
SODIUM SERPL-SCNC: 139 MMOL/L (ref 135–145)
WBC # BLD AUTO: 6.6 10E3/UL (ref 4–11)

## 2025-03-24 PROCEDURE — 84100 ASSAY OF PHOSPHORUS: CPT | Performed by: HOSPITALIST

## 2025-03-24 PROCEDURE — 80048 BASIC METABOLIC PNL TOTAL CA: CPT | Performed by: HOSPITALIST

## 2025-03-24 PROCEDURE — 250N000013 HC RX MED GY IP 250 OP 250 PS 637

## 2025-03-24 PROCEDURE — 36415 COLL VENOUS BLD VENIPUNCTURE: CPT | Performed by: HOSPITALIST

## 2025-03-24 PROCEDURE — 99239 HOSP IP/OBS DSCHRG MGMT >30: CPT | Performed by: HOSPITALIST

## 2025-03-24 PROCEDURE — 99232 SBSQ HOSP IP/OBS MODERATE 35: CPT

## 2025-03-24 PROCEDURE — 250N000013 HC RX MED GY IP 250 OP 250 PS 637: Performed by: INTERNAL MEDICINE

## 2025-03-24 PROCEDURE — 250N000013 HC RX MED GY IP 250 OP 250 PS 637: Performed by: PHYSICIAN ASSISTANT

## 2025-03-24 PROCEDURE — 250N000013 HC RX MED GY IP 250 OP 250 PS 637: Performed by: HOSPITALIST

## 2025-03-24 PROCEDURE — 85025 COMPLETE CBC W/AUTO DIFF WBC: CPT | Performed by: HOSPITALIST

## 2025-03-24 RX ADMIN — ALLOPURINOL 200 MG: 100 TABLET ORAL at 09:07

## 2025-03-24 RX ADMIN — PANTOPRAZOLE SODIUM 40 MG: 40 TABLET, DELAYED RELEASE ORAL at 09:07

## 2025-03-24 RX ADMIN — PSYLLIUM HUSK 1 PACKET: 3.4 POWDER ORAL at 09:10

## 2025-03-24 RX ADMIN — GABAPENTIN 100 MG: 100 CAPSULE ORAL at 09:07

## 2025-03-24 ASSESSMENT — ACTIVITIES OF DAILY LIVING (ADL)
ADLS_ACUITY_SCORE: 41
ADLS_ACUITY_SCORE: 42
ADLS_ACUITY_SCORE: 41
ADLS_ACUITY_SCORE: 41
ADLS_ACUITY_SCORE: 42
ADLS_ACUITY_SCORE: 42
ADLS_ACUITY_SCORE: 41
ADLS_ACUITY_SCORE: 42
ADLS_ACUITY_SCORE: 42
ADLS_ACUITY_SCORE: 41
ADLS_ACUITY_SCORE: 41
ADLS_ACUITY_SCORE: 42
ADLS_ACUITY_SCORE: 41
ADLS_ACUITY_SCORE: 42

## 2025-03-24 NOTE — PROGRESS NOTES
"Care Management Follow Up    Length of Stay (days): 10    Expected Discharge Date: 03/24/2025     Concerns to be Addressed: adjustment to diagnosis/illness, discharge planning     Patient plan of care discussed at interdisciplinary rounds: Yes    Anticipated Discharge Disposition: Home            Anticipated Discharge Services:  Anticipated Discharge DME:   Patient/family educated on Medicare website which has current facility and service quality ratings:    Education Provided on the Discharge Plan: Yes  Patient/Family in Agreement with the Plan: yes    Referrals Placed by CM/SW:    Private pay costs discussed: Not applicable    Discussed  Partnership in Safe Discharge Planning  document with patient/family: No     Handoff Completed: Yes, [facility] PCP: Internal handoff referral completed    Additional Information:  Following patient for discharge planning. Reviewed recommendation for Home w/ HH PT.  Pt declined feeling like he doesn't need that, he has been walking well around unit and daughter will be helping.   Pt voices no other needs for discharge.   Pt has Follow-up arranged on AVS.   Mar 26, 2025 1:00 PM  (Arrive by 12:45 PM)  New Cardiology with Christine Dimas MD, MD  [facility] ([facility] ) [address] [facility] [address]  [phone]      Apr 01, 2025 9:30 AM  (Arrive by 9:10 AM)  Provider Visit with Malachi Deutsch MD  [facility] ([facility]) [address] [id number] [phone]   Please plan to arrive at the clinic at your \"Arrive By\" time for your appointment. Our late policy will be enforced based on this time.   Apr 14, 2025 11:00 AM  (Arrive by 10:45 AM)  New Patient with Jason Serra MD  [facility] ([facility]) [address] [place] [id number] [phone]   Pt is " hopeful to discharge today. Family can provide transport    Next Steps: Follow if other needs arise.     Jackie Albarran RN

## 2025-03-24 NOTE — DISCHARGE SUMMARY
"[facility]  Hospitalist Discharge Summary      Date of Admission:  3/14/2025  Date of Discharge:  3/24/2025  Discharging Provider: Tripp Olson MD  Discharge Service: Hospitalist Service    Discharge Diagnoses     Please refer to the hospital course below    Clinically Significant Risk Factors     # Obesity: Estimated body mass index is 35.36 kg/m  as calculated from the following:    Height as of this encounter: 1.702 m (5' 7\").    Weight as of this encounter: 102.4 kg (225 lb 12 oz).       Follow-ups Needed After Discharge   Follow-up Appointments       Hospital Follow-up with Existing Primary Care Provider (PCP)      Please see details below         Schedule Primary Care visit within: 14 Days   Recommended labs and Imaging (to be ordered by Primary Care Provider): CBC and CMP                Unresulted Labs Ordered in the Past 30 Days of this Admission       Date and Time Order Name Status Description    3/15/2025  3:45 PM FISH With Professional Interpretation In process     3/15/2025  3:45 PM CHROMOSOME ANALYSIS, BONE MARROW, DIAGNOSIS/RELAPSE With Professional Interpretation In process         These results will be followed up by [facility]    Discharge Disposition   Discharged to home  Condition at discharge: Stable    Hospital Course   Bhavesh Landeros is a 69 year old male medical history significant for hypertension, aortic stenosis, mitral regurgitation, ?multiple sclerosis, obesity, GERD, hyperlipidemia,  who was directly admitted from [facility] ED with worsening abdominal bloating and distention and found to have extensive retroperitoneal soft tissue concerning for possible lymphoma with extension to adjacent mesenteric and pancreatic structures/vasculature and associated significant compression of IVC, significant abdominal and pelvic ascites, and acute RLL pulmonary embolism.     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  Symptomatic moderately large volume ascites.  Peripheral Edema  Splenomegaly  *Presented to [facility] ED with worsening abd bloating/distention.   *CT PE revealed extensive confluent retroperitoneal soft tissue most compatible with lymphoma, likely associated significant compression of the IVC and moderately large volume of abdominal and pelvic ascites. Transferred to [facility] for Oncology workup.    -S/p CT-guided retroperitoneal lymphadenopathy biopsy with IR on 3/40/25.  Hospital course complicated by retroperitoneal hemorrhage following this.  Please see discussion below.  -Pathology came back positive for follicle lymphoma grade 1-2  -[facility] following,  s/p bone marrow biopsy  3/18  for staging.  -Started on chemotherapy 3/19 rituximab and Bendamustine.  Had reaction with rituximab infusion and this was discontinued, not rechallenging inpatient per heme-onc.  -S/p diagnostic and therapeutic paracentesis on 3/15 (removal of 4.7 L) 3/18 (with 2.7 mL)  and 3/21 with 2.7 hemorrhagic fluid. Serum albumin gradient is less than 1.  Lymphocytic predominance and culture negative.  Cytology as well negative.     -PTA Lasix resumed but creatinine worsened and on hold.  Has had good diuresis off Lasix.  Weight trending down.  Not resuming Lasix at this time.  Recommend follow-up as outpatient, check weight, follow BMP and if needed/creatinine remains stable then may resume Lasix.  -Supportive care, leg elevation and continue Compression stockings     Tumor lysis syndrome  Acute kidney injury  Hyperkalemia  Uric acid elevated, creatinine trended up to 1.6.  Also hyperkalemic  -Received rasburicase per heme-onc, uric acid trended down.  -Allopurinol dose increased now creatinine and uric acid normal, continuing on 100 Mg daily  -Holding losartan and Aldactone and Lasix.  Blood pressure low normal.  Diuresing without Lasix now with weight trending down.  Swelling has improved  -IV albumin x 6 given, creatinine  has improved to 1.02.     Acute RLL pulmonary embolism   Had some shortness of breath and dyspnea on exertion in recent weeks. CT PE study revealed right lower lobe pulmonary embolism, no right heart strain on imaging. Venous Doppler negative for DVT including repeat 3/17  -Initially started on heparin drip.  Patient reported new and worsening pain of the left groin area on 3/16. Workup revealed new retroperitoneal hemorrhage.  Heparin drip discontinued on 3/16. Reviewed with oncology and rechallenged with heparin drip after bone marrow biopsy 3/18.     -Transitioned to DOAC as Hb was stable but drop in Hb again 3/20, anticoagulation held.  -IR consulted, he does have compressed IVC from the mass and therefore no place for a filter to be placed safely.  Radiology does not recommend temporary filter    -Reviewed with hematology.  Hemoglobin has fluctuated but stable overall.  Discharging him on reduced dose of apixaban, 2.5 Mg p.o. twice daily.  Patient will follow-up with heme-onc and dose will be changed to 5 Mg twice daily as outpatient if hemoglobin stable on recheck.    Large left retroperitoneal hemorrhage  Acute blood loss anemia due to #1 above  Patient complained of intractable pain since 3/15 evening, multiple imaging including MRI and CTA shows large left retroperitoneal hemorrhage measuring up to 16 cm in length.  Most likely this was bleeding from the lymph node biopsy site on 3/14/2025 as he was concurrently on anticoagulation.  -Hemoglobin dropped from 13.3 at presentation to 9.3 ,  s/p 2 units of PRBC on 3/16  -Heparin drip resumed subsequently but with dropping hemoglobin, heparin on hold again.  Hb 7.7, has received 2 units PRBC, and up to 8.5  - Transfuse to keep Hb above 8.  If worsening hemoglobin, will get noncontrast CT to evaluate for any expanding hematoma.  Suspect retroperitoneal hematoma could be tracking into abdominal cavity resulting in hemorrhagic ascites  -Anticoagulation managed as  above.      Degenerative disc disease  Patient reported intractable left lower back/groin and thigh pain on 3/16 with radicular symptoms.  -MRI of the lumbar spine shows multilevel disc bulges with bilateral extraforaminal nerve root compression at multiple levels.  Evaluated by neurosurgery who felt that these are all chronic changes with no acute intervention needed at this point  -Pain control as needed with Tylenol, Neurontin, oxycodone and IV Dilaudid     URI symptoms  Dry cough, coryza, intermittent wheezing for 1 week PTA. No fevers or chills. PE as above.  -COVID-19, influenza A and B and RSV negative  -Respiratory symptoms much improved after paracentesis, suspect this was mechanical  -Supportive care  -Nebs PRN  -PRN guaifenesin and tessalon pearles      Slow transit constipation  Likely worsened by above.  -Bowel regimen, Metamucil ordered     History of mild aortic stenosis and mitral regurgitation  HTN  -Echocardiogram is fairly unremarkable  -Losartan held due to soft blood pressure after retroperitoneal hemorrhage and acute kidney injury.  BP remains normal and given acute kidney injury injury as well, although recovering now losartan not resumed.  Follow-up as outpatient.  -Discontinue prior to admission aspirin as he will be on anticoagulation now     Chronic stable diagnoses and other pertinent medical history:  GERD  Obesity class II  HLD  Hiatal hernia  Recurrent abdominal hernia  Multiple sclerosis    Consultations This Hospital Stay   PHARMACY IP CONSULT  HEMATOLOGY & ONCOLOGY IP CONSULT  ADVANCE DIRECTIVE IP CONSULT  INTERVENTIONAL RADIOLOGY ADULT/PEDS IP CONSULT  PHARMACY LIAISON FOR MEDICATION COVERAGE CONSULT  VASCULAR ACCESS ADULT IP CONSULT  CARE MANAGEMENT / SOCIAL WORK IP CONSULT  INTERVENTIONAL RADIOLOGY ADULT/PEDS IP CONSULT  NEUROSURGERY IP CONSULT  INTERVENTIONAL RADIOLOGY ADULT/PEDS IP CONSULT  PHARMACY IP CONSULT  NURSING TO CONSULT FOR VASCULAR ACCESS CARE IP CONSULT  PHYSICAL  THERAPY ADULT IP CONSULT    Code Status   Full Code    Time Spent on this Encounter   I, Tripp Olson MD, personally saw the patient today and spent greater than 30 minutes discharging this patient.       Tripp Olson MD  [facility] [address] [facility]  [address], [address]  [place] [address]  Phone: [phone]  ______________________________________________________________________    Physical Exam   Vital Signs: Temp: 97.6  F (36.4  C) Temp src: Oral BP: 139/65 Pulse: 90   Resp: 18 SpO2: 99 % O2 Device: None (Room air)    Weight: 225 lbs 12.02 oz    General: AAOx3, appears comfortable.  HEENT: PERRLA EOMI. Mucosa moist.   Lungs: Bilateral equal air entry. Clear to auscultation, normal work of breathing.   CVS: S1S2 regular, no tachycardia or murmur.   Abdomen: Soft, distended but overall improved, nontender, soft, bowel tones present.  MSK: Bilateral 2 + leg edema.  Much improved in the last 2 days.  Neuro: AAOX3. CN 2-12 normal. Strength symmetrical.  Skin: No rash.  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  Symptomatic moderately large volume ascites.  Acute kidney injury  Tumor lysis syndrome     Activity    Your activity upon discharge: activity as tolerated    Leg elevation     Diet    Follow this diet upon discharge: Current Diet:Orders Placed This Encounter      2 Gram Sodium Diet     Infusion Appointment Request - Adult     Hospital Follow-up with Existing Primary Care Provider (PCP)    Please see details below            Significant Results and Procedures   Most Recent 3 CBC's:  Recent Labs   Lab Test 03/24/25  1114 03/23/25  0901 03/22/25  0808    WBC 6.6 6.9 9.5   HGB 9.6* 8.5* 9.5*   MCV 89 87 86    207 231     Most Recent 3 BMP's:  Recent Labs   Lab Test 03/24/25  1114 03/23/25  0901 03/22/25  0808    138 135   POTASSIUM 4.6 4.9 4.8   CHLORIDE 105 105 103   CO2 24 20* 23   BUN 21.9 28.4* 34.4*   CR 1.02 1.22* 1.51*   ANIONGAP 10 13 9   SHANNON 8.4* 8.3* 8.1*   * 105* 87     Most Recent 2 LFT's:  Recent Labs   Lab Test 03/20/25  1901 03/19/25  1538   AST 26 43   ALT 19 28   ALKPHOS 51 58   BILITOTAL 0.5 0.7     Most Recent TSH and T4:  Recent Labs   Lab Test 03/13/25  2200   TSH 3.94     Most Recent 6 glucoses:  Recent Labs   Lab Test 03/24/25  1114 03/23/25  0901 03/22/25  0808 03/22/25  0115 03/21/25  1134 03/20/25  1546   * 105* 87 94 96 138*     Most Recent Urinalysis:  Recent Labs   Lab Test 03/14/25  0029 03/04/25  1120   COLOR Light Yellow Yellow   APPEARANCE Clear Clear   URINEGLC Negative Negative   URINEBILI Negative Negative   URINEKETONE Negative Negative   SG 1.015 1.010   UBLD Negative Negative   URINEPH 5.5 6.0   PROTEIN Negative Negative   UROBILINOGEN  --  0.2   NITRITE Negative Negative   LEUKEST Negative Negative   RBCU 1  --    WBCU <1  --      Most Recent ESR & CRP:No lab results found.  Most Recent Anemia Panel:  Recent Labs   Lab Test 03/24/25  1114 03/18/25  1351 03/18/25  0851   WBC 6.6   < > 7.2  6.9   HGB 9.6*   < > 10.1*   HCT 30.0*   < > 30.0*   MCV 89   < > 86      < > 207   RETICABSCT  --   --  0.079   RETP  --   --  2.2*    < > = values in this interval not displayed.   ,   Results for orders placed or performed during the hospital encounter of 03/14/25   CT Abdomen Retroperitoneal Biopsy    Narrative    EXAM:  1. PERCUTANEOUS BIOPSY RETROPERITONEAL LYMPHADENOPATHY  2. CT GUIDANCE  3. CONSCIOUS SEDATION  LOCATION: [facility]  DATE: 3/14/2025    INDICATION: retroperitoneal lymphadenopathy  TECHNIQUE: Dose reduction techniques were used.    PROCEDURE: Informed consent  obtained. Site marked. Prior images reviewed. Required items made available. Patient identity confirmed verbally and with arm band. Patient reevaluated immediately before administering sedation. Universal protocol was   followed. Time out performed. The site was prepped and draped in sterile fashion. 10 mL of 1% lidocaine was infused into the local soft tissues. Using standard technique and under direct CT guidance, a 18-gauge biopsy device was used to obtain 5 core   biopsies.     The patient tolerated the procedure well. No immediate complications.    SEDATION: Versed 1 mg. Fentanyl 50 mcg. The procedure was performed with administration intravenous conscious sedation with appropriate preoperative, intraoperative, and postoperative evaluation.    15 minutes of supervised face to face conscious sedation time was provided by a radiology nurse under my direct supervision.      Impression    IMPRESSION:  1.  Successful CT-guided biopsy retroperitoneal lymphadenopathy   US Paracentesis without Albumin    Narrative    EXAM:  1. PARACENTESIS  2. ULTRASOUND GUIDANCE  LOCATION: [facility]  DATE: 3/15/2025    INDICATION: Ascites.    PROCEDURE: Informed consent obtained. Time out performed. The abdomen was prepped and draped in a sterile fashion. 10 mL of 1% lidocaine was infused into local soft tissues. A 5 Indonesian catheter system was introduced into the abdominal ascites under   ultrasound guidance.    4.7 liters of clear fluid were removed and sent to lab if requested.    Patient tolerated procedure well.    Ultrasound imaging was obtained and placed in the patient's permanent medical record.      Impression    IMPRESSION:  1.  Status post ultrasound-guided paracentesis.    Reference CPT Code: 23457   MR Lumbar Spine w/o & w Contrast     Value    Radiologist flags Concern for left-sided retroperitoneal hemorrhage. (AA)    Narrative    EXAM: MR LUMBAR SPINE W/O and W CONTRAST  LOCATION: [facility]  [facility]  DATE: 3/16/2025    INDICATION: Left groin back pain with radicular symptoms, suspected lymphoma  COMPARISON: Chest abdomen and pelvis CT dated 03/13/2025.  CONTRAST: 10mL Gadavist intravenous  TECHNIQUE: Routine Lumbar Spine MRI without and with IV contrast.    FINDINGS:   Nomenclature is based on 5 lumbar type vertebral bodies. Vertebral body heights are maintained. Mild retrolisthesis at L1-L2, L2-L3 and L3-L4. Minimal grade 1 anterolisthesis L4-L5 measuring 2 mm. Modic type I degenerative endplate changes at L2-L3.   Intrinsically T1 hyperintense lesions within the L1, L2 and L5 vertebral bodies may represent lipid rich hemangiomas. Diffuse heterogeneous bone marrow appearance. Normal distal spinal cord and cauda equina with conus medullaris at L1. There is dorsal   epidural enhancement and interspinous process enhancement at L2-L3, possibly degenerative. Enhancement adjacent to the L4 spinous process is likely degenerative in nature. Partially visualized ascites. No definite abnormal intradural enhancement. Atrophy   of the paravertebral muscles. Heterogeneous appearance of the pelvic bone marrow.    The left psoas muscle is asymmetrically enlarged and demonstrates a fluid collection, hyperintense on T2-weighted images with areas of mild intrinsic hyperintensity on T1-weighted images, measuring up to 2.5 x 4.6 x 14.6 cm, concerning for hemorrhage   (series 105 image 33, series 106 image 7 and series 110 image 44. Antidependent enhancement within the collection may represent tumor. Partially visualized bulky retroperitoneal lymphadenopathy.    T12-L1: Normal disc height. No herniation. Mild bilateral facet arthropathy. No spinal canal or neural foraminal stenosis.    L1-L2: Normal disc height. Disc desiccation. Tiny disc bulge. Bilateral facet arthropathy. No spinal canal or neural foraminal stenosis.    L2-L3: Mild disc height loss. Annular fissure. Mild retrolisthesis. Disc bulge  indenting the ventral thecal sac and contacting the bilateral extraforaminal L2 nerve roots. Bilateral facet arthropathy. Mild spinal canal narrowing. Moderate to severe right   and moderate left neural foraminal stenosis.    L3-L4: Mild disc height loss. Mild retrolisthesis. Disc bulge indenting the ventral thecal sac and contacting the descending right L4 nerve root in the lateral recess and the bilateral extra foraminal L3 nerve roots. Ligamentum flavum thickening.   Bilateral facet arthropathy. Dorsal epidural lipomatosis. Moderate spinal canal stenosis. Severe bilateral neural foraminal stenosis.    L4-L5: Mild disc height loss and desiccation. Disc bulge with superimposed central disc protrusion effacing the ventral thecal sac and contacting the bilateral descending L5 nerve roots and bilateral extraforaminal L4 nerve roots. Ligamentum flavum   thickening. Bilateral facet arthropathy. Dorsal epidural lipomatosis. Severe spinal canal stenosis. Severe bilateral neural foraminal stenosis.    L5-S1: Normal disc height and signal. Small disc bulge contacting the bilateral extra foraminal L5 nerve roots.. Bilateral facet arthropathy. Moderate epidural lipomatosis. Moderate spinal canal stenosis. Mild right neural foraminal narrowing. No   significant left neural foraminal stenosis      Impression    IMPRESSION:  1.  New partially visualized large fluid collection in the left psoas muscle measures at least 2.5 x 4.6 x 14.6 cm and is concerning for hemorrhage. Recommend IR consult consider CT abdomen and pelvis with intravenous contrast to further evaluate.  2.  Modic type I degenerative endplate changes at L2-L3 and represent a source of pain.  3.  Enhancement of the interspinous spaces at L2-L3 and L3-L4, extending to the dorsal epidural space likely represent degenerative related inflammation. No definite abnormal intradural enhancement.  4.  At L2-L3, there is a disc bulge contacting the bilateral extraforaminal  L2 nerve roots.  5.  At L3-L4, there is a disc bulge contacting the bilateral extraforaminal L3 nerve roots and a dorsal epidural lipomatosis contributing to moderate spinal canal stenosis.  6.  At L4-L5, there is a disc bulge contacting the bilateral L4 and L5 nerve roots and dorsal epidural lipomatosis contributing to severe spinal canal stenosis.  7.  At L5-S1, there is a disc bulge contacting the bilateral extraforaminal L5 nerve roots and dorsal epidural lipomatosis contributing to moderate spinal canal stenosis.  8.  Partially visualized bulky retroperitoneal lymphadenopathy and ascites.      [Critical Result: Concern for left-sided retroperitoneal hemorrhage.]    Finding was identified on 3/16/2025 12:20 PM CDT.     Dr. Velasquez was contacted by me on 3/16/2025 12:58 PM CDT and verbalized understanding of the critical result.   MR Hip Left w/o & w Contrast    Narrative    EXAM: MR HIP LEFT W/O and W CONTRAST  LOCATION: [facility]  DATE: 3/16/2025    INDICATION: intractable left groin pain, being worked up for lymphoma  COMPARISON: CT 03/14/2025  TECHNIQUE: Routine. Additional postgadolinium T1 sequences were obtained.  IV CONTRAST: Gadavist 10 mL    FINDINGS:    LEFT HIP:   -Labrum: Limited evaluation of the labrum.   -Cartilage: Likely grade III chondromalacia of the superior and posterior hip. There is subchondral cyst formation within the acetabulum.  -Joint space: Trace effusion with minimal likely reactive enhancing synovitis.   -Joint capsule/ligaments: Intact joint capsule. Ligamentum teres is intact.    MUSCLES AND TENDONS:   -Gluteal: Mild gluteus minimus and gluteus medius tendinopathy. No significant tear. No muscular atrophy. There is edema within the greater trochanteric bursa.  -Proximal hamstring: Hamstring origin tendinopathy. There is chronic partial tear of the origin. No significant retraction. There is adjacent corticated heterotopic ossification.   -Iliopsoas:  There is T2 hyperintensity of the distal tendon which could represent underlying tendinopathy or extension of fluid into the tendon. No definite tear. There is a large amount of fluid partially imaged present within the iliopsoas muscle.   Edema and fluid extend distally along the iliopsoas bursa and extend posterior to the proximal femur and along the fascial planes of the proximal anterior left thigh. There is high T1 signal in portions of the fluid.  -Rectus femoris origin: No tear or tendinopathy.    BONES:   -No fracture. Heterogeneous red marrow. No discrete marrow replacing lesion.   -Mild left sacroiliac joint arthrosis.    SOFT TISSUES:   -Fluid within the partially imaged iliopsoas musculature extending inferiorly as above. No muscular atrophy.    INTRA-PELVIC CONTENTS:  -Nonorganized presacral and retroperitoneal edema. Soft tissue edema along the subcutaneous left flank/thigh. Fluid extends along the left inguinal canal.      Impression    IMPRESSION:  1.  Large amount of fluid within the partially imaged iliopsoas musculature extending inferiorly along the iliopsoas bursa and extending posterior along the fascial planes of the proximal anterior left thigh. There is high T1 signal in portions of the   fluid concerning for blood products. Further evaluation with contrast enhanced abdomen/pelvis CT is recommended.  2.  No concerning marrow replacing lesion.  3.  Mild to moderate left hip osteoarthritis with a trace effusion and minimal likely reactive enhancing synovitis.  4.  Left hamstring origin tendinopathy with chronic partial tear.      NOTE: ABNORMAL REPORT    THE DICTATION ABOVE DESCRIBES AN ABNORMALITY FOR WHICH FOLLOW-UP IS NEEDED.   CTA Abdomen Pelvis with Contrast    Narrative    EXAM: CTA ABDOMEN PELVIS WITH CONTRAST  LOCATION: [facility]  DATE: 3/16/2025    INDICATION: Retoperitoneal hematoma with drop in HB  COMPARISON: CT abdomen and pelvis dated  3/14/2025  TECHNIQUE: CT angiogram abdomen pelvis during arterial phase of injection of IV contrast. 2D and 3D MIP reconstructions were performed by the CT technologist. Dose reduction techniques were used.  CONTRAST: 72 cc [id number]    FINDINGS:  ANGIOGRAM ABDOMEN/PELVIS: No abdominal aortic aneurysm or dissection. Patent celiac axis, SMA, bilateral renal arteries, GERARD, and runoff to the upper legs. No evidence of active hemorrhage at single phase CT. There is significant stenosis at the origin   of the celiac axis, which may be related to vascular encasement although morphologically is suspicious for acute ligament syndrome. There is mild poststenotic dilatation reaching up to 7 mm.    LOWER CHEST: New small to moderate left pleural effusion with associated compressive atelectasis. Mild right basilar atelectasis. Peridiaphragmatic adenopathy redemonstrated.    HEPATOBILIARY: Moderate abdominal ascites, slightly decreased in interval. Vicarious excretion of contrast in the gallbladder. No definite ductal dilatation.    PANCREAS: Pancreas remains poorly visualized, and encased by extensive retroperitoneal soft tissue.    SPLEEN: Splenomegaly redemonstrated.    ADRENAL GLANDS: Left adrenal gland is encased by retroperitoneal soft tissue. No discrete adrenal mass.    KIDNEYS/BLADDER: Anterior displacement of the left kidney by new retroperitoneal hematoma. No hydronephrosis. Excreted contrast in the bladder.    BOWEL: No bowel obstruction. Increased stranding and potential nodularity within the anterior intra-abdominal fat, most apparent in the upper abdomen.    LYMPH NODES: Extensive confluent retroperitoneal and mesenteric soft tissue consistent with confluent adenopathy redemonstrated. There is new enlargement of the left psoas musculature, with central low attenuation compatible with intramuscular hematoma.   This measures approximately 5 cm in greatest transverse dimension. This extends approximately 16 cm in  length. Lateral to the psoas muscle, a higher density retroperitoneal hematoma measures up to 11 cm in transverse dimension, image 101. This extends   approximately 15 cm in length.    PELVIC ORGANS: Moderate to large volume of pelvic ascites, unchanged. Prostatic hypertrophy redemonstrated.    MUSCULOSKELETAL: Increasing subcutaneous edema. Fluid containing umbilical hernia. No acute bony abnormalities.      Impression    IMPRESSION:  1.  New large left-sided retroperitoneal hematoma with intramuscular component. No evidence of active hemorrhage.  2.  Extensive retroperitoneal and mesenteric adenopathy remains suspicious for lymphoma.  3.  Slight decrease in abdominal and pelvic ascites. Increasing prominence of anterior intra-abdominal fat stranding/nodularity, suspicious for peritoneal disease/lymphomatosis.  4.  Increasing left pleural effusion.   US Lower Extremity Venous Duplex Bilateral    Narrative    EXAM: US LOWER EXTREMITY VENOUS DUPLEX BILATERAL  LOCATION: [facility]  DATE: 3/17/2025    INDICATION: PE.  COMPARISON: 3/13/2025.  TECHNIQUE: Venous Duplex ultrasound of bilateral lower extremities with and without compression, augmentation and duplex. Color flow and spectral Doppler with waveform analysis performed.    FINDINGS: Exam includes the common femoral, femoral, popliteal veins as well as segmentally visualized deep calf veins and greater saphenous vein.     RIGHT: No deep vein thrombosis. No superficial thrombophlebitis. Baker's cyst suggested measuring 4.9 x 3.8 x 1.9 cm. There were two discrete fluid collection seen on the prior exam and only one is currently seen.    LEFT: No deep vein thrombosis. No superficial thrombophlebitis. Baker's cyst suggested measuring 7.0 x 4.5 x 2.7 cm. This is similar to the prior exam.      Impression    IMPRESSION:  1.  No deep venous thrombosis in the bilateral lower extremities.  2.  Bilateral lower extremity Baker's cysts as above.   US  Paracentesis without Albumin    Narrative    EXAM:  1. PARACENTESIS  2. ULTRASOUND GUIDANCE  LOCATION: [facility]  DATE: 3/18/2025    INDICATION: Ascites.    PROCEDURE: Informed consent obtained. Time out performed. The abdomen was prepped and draped in a sterile fashion. 10 mL of 1% lidocaine was infused into local soft tissues. A 5 [place]ian catheter system was introduced into the abdominal ascites under   ultrasound guidance.    2.7 liters of dark, bloody fluid were removed and sent to lab if requested.    Patient tolerated procedure well.    Ultrasound imaging was obtained and placed in the patient's permanent medical record.      Impression    IMPRESSION:  1.  Status post ultrasound-guided paracentesis.     US Paracentesis with Albumin    Narrative    EXAM:  1. PARACENTESIS  2. ULTRASOUND GUIDANCE  LOCATION: [facility]  DATE: 3/21/2025    INDICATION: Ascites.    PROCEDURE: Informed consent obtained. Time out performed. The abdomen was prepped and draped in a sterile fashion. 10 mL of 1% lidocaine was infused into local soft tissues. A 5 [place]ian catheter system was introduced into the abdominal ascites under   ultrasound guidance.    2.4 liters of dark, bloody fluid were removed and sent to lab if requested.    Patient tolerated procedure well.    Ultrasound imaging was obtained and placed in the patient's permanent medical record.      Impression    IMPRESSION:  1.  Status post ultrasound-guided paracentesis.     Echocardiogram Complete     Value    LVEF  55-60%    Narrative    [id number]  [id number]  [id number]^BONNIE^SABINA^BEATRIZ     [facility]  Echocardiography Laboratory  [address] [place] [address]     Name: TOM TAYLOR  MRN: [id number]  : 1955  Study Date: 03/15/2025 08:42 AM  Age: 69 yrs  Gender: Male  Patient Location: [facility]  Reason For Study: Pulmonary Emboli  Ordering Physician: SABINA NICOLE  BEATRIZ  Referring Physician: MILTON TAN  Performed By: Jacky Parr     BSA: 2.2 m2  Height: 67 in  Weight: 239 lb  HR: 74  BP: 103/82 mmHg  ______________________________________________________________________________  Procedure  Echocardiogram with two-dimensional, color and spectral Doppler. Definity (NDC  #[id number]) given intravenously.  ______________________________________________________________________________  Interpretation Summary     Left ventricular systolic function is normal.  The visual ejection fraction is 55-60%.  No regional wall motion abnormalities noted.  The right ventricle is normal size.  Right ventricular systolic function is lower limits of normal. Evaluation  suboptimal due to poor image quality.  Mild valvular aortic stenosis.  The mean AoV pressure gradient is 16mmHg.  Prior echo from July 2023 revealed a mean gradient across aortic valve of 14  mm. The study was technically difficult.  ______________________________________________________________________________  Left Ventricle  The left ventricle is normal in size. There is normal left ventricular wall  thickness. Left ventricular systolic function is normal. The visual ejection  fraction is 55-60%. Left ventricular diastolic function is normal. No regional  wall motion abnormalities noted.     Right Ventricle  The right ventricle is normal size. Right ventricular systolic function is  lower limits of normal. Evaluation suboptimal due to poor image quality.     Tricuspid Valve  There is trace tricuspid regurgitation. Right ventricular systolic pressure  could not be approximated due to inadequate tricuspid regurgitation. IVC  diameter and respiratory changes fall into an intermediate range suggesting an  RA pressure of 8 mmHg.     Aortic Valve  The aortic valve is trileaflet. There is trace aortic regurgitation. Mild  valvular aortic stenosis. The mean AoV pressure gradient is 16mmHg.     Pulmonic Valve  The pulmonic valve is  not well visualized.     Vessels  The aortic root is normal size. Ascending aorta dilatation is present. (41mm).     Pericardium  There is no pericardial effusion.     Rhythm  Sinus rhythm was noted.     ______________________________________________________________________________  MMode/2D Measurements & Calculations  IVSd: 1.0 cm  LVIDd: 4.5 cm  LVIDs: 2.4 cm  LVPWd: 1.1 cm  FS: 46.7 %  LV mass(C)d: 164.0 grams  LV mass(C)dI: 75.2 grams/m2  Ao root diam: 3.3 cm  asc Aorta Diam: 4.1 cm  LVOT diam: 2.3 cm  LVOT area: 4.2 cm2     Ao root diam index Ht(cm/m): 1.9  Ao root diam index BSA (cm/m2): 1.5  Asc Ao diam index BSA (cm/m2): 1.9  Asc Ao diam index Ht(cm/m): 2.4  LA Volume (BP): 57.9 ml  LA Volume Index (BP): 26.6 ml/m2  RWT: 0.49  TAPSE: 2.7 cm     Doppler Measurements & Calculations  MV E max basim: 80.7 cm/sec  MV A max basim: 74.4 cm/sec  MV E/A: 1.1  MV dec time: 0.21 sec  Ao V2 max: 271.0 cm/sec  Ao max P.0 mmHg  Ao V2 mean: 180.0 cm/sec  Ao mean P.0 mmHg  Ao V2 VTI: 53.0 cm  J CARLOS(I,D): 1.8 cm2  J CARLOS(V,D): 1.6 cm2  LV V1 max P.6 mmHg  LV V1 max: 107.0 cm/sec  LV V1 VTI: 22.5 cm  SV(LVOT): 93.5 ml  SI(LVOT): 42.9 ml/m2  PA acc time: 0.09 sec  AV Basim Ratio (DI): 0.39  J CARLOS Index (cm2/m2): 0.81  E/E' av.1  Lateral E/e': 5.3  Medial E/e': 6.9     ______________________________________________________________________________  Report approved by: Calvin Vegas MD on 03/15/2025 01:23 PM             Discharge Medications   Current Discharge Medication List        START taking these medications    Details   allopurinol (ZYLOPRIM) 100 MG tablet Take 1 tablet (100 mg) by mouth daily.  Qty: 30 tablet, Refills: 0    Comments: Future refills by PCP Dr. KATALINA PRATT with phone number [phone].  Associated Diagnoses: Preventive measure      apixaban ANTICOAGULANT (ELIQUIS) 2.5 MG tablet Take 1 tablet (2.5 mg) by mouth 2 times daily.  Qty: 60 tablet, Refills: 0    Comments: Future refills by PCP   KATALINA PRATT with phone number [phone].  Associated Diagnoses: Other acute pulmonary embolism without acute cor pulmonale (H)      gabapentin (NEURONTIN) 100 MG capsule Take 1 capsule (100 mg) by mouth 3 times daily.  Qty: 90 capsule, Refills: 0    Comments: Future refills by PCP Dr. KATALINA PRATT with phone number [phone].  Associated Diagnoses: Chronic low back pain without sciatica, unspecified back pain laterality      oxyCODONE (ROXICODONE) 5 MG tablet Take 0.5 tablets (2.5 mg) by mouth every 8 hours as needed for moderate to severe pain.  Qty: 10 tablet, Refills: 0    Comments: Future refills by PCP Dr. KATALINA PRATT with phone number [phone].  Associated Diagnoses: Generalized abdominal pain      psyllium (METAMUCIL/KONSYL) Packet Take 1 packet by mouth 2 times daily.  Qty: 60 packet, Refills: 0    Associated Diagnoses: Constipation, unspecified constipation type      prochlorperazine (COMPAZINE) 10 MG tablet Take 1 tablet (10 mg) by mouth every 6 hours as needed for nausea or vomiting.  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  Qty: 18 g, Refills: 0    Comments: Pharmacy may dispense brand covered by insurance (Proair, or proventil or ventolin or generic albuterol inhaler)  Associated Diagnoses: Aortic valve stenosis, etiology of cardiac valve disease unspecified; SOB (shortness of breath); PHELAN (dyspnea on exertion)      atorvastatin (LIPITOR) 20 MG tablet Take 1 tablet (20 mg) by mouth daily  Qty: 90 tablet, Refills: 3    Associated Diagnoses: Mixed hyperlipidemia      omeprazole (PRILOSEC) 20 MG DR capsule Take 1 capsule (20 mg) by mouth daily  Qty: 90 capsule, Refills: 3    Associated Diagnoses:  Gastroesophageal reflux disease with esophagitis, unspecified whether hemorrhage      SENNA-docusate sodium (SENNA S) 8.6-50 MG tablet Take 1 tablet by mouth at bedtime.  Qty: 30 tablet, Refills: 0    Associated Diagnoses: Abdominal distension           STOP taking these medications       aspirin 81 MG chewable tablet Comments:   Reason for Stopping:         furosemide (LASIX) 20 MG tablet Comments:   Reason for Stopping:         losartan (COZAAR) 50 MG tablet Comments:   Reason for Stopping:         polyethylene glycol (MIRALAX) 17 GM/Dose powder Comments:   Reason for Stopping:         spironolactone (ALDACTONE) 25 MG tablet Comments:   Reason for Stopping:             Allergies   Allergies   Allergen Reactions    Rituximab Anaphylaxis    No Known Allergies

## 2025-03-24 NOTE — PLAN OF CARE
Physical Therapy Discharge Summary    Reason for therapy discharge:    Discharged to home with home therapy.    Progress towards therapy goal(s). See goals on Care Plan in [facility] electronic health record for goal details.  Goals partially met.  Barriers to achieving goals:   discharge from facility.    Therapy recommendation(s):    Continued therapy is recommended.  Rationale/Recommendations:  Per previous treating therapist: Pt is slightly below baseline. Pt requires CGA on stairs and SBA with gait for slight unsteadiness with turns. Pt has 1 [address] home with no railing and no stairs within the home. Pt's daughter is nearby to support as needed. Current recommendation is discharge home once medically stable, with family support as needed. Pt would benefit from HHPT for progress strength, activity tolerance and independence. .

## 2025-03-24 NOTE — PLAN OF CARE
Goal Outcome Evaluation:    Orientation: A&Ox4  Aggression Stop Light: Green  Activity: Ind  Diet/BS Checks: 2g Na diet  Tele:  N/A  IV Access/Drains: R PIV SL  Pain Management: Denies  Abnormal VS/Results: VSS on RA  Bowel/Bladder: Cont b/b. 1 BM this shift  Skin/Wounds: BLE edema, para site CDI, previous biopsy CDI  Consults: Hem/onc, PT  D/C Disposition: Pending  Other Info:   -Chemo precautions maintained  -K, Mg, & Phos rechecks this AM

## 2025-03-24 NOTE — PROGRESS NOTES
Care Management Discharge Note    Discharge Date: 03/24/2025       Discharge Disposition: Home    Discharge Services: None    Discharge DME: None    Discharge Transportation: family or friend will provide    Private pay costs discussed: Not applicable    Does the patient's insurance plan have a 3 day qualifying hospital stay waiver?  Yes     Which insurance plan 3 day waiver is available? Alternative insurance waiver    Will the waiver be used for post-acute placement? No    PAS Confirmation Code:    Patient/family educated on Medicare website which has current facility and service quality ratings:      Education Provided on the Discharge Plan: Yes  Persons Notified of Discharge Plans: NA  Patient/Family in Agreement with the Plan: yes    Handoff Referral Completed: No, handoff not indicated or clinically appropriate    Additional Information:  Pt discharge to home, no further needs identified.   Bedside RN to review AVS.     Jackie Albarran RN

## 2025-03-24 NOTE — PROGRESS NOTES
Patient discharged at 4:35 PM to Discharged to home  IV was discontinued. Pain at time of discharge was 0/10. Belongings returned to patient.  Discharge instructions and medications reviewed with patient.  Patient verbalized understanding and all questions were answered.  Prescriptions given to patient.  At time of discharge, patient condition was stable and left the unit  escorted by RN and daughter.

## 2025-03-24 NOTE — PLAN OF CARE
Orientation: A&Ox4; calm and cooperative  Aggression Stop Light: Green  Activity: SBA with walker  Diet/BS Checks: 2g Na diet  Tele:  N/A  IV Access/Drains: R PIV SL  Pain Management: Tylenol given for LLE pain and abd discomfort xs 1  Abnormal VS/Results: VSS on RA. Hgb 8.5 this shift,  Bowel/Bladder: Cont b/b - no BM this shift  Skin/Wounds: BLE edema, para site CDI, previous biopsy CDI  Consults: Hem/onc, PT  D/C Disposition: Pending  Other Info:   - Chemo precautions maintained  - K, Mg, & Phos rechecks this AM  - weighed this a.m. - down 10 lbs

## 2025-03-24 NOTE — PROGRESS NOTES
Hematology/Oncology Follow-up Note  [facility]    Date of Admission:  3/14/2025   Reason for Consult: follicular lymphoma       ASSESSMENT : Bhavesh Landeros is a 69 year old year old male who presented this hospitalization with shortness of breath and abdominal bloating. Found to have significant ascites, PE, and extensive retroperitoneal soft tissue concerning for malignancy. He was diagnosed with follicular lymphoma this admission.   Retroperitoneal adenopathy  PE  Ascites    PLAN:  Okay to discharge from oncology perspective  Has follow up established with cancer clinic  Plan to discharge on 2.5 mg Eliquis, and 100 mg allopurinol       PE  Hematoma   Pulmonary embolism secondary to malignancy, life-long anticoagulation   03/13/2025 CT CAP Small volume of right lower lobe pulmonary embolus without right heart strain.   03/13/2025 BLE US negative for DVT  03/17/2025 BLE US negative for DVT  Heparin gtt initiated on admission. Later discontinued on 03/16/2025 d/t hematoma concerns from biopsy as described below.   03/16/2025 CT AP showing New large left-sided retroperitoneal hematoma with intramuscular component.   IVC filter was not being placed-  IVC thrombus is compressed and DVT negative   03/18/2025 heparin gtt re-initiated   03/20/2025 Eliquis 5 mg, one dose given in AM then held due to worsening hemoglobin at afternoon recheck.   03/23/2025 Lovenox subcutaneous  Will discharge on 2.5 Eliquis BID     Follicular Lymphoma grade 1-2   Ascites  03/13/2025 CT CAP Extensive confluent retroperitoneal soft tissue as described most compatible with lymphoma.  Likely associated significant compression of the IVC, not optimally assessed. Moderately large volume of abdominal and pelvic ascites.  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  B-cell lymphoma comprises an estimated 10 to 20% of marrow cellularity.  Flow showing CD10 positive Lambda-monotypic B cells ( 6 %)   03/21/2025 US paracentesis 2.75 L removed   Treatment History   03/19/2025 C1D1 IP Rituximab & Bendamustine   Severe reaction to Rituximab. Not re-challenged.     Tumor Lysis Syndrome -resolved   Secondary to chemotherapy  03/20/2025 Creatinine 1.43/ K 5.4/ Ph 4.3/ Uric Acid 7.1  03/19/2025 Allopurinol 100 mg initiated with chemotherapy   03/21/2025 Creatinine 1.68/ K normal/ Ph 4.8/ Uric Acid 8.2  Increased allopurinol to 200 mg   Rasburicase 6 mg IV once  Creatinine improved to 1.02 by 03/24/2025. Electrolytes are normal. Uric acid low at 1.5  Will discharge on allopurinol     EXAM:  Subjective  Denies fever, chills, night sweats. Denies nausea, vomiting, diarrhea, constipation. Denies lightheaded or dizziness.Denies headache, fatigue, lack of appetite. Denies bleeding, bruising, rashes. Denies shortness of breath, cough.   Feels overall well. Swelling has decreased. No symptoms of fluid build up in the abdomen     Objective  GENERAL/CONSTITUTIONAL: No acute distress.  NEUROLOGIC: Alert, oriented, answers questions appropriately.       Labs Reviewed: CBC, CMP, labs as above   Imaging Reviewed: as above    35 minutes spent on the date of the encounter doing review of outside records, review of test results, interpretation of tests,  implementing/ creating plan, coordinating care, and documentation     Malu DUNCAN CNP  Hematology/Oncology  [facility]   Securely message with Jeri

## 2025-03-25 ENCOUNTER — PATIENT OUTREACH (OUTPATIENT)
Dept: CARE COORDINATION | Facility: CLINIC | Age: 70
End: 2025-03-25
Payer: COMMERCIAL

## 2025-03-25 PROBLEM — E78.5 DYSLIPIDEMIA: Status: ACTIVE | Noted: 2025-03-25

## 2025-03-25 LAB
CULTURE HARVEST COMPLETE DATE: NORMAL
CULTURE HARVEST COMPLETE DATE: NORMAL

## 2025-03-25 ASSESSMENT — ACTIVITIES OF DAILY LIVING (ADL): DEPENDENT_IADLS:: INDEPENDENT

## 2025-03-25 NOTE — PROGRESS NOTES
Clinic Care Coordination Contact  Transitions of Care Outreach  Chief Complaint   Patient presents with    Clinic Care Coordination - Post Hospital     RN CC- post hospital follow up       Most Recent Admission Date: 3/14/2025   Most Recent Admission Diagnosis: Pulmonary embolism (H) - I26.99  Retroperitoneal mass - R19.00  Ascites - R18.8  Abdominal pain - R10.9     Most Recent Discharge Date: 3/24/2025   Most Recent Discharge Diagnosis: Retroperitoneal mass - R19.00  Inguinal hernia without obstruction or gangrene, recurrence not specified, unspecified laterality - K40.90  Slow transit constipation - K59.01  Hiatal hernia - K44.9  Diastasis recti - M62.08  Recurrent abdominal hernia without obstruction or gangrene, unspecified hernia type - K45.8  Abdominal pain, generalized - R10.84  HTN, goal below 140/80 - I10  Mixed hyperlipidemia - E78.2  Morbid obesity (H) - E66.01  Mitral regurgitation and aortic stenosis - I08.0  Aortic valve stenosis, etiology of cardiac valve disease unspecified - I35.0  Ascites - R18.8  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 has a follow up on 3/26/2025 and 4/14/2025. has all medications. no issues or concerns  How are your symptoms? (Red Flag symptoms escalate to triage hotline per guidelines): Improved  Do you know how to contact your clinic care team if you have future questions or changes to your health status? : Yes  Does the patient have their discharge instructions? :  Yes  Does the patient have questions regarding their discharge instructions? : No  Were you started on any new medications or were there changes to any of your previous medications? : Yes  Does the patient have all of their medications?: Yes  Do you have questions regarding any of your medications? : No  Do you have all of your needed medical supplies or equipment (DME)?  (i.e. oxygen tank, CPAP, cane, etc.): Yes         Post-op (Clinicians Only)  Did the patient have surgery or a procedure: No  Fever: No  Chills: No  Eating & Drinking: eating and drinking without complaints/concerns    Follow up Plan     Discharge Follow-Up  Discharge follow up appointment scheduled in alignment with recommended follow up timeframe or Transitions of Risk Category? (Low = within 30 days; Moderate= within 14 days; High= within 7 days): Yes  Discharge Follow Up Appointment Date: 03/26/25  Discharge Follow Up Appointment Scheduled with?: Specialty Care Provider    Future Appointments   Date Time Provider Department [place]   3/26/2025 11:30 AM [address]   3/26/2025  1:00 PM Christine Dimas MD, MD [facility]   4/14/2025 11:00 AM Jason Serra MD [facility]       Outpatient Plan as outlined on AVS reviewed with patient.    For any urgent concerns, please contact our 24 hour nurse triage line: [phone] ([phone])       Patient declines care coordination at this time.    Reyna Felix RN

## 2025-03-25 NOTE — PROGRESS NOTES
Clinic Care Coordination Contact  [facility]/Voicemail    Clinical Data: Care Coordinator Outreach    Outreach Documentation Number of Outreach Attempt   3/25/2025  10:34 AM 1       Left message on patient's voicemail with call back information and requested return call.      Plan: Care Coordinator will try to reach patient again in 1-2 business days.    Reyna Felix RN Care Coordination   [facility]  Email: [email or web address]  Phone: [phone]

## 2025-03-26 ENCOUNTER — TELEPHONE (OUTPATIENT)
Dept: FAMILY MEDICINE | Facility: OTHER | Age: 70
End: 2025-03-26

## 2025-03-26 ENCOUNTER — HOSPITAL ENCOUNTER (OUTPATIENT)
Dept: ULTRASOUND IMAGING | Facility: CLINIC | Age: 70
Discharge: HOME | End: 2025-03-26
Attending: HOSPITALIST
Payer: COMMERCIAL

## 2025-03-26 ENCOUNTER — ANCILLARY ORDERS (OUTPATIENT)
Dept: ONCOLOGY | Facility: CLINIC | Age: 70
End: 2025-03-26

## 2025-03-26 DIAGNOSIS — R60.0 LOWER EXTREMITY EDEMA: ICD-10-CM

## 2025-03-26 DIAGNOSIS — R18.8 ASCITES: ICD-10-CM

## 2025-03-26 DIAGNOSIS — E78.2 MIXED HYPERLIPIDEMIA: ICD-10-CM

## 2025-03-26 DIAGNOSIS — R10.84 ABDOMINAL PAIN, GENERALIZED: ICD-10-CM

## 2025-03-26 DIAGNOSIS — K45.8 RECURRENT ABDOMINAL HERNIA WITHOUT OBSTRUCTION OR GANGRENE, UNSPECIFIED HERNIA TYPE: ICD-10-CM

## 2025-03-26 DIAGNOSIS — M62.08 DIASTASIS RECTI: ICD-10-CM

## 2025-03-26 DIAGNOSIS — C82.18 GRADE 2 FOLLICULAR LYMPHOMA OF LYMPH NODES OF MULTIPLE REGIONS (H): ICD-10-CM

## 2025-03-26 DIAGNOSIS — K44.9 HIATAL HERNIA: ICD-10-CM

## 2025-03-26 DIAGNOSIS — E66.01 MORBID OBESITY (H): ICD-10-CM

## 2025-03-26 DIAGNOSIS — K59.01 SLOW TRANSIT CONSTIPATION: ICD-10-CM

## 2025-03-26 DIAGNOSIS — Z29.9 PREVENTIVE MEASURE: ICD-10-CM

## 2025-03-26 DIAGNOSIS — I10 HTN, GOAL BELOW 140/80: ICD-10-CM

## 2025-03-26 DIAGNOSIS — R19.00 RETROPERITONEAL MASS: Primary | ICD-10-CM

## 2025-03-26 DIAGNOSIS — I35.0 AORTIC VALVE STENOSIS, ETIOLOGY OF CARDIAC VALVE DISEASE UNSPECIFIED: ICD-10-CM

## 2025-03-26 DIAGNOSIS — K59.00 CONSTIPATION, UNSPECIFIED CONSTIPATION TYPE: ICD-10-CM

## 2025-03-26 DIAGNOSIS — G89.29 CHRONIC LOW BACK PAIN WITHOUT SCIATICA, UNSPECIFIED BACK PAIN LATERALITY: ICD-10-CM

## 2025-03-26 DIAGNOSIS — I26.99 OTHER ACUTE PULMONARY EMBOLISM WITHOUT ACUTE COR PULMONALE (H): ICD-10-CM

## 2025-03-26 DIAGNOSIS — R10.84 GENERALIZED ABDOMINAL PAIN: ICD-10-CM

## 2025-03-26 DIAGNOSIS — R18.8 OTHER ASCITES: ICD-10-CM

## 2025-03-26 DIAGNOSIS — K40.90 INGUINAL HERNIA WITHOUT OBSTRUCTION OR GANGRENE, RECURRENCE NOT SPECIFIED, UNSPECIFIED LATERALITY: ICD-10-CM

## 2025-03-26 DIAGNOSIS — I08.0 MITRAL REGURGITATION AND AORTIC STENOSIS: ICD-10-CM

## 2025-03-26 DIAGNOSIS — M54.50 CHRONIC LOW BACK PAIN WITHOUT SCIATICA, UNSPECIFIED BACK PAIN LATERALITY: ICD-10-CM

## 2025-03-26 PROCEDURE — 76705 ECHO EXAM OF ABDOMEN: CPT

## 2025-03-26 RX ORDER — ALBUMIN (HUMAN) 12.5 G/50ML
25-50 SOLUTION INTRAVENOUS ONCE
OUTPATIENT
Start: 2025-03-26 | End: 2025-03-26

## 2025-03-26 RX ORDER — LIDOCAINE 40 MG/G
CREAM TOPICAL
OUTPATIENT
Start: 2025-03-26

## 2025-03-26 NOTE — TELEPHONE ENCOUNTER
Patient Quality Outreach    Patient is due for the following:   Asthma  -  AAP  Hypertension -  Hypertension follow-up visit      Topic Date Due    COVID-19 Vaccine (3 - Moderna risk series) 05/07/2021    Flu Vaccine (1) 09/01/2024       Action(s) Taken:   Schedule a Annual Wellness Visit    Type of outreach:    [employer] message.    Questions for provider review:    None         Radha Lovelace  Chart routed to Care Team.

## 2025-03-28 LAB — CULTURE HARVEST COMPLETE DATE: NORMAL

## 2025-03-31 ENCOUNTER — TELEPHONE (OUTPATIENT)
Dept: FAMILY MEDICINE | Facility: OTHER | Age: 70
End: 2025-03-31

## 2025-03-31 ENCOUNTER — LAB (OUTPATIENT)
Dept: LAB | Facility: CLINIC | Age: 70
End: 2025-03-31
Payer: COMMERCIAL

## 2025-03-31 DIAGNOSIS — Z09 HOSPITAL DISCHARGE FOLLOW-UP: Primary | ICD-10-CM

## 2025-03-31 DIAGNOSIS — Z09 HOSPITAL DISCHARGE FOLLOW-UP: ICD-10-CM

## 2025-03-31 DIAGNOSIS — R19.00 RETROPERITONEAL MASS: Primary | ICD-10-CM

## 2025-03-31 LAB
ALBUMIN SERPL BCG-MCNC: 3.6 G/DL (ref 3.5–5.2)
ALP SERPL-CCNC: 53 U/L (ref 40–150)
ALT SERPL W P-5'-P-CCNC: 22 U/L (ref 0–70)
ANION GAP SERPL CALCULATED.3IONS-SCNC: 11 MMOL/L (ref 7–15)
AST SERPL W P-5'-P-CCNC: 26 U/L (ref 0–45)
BILIRUB SERPL-MCNC: 0.8 MG/DL
BUN SERPL-MCNC: 18.4 MG/DL (ref 8–23)
CALCIUM SERPL-MCNC: 8.4 MG/DL (ref 8.8–10.4)
CHLORIDE SERPL-SCNC: 105 MMOL/L (ref 98–107)
CREAT SERPL-MCNC: 0.92 MG/DL (ref 0.67–1.17)
EGFRCR SERPLBLD CKD-EPI 2021: 89 ML/MIN/1.73M2
ERYTHROCYTE [DISTWIDTH] IN BLOOD BY AUTOMATED COUNT: 15.2 % (ref 10–15)
GLUCOSE SERPL-MCNC: 103 MG/DL (ref 70–99)
HCO3 SERPL-SCNC: 23 MMOL/L (ref 22–29)
HCT VFR BLD AUTO: 30.9 % (ref 40–53)
HGB BLD-MCNC: 10 G/DL (ref 13.3–17.7)
INTERPRETATION: NORMAL
INTERPRETATION: NORMAL
ISCN: NORMAL
MCH RBC QN AUTO: 28.7 PG (ref 26.5–33)
MCHC RBC AUTO-ENTMCNC: 32.4 G/DL (ref 31.5–36.5)
MCV RBC AUTO: 89 FL (ref 78–100)
METHODS: NORMAL
PLATELET # BLD AUTO: 200 10E3/UL (ref 150–450)
POTASSIUM SERPL-SCNC: 3.9 MMOL/L (ref 3.4–5.3)
PROT SERPL-MCNC: 5.6 G/DL (ref 6.4–8.3)
RBC # BLD AUTO: 3.49 10E6/UL (ref 4.4–5.9)
SODIUM SERPL-SCNC: 139 MMOL/L (ref 135–145)
WBC # BLD AUTO: 5.3 10E3/UL (ref 4–11)

## 2025-03-31 PROCEDURE — 36415 COLL VENOUS BLD VENIPUNCTURE: CPT

## 2025-03-31 PROCEDURE — 80053 COMPREHEN METABOLIC PANEL: CPT

## 2025-03-31 PROCEDURE — G0452 MOLECULAR PATHOLOGY INTERPR: HCPCS | Mod: 26 | Performed by: PATHOLOGY

## 2025-03-31 PROCEDURE — 81450 HL NEO GSAP 5-50DNA/DNA&RNA: CPT | Performed by: RADIOLOGY

## 2025-03-31 PROCEDURE — 85027 COMPLETE CBC AUTOMATED: CPT

## 2025-03-31 NOTE — TELEPHONE ENCOUNTER
[facility] pharmacist (Antonietta) calling;   Requesting orders for CBC and CMP to be done today. Patient at lab/clinic now ([facility]).  Has pending appointment Dr. Deutsch tomorrow.   Pharmacist requesting hospital follow up appointment for patient today. Appointment scheduled for tomorrow at 7:40am.    Hospitalized at [facility] 3/14 - 3/24.  Newly diagnosed lymphoma; oncology appointment 4/14.  BP at pharmacy 116/54 today.  States patient reports feeling weak since hospitalization; he had follow up scheduled but cancelled it.  Pharmacist states patient needs appointment PCP; will tell him to go to ED if feels worse but otherwise will plan on seeing PCP tomorrow a.m. for his Hospital follow up.  Per discharge orders:  CBC and CMP to be done at follow up.  Please advise.  Thank you.  Lillian OG RN

## 2025-04-01 ENCOUNTER — OFFICE VISIT (OUTPATIENT)
Dept: FAMILY MEDICINE | Facility: OTHER | Age: 70
End: 2025-04-01
Payer: COMMERCIAL

## 2025-04-01 VITALS
SYSTOLIC BLOOD PRESSURE: 132 MMHG | RESPIRATION RATE: 20 BRPM | TEMPERATURE: 98.2 F | BODY MASS INDEX: 35.79 KG/M2 | HEIGHT: 67 IN | WEIGHT: 228 LBS | HEART RATE: 96 BPM | DIASTOLIC BLOOD PRESSURE: 60 MMHG | OXYGEN SATURATION: 99 %

## 2025-04-01 DIAGNOSIS — Z29.9 PREVENTIVE MEASURE: ICD-10-CM

## 2025-04-01 DIAGNOSIS — M54.50 CHRONIC LOW BACK PAIN WITHOUT SCIATICA, UNSPECIFIED BACK PAIN LATERALITY: ICD-10-CM

## 2025-04-01 DIAGNOSIS — R60.0 BILATERAL LEG EDEMA: ICD-10-CM

## 2025-04-01 DIAGNOSIS — C82.18 GRADE 2 FOLLICULAR LYMPHOMA OF LYMPH NODES OF MULTIPLE REGIONS (H): ICD-10-CM

## 2025-04-01 DIAGNOSIS — G89.29 CHRONIC LOW BACK PAIN WITHOUT SCIATICA, UNSPECIFIED BACK PAIN LATERALITY: ICD-10-CM

## 2025-04-01 DIAGNOSIS — I26.99 OTHER ACUTE PULMONARY EMBOLISM WITHOUT ACUTE COR PULMONALE (H): ICD-10-CM

## 2025-04-01 DIAGNOSIS — R14.0 ABDOMINAL DISTENSION: ICD-10-CM

## 2025-04-01 DIAGNOSIS — Z09 HOSPITAL DISCHARGE FOLLOW-UP: Primary | ICD-10-CM

## 2025-04-01 DIAGNOSIS — K59.00 CONSTIPATION, UNSPECIFIED CONSTIPATION TYPE: ICD-10-CM

## 2025-04-01 RX ORDER — GABAPENTIN 100 MG/1
100 CAPSULE ORAL 3 TIMES DAILY
Qty: 90 CAPSULE | Refills: 0 | Status: SHIPPED | OUTPATIENT
Start: 2025-04-01

## 2025-04-01 RX ORDER — FUROSEMIDE 20 MG/1
20 TABLET ORAL DAILY PRN
Qty: 30 TABLET | Refills: 0 | Status: SHIPPED | OUTPATIENT
Start: 2025-04-01

## 2025-04-01 RX ORDER — ALLOPURINOL 100 MG/1
100 TABLET ORAL DAILY
Qty: 30 TABLET | Refills: 0 | Status: SHIPPED | OUTPATIENT
Start: 2025-04-01

## 2025-04-01 ASSESSMENT — PAIN SCALES - GENERAL: PAINLEVEL_OUTOF10: NO PAIN (0)

## 2025-04-01 NOTE — PROGRESS NOTES
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  Preventive measure  - allopurinol (ZYLOPRIM) 100 MG tablet; Take 1 tablet (100 mg) by mouth daily.  Chronic low back pain without sciatica, unspecified back pain laterality  - gabapentin (NEURONTIN) 100 MG capsule; Take 1 capsule (100 mg) by mouth 3 times daily.  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  Throughout the patient's hospitalization he did have paracentesis of his ascites, volume status diligently monitored.  He also did have retroperitoneal bleed after biopsy of his retroperitoneal mass now identified as follicular lymphoma grade 1-2.  Was also started on Rituxan, did have tumor lysis syndrome shortly thereafter with acute kidney injury, labs and kidney function did recover/improved.  Also of note from retroperitoneal bleed did require multiple transfusions, was started on Eliquis shortly prior to this for PE.  Patient states he does feel better but overall does feel generally weak.  Still some residual swelling of his legs, encouraged him to resume his compression stockings which he has at home.  Does have upcoming appointment with oncology, reviewed labs today and overall stable or improved.  Will add back Lasix to be used as needed rather than scheduled for his edema, although I think compression stockings would be just as beneficial.  Medication refills placed.  Will plan to follow-up in 4 to 6 weeks for  recheck        MED REC REQUIRED  Post Medication Reconciliation Status: discharge medications reconciled and changed, per note/orders        Branden Ospina is a 69 year old, presenting for the following health issues:  Hospital F/U      4/1/2025     7:41 AM   Additional Questions   Roomed by Shavon FOSTER   Accompanied by Patria- Daughter     HPI          3/25/2025   Post Discharge Outreach   How are you doing now that you are home? per patient report, he is doing well. has a follow up on 3/26/2025 and 4/14/2025. has all medications. no issues or concerns   How are your symptoms? (Red Flag symptoms escalate to triage hotline per guidelines) Improved   Does the patient have their discharge instructions?  Yes   Does the patient have questions regarding their discharge instructions?  No   Were you started on any new medications or were there changes to any of your previous medications?  Yes   Does the patient have all of their medications? Yes   Do you have questions regarding any of your medications?  No   Do you have all of your needed medical supplies or equipment (DME)?  (i.e. oxygen tank, CPAP, cane, etc.) Yes   Discharge Follow Up Appointment Date 3/26/2025   Discharge Follow Up Appointment Scheduled with? Specialty Care Provider       Hospital Follow-up Visit:    Hospital/Nursing Home/IP Rehab Facility: [facility]  Most Recent Admission Date: 3/14/2025   Most Recent Admission Diagnosis: Pulmonary embolism (H) - I26.99  Retroperitoneal mass - R19.00  Ascites - R18.8  Abdominal pain - R10.9     Most Recent Discharge Date: 3/24/2025   Most Recent Discharge Diagnosis: Retroperitoneal mass - R19.00  Inguinal hernia without obstruction or gangrene, recurrence not specified, unspecified laterality - K40.90  Slow transit constipation - K59.01  Hiatal hernia - K44.9  Diastasis recti - M62.08  Recurrent abdominal hernia without obstruction or gangrene, unspecified hernia type - K45.8  Abdominal  pain, generalized - R10.84  HTN, goal below 140/80 - I10  Mixed hyperlipidemia - E78.2  Morbid obesity (H) - E66.01  Mitral regurgitation and aortic stenosis - I08.0  Aortic valve stenosis, etiology of cardiac valve disease unspecified - I35.0  Ascites - R18.8  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  No  Do you have any other stressors you would like to discuss with your provider? No    Problems taking medications regularly:  None  Medication changes since discharge:   START taking these medications     Details   allopurinol (ZYLOPRIM) 100 MG tablet Take 1 tablet (100 mg) by mouth daily.  Qty: 30 tablet, Refills: 0     Comments: Future refills by PCP Dr. KATALINA PRATT with phone number [phone].  Associated Diagnoses: Preventive measure       apixaban ANTICOAGULANT (ELIQUIS) 2.5 MG tablet Take 1 tablet (2.5 mg) by mouth 2 times daily.  Qty: 60 tablet, Refills: 0     Comments: Future refills by PCP Dr. KATALINA PRATT with phone number [phone].  Associated Diagnoses: Other acute pulmonary embolism without acute cor pulmonale (H)       gabapentin (NEURONTIN) 100 MG capsule Take 1 capsule (100 mg) by mouth 3 times daily.  Qty: 90 capsule, Refills: 0     Comments: Future refills by PCP Dr. KATALINA PRATT with phone number [phone].  Associated Diagnoses: Chronic low back pain without sciatica, unspecified back pain laterality       oxyCODONE (ROXICODONE) 5 MG tablet Take 0.5 tablets (2.5 mg) by mouth every 8 hours as needed for  "moderate to severe pain.  Qty: 10 tablet, Refills: 0     Comments: Future refills by PCP Dr. KATALINA PRATT with phone number [phone].  Associated Diagnoses: Generalized abdominal pain       psyllium (METAMUCIL/KONSYL) Packet Take 1 packet by mouth 2 times daily.  Qty: 60 packet, Refills: 0     Associated Diagnoses: Constipation, unspecified constipation type       prochlorperazine (COMPAZINE) 10 MG tablet Take 1 tablet (10 mg) by mouth every 6 hours as needed for nausea or vomiting.  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)              STOP taking these medications         aspirin 81 MG chewable tablet Comments:   Reason for Stopping:            furosemide (LASIX) 20 MG tablet Comments:   Reason for Stopping:            losartan (COZAAR) 50 MG tablet Comments:   Reason for Stopping:            polyethylene glycol (MIRALAX) 17 GM/Dose powder Comments:   Reason for Stopping:            spironolactone (ALDACTONE) 25 MG tablet Comments:   Reason for Stopping:                 Problems adhering to non-medication therapy:  None    Summary of hospitalization:  [facility] discharge summary reviewed  Diagnostic Tests/Treatments reviewed.  Follow up needed: With oncology  Other Healthcare Providers Involved in Patient s Care:          Oncology  Update since discharge: improved.         Plan of care communicated with patient and family                 Review of Systems  Constitutional, HEENT, cardiovascular, pulmonary, gi and gu systems are negative, except as otherwise noted.      Objective    /60   Pulse 96   Temp 98.2  F (36.8  C) (Temporal)   Resp 20   Ht 1.702 m (5' 7.01\")   Wt 103.4 kg (228 lb)   SpO2 99%   BMI 35.70 kg/m    Body mass index is 35.7 kg/m .  Physical Exam  Vitals and nursing note reviewed.   Constitutional:       General: He is not in acute distress.     Appearance: Normal appearance. He " is not ill-appearing, toxic-appearing or diaphoretic.   HENT:      Head: Normocephalic and atraumatic.      Right Ear: Tympanic membrane, ear canal and external ear normal. There is no impacted cerumen.      Left Ear: Tympanic membrane, ear canal and external ear normal. There is no impacted cerumen.      Nose: Nose normal. No congestion or rhinorrhea.      Mouth/Throat:      Mouth: Mucous membranes are moist.      Pharynx: Oropharynx is clear. No oropharyngeal exudate or posterior oropharyngeal erythema.   Eyes:      General:         Right eye: No discharge.         Left eye: No discharge.      Extraocular Movements: Extraocular movements intact.      Conjunctiva/sclera: Conjunctivae normal.      Pupils: Pupils are equal, round, and reactive to light.   Cardiovascular:      Rate and Rhythm: Normal rate and regular rhythm.      Heart sounds: No murmur heard.  Pulmonary:      Effort: Pulmonary effort is normal. No respiratory distress.      Breath sounds: Normal breath sounds.   Abdominal:      General: There is distension.      Palpations: Abdomen is soft. There is no mass.   Musculoskeletal:         General: Normal range of motion.      Cervical back: Normal range of motion.      Right lower leg: No edema.      Left lower leg: Edema present.   Lymphadenopathy:      Cervical: No cervical adenopathy.   Neurological:      Mental Status: He is alert.   Psychiatric:         Mood and Affect: Mood normal.         Behavior: Behavior normal.         Thought Content: Thought content normal.            Signed Electronically by: KATALINA PRATT MD

## 2025-04-05 ENCOUNTER — MYC MEDICAL ADVICE (OUTPATIENT)
Dept: FAMILY MEDICINE | Facility: OTHER | Age: 70
End: 2025-04-05

## 2025-04-07 ENCOUNTER — HOSPITAL ENCOUNTER (EMERGENCY)
Facility: CLINIC | Age: 70
Discharge: HOME | End: 2025-04-07
Attending: EMERGENCY MEDICINE
Payer: COMMERCIAL

## 2025-04-07 ENCOUNTER — TELEPHONE (OUTPATIENT)
Dept: FAMILY MEDICINE | Facility: OTHER | Age: 70
End: 2025-04-07
Payer: COMMERCIAL

## 2025-04-07 VITALS
OXYGEN SATURATION: 100 % | BODY MASS INDEX: 37.04 KG/M2 | HEART RATE: 96 BPM | DIASTOLIC BLOOD PRESSURE: 78 MMHG | HEIGHT: 67 IN | TEMPERATURE: 97.5 F | SYSTOLIC BLOOD PRESSURE: 107 MMHG | WEIGHT: 236 LBS | RESPIRATION RATE: 20 BRPM

## 2025-04-07 DIAGNOSIS — R18.8 OTHER ASCITES: ICD-10-CM

## 2025-04-07 PROCEDURE — 99284 EMERGENCY DEPT VISIT MOD MDM: CPT | Performed by: EMERGENCY MEDICINE

## 2025-04-07 PROCEDURE — 99284 EMERGENCY DEPT VISIT MOD MDM: CPT | Mod: 25

## 2025-04-07 ASSESSMENT — COLUMBIA-SUICIDE SEVERITY RATING SCALE - C-SSRS
6. HAVE YOU EVER DONE ANYTHING, STARTED TO DO ANYTHING, OR PREPARED TO DO ANYTHING TO END YOUR LIFE?: NO
2. HAVE YOU ACTUALLY HAD ANY THOUGHTS OF KILLING YOURSELF IN THE PAST MONTH?: NO
1. IN THE PAST MONTH, HAVE YOU WISHED YOU WERE DEAD OR WISHED YOU COULD GO TO SLEEP AND NOT WAKE UP?: NO

## 2025-04-07 ASSESSMENT — ACTIVITIES OF DAILY LIVING (ADL): ADLS_ACUITY_SCORE: 58

## 2025-04-07 NOTE — TELEPHONE ENCOUNTER
Given history I would recommend that he have a face to face evaluation and I think that if he has worsening symptoms he should be where they can do more testing. This likely would be the ER.     PERLA Spears CNP  Questions or concerns please feel free to send me a [employer] message or call me  Phone : [phone]

## 2025-04-07 NOTE — TELEPHONE ENCOUNTER
To a provider today.   Spoke with Patria.  (Consent to communicate on file)  Starting to wheeze again in chest.  Is able to breathe/ambulate.  States stomach has gotten larger since his office visit Tuesday.  Was told in March (see 3/13 admission) would need ultrasound and probable paracentesis probably weekly.  Is taking his lasix 20mg daily.  Paracentesis last done 3/21/25.  First appointment with oncology is 4/14.    Requesting orders for abdominal ultrasound to see if ascites present and if needing paracentesis.  Lillian MAJANO RN

## 2025-04-07 NOTE — TELEPHONE ENCOUNTER
Order/Referral Request    Who is requesting: Bhavesh    Orders being requested: Ultra sound    Reason service is needed/diagnosis: Fluid in stomach    When are orders needed by: ASAP    Has this been discussed with Provider: Yes    Does patient have a preference on a Group/Provider/Facility? [facility]    Does patient have an appointment scheduled?: No    Where to send orders: Place orders within Epic    Could we send this information to you in "[employer] or would you prefer to receive a phone call?:   Patient would like to be contacted via "[employer]    Per 4/5/25 [employer] message:    My stomach has become bloated and a little uncomfortable again. I think the fluid has come back. I have been wheezing a little the last few days too. Dr. Serra said I should get an ultrasound to check the fluid and if needed have it drained. I would like to do this in [place]. My daughter tried to schedule it but I need doctors orders. We tried to message Dr. Serra but can t.

## 2025-04-07 NOTE — TELEPHONE ENCOUNTER
Routing to DP for review. Patient is wanting this done today and was wondering if an order can be placed?   Statement Selected

## 2025-04-07 NOTE — TELEPHONE ENCOUNTER
Returned call to patient's daughter Patria, C2C on file. Advised of providers advise. Patria verbalizes understanding. Did ask why ultrasound couldn't be ordered. Writer again advised need for face to face visit due to symptoms worsening. Patria verbalizes understanding and denies further questions.    Maddie Perales RN on 4/7/2025 at 2:23 PM

## 2025-04-07 NOTE — ED PROVIDER NOTES
History     Chief Complaint   Patient presents with    Wheezing    Bloated     HPI  Bhavesh Landeros is a 69 year old male who presents with feeling bloated.  History of recurrent ascites.  Daughter reports she has had this tapped 3 times.  They did not have an appointment with radiology which is what she states they need.  He has lymphoma history.    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) 03/18/2025     Priority: Medium    Retroperitoneal mass 03/14/2025     Priority: Medium    Pulmonary embolism (H) 03/14/2025     Priority: Medium    Abdominal pain 03/14/2025     Priority: Medium    Ascites 03/14/2025     Priority: Medium    Abdominal pain, generalized 02/14/2025     Priority: Medium    Recurrent abdominal hernia without obstruction or gangrene, unspecified hernia type 02/14/2025     Priority: Medium    Diastasis recti 02/14/2025     Priority: Medium    Hiatal hernia - historically 02/14/2025     Priority: Medium    Slow transit constipation 02/14/2025     Priority: Medium    Inguinal hernia without obstruction or gangrene, recurrence not specified, unspecified laterality 02/14/2025     Priority: Medium    Mixed hyperlipidemia 12/02/2019     Priority: Medium    HTN, goal below 140/80 12/02/2019     Priority: Medium    Obesity (BMI 35.0-39.9) with comorbidity (H) 11/25/2019     Priority: Medium    Aortic valve stenosis, etiology of cardiac valve disease unspecified 11/19/2018     Priority: Medium    Mitral regurgitation and aortic stenosis 11/19/2018     Priority: Medium    GERD (gastroesophageal reflux disease) 10/27/2009     Priority: Medium        Past Medical History:    Past Medical History:   Diagnosis  Date    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    Cellulitis of leg 8/16/2011    Early satiety 2/24/2022    Leg edema, right 5/16/2013    MEDICAL HISTORY OF -     Multiple sclerosis (H)     Primary osteoarthritis of left knee 10/21/2021    Primary osteoarthritis of right knee 11/20/2019    Ventral hernia without obstruction or gangrene 11/9/2018       Past Surgical History:    Past Surgical History:   Procedure Laterality Date    COLONOSCOPY N/A 1/22/2016    Procedure: COLONOSCOPY;  Surgeon: Pb Rodriguez MD;  Location: [facility]    ESOPHAGOSCOPY, GASTROSCOPY, DUODENOSCOPY (EGD), COMBINED N/A 9/10/2020    Procedure: Esophagogastroduodenoscopy with Biopsy;  Surgeon: Emigdio Betancourt DO;  Location: [facility]    HC KNEE SCOPE,MED/LAT MENISECTOMY  07/08/1999    HC REVISE MEDIAN N/CARPAL TUNNEL SURG  1986    HC UGI ENDOSCOPY DIAG W BIOPSY  10/26/09    ZZHC COLONOSCOPY W/WO BRUSH/WASH  03/27/06       Family History:    Family History   Problem Relation Age of Onset    Diabetes Brother     Alzheimer Disease Father     Diabetes Sister        Social History:  Marital Status:   [5]  Social History     Tobacco Use    Smoking status: Never    Smokeless tobacco: Never   Vaping Use    Vaping status: Never Used   Substance Use Topics    Alcohol use: Yes     Alcohol/week: 0.0 standard drinks of alcohol     Comment: occ    Drug use: No        Medications:    albuterol (PROAIR HFA/PROVENTIL HFA/VENTOLIN HFA) 108 (90 Base) MCG/ACT inhaler  allopurinol (ZYLOPRIM) 100 MG tablet  apixaban ANTICOAGULANT (ELIQUIS) 2.5 MG tablet  atorvastatin (LIPITOR) 20 MG tablet  furosemide (LASIX) 20 MG tablet  gabapentin (NEURONTIN) 100 MG capsule  omeprazole (PRILOSEC) 20 MG DR capsule  oxyCODONE (ROXICODONE) 5 MG tablet  prochlorperazine (COMPAZINE) 10 MG tablet  psyllium (METAMUCIL/KONSYL) Packet  SENNA-docusate sodium (SENNA S) 8.6-50 MG tablet          Review of Systems  All other systems are reviewed and are negative    Physical Exam   BP:  "(!) 136/99  Pulse: 98  Temp: 97.5  F (36.4  C)  Resp: 22  Height: 170.2 cm (5' 7\")  Weight: 107 kg (236 lb)  SpO2: 100 %      Physical Exam  Vitals and nursing note reviewed.   Constitutional:       General: He is not in acute distress.     Appearance: He is well-developed. He is not diaphoretic.   HENT:      Head: Normocephalic and atraumatic.      Nose: Nose normal.      Mouth/Throat:      Mouth: Mucous membranes are moist.      Pharynx: Oropharynx is clear.   Eyes:      General: No scleral icterus.        Right eye: No discharge.         Left eye: No discharge.      Conjunctiva/sclera: Conjunctivae normal.   Cardiovascular:      Rate and Rhythm: Normal rate and regular rhythm.      Heart sounds: Normal heart sounds. No murmur heard.  Pulmonary:      Effort: Pulmonary effort is normal. No respiratory distress.      Breath sounds: Normal breath sounds. No stridor.   Abdominal:      General: There is distension (markedly).      Palpations: Abdomen is soft.      Tenderness: There is no abdominal tenderness. There is no guarding or rebound.   Musculoskeletal:         General: Normal range of motion.      Cervical back: Normal range of motion and neck supple.   Skin:     General: Skin is warm and dry.      Coloration: Skin is not pale.      Findings: No erythema or rash.   Neurological:      General: No focal deficit present.      Mental Status: He is alert.      Cranial Nerves: No cranial nerve deficit.      Motor: No abnormal muscle tone.   Psychiatric:         Mood and Affect: Mood normal.         ED Course        Procedures                No results found for this or any previous visit (from the past 24 hours).    Medications - No data to display    Assessments & Plan (with Medical Decision Making)  69-year-old male with accumulation of what appears to be recurrent ascites.  Hemodynamically stable today.  No signs of acute emergency medical condition.  Was able to establish an appointment tomorrow morning for " paracentesis to radiology here.     I have reviewed the nursing notes.    I have reviewed the findings, diagnosis, plan and need for follow up with the patient.          New Prescriptions    No medications on file       Final diagnoses:   Other ascites       4/7/2025   [facility] EMERGENCY DEPT       Irvin Carpenter MD  04/07/25 1508

## 2025-04-08 ENCOUNTER — HOSPITAL ENCOUNTER (OUTPATIENT)
Dept: ULTRASOUND IMAGING | Facility: CLINIC | Age: 70
Discharge: HOME | End: 2025-04-08
Attending: EMERGENCY MEDICINE
Payer: COMMERCIAL

## 2025-04-08 VITALS
OXYGEN SATURATION: 99 % | TEMPERATURE: 96.9 F | HEART RATE: 76 BPM | SYSTOLIC BLOOD PRESSURE: 118 MMHG | DIASTOLIC BLOOD PRESSURE: 72 MMHG

## 2025-04-08 DIAGNOSIS — R18.8 OTHER ASCITES: ICD-10-CM

## 2025-04-08 PROCEDURE — P9047 ALBUMIN (HUMAN), 25%, 50ML: HCPCS | Performed by: RADIOLOGY

## 2025-04-08 PROCEDURE — 49083 ABD PARACENTESIS W/IMAGING: CPT

## 2025-04-08 PROCEDURE — 250N000009 HC RX 250: Performed by: RADIOLOGY

## 2025-04-08 PROCEDURE — 250N000011 HC RX IP 250 OP 636: Performed by: RADIOLOGY

## 2025-04-08 PROCEDURE — 96372 THER/PROPH/DIAG INJ SC/IM: CPT | Performed by: RADIOLOGY

## 2025-04-08 PROCEDURE — 272N000706 US PARACENTESIS WITH ALBUMIN

## 2025-04-08 RX ORDER — LIDOCAINE 40 MG/G
CREAM TOPICAL
Status: DISCONTINUED | OUTPATIENT
Start: 2025-04-08 | End: 2025-04-09 | Stop reason: HOSPADM

## 2025-04-08 RX ORDER — LIDOCAINE HYDROCHLORIDE 10 MG/ML
10 INJECTION, SOLUTION INFILTRATION; PERINEURAL ONCE
Status: COMPLETED | OUTPATIENT
Start: 2025-04-08 | End: 2025-04-08

## 2025-04-08 RX ORDER — ALBUMIN (HUMAN) 12.5 G/50ML
25-50 SOLUTION INTRAVENOUS ONCE
Status: COMPLETED | OUTPATIENT
Start: 2025-04-08 | End: 2025-04-08

## 2025-04-08 RX ADMIN — LIDOCAINE HYDROCHLORIDE ANHYDROUS 6 ML: 10 INJECTION, SOLUTION INFILTRATION at 09:20

## 2025-04-08 RX ADMIN — ALBUMIN HUMAN 25 G: 0.25 SOLUTION INTRAVENOUS at 10:30

## 2025-04-14 ENCOUNTER — PRE VISIT (OUTPATIENT)
Dept: ONCOLOGY | Facility: CLINIC | Age: 70
End: 2025-04-14
Payer: COMMERCIAL

## 2025-04-14 ENCOUNTER — VIRTUAL VISIT (OUTPATIENT)
Dept: ONCOLOGY | Facility: CLINIC | Age: 70
End: 2025-04-14
Attending: INTERNAL MEDICINE
Payer: COMMERCIAL

## 2025-04-14 VITALS — WEIGHT: 218 LBS | BODY MASS INDEX: 34.21 KG/M2 | HEIGHT: 67 IN

## 2025-04-14 DIAGNOSIS — R18.0 MALIGNANT ASCITES (H): ICD-10-CM

## 2025-04-14 DIAGNOSIS — R19.00 RETROPERITONEAL MASS: ICD-10-CM

## 2025-04-14 DIAGNOSIS — C82.18 GRADE 2 FOLLICULAR LYMPHOMA OF LYMPH NODES OF MULTIPLE REGIONS (H): Primary | ICD-10-CM

## 2025-04-14 DIAGNOSIS — Z79.01 CHRONIC ANTICOAGULATION: ICD-10-CM

## 2025-04-14 PROCEDURE — 98007 SYNCH AUDIO-VIDEO EST HI 40: CPT | Performed by: INTERNAL MEDICINE

## 2025-04-14 PROCEDURE — 1126F AMNT PAIN NOTED NONE PRSNT: CPT | Performed by: INTERNAL MEDICINE

## 2025-04-14 RX ORDER — METHYLPREDNISOLONE SODIUM SUCCINATE 125 MG/2ML
125 INJECTION INTRAMUSCULAR; INTRAVENOUS
Start: 2025-05-19

## 2025-04-14 RX ORDER — ALBUTEROL SULFATE 0.83 MG/ML
2.5 SOLUTION RESPIRATORY (INHALATION)
OUTPATIENT
Start: 2025-05-20

## 2025-04-14 RX ORDER — DIPHENHYDRAMINE HYDROCHLORIDE 50 MG/ML
50 INJECTION, SOLUTION INTRAMUSCULAR; INTRAVENOUS
Start: 2025-04-21

## 2025-04-14 RX ORDER — MEPERIDINE HYDROCHLORIDE 25 MG/ML
25 INJECTION INTRAMUSCULAR; INTRAVENOUS; SUBCUTANEOUS
OUTPATIENT
Start: 2025-05-20

## 2025-04-14 RX ORDER — DIPHENHYDRAMINE HYDROCHLORIDE 50 MG/ML
50 INJECTION, SOLUTION INTRAMUSCULAR; INTRAVENOUS
Start: 2025-06-17

## 2025-04-14 RX ORDER — HEPARIN SODIUM,PORCINE 10 UNIT/ML
5-20 VIAL (ML) INTRAVENOUS DAILY PRN
OUTPATIENT
Start: 2025-04-21

## 2025-04-14 RX ORDER — METHYLPREDNISOLONE SODIUM SUCCINATE 40 MG/ML
40 INJECTION INTRAMUSCULAR; INTRAVENOUS
Start: 2025-05-20

## 2025-04-14 RX ORDER — DIPHENHYDRAMINE HYDROCHLORIDE 50 MG/ML
25 INJECTION, SOLUTION INTRAMUSCULAR; INTRAVENOUS
Start: 2025-05-19

## 2025-04-14 RX ORDER — ALBUTEROL SULFATE 90 UG/1
1-2 INHALANT RESPIRATORY (INHALATION)
Start: 2025-06-16

## 2025-04-14 RX ORDER — HEPARIN SODIUM,PORCINE 10 UNIT/ML
5-20 VIAL (ML) INTRAVENOUS DAILY PRN
OUTPATIENT
Start: 2025-05-19

## 2025-04-14 RX ORDER — METHYLPREDNISOLONE SODIUM SUCCINATE 125 MG/2ML
125 INJECTION INTRAMUSCULAR; INTRAVENOUS
Start: 2025-06-16

## 2025-04-14 RX ORDER — HEPARIN SODIUM (PORCINE) LOCK FLUSH IV SOLN 100 UNIT/ML 100 UNIT/ML
5 SOLUTION INTRAVENOUS
OUTPATIENT
Start: 2025-05-19

## 2025-04-14 RX ORDER — DIPHENHYDRAMINE HYDROCHLORIDE 50 MG/ML
50 INJECTION, SOLUTION INTRAMUSCULAR; INTRAVENOUS
Start: 2025-06-16

## 2025-04-14 RX ORDER — METHYLPREDNISOLONE SODIUM SUCCINATE 125 MG/2ML
125 INJECTION INTRAMUSCULAR; INTRAVENOUS
Start: 2025-04-21

## 2025-04-14 RX ORDER — MEPERIDINE HYDROCHLORIDE 25 MG/ML
25 INJECTION INTRAMUSCULAR; INTRAVENOUS; SUBCUTANEOUS
Start: 2025-05-19

## 2025-04-14 RX ORDER — HEPARIN SODIUM (PORCINE) LOCK FLUSH IV SOLN 100 UNIT/ML 100 UNIT/ML
5 SOLUTION INTRAVENOUS
OUTPATIENT
Start: 2025-04-22

## 2025-04-14 RX ORDER — EPINEPHRINE 1 MG/ML
0.3 INJECTION, SOLUTION INTRAMUSCULAR; SUBCUTANEOUS EVERY 5 MIN PRN
OUTPATIENT
Start: 2025-06-17

## 2025-04-14 RX ORDER — ALBUTEROL SULFATE 90 UG/1
1-2 INHALANT RESPIRATORY (INHALATION)
Start: 2025-04-22

## 2025-04-14 RX ORDER — HEPARIN SODIUM (PORCINE) LOCK FLUSH IV SOLN 100 UNIT/ML 100 UNIT/ML
5 SOLUTION INTRAVENOUS
OUTPATIENT
Start: 2025-04-21

## 2025-04-14 RX ORDER — HEPARIN SODIUM,PORCINE 10 UNIT/ML
5-20 VIAL (ML) INTRAVENOUS DAILY PRN
OUTPATIENT
Start: 2025-06-17

## 2025-04-14 RX ORDER — ALBUTEROL SULFATE 90 UG/1
1-2 INHALANT RESPIRATORY (INHALATION)
Start: 2025-05-20

## 2025-04-14 RX ORDER — ALBUTEROL SULFATE 0.83 MG/ML
2.5 SOLUTION RESPIRATORY (INHALATION)
OUTPATIENT
Start: 2025-05-19

## 2025-04-14 RX ORDER — MEPERIDINE HYDROCHLORIDE 25 MG/ML
25 INJECTION INTRAMUSCULAR; INTRAVENOUS; SUBCUTANEOUS
OUTPATIENT
Start: 2025-04-21

## 2025-04-14 RX ORDER — METHYLPREDNISOLONE SODIUM SUCCINATE 40 MG/ML
40 INJECTION INTRAMUSCULAR; INTRAVENOUS
Start: 2025-04-21

## 2025-04-14 RX ORDER — METHYLPREDNISOLONE SODIUM SUCCINATE 40 MG/ML
40 INJECTION INTRAMUSCULAR; INTRAVENOUS
Start: 2025-04-22

## 2025-04-14 RX ORDER — HEPARIN SODIUM,PORCINE 10 UNIT/ML
5-20 VIAL (ML) INTRAVENOUS DAILY PRN
OUTPATIENT
Start: 2025-06-16

## 2025-04-14 RX ORDER — HEPARIN SODIUM (PORCINE) LOCK FLUSH IV SOLN 100 UNIT/ML 100 UNIT/ML
5 SOLUTION INTRAVENOUS
OUTPATIENT
Start: 2025-05-20

## 2025-04-14 RX ORDER — DIPHENHYDRAMINE HYDROCHLORIDE 50 MG/ML
50 INJECTION, SOLUTION INTRAMUSCULAR; INTRAVENOUS
Start: 2025-05-20

## 2025-04-14 RX ORDER — HEPARIN SODIUM,PORCINE 10 UNIT/ML
5-20 VIAL (ML) INTRAVENOUS DAILY PRN
OUTPATIENT
Start: 2025-05-20

## 2025-04-14 RX ORDER — HEPARIN SODIUM,PORCINE 10 UNIT/ML
5-20 VIAL (ML) INTRAVENOUS DAILY PRN
OUTPATIENT
Start: 2025-04-22

## 2025-04-14 RX ORDER — MEPERIDINE HYDROCHLORIDE 25 MG/ML
25 INJECTION INTRAMUSCULAR; INTRAVENOUS; SUBCUTANEOUS
OUTPATIENT
Start: 2025-05-19

## 2025-04-14 RX ORDER — DIPHENHYDRAMINE HYDROCHLORIDE 50 MG/ML
25 INJECTION, SOLUTION INTRAMUSCULAR; INTRAVENOUS
Start: 2025-06-16

## 2025-04-14 RX ORDER — HEPARIN SODIUM (PORCINE) LOCK FLUSH IV SOLN 100 UNIT/ML 100 UNIT/ML
5 SOLUTION INTRAVENOUS
OUTPATIENT
Start: 2025-06-17

## 2025-04-14 RX ORDER — MEPERIDINE HYDROCHLORIDE 25 MG/ML
25 INJECTION INTRAMUSCULAR; INTRAVENOUS; SUBCUTANEOUS
OUTPATIENT
Start: 2025-04-22

## 2025-04-14 RX ORDER — EPINEPHRINE 1 MG/ML
0.3 INJECTION, SOLUTION INTRAMUSCULAR; SUBCUTANEOUS EVERY 5 MIN PRN
OUTPATIENT
Start: 2025-04-22

## 2025-04-14 RX ORDER — EPINEPHRINE 1 MG/ML
0.3 INJECTION, SOLUTION INTRAMUSCULAR; SUBCUTANEOUS EVERY 5 MIN PRN
OUTPATIENT
Start: 2025-05-19

## 2025-04-14 RX ORDER — ALBUTEROL SULFATE 90 UG/1
1-2 INHALANT RESPIRATORY (INHALATION)
Start: 2025-04-21

## 2025-04-14 RX ORDER — DIPHENHYDRAMINE HYDROCHLORIDE 50 MG/ML
25 INJECTION, SOLUTION INTRAMUSCULAR; INTRAVENOUS
Start: 2025-05-20

## 2025-04-14 RX ORDER — ACETAMINOPHEN 325 MG/1
650 TABLET ORAL ONCE
Start: 2025-04-21

## 2025-04-14 RX ORDER — HEPARIN SODIUM (PORCINE) LOCK FLUSH IV SOLN 100 UNIT/ML 100 UNIT/ML
5 SOLUTION INTRAVENOUS
OUTPATIENT
Start: 2025-06-16

## 2025-04-14 RX ORDER — ACETAMINOPHEN 325 MG/1
650 TABLET ORAL ONCE
Start: 2025-06-16

## 2025-04-14 RX ORDER — ALBUTEROL SULFATE 0.83 MG/ML
2.5 SOLUTION RESPIRATORY (INHALATION)
OUTPATIENT
Start: 2025-06-17

## 2025-04-14 RX ORDER — ALBUTEROL SULFATE 0.83 MG/ML
2.5 SOLUTION RESPIRATORY (INHALATION)
OUTPATIENT
Start: 2025-04-22

## 2025-04-14 RX ORDER — METHYLPREDNISOLONE SODIUM SUCCINATE 40 MG/ML
40 INJECTION INTRAMUSCULAR; INTRAVENOUS
Start: 2025-06-17

## 2025-04-14 RX ORDER — ALBUTEROL SULFATE 90 UG/1
1-2 INHALANT RESPIRATORY (INHALATION)
Start: 2025-06-17

## 2025-04-14 RX ORDER — MEPERIDINE HYDROCHLORIDE 25 MG/ML
25 INJECTION INTRAMUSCULAR; INTRAVENOUS; SUBCUTANEOUS
Start: 2025-06-16

## 2025-04-14 RX ORDER — EPINEPHRINE 1 MG/ML
0.3 INJECTION, SOLUTION INTRAMUSCULAR; SUBCUTANEOUS EVERY 5 MIN PRN
OUTPATIENT
Start: 2025-04-21

## 2025-04-14 RX ORDER — EPINEPHRINE 1 MG/ML
0.3 INJECTION, SOLUTION INTRAMUSCULAR; SUBCUTANEOUS EVERY 5 MIN PRN
OUTPATIENT
Start: 2025-05-20

## 2025-04-14 RX ORDER — MEPERIDINE HYDROCHLORIDE 25 MG/ML
25 INJECTION INTRAMUSCULAR; INTRAVENOUS; SUBCUTANEOUS
OUTPATIENT
Start: 2025-06-16

## 2025-04-14 RX ORDER — ACETAMINOPHEN 325 MG/1
650 TABLET ORAL ONCE
Start: 2025-05-19

## 2025-04-14 RX ORDER — MEPERIDINE HYDROCHLORIDE 25 MG/ML
25 INJECTION INTRAMUSCULAR; INTRAVENOUS; SUBCUTANEOUS
Start: 2025-04-21

## 2025-04-14 RX ORDER — DIPHENHYDRAMINE HYDROCHLORIDE 50 MG/ML
25 INJECTION, SOLUTION INTRAMUSCULAR; INTRAVENOUS
Start: 2025-04-22

## 2025-04-14 RX ORDER — DIPHENHYDRAMINE HYDROCHLORIDE 50 MG/ML
50 INJECTION, SOLUTION INTRAMUSCULAR; INTRAVENOUS
Start: 2025-05-19

## 2025-04-14 RX ORDER — MEPERIDINE HYDROCHLORIDE 25 MG/ML
25 INJECTION INTRAMUSCULAR; INTRAVENOUS; SUBCUTANEOUS
OUTPATIENT
Start: 2025-06-17

## 2025-04-14 RX ORDER — ALBUTEROL SULFATE 0.83 MG/ML
2.5 SOLUTION RESPIRATORY (INHALATION)
OUTPATIENT
Start: 2025-06-16

## 2025-04-14 RX ORDER — ALBUTEROL SULFATE 90 UG/1
1-2 INHALANT RESPIRATORY (INHALATION)
Start: 2025-05-19

## 2025-04-14 RX ORDER — DIPHENHYDRAMINE HYDROCHLORIDE 50 MG/ML
25 INJECTION, SOLUTION INTRAMUSCULAR; INTRAVENOUS
Start: 2025-06-17

## 2025-04-14 RX ORDER — DIPHENHYDRAMINE HYDROCHLORIDE 50 MG/ML
50 INJECTION, SOLUTION INTRAMUSCULAR; INTRAVENOUS
Start: 2025-04-22

## 2025-04-14 RX ORDER — METHYLPREDNISOLONE SODIUM SUCCINATE 40 MG/ML
40 INJECTION INTRAMUSCULAR; INTRAVENOUS
Start: 2025-06-16

## 2025-04-14 RX ORDER — METHYLPREDNISOLONE SODIUM SUCCINATE 40 MG/ML
40 INJECTION INTRAMUSCULAR; INTRAVENOUS
Start: 2025-05-19

## 2025-04-14 RX ORDER — EPINEPHRINE 1 MG/ML
0.3 INJECTION, SOLUTION INTRAMUSCULAR; SUBCUTANEOUS EVERY 5 MIN PRN
OUTPATIENT
Start: 2025-06-16

## 2025-04-14 RX ORDER — ALBUTEROL SULFATE 0.83 MG/ML
2.5 SOLUTION RESPIRATORY (INHALATION)
OUTPATIENT
Start: 2025-04-21

## 2025-04-14 RX ORDER — DIPHENHYDRAMINE HYDROCHLORIDE 50 MG/ML
25 INJECTION, SOLUTION INTRAMUSCULAR; INTRAVENOUS
Start: 2025-04-21

## 2025-04-14 ASSESSMENT — PAIN SCALES - GENERAL: PAINLEVEL_OUTOF10: NO PAIN (0)

## 2025-04-14 NOTE — Clinical Note
"2025      Bhavesh Landeros  [address]   [place] [address]      Dear Colleague,    Thank you for referring your patient, Bhavesh Landeros, to the [facility]. Please see a copy of my visit note below.    Virtual Visit Details    Type of service:  Video Visit   Video Start Time: {video visit start/end time for provider to select:986203}  Video End Time:{video visit start/end time for provider to select:110468}    Originating Location (pt. Location): {video visit patient location:282307::\"Home\"}  {PROVIDER LOCATION On-site should be selected for visits conducted from your clinic location or adjoining [facility] hospital, academic office, or other nearby [facility] building. Off-site should be selected for all other provider locations, including home:[id number]}  Distant Location (provider location):  {virtual location provider:379722}  Platform used for Video Visit: {Virtual Visit Platforms:495434::\"[employer]\"}        [facility]ology / Oncology  Progress Note  Name: Bhavesh Landeros  :  1955  MRN:  [id number]    --------------------    Subjective:  ***.    --------------------    Objective:  VS: There were no vitals taken for this visit.  Gen: ***-appearing.    We reviewed ***.  We reviewed ***.    --------------------    Assessment / Plan:  Follicular lymphoma, grade 1-2 with retroperitoneal involvement and possible serous involvement (ascites).  Status post inpatient cycle 1 Bendamustine with rituximab; rituximab complicated by infusion reaction.  Pulmonary embolism secondary to malignancy requiring chronic anticoagulation.  Retroperitoneal biopsy complicated by retroperitoneal hemorrhage in the setting of anticoagulation.  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  Pancytopenia, likely multifactorial secondary to chemotherapy, lymphoma, acute illness.    Continue allopurinol; tumor lysis labs improved status post rasburicase.  Transfuse " to keep hemoglobin greater than 7; no indication today.  Anticipate improvement in kidney function as he mobilizes fluid and uric acid improves.  Planning anticoagulation 3 months beyond completion of chemotherapy assuming he achieves complete remission.  Okay to start lovenox 40 mg daily; if hemoglobin stable in AM, will transition to DOAC as outpatient.  May continue to require as needed paracenteses and transfusion support as an outpatient, but anticipate improvement.  Planning 6 cycles of Bendamustine rituximab as an outpatient; may or may not need port.  Will continue to follow.    There are no Patient Instructions on file for this visit.      Again, thank you for allowing me to participate in the care of your patient.        Sincerely,        Jason Serra MD    Electronically signed

## 2025-04-14 NOTE — NURSING NOTE
Current patient location: [id number] [place] [id number]    Is the patient currently in the state of [place]? YES    Visit mode: VIDEO    If the visit is dropped, the patient can be reconnected by:VIDEO VISIT: Send to e-mail at: [email or web address]    Will anyone else be joining the visit? NO  (If patient encounters technical issues they should call [phone] :[phone])    Are changes needed to the allergy or medication list? No    Are refills needed on medications prescribed by this physician? NO    Rooming Documentation:  Questionnaire(s) not done per department protocol    Reason for visit: Consult    Gala MARTINES

## 2025-04-14 NOTE — PROGRESS NOTES
[facility] Hematology / Oncology  Progress Note  Name: Bhavesh Landeros  :  1955  MRN:  [id number]    --------------------    Subjective:  Bhavesh returns for follow-up of follicular lymphoma unaccompanied via video visit.  All in all, he is actually been feeling quite good since leaving the hospital.  Every day and every week he has been getting a little better.  He is making good urine and he notices the darkness has resolved and returned to a more hydrated state.  His abdomen after his last paracentesis has not recurred from a swelling standpoint after 6 L was removed.  His legs remain a little puffy towards the end of the day, but they have also improved.  He continues to tolerate Eliquis without issues.    --------------------    Objective:  Video visit    No new labs or imaging    --------------------    Assessment / Plan:  Follicular lymphoma, grade 1-2 with retroperitoneal involvement and possible serous involvement (ascites).  Status post inpatient cycle 1 Bendamustine with rituximab; rituximab complicated by infusion reaction.  Pulmonary embolism secondary to malignancy requiring chronic anticoagulation.  Retroperitoneal biopsy complicated by retroperitoneal hemorrhage in the setting of anticoagulation.  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  Pancytopenia, likely multifactorial secondary to chemotherapy, lymphoma, acute illness.    Continue Bendamustine and rituximab; planning minimum 4 cycles with PET scan prior to the fourth cycle prior to determining cycles 5 and 6.  Given prior reaction to rituximab likely secondary to infusion reaction, we will plan to premedicate and slow infusion rate with cycle 2.  Continue allopurinol until PET scan.  Hemoglobin improving over time; monitor hemoglobin.  Continue Eliquis with plans for minimum of 3 months anticoagulation beyond completing treatment and achieving hopefully a complete remission.  Reviewed that he may  require as needed paracentesis, but I am hopeful that control of his cancer will lead to resolution of recurrent ascites.    Patient Instructions   1) Cycles 2-4 Treatment.  2) ESTELLE w/ Cycle 3.  3) BJT w/ Cycle 4.  4) PET scan prior to Cycle 4.    Jason Serra MD.    Video Visit:  Bhavesh is a 69 year old male who is being evaluated via a billable video visit.  }    Video start time:  11:09 AM  Video end time:  11:29 AM  Provider location: -[place]  Patient location: Home  Mode of transmission:  Portal / [employer].

## 2025-04-14 NOTE — PATIENT INSTRUCTIONS
1) Cycles 2-4 Treatment.  2) ESTELLE w/ Cycle 3.  3) BJT w/ Cycle 4.  4) PET scan prior to Cycle 4.    Jason Serra MD.

## 2025-04-18 PROBLEM — Z51.11 ENCOUNTER FOR ANTINEOPLASTIC CHEMOTHERAPY: Status: ACTIVE | Noted: 2025-04-18

## 2025-04-22 ENCOUNTER — INFUSION THERAPY VISIT (OUTPATIENT)
Dept: INFUSION THERAPY | Facility: CLINIC | Age: 70
End: 2025-04-22
Attending: INTERNAL MEDICINE
Payer: COMMERCIAL

## 2025-04-22 VITALS
WEIGHT: 238.2 LBS | SYSTOLIC BLOOD PRESSURE: 134 MMHG | DIASTOLIC BLOOD PRESSURE: 72 MMHG | RESPIRATION RATE: 20 BRPM | OXYGEN SATURATION: 97 % | TEMPERATURE: 97.7 F | BODY MASS INDEX: 37.31 KG/M2 | HEART RATE: 82 BPM

## 2025-04-22 DIAGNOSIS — Z51.11 ENCOUNTER FOR ANTINEOPLASTIC CHEMOTHERAPY: Primary | ICD-10-CM

## 2025-04-22 DIAGNOSIS — C82.18 GRADE 2 FOLLICULAR LYMPHOMA OF LYMPH NODES OF MULTIPLE REGIONS (H): ICD-10-CM

## 2025-04-22 LAB
ALBUMIN SERPL BCG-MCNC: 3.2 G/DL (ref 3.5–5.2)
ALP SERPL-CCNC: 67 U/L (ref 40–150)
ALT SERPL W P-5'-P-CCNC: 13 U/L (ref 0–70)
ANION GAP SERPL CALCULATED.3IONS-SCNC: 9 MMOL/L (ref 7–15)
AST SERPL W P-5'-P-CCNC: 22 U/L (ref 0–45)
BASOPHILS # BLD AUTO: 0.1 10E3/UL (ref 0–0.2)
BASOPHILS NFR BLD AUTO: 2 %
BILIRUB SERPL-MCNC: 0.5 MG/DL
BUN SERPL-MCNC: 15.5 MG/DL (ref 8–23)
CALCIUM SERPL-MCNC: 8.8 MG/DL (ref 8.8–10.4)
CHLORIDE SERPL-SCNC: 101 MMOL/L (ref 98–107)
CREAT SERPL-MCNC: 1.01 MG/DL (ref 0.67–1.17)
EGFRCR SERPLBLD CKD-EPI 2021: 80 ML/MIN/1.73M2
EOSINOPHIL # BLD AUTO: 0.3 10E3/UL (ref 0–0.7)
EOSINOPHIL NFR BLD AUTO: 7 %
ERYTHROCYTE [DISTWIDTH] IN BLOOD BY AUTOMATED COUNT: 14.6 % (ref 10–15)
GLUCOSE SERPL-MCNC: 126 MG/DL (ref 70–99)
HCO3 SERPL-SCNC: 26 MMOL/L (ref 22–29)
HCT VFR BLD AUTO: 33.5 % (ref 40–53)
HGB BLD-MCNC: 11.2 G/DL (ref 13.3–17.7)
IMM GRANULOCYTES # BLD: 0 10E3/UL
IMM GRANULOCYTES NFR BLD: 1 %
LYMPHOCYTES # BLD AUTO: 0.5 10E3/UL (ref 0.8–5.3)
LYMPHOCYTES NFR BLD AUTO: 12 %
MCH RBC QN AUTO: 29 PG (ref 26.5–33)
MCHC RBC AUTO-ENTMCNC: 33.4 G/DL (ref 31.5–36.5)
MCV RBC AUTO: 87 FL (ref 78–100)
MONOCYTES # BLD AUTO: 0.5 10E3/UL (ref 0–1.3)
MONOCYTES NFR BLD AUTO: 12 %
NEUTROPHILS # BLD AUTO: 2.9 10E3/UL (ref 1.6–8.3)
NEUTROPHILS NFR BLD AUTO: 67 %
NRBC # BLD AUTO: 0 10E3/UL
NRBC BLD AUTO-RTO: 0 /100
PLATELET # BLD AUTO: 167 10E3/UL (ref 150–450)
POTASSIUM SERPL-SCNC: 3.7 MMOL/L (ref 3.4–5.3)
PROT SERPL-MCNC: 6.3 G/DL (ref 6.4–8.3)
RBC # BLD AUTO: 3.86 10E6/UL (ref 4.4–5.9)
SODIUM SERPL-SCNC: 136 MMOL/L (ref 135–145)
WBC # BLD AUTO: 4.3 10E3/UL (ref 4–11)

## 2025-04-22 PROCEDURE — 258N000003 HC RX IP 258 OP 636: Performed by: INTERNAL MEDICINE

## 2025-04-22 PROCEDURE — 96417 CHEMO IV INFUS EACH ADDL SEQ: CPT

## 2025-04-22 PROCEDURE — 85004 AUTOMATED DIFF WBC COUNT: CPT | Performed by: INTERNAL MEDICINE

## 2025-04-22 PROCEDURE — 96415 CHEMO IV INFUSION ADDL HR: CPT

## 2025-04-22 PROCEDURE — 96413 CHEMO IV INFUSION 1 HR: CPT

## 2025-04-22 PROCEDURE — 84155 ASSAY OF PROTEIN SERUM: CPT | Performed by: INTERNAL MEDICINE

## 2025-04-22 PROCEDURE — 36415 COLL VENOUS BLD VENIPUNCTURE: CPT | Performed by: INTERNAL MEDICINE

## 2025-04-22 PROCEDURE — 82947 ASSAY GLUCOSE BLOOD QUANT: CPT | Performed by: INTERNAL MEDICINE

## 2025-04-22 PROCEDURE — 250N000011 HC RX IP 250 OP 636: Performed by: INTERNAL MEDICINE

## 2025-04-22 PROCEDURE — 96367 TX/PROPH/DG ADDL SEQ IV INF: CPT

## 2025-04-22 PROCEDURE — 96375 TX/PRO/DX INJ NEW DRUG ADDON: CPT

## 2025-04-22 PROCEDURE — 250N000013 HC RX MED GY IP 250 OP 250 PS 637: Performed by: INTERNAL MEDICINE

## 2025-04-22 RX ORDER — ACETAMINOPHEN 325 MG/1
650 TABLET ORAL ONCE
Status: COMPLETED | OUTPATIENT
Start: 2025-04-22 | End: 2025-04-22

## 2025-04-22 RX ORDER — DIPHENHYDRAMINE HYDROCHLORIDE 50 MG/ML
50 INJECTION, SOLUTION INTRAMUSCULAR; INTRAVENOUS ONCE
Status: COMPLETED | OUTPATIENT
Start: 2025-04-22 | End: 2025-04-22

## 2025-04-22 RX ORDER — ONDANSETRON 2 MG/ML
8 INJECTION INTRAMUSCULAR; INTRAVENOUS ONCE
Status: COMPLETED | OUTPATIENT
Start: 2025-04-22 | End: 2025-04-22

## 2025-04-22 RX ADMIN — SODIUM CHLORIDE 250 MG: 9 INJECTION, SOLUTION INTRAVENOUS at 08:58

## 2025-04-22 RX ADMIN — DIPHENHYDRAMINE HYDROCHLORIDE 50 MG: 50 INJECTION, SOLUTION INTRAMUSCULAR; INTRAVENOUS at 08:47

## 2025-04-22 RX ADMIN — SODIUM CHLORIDE 250 ML: 0.9 INJECTION, SOLUTION INTRAVENOUS at 08:45

## 2025-04-22 RX ADMIN — RITUXIMAB-ABBS 800 MG: 10 INJECTION, SOLUTION INTRAVENOUS at 10:18

## 2025-04-22 RX ADMIN — ONDANSETRON 8 MG: 2 INJECTION, SOLUTION INTRAMUSCULAR; INTRAVENOUS at 08:47

## 2025-04-22 RX ADMIN — ACETAMINOPHEN 650 MG: 325 TABLET ORAL at 08:46

## 2025-04-22 RX ADMIN — FAMOTIDINE 20 MG: 10 INJECTION, SOLUTION INTRAVENOUS at 08:47

## 2025-04-22 RX ADMIN — BENDAMUSTINE HYDROCHLORIDE 200 MG: 25 INJECTION, SOLUTION INTRAVENOUS at 09:31

## 2025-04-22 ASSESSMENT — PAIN SCALES - GENERAL: PAINLEVEL_OUTOF10: MILD PAIN (1)

## 2025-04-22 NOTE — PROGRESS NOTES
Infusion Nursing Note:  Bhavesh Landeros presents today for C2D1 Bendamustine/Truxima.    Patient seen by provider today: No   present during visit today: Not Applicable.    Note: This is Bhavesh's first visit to our infusion center. He received C1 during an inpatient stay to [facility]. He had a severe reaction to his Truxima the first time without any re-challenge. Today we've added several premeds as well as cut the rate in half from first attempt in hospital.      Intravenous Access:  Peripheral IV placed.    Treatment Conditions:  Lab Results   Component Value Date    HGB 11.2 (L) 04/22/2025    WBC 4.3 04/22/2025    ANEU 2.9 04/22/2025     04/22/2025        Lab Results   Component Value Date     04/22/2025    POTASSIUM 3.7 04/22/2025    MAG 2.2 03/23/2025    CR 1.01 04/22/2025    SHANNON 8.8 04/22/2025    BILITOTAL 0.5 04/22/2025    ALBUMIN 3.2 (L) 04/22/2025    ALT 13 04/22/2025    AST 22 04/22/2025       Results reviewed, labs MET treatment parameters, ok to proceed with treatment.      Post Infusion Assessment:  Patient tolerated infusion without incident.       Discharge Plan:   Patient discharged in stable condition accompanied by: sister.  Departure Mode: Ambulatory.      Jenny Sow RN

## 2025-04-23 ENCOUNTER — INFUSION THERAPY VISIT (OUTPATIENT)
Dept: INFUSION THERAPY | Facility: CLINIC | Age: 70
End: 2025-04-23
Attending: INTERNAL MEDICINE
Payer: COMMERCIAL

## 2025-04-23 ENCOUNTER — TELEPHONE (OUTPATIENT)
Dept: FAMILY MEDICINE | Facility: OTHER | Age: 70
End: 2025-04-23

## 2025-04-23 ENCOUNTER — HOSPITAL ENCOUNTER (OUTPATIENT)
Dept: ULTRASOUND IMAGING | Facility: CLINIC | Age: 70
Discharge: HOME | End: 2025-04-23
Attending: EMERGENCY MEDICINE
Payer: COMMERCIAL

## 2025-04-23 VITALS
WEIGHT: 243 LBS | DIASTOLIC BLOOD PRESSURE: 75 MMHG | HEART RATE: 68 BPM | TEMPERATURE: 97.6 F | RESPIRATION RATE: 20 BRPM | BODY MASS INDEX: 38.06 KG/M2 | SYSTOLIC BLOOD PRESSURE: 122 MMHG

## 2025-04-23 VITALS
OXYGEN SATURATION: 98 % | DIASTOLIC BLOOD PRESSURE: 66 MMHG | SYSTOLIC BLOOD PRESSURE: 116 MMHG | TEMPERATURE: 98.1 F | HEART RATE: 71 BPM | RESPIRATION RATE: 16 BRPM

## 2025-04-23 DIAGNOSIS — C82.18 GRADE 2 FOLLICULAR LYMPHOMA OF LYMPH NODES OF MULTIPLE REGIONS (H): ICD-10-CM

## 2025-04-23 DIAGNOSIS — I26.99 OTHER ACUTE PULMONARY EMBOLISM WITHOUT ACUTE COR PULMONALE (H): ICD-10-CM

## 2025-04-23 DIAGNOSIS — Z51.11 ENCOUNTER FOR ANTINEOPLASTIC CHEMOTHERAPY: Primary | ICD-10-CM

## 2025-04-23 DIAGNOSIS — Z29.9 PREVENTIVE MEASURE: ICD-10-CM

## 2025-04-23 DIAGNOSIS — M54.50 CHRONIC LOW BACK PAIN WITHOUT SCIATICA, UNSPECIFIED BACK PAIN LATERALITY: ICD-10-CM

## 2025-04-23 DIAGNOSIS — R18.8 OTHER ASCITES: ICD-10-CM

## 2025-04-23 DIAGNOSIS — G89.29 CHRONIC LOW BACK PAIN WITHOUT SCIATICA, UNSPECIFIED BACK PAIN LATERALITY: ICD-10-CM

## 2025-04-23 DIAGNOSIS — R60.0 BILATERAL LEG EDEMA: ICD-10-CM

## 2025-04-23 PROCEDURE — 96377 APPLICATON ON-BODY INJECTOR: CPT | Mod: XS | Performed by: INTERNAL MEDICINE

## 2025-04-23 PROCEDURE — 258N000003 HC RX IP 258 OP 636: Performed by: INTERNAL MEDICINE

## 2025-04-23 PROCEDURE — 49083 ABD PARACENTESIS W/IMAGING: CPT

## 2025-04-23 PROCEDURE — 250N000011 HC RX IP 250 OP 636: Performed by: INTERNAL MEDICINE

## 2025-04-23 PROCEDURE — 96372 THER/PROPH/DIAG INJ SC/IM: CPT | Performed by: INTERNAL MEDICINE

## 2025-04-23 PROCEDURE — 272N000706 US PARACENTESIS WITH ALBUMIN

## 2025-04-23 PROCEDURE — 250N000009 HC RX 250: Performed by: EMERGENCY MEDICINE

## 2025-04-23 PROCEDURE — 250N000011 HC RX IP 250 OP 636: Performed by: RADIOLOGY

## 2025-04-23 PROCEDURE — P9047 ALBUMIN (HUMAN), 25%, 50ML: HCPCS | Performed by: RADIOLOGY

## 2025-04-23 RX ORDER — ALBUMIN (HUMAN) 12.5 G/50ML
25-50 SOLUTION INTRAVENOUS ONCE
Status: COMPLETED | OUTPATIENT
Start: 2025-04-23 | End: 2025-04-23

## 2025-04-23 RX ORDER — ONDANSETRON 2 MG/ML
8 INJECTION INTRAMUSCULAR; INTRAVENOUS ONCE
Status: COMPLETED | OUTPATIENT
Start: 2025-04-23 | End: 2025-04-23

## 2025-04-23 RX ORDER — GABAPENTIN 100 MG/1
100 CAPSULE ORAL 3 TIMES DAILY
Qty: 90 CAPSULE | Refills: 0 | Status: SHIPPED | OUTPATIENT
Start: 2025-04-23

## 2025-04-23 RX ORDER — FUROSEMIDE 20 MG/1
20 TABLET ORAL DAILY PRN
Qty: 30 TABLET | Refills: 0 | Status: SHIPPED | OUTPATIENT
Start: 2025-04-23

## 2025-04-23 RX ORDER — LIDOCAINE 40 MG/G
CREAM TOPICAL
Status: DISCONTINUED | OUTPATIENT
Start: 2025-04-23 | End: 2025-04-24 | Stop reason: HOSPADM

## 2025-04-23 RX ORDER — ALLOPURINOL 100 MG/1
100 TABLET ORAL DAILY
Qty: 30 TABLET | Refills: 0 | Status: SHIPPED | OUTPATIENT
Start: 2025-04-23

## 2025-04-23 RX ORDER — LIDOCAINE HYDROCHLORIDE 10 MG/ML
10 INJECTION, SOLUTION INFILTRATION; PERINEURAL ONCE
Status: COMPLETED | OUTPATIENT
Start: 2025-04-23 | End: 2025-04-23

## 2025-04-23 RX ADMIN — PEGFILGRASTIM 6 MG: KIT SUBCUTANEOUS at 10:58

## 2025-04-23 RX ADMIN — BENDAMUSTINE HYDROCHLORIDE 200 MG: 25 INJECTION, SOLUTION INTRAVENOUS at 10:39

## 2025-04-23 RX ADMIN — ONDANSETRON 8 MG: 2 INJECTION, SOLUTION INTRAMUSCULAR; INTRAVENOUS at 09:56

## 2025-04-23 RX ADMIN — LIDOCAINE HYDROCHLORIDE ANHYDROUS 10 ML: 10 INJECTION, SOLUTION INFILTRATION at 11:47

## 2025-04-23 RX ADMIN — SODIUM CHLORIDE 250 ML: 0.9 INJECTION, SOLUTION INTRAVENOUS at 09:52

## 2025-04-23 RX ADMIN — ALBUMIN HUMAN 37.5 G: 0.25 SOLUTION INTRAVENOUS at 12:31

## 2025-04-23 RX ADMIN — SODIUM CHLORIDE 250 MG: 9 INJECTION, SOLUTION INTRAVENOUS at 10:04

## 2025-04-23 ASSESSMENT — PAIN SCALES - GENERAL: PAINLEVEL_OUTOF10: NO PAIN (0)

## 2025-05-05 ENCOUNTER — PATIENT OUTREACH (OUTPATIENT)
Dept: CARE COORDINATION | Facility: CLINIC | Age: 70
End: 2025-05-05
Payer: COMMERCIAL

## 2025-05-07 ENCOUNTER — HOSPITAL ENCOUNTER (OUTPATIENT)
Dept: ULTRASOUND IMAGING | Facility: CLINIC | Age: 70
Discharge: HOME | End: 2025-05-07
Attending: EMERGENCY MEDICINE
Payer: COMMERCIAL

## 2025-05-07 VITALS
OXYGEN SATURATION: 98 % | TEMPERATURE: 97.7 F | HEART RATE: 67 BPM | SYSTOLIC BLOOD PRESSURE: 123 MMHG | DIASTOLIC BLOOD PRESSURE: 70 MMHG

## 2025-05-07 DIAGNOSIS — R18.8 OTHER ASCITES: ICD-10-CM

## 2025-05-07 PROCEDURE — 272N000706 US PARACENTESIS WITH ALBUMIN

## 2025-05-07 PROCEDURE — 250N000011 HC RX IP 250 OP 636: Mod: JZ | Performed by: RADIOLOGY

## 2025-05-07 PROCEDURE — 250N000009 HC RX 250: Performed by: EMERGENCY MEDICINE

## 2025-05-07 PROCEDURE — P9047 ALBUMIN (HUMAN), 25%, 50ML: HCPCS | Mod: JZ | Performed by: RADIOLOGY

## 2025-05-07 RX ORDER — ALBUMIN (HUMAN) 12.5 G/50ML
25-50 SOLUTION INTRAVENOUS ONCE
Status: COMPLETED | OUTPATIENT
Start: 2025-05-07 | End: 2025-05-07

## 2025-05-07 RX ORDER — ACETAMINOPHEN 500 MG
500-1000 TABLET ORAL EVERY 6 HOURS PRN
COMMUNITY

## 2025-05-07 RX ORDER — LIDOCAINE HYDROCHLORIDE 10 MG/ML
5 INJECTION, SOLUTION EPIDURAL; INFILTRATION; INTRACAUDAL; PERINEURAL ONCE
Status: COMPLETED | OUTPATIENT
Start: 2025-05-07 | End: 2025-05-07

## 2025-05-07 RX ADMIN — ALBUMIN HUMAN 50 G: 0.25 SOLUTION INTRAVENOUS at 14:55

## 2025-05-07 RX ADMIN — LIDOCAINE HYDROCHLORIDE 7 ML: 10 INJECTION, SOLUTION EPIDURAL; INFILTRATION; INTRACAUDAL; PERINEURAL at 14:40

## 2025-05-08 NOTE — TELEPHONE ENCOUNTER
Called and scheduled patient with DR. Lomax for next Monday 11/19.     Osei Cartagena,      Goal Outcome Evaluation:  Plan of Care Reviewed With: patient, daughter        Progress: no change  Outcome Evaluation: OT eval complete. Pt presents below fxl baseline for ADLs and mobility, limited by cervical precautions/restrictions s/p discectomy and fusion, decreased activity tolerance, and strength. Pt performed bed mobility, toileting, and sinkside grooming routine with SBA-CGA and cues for maintaining precautions. Pt educated on AE to increase independence w/ADLs while maintaining precautions. Pt would benefit from ongoing skilled OT services to progress to PLOF. Rec d/c to home w/assist.    Anticipated Discharge Disposition (OT): home with assist

## 2025-05-12 ENCOUNTER — PATIENT OUTREACH (OUTPATIENT)
Dept: ONCOLOGY | Facility: CLINIC | Age: 70
End: 2025-05-12

## 2025-05-12 ENCOUNTER — OFFICE VISIT (OUTPATIENT)
Dept: FAMILY MEDICINE | Facility: OTHER | Age: 70
End: 2025-05-12
Payer: COMMERCIAL

## 2025-05-12 VITALS
HEIGHT: 67 IN | HEART RATE: 78 BPM | SYSTOLIC BLOOD PRESSURE: 122 MMHG | RESPIRATION RATE: 16 BRPM | TEMPERATURE: 97.9 F | DIASTOLIC BLOOD PRESSURE: 72 MMHG | WEIGHT: 219 LBS | BODY MASS INDEX: 34.37 KG/M2 | OXYGEN SATURATION: 98 %

## 2025-05-12 DIAGNOSIS — C82.18 GRADE 2 FOLLICULAR LYMPHOMA OF LYMPH NODES OF MULTIPLE REGIONS (H): Primary | ICD-10-CM

## 2025-05-12 DIAGNOSIS — R60.0 BILATERAL LEG EDEMA: ICD-10-CM

## 2025-05-12 DIAGNOSIS — R18.8 OTHER ASCITES: ICD-10-CM

## 2025-05-12 DIAGNOSIS — E78.2 MIXED HYPERLIPIDEMIA: ICD-10-CM

## 2025-05-12 PROCEDURE — 3078F DIAST BP <80 MM HG: CPT | Performed by: STUDENT IN AN ORGANIZED HEALTH CARE EDUCATION/TRAINING PROGRAM

## 2025-05-12 PROCEDURE — 3074F SYST BP LT 130 MM HG: CPT | Performed by: STUDENT IN AN ORGANIZED HEALTH CARE EDUCATION/TRAINING PROGRAM

## 2025-05-12 PROCEDURE — G2211 COMPLEX E/M VISIT ADD ON: HCPCS | Performed by: STUDENT IN AN ORGANIZED HEALTH CARE EDUCATION/TRAINING PROGRAM

## 2025-05-12 PROCEDURE — 99214 OFFICE O/P EST MOD 30 MIN: CPT | Performed by: STUDENT IN AN ORGANIZED HEALTH CARE EDUCATION/TRAINING PROGRAM

## 2025-05-12 RX ORDER — FUROSEMIDE 20 MG/1
20 TABLET ORAL DAILY PRN
Qty: 90 TABLET | Refills: 1 | Status: SHIPPED | OUTPATIENT
Start: 2025-05-12

## 2025-05-12 ASSESSMENT — ASTHMA QUESTIONNAIRES
ACT_TOTALSCORE: 21
QUESTION_4 LAST FOUR WEEKS HOW OFTEN HAVE YOU USED YOUR RESCUE INHALER OR NEBULIZER MEDICATION (SUCH AS ALBUTEROL): ONCE A WEEK OR LESS
QUESTION_1 LAST FOUR WEEKS HOW MUCH OF THE TIME DID YOUR ASTHMA KEEP YOU FROM GETTING AS MUCH DONE AT WORK, SCHOOL OR AT HOME: NONE OF THE TIME
QUESTION_3 LAST FOUR WEEKS HOW OFTEN DID YOUR ASTHMA SYMPTOMS (WHEEZING, COUGHING, SHORTNESS OF BREATH, CHEST TIGHTNESS OR PAIN) WAKE YOU UP AT NIGHT OR EARLIER THAN USUAL IN THE MORNING: NOT AT ALL
QUESTION_5 LAST FOUR WEEKS HOW WOULD YOU RATE YOUR ASTHMA CONTROL: WELL CONTROLLED
EMERGENCY_ROOM_LAST_YEAR_TOTAL: TWO
QUESTION_2 LAST FOUR WEEKS HOW OFTEN HAVE YOU HAD SHORTNESS OF BREATH: THREE TO SIX TIMES A WEEK

## 2025-05-12 NOTE — PROGRESS NOTES
1537 VM received from daughter, Patria Hanley, requesting orders for paracentesis for patient.  She is hoping to get this scheduled in [place] for the morning before his next chemotherapy on 5/21 but is unable to schedule because there are no orders.     Forwarding to provider to advise, daughter informed that message has been received and she will be notified once orders are in place.  Patria states ok to leave detailed voice mail on her cell phone.       Shandra Vaca RN

## 2025-05-12 NOTE — PROGRESS NOTES
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  Continue to follow-up with oncology for chemotherapy.  Has upcoming appointment in about 1 week.  Continue the Lasix for now, I encouraged him to utilize his compression socks, he is only been using them sparingly.  He also has been getting therapeutic paracentesis, continue with these as needed.  We also discussed about lymphedema clinic but declined for now but if he changes his mind he will let me know.  Continue with leg elevation and ankle pumps as well.    The longitudinal plan of care for the diagnosis(es)/condition(s) as documented were addressed during this visit. Due to the added complexity in care, I will continue to support Bhavesh in the subsequent management and with ongoing continuity of care.    Branden Ospina is a 69 year old, presenting for the following health issues:  Recheck Medication and Leg Swelling      5/12/2025    10:00 AM   Additional Questions   Roomed by Tessie PINTO   Accompanied by self     History of Present Illness       Reason for visit:  Leg swelling    He eats 0-1 servings of fruits and vegetables daily.He consumes 1 sweetened beverage(s) daily.He exercises with enough effort to increase his heart rate 60 or more minutes per day.  He exercises with enough effort to increase his heart rate 4 days per week.   He is taking medications regularly.          Medication Followup of Lasix  Taking Medication as prescribed: yes  Side Effects:  None  Medication Helping Symptoms:  NO - they go down at night but otherwise he says no help.    Leg Swelling started with his cancer stuff he reports, since March. He also lost a lot of weight.          Review of Systems  Constitutional, HEENT, cardiovascular, pulmonary, gi and gu systems are negative, except as otherwise  "noted.      Objective    /72   Pulse 78   Temp 97.9  F (36.6  C) (Temporal)   Resp 16   Ht 1.702 m (5' 7\")   Wt 99.3 kg (219 lb)   SpO2 98%   BMI 34.30 kg/m    Body mass index is 34.3 kg/m .  Physical Exam  Vitals and nursing note reviewed.   Constitutional:       General: He is not in acute distress.     Appearance: Normal appearance. He is not ill-appearing, toxic-appearing or diaphoretic.   HENT:      Head: Normocephalic and atraumatic.      Right Ear: Tympanic membrane, ear canal and external ear normal. There is no impacted cerumen.      Left Ear: Tympanic membrane, ear canal and external ear normal. There is no impacted cerumen.      Nose: Nose normal. No congestion or rhinorrhea.      Mouth/Throat:      Mouth: Mucous membranes are moist.      Pharynx: Oropharynx is clear. No oropharyngeal exudate or posterior oropharyngeal erythema.   Eyes:      General:         Right eye: No discharge.         Left eye: No discharge.      Extraocular Movements: Extraocular movements intact.      Conjunctiva/sclera: Conjunctivae normal.      Pupils: Pupils are equal, round, and reactive to light.   Cardiovascular:      Rate and Rhythm: Normal rate and regular rhythm.      Heart sounds: No murmur heard.  Pulmonary:      Effort: Pulmonary effort is normal. No respiratory distress.      Breath sounds: Normal breath sounds.   Musculoskeletal:         General: Normal range of motion.      Cervical back: Normal range of motion.      Right lower leg: Edema (2+) present.      Left lower leg: Edema (2+) present.   Lymphadenopathy:      Cervical: No cervical adenopathy.   Neurological:      Mental Status: He is alert.   Psychiatric:         Mood and Affect: Mood normal.         Behavior: Behavior normal.         Thought Content: Thought content normal.            Signed Electronically by: KATALINA PRATT MD    "

## 2025-05-14 NOTE — TELEPHONE ENCOUNTER
Orders place for standing paracentesis without albumin.    Couple reasons for without albumin:  1) No known cirrhosis (that we're aware of by imaging).  2) We're treating lymphoma which is likely culprit.  3) Last albumins reasonable.    Jason Serra MD.

## 2025-05-14 NOTE — PROGRESS NOTES
Daughter contacted, informed that standing orders for paracentesis have been placed and she can schedule.  Daughter asked if there is any timing in regards to treatment that would be ideal for paracentesis?  Advised that WBC count is typically highest right before treatment.      Daughter will call to schedule.       Shandra Vaca RN

## 2025-05-19 ENCOUNTER — PATIENT OUTREACH (OUTPATIENT)
Dept: CARE COORDINATION | Facility: CLINIC | Age: 70
End: 2025-05-19
Payer: COMMERCIAL

## 2025-05-20 ENCOUNTER — APPOINTMENT (OUTPATIENT)
Dept: LAB | Facility: CLINIC | Age: 70
End: 2025-05-20
Payer: COMMERCIAL

## 2025-05-20 ENCOUNTER — INFUSION THERAPY VISIT (OUTPATIENT)
Dept: INFUSION THERAPY | Facility: CLINIC | Age: 70
End: 2025-05-20
Attending: INTERNAL MEDICINE
Payer: COMMERCIAL

## 2025-05-20 ENCOUNTER — ONCOLOGY VISIT (OUTPATIENT)
Dept: ONCOLOGY | Facility: CLINIC | Age: 70
End: 2025-05-20
Payer: COMMERCIAL

## 2025-05-20 VITALS
BODY MASS INDEX: 35.53 KG/M2 | DIASTOLIC BLOOD PRESSURE: 72 MMHG | HEIGHT: 67 IN | WEIGHT: 226.38 LBS | RESPIRATION RATE: 17 BRPM | OXYGEN SATURATION: 98 % | TEMPERATURE: 97.9 F | HEART RATE: 99 BPM | SYSTOLIC BLOOD PRESSURE: 126 MMHG

## 2025-05-20 VITALS
SYSTOLIC BLOOD PRESSURE: 123 MMHG | HEART RATE: 81 BPM | OXYGEN SATURATION: 95 % | TEMPERATURE: 98.7 F | DIASTOLIC BLOOD PRESSURE: 67 MMHG

## 2025-05-20 DIAGNOSIS — R18.8 OTHER ASCITES: ICD-10-CM

## 2025-05-20 DIAGNOSIS — C82.18 GRADE 2 FOLLICULAR LYMPHOMA OF LYMPH NODES OF MULTIPLE REGIONS (H): Primary | ICD-10-CM

## 2025-05-20 DIAGNOSIS — Z51.11 ENCOUNTER FOR ANTINEOPLASTIC CHEMOTHERAPY: ICD-10-CM

## 2025-05-20 DIAGNOSIS — R60.0 LOWER EXTREMITY EDEMA: ICD-10-CM

## 2025-05-20 DIAGNOSIS — Z79.01 CHRONIC ANTICOAGULATION: ICD-10-CM

## 2025-05-20 LAB
ALBUMIN SERPL BCG-MCNC: 3.1 G/DL (ref 3.5–5.2)
ALP SERPL-CCNC: 69 U/L (ref 40–150)
ALT SERPL W P-5'-P-CCNC: 12 U/L (ref 0–70)
ANION GAP SERPL CALCULATED.3IONS-SCNC: 8 MMOL/L (ref 7–15)
AST SERPL W P-5'-P-CCNC: 17 U/L (ref 0–45)
BASOPHILS # BLD AUTO: 0.1 10E3/UL (ref 0–0.2)
BASOPHILS NFR BLD AUTO: 2 %
BILIRUB SERPL-MCNC: 0.3 MG/DL
BUN SERPL-MCNC: 11 MG/DL (ref 8–23)
CALCIUM SERPL-MCNC: 9 MG/DL (ref 8.8–10.4)
CHLORIDE SERPL-SCNC: 104 MMOL/L (ref 98–107)
CREAT SERPL-MCNC: 0.9 MG/DL (ref 0.67–1.17)
EGFRCR SERPLBLD CKD-EPI 2021: >90 ML/MIN/1.73M2
EOSINOPHIL # BLD AUTO: 0.3 10E3/UL (ref 0–0.7)
EOSINOPHIL NFR BLD AUTO: 5 %
ERYTHROCYTE [DISTWIDTH] IN BLOOD BY AUTOMATED COUNT: 14.1 % (ref 10–15)
GLUCOSE SERPL-MCNC: 112 MG/DL (ref 70–99)
HCO3 SERPL-SCNC: 29 MMOL/L (ref 22–29)
HCT VFR BLD AUTO: 35.3 % (ref 40–53)
HGB BLD-MCNC: 11.7 G/DL (ref 13.3–17.7)
IMM GRANULOCYTES # BLD: 0 10E3/UL
IMM GRANULOCYTES NFR BLD: 0 %
LYMPHOCYTES # BLD AUTO: 0.4 10E3/UL (ref 0.8–5.3)
LYMPHOCYTES NFR BLD AUTO: 6 %
MCH RBC QN AUTO: 28.5 PG (ref 26.5–33)
MCHC RBC AUTO-ENTMCNC: 33.1 G/DL (ref 31.5–36.5)
MCV RBC AUTO: 86 FL (ref 78–100)
MONOCYTES # BLD AUTO: 0.8 10E3/UL (ref 0–1.3)
MONOCYTES NFR BLD AUTO: 12 %
NEUTROPHILS # BLD AUTO: 5.2 10E3/UL (ref 1.6–8.3)
NEUTROPHILS NFR BLD AUTO: 76 %
NRBC # BLD AUTO: 0 10E3/UL
NRBC BLD AUTO-RTO: 0 /100
PLATELET # BLD AUTO: 236 10E3/UL (ref 150–450)
POTASSIUM SERPL-SCNC: 3.6 MMOL/L (ref 3.4–5.3)
PROT SERPL-MCNC: 5.7 G/DL (ref 6.4–8.3)
RBC # BLD AUTO: 4.11 10E6/UL (ref 4.4–5.9)
SODIUM SERPL-SCNC: 141 MMOL/L (ref 135–145)
WBC # BLD AUTO: 6.8 10E3/UL (ref 4–11)

## 2025-05-20 PROCEDURE — 36415 COLL VENOUS BLD VENIPUNCTURE: CPT | Performed by: INTERNAL MEDICINE

## 2025-05-20 PROCEDURE — 250N000011 HC RX IP 250 OP 636: Performed by: INTERNAL MEDICINE

## 2025-05-20 PROCEDURE — 250N000013 HC RX MED GY IP 250 OP 250 PS 637: Performed by: INTERNAL MEDICINE

## 2025-05-20 PROCEDURE — 85025 COMPLETE CBC W/AUTO DIFF WBC: CPT | Performed by: INTERNAL MEDICINE

## 2025-05-20 PROCEDURE — 96375 TX/PRO/DX INJ NEW DRUG ADDON: CPT

## 2025-05-20 PROCEDURE — 99214 OFFICE O/P EST MOD 30 MIN: CPT

## 2025-05-20 PROCEDURE — G2211 COMPLEX E/M VISIT ADD ON: HCPCS

## 2025-05-20 PROCEDURE — 96415 CHEMO IV INFUSION ADDL HR: CPT

## 2025-05-20 PROCEDURE — 258N000003 HC RX IP 258 OP 636: Performed by: INTERNAL MEDICINE

## 2025-05-20 PROCEDURE — 96367 TX/PROPH/DG ADDL SEQ IV INF: CPT

## 2025-05-20 PROCEDURE — 96411 CHEMO IV PUSH ADDL DRUG: CPT

## 2025-05-20 PROCEDURE — 82310 ASSAY OF CALCIUM: CPT | Performed by: INTERNAL MEDICINE

## 2025-05-20 PROCEDURE — 96413 CHEMO IV INFUSION 1 HR: CPT

## 2025-05-20 RX ORDER — DIPHENHYDRAMINE HYDROCHLORIDE 50 MG/ML
50 INJECTION, SOLUTION INTRAMUSCULAR; INTRAVENOUS ONCE
Status: DISCONTINUED | OUTPATIENT
Start: 2025-05-20 | End: 2025-05-20 | Stop reason: HOSPADM

## 2025-05-20 RX ORDER — ONDANSETRON 2 MG/ML
8 INJECTION INTRAMUSCULAR; INTRAVENOUS ONCE
Status: COMPLETED | OUTPATIENT
Start: 2025-05-20 | End: 2025-05-20

## 2025-05-20 RX ORDER — DIPHENHYDRAMINE HYDROCHLORIDE 50 MG/ML
50 INJECTION, SOLUTION INTRAMUSCULAR; INTRAVENOUS ONCE
Status: COMPLETED | OUTPATIENT
Start: 2025-05-20 | End: 2025-05-20

## 2025-05-20 RX ORDER — ACETAMINOPHEN 325 MG/1
650 TABLET ORAL ONCE
Status: COMPLETED | OUTPATIENT
Start: 2025-05-20 | End: 2025-05-20

## 2025-05-20 RX ADMIN — SODIUM CHLORIDE 250 ML: 0.9 INJECTION, SOLUTION INTRAVENOUS at 09:23

## 2025-05-20 RX ADMIN — DIPHENHYDRAMINE HYDROCHLORIDE 50 MG: 50 INJECTION, SOLUTION INTRAMUSCULAR; INTRAVENOUS at 09:28

## 2025-05-20 RX ADMIN — BENDAMUSTINE HYDROCHLORIDE 200 MG: 25 INJECTION, SOLUTION INTRAVENOUS at 10:01

## 2025-05-20 RX ADMIN — ACETAMINOPHEN 650 MG: 325 TABLET ORAL at 09:22

## 2025-05-20 RX ADMIN — RITUXIMAB-ABBS 800 MG: 10 INJECTION, SOLUTION INTRAVENOUS at 10:20

## 2025-05-20 RX ADMIN — FAMOTIDINE 20 MG: 10 INJECTION, SOLUTION INTRAVENOUS at 09:26

## 2025-05-20 RX ADMIN — SODIUM CHLORIDE 250 MG: 9 INJECTION, SOLUTION INTRAVENOUS at 09:36

## 2025-05-20 RX ADMIN — ONDANSETRON 8 MG: 2 INJECTION, SOLUTION INTRAMUSCULAR; INTRAVENOUS at 09:25

## 2025-05-20 ASSESSMENT — PAIN SCALES - GENERAL: PAINLEVEL_OUTOF10: MODERATE PAIN (4)

## 2025-05-20 NOTE — Clinical Note
"5/20/2025      Bhavesh Landeros  [address]  [place] [address]      Dear Colleague,    Thank you for referring your patient, Bhavesh Landeros, to the [facility]. Please see a copy of my visit note below.    Oncology Follow-Up Visit: May 20, 2025    Oncologist: Dr. 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  Status post inpatient cycle 1 Bendamustine with rituximab; rituximab complicated by infusion reaction.  Pulmonary embolism secondary to malignancy requiring chronic anticoagulation.  Retroperitoneal biopsy complicated by retroperitoneal hemorrhage in the setting of anticoagulation.  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  Pancytopenia, likely multifactorial secondary to chemotherapy, lymphoma, acute illness.    Interval History:  Pt is seen in-person for review of ***    Patient denies any of the following except if noted above: fevers, chills, difficulty with energy, vision or hearing changes, chest pain, dyspnea, abdominal pain, nausea, vomiting, diarrhea, constipation, urinary concerns, headaches, numbness, tingling, issues with sleep or mood. He also denies lumps, bumps, rashes or skin lesions, bleeding or bruising issues.    Physical Exam:  /72 (BP Location: Right arm, Patient Position: Sitting, Cuff Size: Adult Large)   Pulse 99   Temp 97.9  F (36.6  C) (Temporal)   Resp 17   Ht 1.702 m (5' 7\")   Wt 102.7 kg (226 lb 6 oz)   SpO2 98%   BMI 35.46 kg/m       BP Readings from Last 6 Encounters:   05/20/25 126/72   05/12/25 122/72   05/07/25 123/70   04/23/25 116/66   04/23/25 122/75   04/22/25 134/72     Wt Readings from Last 6 Encounters:   05/20/25 102.7 kg (226 lb 6 oz)   05/12/25 99.3 kg (219 lb)   04/23/25 110.2 kg (243 lb)   04/22/25 108 kg (238 lb 3.2 oz) "   04/14/25 98.9 kg (218 lb)   04/07/25 107 kg (236 lb)      Constitutional: No acute distress, pleasant, appropriately groomed.   ENT: PERRLA, sclera without erythema. Appropriate dentition.  Neck: Trachea midline, no adenopathy.   Resp: CTA, adequate depth and rate of respirations.   Cardiac: S1/S2, RRR, no murmurs.   Abdomen: BS active, abdomen soft and non-tender. No masses or organomegaly.   MS:  5/5 muscle strength, adequate ROM.   Skin: No rashes, lesions, or wounds on exposed skin.  Neuro: A/O x 4, sensation intact.   Lymph: No palpable anterior/posterior cervical, axillary, or supraclavicular nodes.   Psych: Appropriate mentation and affect.    Laboratory Results:   Results for orders placed or performed in visit on 05/20/25   Comprehensive metabolic panel     Status: Abnormal   Result Value Ref Range    Sodium 141 135 - 145 mmol/L    Potassium 3.6 3.4 - 5.3 mmol/L    Carbon Dioxide (CO2) 29 22 - 29 mmol/L    Anion Gap 8 7 - 15 mmol/L    Urea Nitrogen 11.0 8.0 - 23.0 mg/dL    Creatinine 0.90 0.67 - 1.17 mg/dL    GFR Estimate >90 >60 mL/min/1.73m2    Calcium 9.0 8.8 - 10.4 mg/dL    Chloride 104 98 - 107 mmol/L    Glucose 112 (H) 70 - 99 mg/dL    Alkaline Phosphatase 69 40 - 150 U/L    AST 17 0 - 45 U/L    ALT 12 0 - 70 U/L    Protein Total 5.7 (L) 6.4 - 8.3 g/dL    Albumin 3.1 (L) 3.5 - 5.2 g/dL    Bilirubin Total 0.3 <=1.2 mg/dL   CBC with platelets and differential     Status: Abnormal   Result Value Ref Range    WBC Count 6.8 4.0 - 11.0 10e3/uL    RBC Count 4.11 (L) 4.40 - 5.90 10e6/uL    Hemoglobin 11.7 (L) 13.3 - 17.7 g/dL    Hematocrit 35.3 (L) 40.0 - 53.0 %    MCV 86 78 - 100 fL    MCH 28.5 26.5 - 33.0 pg    MCHC 33.1 31.5 - 36.5 g/dL    RDW 14.1 10.0 - 15.0 %    Platelet Count 236 150 - 450 10e3/uL    % Neutrophils 76 %    % Lymphocytes 6 %    % Monocytes 12 %    % Eosinophils 5 %    % Basophils 2 %    % Immature Granulocytes 0 %    NRBCs per 100 WBC 0 <1 /100    Absolute Neutrophils 5.2 1.6 - 8.3  10e3/uL    Absolute Lymphocytes 0.4 (L) 0.8 - 5.3 10e3/uL    Absolute Monocytes 0.8 0.0 - 1.3 10e3/uL    Absolute Eosinophils 0.3 0.0 - 0.7 10e3/uL    Absolute Basophils 0.1 0.0 - 0.2 10e3/uL    Absolute Immature Granulocytes 0.0 <=0.4 10e3/uL    Absolute NRBCs 0.0 10e3/uL   CBC with platelets differential     Status: Abnormal    Narrative    The following orders were created for panel order CBC with platelets differential.  Procedure                               Abnormality         Status                     ---------                               -----------         ------                     CBC with platelets and ...[[id number]]  Abnormal            Final result                 Please view results for these tests on the individual orders.     I reviewed the above labs today.    Imaging:  US Paracentesis with Albumin  Narrative: EXAM:  1. PARACENTESIS  2. ULTRASOUND GUIDANCE  LOCATION: [facility]  DATE: 5/7/2025    INDICATION: Ascites.    PROCEDURE: Informed consent obtained. Time out performed. The abdomen was prepped and draped in a sterile fashion. 10 mL of 1% lidocaine was infused into local soft tissues. A 5 Swedish catheter system was introduced into the abdominal ascites under   ultrasound guidance.    9.5 liters of yellowish fluid were removed and sent to lab if requested.    Patient tolerated procedure well.    Ultrasound imaging was obtained and placed in the patient's permanent medical record.  Impression: IMPRESSION:  1.  Status post ultrasound-guided paracentesis.    Reference CPT Code: 27142    I reviewed the above imaging report today.        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   - On treatment with Bendamustine and rituximab, has completed 2 of 4 cycles.   - Tolerating *** with manageable side effects.   - Hx of infusion reaction to rituximab w/cycle 1, tolerated cycle 2  better with addition of pre-medications and slower transfusion rate.  - Labs reviewed and discussed. Continues to meet treatment goals, will proceed with cycle 3 without changes.   - Continue allopurinol daily.  - Repeat PET 6/2025, followed by review with Dr. Serra.  - Continue provider / ESTELLE visits prior to treatment for review.  - Patient is in agreement with plan.     Hx of pulmonary embolism   - On current treatment with Eliquis BID, w/plan for a minimum of 3-months of treatment beyond chemotherapy  - Tolerating well without significant bleeding, reviewed alarm symptoms    BLE edema  - Currently on furosemide 20 mg daily     Ascites   - Standing orders in place for paracentesis PRN      Stephanie [place]UNC[place], [place] [address]      Again, thank you for allowing me to participate in the care of your patient.        Sincerely,        PERLA Lopez CNP    Electronically signed

## 2025-05-20 NOTE — PROGRESS NOTES
Infusion Nursing Note:  Bhavesh Landeros presents today for C3D1  Bendeka and Rituximab.    Patient seen by provider today: No   present during visit today: Not Applicable.    Note: Bhavesh comes to infusion on his own.  today is his sister.  Patient previously had a reaction to Rituximab during C1D1. He tolerated last infusion well with a slow ramp up by 25ml/ every 30 minutes.  Today he tolerated a ramp up by 50ml/ every 30 minutes with a max rate of 350ml hour.        Intravenous Access:  Peripheral IV placed.    Treatment Conditions:  Lab Results   Component Value Date    HGB 11.7 (L) 05/20/2025    WBC 6.8 05/20/2025    ANEU 5.2 05/20/2025     05/20/2025        Lab Results   Component Value Date     05/20/2025    POTASSIUM 3.6 05/20/2025    MAG 2.2 03/23/2025    CR 0.90 05/20/2025    SHANNON 9.0 05/20/2025    BILITOTAL 0.3 05/20/2025    ALBUMIN 3.1 (L) 05/20/2025    ALT 12 05/20/2025    AST 17 05/20/2025       Results reviewed, labs MET treatment parameters, ok to proceed with treatment.      Post Infusion Assessment:  Patient tolerated infusion without incident.  Access discontinued per protocol.       Discharge Plan:   Patient discharged in stable condition accompanied by: self and sister.  Departure Mode: Ambulatory.  Patient has appt to return to infusion tomorrow. .      Codie Wilson RN

## 2025-05-21 ENCOUNTER — RESULTS FOLLOW-UP (OUTPATIENT)
Dept: ONCOLOGY | Facility: CLINIC | Age: 70
End: 2025-05-21

## 2025-05-21 ENCOUNTER — HOSPITAL ENCOUNTER (OUTPATIENT)
Dept: ULTRASOUND IMAGING | Facility: CLINIC | Age: 70
Discharge: HOME | End: 2025-05-21
Attending: INTERNAL MEDICINE
Payer: COMMERCIAL

## 2025-05-21 ENCOUNTER — INFUSION THERAPY VISIT (OUTPATIENT)
Dept: INFUSION THERAPY | Facility: CLINIC | Age: 70
End: 2025-05-21
Attending: INTERNAL MEDICINE
Payer: COMMERCIAL

## 2025-05-21 VITALS
WEIGHT: 215.4 LBS | SYSTOLIC BLOOD PRESSURE: 120 MMHG | DIASTOLIC BLOOD PRESSURE: 52 MMHG | BODY MASS INDEX: 33.74 KG/M2 | HEART RATE: 78 BPM | OXYGEN SATURATION: 99 % | TEMPERATURE: 97.8 F | RESPIRATION RATE: 14 BRPM

## 2025-05-21 VITALS
DIASTOLIC BLOOD PRESSURE: 70 MMHG | RESPIRATION RATE: 18 BRPM | SYSTOLIC BLOOD PRESSURE: 127 MMHG | OXYGEN SATURATION: 97 % | TEMPERATURE: 98.6 F | HEART RATE: 74 BPM

## 2025-05-21 DIAGNOSIS — Z29.9 PREVENTIVE MEASURE: ICD-10-CM

## 2025-05-21 DIAGNOSIS — C82.18 GRADE 2 FOLLICULAR LYMPHOMA OF LYMPH NODES OF MULTIPLE REGIONS (H): ICD-10-CM

## 2025-05-21 DIAGNOSIS — Z51.11 ENCOUNTER FOR ANTINEOPLASTIC CHEMOTHERAPY: Primary | ICD-10-CM

## 2025-05-21 DIAGNOSIS — R18.8 OTHER ASCITES: ICD-10-CM

## 2025-05-21 PROCEDURE — 250N000011 HC RX IP 250 OP 636: Performed by: RADIOLOGY

## 2025-05-21 PROCEDURE — 49083 ABD PARACENTESIS W/IMAGING: CPT

## 2025-05-21 PROCEDURE — 96409 CHEMO IV PUSH SNGL DRUG: CPT

## 2025-05-21 PROCEDURE — 250N000009 HC RX 250: Performed by: INTERNAL MEDICINE

## 2025-05-21 PROCEDURE — 96375 TX/PRO/DX INJ NEW DRUG ADDON: CPT

## 2025-05-21 PROCEDURE — 258N000003 HC RX IP 258 OP 636: Performed by: INTERNAL MEDICINE

## 2025-05-21 PROCEDURE — P9047 ALBUMIN (HUMAN), 25%, 50ML: HCPCS | Performed by: RADIOLOGY

## 2025-05-21 PROCEDURE — 96367 TX/PROPH/DG ADDL SEQ IV INF: CPT | Mod: XU

## 2025-05-21 PROCEDURE — 96377 APPLICATON ON-BODY INJECTOR: CPT | Mod: XS

## 2025-05-21 PROCEDURE — 96372 THER/PROPH/DIAG INJ SC/IM: CPT | Performed by: INTERNAL MEDICINE

## 2025-05-21 PROCEDURE — 250N000011 HC RX IP 250 OP 636: Mod: JZ | Performed by: INTERNAL MEDICINE

## 2025-05-21 RX ORDER — LIDOCAINE HYDROCHLORIDE 10 MG/ML
10 INJECTION, SOLUTION INFILTRATION; PERINEURAL ONCE
Status: COMPLETED | OUTPATIENT
Start: 2025-05-21 | End: 2025-05-21

## 2025-05-21 RX ORDER — LIDOCAINE 40 MG/G
CREAM TOPICAL
Status: DISCONTINUED | OUTPATIENT
Start: 2025-05-21 | End: 2025-05-22 | Stop reason: HOSPADM

## 2025-05-21 RX ORDER — ALBUMIN (HUMAN) 12.5 G/50ML
25-50 SOLUTION INTRAVENOUS ONCE
Status: COMPLETED | OUTPATIENT
Start: 2025-05-21 | End: 2025-05-21

## 2025-05-21 RX ORDER — ONDANSETRON 2 MG/ML
8 INJECTION INTRAMUSCULAR; INTRAVENOUS ONCE
Status: COMPLETED | OUTPATIENT
Start: 2025-05-21 | End: 2025-05-21

## 2025-05-21 RX ADMIN — ALBUMIN HUMAN 25 G: 0.25 SOLUTION INTRAVENOUS at 11:06

## 2025-05-21 RX ADMIN — ONDANSETRON 8 MG: 2 INJECTION, SOLUTION INTRAMUSCULAR; INTRAVENOUS at 13:26

## 2025-05-21 RX ADMIN — BENDAMUSTINE HYDROCHLORIDE 200 MG: 25 INJECTION, SOLUTION INTRAVENOUS at 13:39

## 2025-05-21 RX ADMIN — PEGFILGRASTIM 6 MG: KIT SUBCUTANEOUS at 14:03

## 2025-05-21 RX ADMIN — SODIUM CHLORIDE 250 ML: 0.9 INJECTION, SOLUTION INTRAVENOUS at 12:58

## 2025-05-21 RX ADMIN — SODIUM CHLORIDE 250 MG: 9 INJECTION, SOLUTION INTRAVENOUS at 13:01

## 2025-05-21 RX ADMIN — LIDOCAINE HYDROCHLORIDE ANHYDROUS 8 ML: 10 INJECTION, SOLUTION INFILTRATION at 10:07

## 2025-05-21 ASSESSMENT — PAIN SCALES - GENERAL: PAINLEVEL_OUTOF10: MILD PAIN (1)

## 2025-05-21 NOTE — PROGRESS NOTES
Infusion Nursing Note:  Bhavesh RAMIN Landeros presents today for C3D2 Bendamustine.    Patient seen by provider today: No   present during visit today: Not Applicable.    Note: Pt arrived via ambulatory following paracentesis with Albumin replacement in SDS.  Pt states that he feels lighter.  Pt c/o slight ache in upper mid abdomen.  Pt denies other concerns/ needs at this time.      Intravenous Access:  Peripheral IV placed in SDS prior to this visit..    Treatment Conditions:  Not Applicable.      Post Infusion Assessment:  Patient tolerated infusion without incident.  Patient observed for 15 minutes post chemotherapy.  Site patent and intact, free from redness, edema or discomfort.  No evidence of extravasations.  Access discontinued per protocol.       Discharge Plan:   Discharge instructions reviewed with: Patient.  Patient discharged in stable condition accompanied by: self.  Departure Mode: Ambulatory.      Lady Stein RN

## 2025-05-21 NOTE — PROGRESS NOTES
VSS. A&Ox4. 6500ml removed, 25g albumin given. Initial pain at injection site, subsided after 20 minutes. Site CDI. Tolerating po intake. PIV left in place for chemo appt here at 1230. No concerns. Patient discharged to IVO appt, daughter will meet patient after appt.     Jeanne Ledbetter, RN, BSN

## 2025-05-22 RX ORDER — ALLOPURINOL 100 MG/1
100 TABLET ORAL DAILY
Qty: 30 TABLET | Refills: 0 | Status: SHIPPED | OUTPATIENT
Start: 2025-05-22

## 2025-05-27 DIAGNOSIS — I26.99 OTHER ACUTE PULMONARY EMBOLISM WITHOUT ACUTE COR PULMONALE (H): ICD-10-CM

## 2025-05-28 RX ORDER — APIXABAN 2.5 MG/1
2.5 TABLET, FILM COATED ORAL 2 TIMES DAILY
Qty: 60 TABLET | Refills: 0 | Status: SHIPPED | OUTPATIENT
Start: 2025-05-28

## 2025-06-03 ENCOUNTER — HOSPITAL ENCOUNTER (OUTPATIENT)
Dept: ULTRASOUND IMAGING | Facility: CLINIC | Age: 70
Discharge: HOME | End: 2025-06-03
Attending: INTERNAL MEDICINE
Payer: COMMERCIAL

## 2025-06-03 DIAGNOSIS — R18.8 OTHER ASCITES: ICD-10-CM

## 2025-06-03 PROCEDURE — 49083 ABD PARACENTESIS W/IMAGING: CPT

## 2025-06-03 PROCEDURE — 96372 THER/PROPH/DIAG INJ SC/IM: CPT | Performed by: INTERNAL MEDICINE

## 2025-06-03 PROCEDURE — 250N000009 HC RX 250: Performed by: INTERNAL MEDICINE

## 2025-06-03 RX ORDER — LIDOCAINE 40 MG/G
CREAM TOPICAL
OUTPATIENT
Start: 2025-06-03

## 2025-06-03 RX ORDER — ALBUMIN (HUMAN) 12.5 G/50ML
25-50 SOLUTION INTRAVENOUS ONCE
OUTPATIENT
Start: 2025-06-03 | End: 2025-06-03

## 2025-06-03 RX ORDER — LIDOCAINE HYDROCHLORIDE 10 MG/ML
10 INJECTION, SOLUTION EPIDURAL; INFILTRATION; INTRACAUDAL; PERINEURAL ONCE
Status: COMPLETED | OUTPATIENT
Start: 2025-06-03 | End: 2025-06-03

## 2025-06-03 RX ADMIN — LIDOCAINE HYDROCHLORIDE ANHYDROUS 10 ML: 10 INJECTION, SOLUTION INFILTRATION at 10:28

## 2025-06-14 ENCOUNTER — HOSPITAL ENCOUNTER (OUTPATIENT)
Dept: PET IMAGING | Facility: CLINIC | Age: 70
Discharge: HOME | End: 2025-06-14
Attending: INTERNAL MEDICINE | Admitting: INTERNAL MEDICINE
Payer: COMMERCIAL

## 2025-06-14 DIAGNOSIS — C82.18 GRADE 2 FOLLICULAR LYMPHOMA OF LYMPH NODES OF MULTIPLE REGIONS (H): ICD-10-CM

## 2025-06-14 PROCEDURE — 78816 PET IMAGE W/CT FULL BODY: CPT | Mod: PI

## 2025-06-14 PROCEDURE — 343N000001 HC RX 343 MED OP 636: Performed by: INTERNAL MEDICINE

## 2025-06-14 PROCEDURE — A9552 F18 FDG: HCPCS | Performed by: INTERNAL MEDICINE

## 2025-06-14 RX ORDER — FLUDEOXYGLUCOSE F 18 200 MCI/ML
10-18 INJECTION, SOLUTION INTRAVENOUS ONCE
Status: COMPLETED | OUTPATIENT
Start: 2025-06-14 | End: 2025-06-14

## 2025-06-14 RX ADMIN — FLUDEOXYGLUCOSE F 18 13.01 MILLICURIE: 200 INJECTION, SOLUTION INTRAVENOUS at 09:46

## 2025-06-16 ENCOUNTER — VIRTUAL VISIT (OUTPATIENT)
Dept: ONCOLOGY | Facility: CLINIC | Age: 70
End: 2025-06-16
Payer: COMMERCIAL

## 2025-06-16 VITALS — HEIGHT: 68 IN | WEIGHT: 205 LBS | BODY MASS INDEX: 31.07 KG/M2

## 2025-06-16 DIAGNOSIS — R18.0 MALIGNANT ASCITES (H): ICD-10-CM

## 2025-06-16 DIAGNOSIS — Z51.11 ENCOUNTER FOR ANTINEOPLASTIC CHEMOTHERAPY: ICD-10-CM

## 2025-06-16 DIAGNOSIS — Z79.01 CHRONIC ANTICOAGULATION: ICD-10-CM

## 2025-06-16 DIAGNOSIS — C82.18 GRADE 2 FOLLICULAR LYMPHOMA OF LYMPH NODES OF MULTIPLE REGIONS (H): Primary | ICD-10-CM

## 2025-06-16 PROCEDURE — 1126F AMNT PAIN NOTED NONE PRSNT: CPT | Performed by: INTERNAL MEDICINE

## 2025-06-16 PROCEDURE — 98006 SYNCH AUDIO-VIDEO EST MOD 30: CPT | Performed by: INTERNAL MEDICINE

## 2025-06-16 RX ORDER — ALBUTEROL SULFATE 90 UG/1
1-2 INHALANT RESPIRATORY (INHALATION)
Start: 2025-08-12

## 2025-06-16 RX ORDER — METHYLPREDNISOLONE SODIUM SUCCINATE 40 MG/ML
40 INJECTION INTRAMUSCULAR; INTRAVENOUS
Start: 2025-08-13

## 2025-06-16 RX ORDER — ALBUTEROL SULFATE 0.83 MG/ML
2.5 SOLUTION RESPIRATORY (INHALATION)
OUTPATIENT
Start: 2025-08-13

## 2025-06-16 RX ORDER — HEPARIN SODIUM (PORCINE) LOCK FLUSH IV SOLN 100 UNIT/ML 100 UNIT/ML
5 SOLUTION INTRAVENOUS
OUTPATIENT
Start: 2025-08-13

## 2025-06-16 RX ORDER — DIPHENHYDRAMINE HYDROCHLORIDE 50 MG/ML
50 INJECTION, SOLUTION INTRAMUSCULAR; INTRAVENOUS
Start: 2025-08-13

## 2025-06-16 RX ORDER — ALBUTEROL SULFATE 90 UG/1
1-2 INHALANT RESPIRATORY (INHALATION)
Start: 2025-07-16

## 2025-06-16 RX ORDER — HEPARIN SODIUM (PORCINE) LOCK FLUSH IV SOLN 100 UNIT/ML 100 UNIT/ML
5 SOLUTION INTRAVENOUS
OUTPATIENT
Start: 2025-07-15

## 2025-06-16 RX ORDER — ALBUTEROL SULFATE 0.83 MG/ML
2.5 SOLUTION RESPIRATORY (INHALATION)
OUTPATIENT
Start: 2025-08-12

## 2025-06-16 RX ORDER — ALBUTEROL SULFATE 90 UG/1
1-2 INHALANT RESPIRATORY (INHALATION)
Start: 2025-08-13

## 2025-06-16 RX ORDER — EPINEPHRINE 1 MG/ML
0.3 INJECTION, SOLUTION INTRAMUSCULAR; SUBCUTANEOUS EVERY 5 MIN PRN
OUTPATIENT
Start: 2025-08-13

## 2025-06-16 RX ORDER — HEPARIN SODIUM (PORCINE) LOCK FLUSH IV SOLN 100 UNIT/ML 100 UNIT/ML
5 SOLUTION INTRAVENOUS
OUTPATIENT
Start: 2025-08-12

## 2025-06-16 RX ORDER — HEPARIN SODIUM,PORCINE 10 UNIT/ML
5-20 VIAL (ML) INTRAVENOUS DAILY PRN
OUTPATIENT
Start: 2025-08-13

## 2025-06-16 RX ORDER — METHYLPREDNISOLONE SODIUM SUCCINATE 40 MG/ML
40 INJECTION INTRAMUSCULAR; INTRAVENOUS
Start: 2025-08-12

## 2025-06-16 RX ORDER — METHYLPREDNISOLONE SODIUM SUCCINATE 40 MG/ML
40 INJECTION INTRAMUSCULAR; INTRAVENOUS
Start: 2025-07-15

## 2025-06-16 RX ORDER — DIPHENHYDRAMINE HYDROCHLORIDE 50 MG/ML
50 INJECTION, SOLUTION INTRAMUSCULAR; INTRAVENOUS
Start: 2025-07-16

## 2025-06-16 RX ORDER — ALBUTEROL SULFATE 90 UG/1
1-2 INHALANT RESPIRATORY (INHALATION)
Start: 2025-07-15

## 2025-06-16 RX ORDER — HEPARIN SODIUM (PORCINE) LOCK FLUSH IV SOLN 100 UNIT/ML 100 UNIT/ML
5 SOLUTION INTRAVENOUS
OUTPATIENT
Start: 2025-07-16

## 2025-06-16 RX ORDER — EPINEPHRINE 1 MG/ML
0.3 INJECTION, SOLUTION INTRAMUSCULAR; SUBCUTANEOUS EVERY 5 MIN PRN
OUTPATIENT
Start: 2025-07-15

## 2025-06-16 RX ORDER — HEPARIN SODIUM,PORCINE 10 UNIT/ML
5-20 VIAL (ML) INTRAVENOUS DAILY PRN
OUTPATIENT
Start: 2025-07-15

## 2025-06-16 RX ORDER — DIPHENHYDRAMINE HYDROCHLORIDE 50 MG/ML
50 INJECTION, SOLUTION INTRAMUSCULAR; INTRAVENOUS
Start: 2025-07-15

## 2025-06-16 RX ORDER — ALBUTEROL SULFATE 0.83 MG/ML
2.5 SOLUTION RESPIRATORY (INHALATION)
OUTPATIENT
Start: 2025-07-16

## 2025-06-16 RX ORDER — METHYLPREDNISOLONE SODIUM SUCCINATE 40 MG/ML
40 INJECTION INTRAMUSCULAR; INTRAVENOUS
Start: 2025-07-16

## 2025-06-16 RX ORDER — ALBUTEROL SULFATE 0.83 MG/ML
2.5 SOLUTION RESPIRATORY (INHALATION)
OUTPATIENT
Start: 2025-07-15

## 2025-06-16 RX ORDER — MEPERIDINE HYDROCHLORIDE 25 MG/ML
25 INJECTION INTRAMUSCULAR; INTRAVENOUS; SUBCUTANEOUS
OUTPATIENT
Start: 2025-08-12

## 2025-06-16 RX ORDER — DIPHENHYDRAMINE HYDROCHLORIDE 50 MG/ML
25 INJECTION, SOLUTION INTRAMUSCULAR; INTRAVENOUS
Start: 2025-07-15

## 2025-06-16 RX ORDER — EPINEPHRINE 1 MG/ML
0.3 INJECTION, SOLUTION INTRAMUSCULAR; SUBCUTANEOUS EVERY 5 MIN PRN
OUTPATIENT
Start: 2025-07-16

## 2025-06-16 RX ORDER — DIPHENHYDRAMINE HYDROCHLORIDE 50 MG/ML
25 INJECTION, SOLUTION INTRAMUSCULAR; INTRAVENOUS
Start: 2025-08-12

## 2025-06-16 RX ORDER — MEPERIDINE HYDROCHLORIDE 25 MG/ML
25 INJECTION INTRAMUSCULAR; INTRAVENOUS; SUBCUTANEOUS
OUTPATIENT
Start: 2025-08-13

## 2025-06-16 RX ORDER — METHYLPREDNISOLONE SODIUM SUCCINATE 125 MG/2ML
125 INJECTION INTRAMUSCULAR; INTRAVENOUS
Start: 2025-07-15

## 2025-06-16 RX ORDER — METHYLPREDNISOLONE SODIUM SUCCINATE 125 MG/2ML
125 INJECTION INTRAMUSCULAR; INTRAVENOUS
Start: 2025-08-12

## 2025-06-16 RX ORDER — MEPERIDINE HYDROCHLORIDE 25 MG/ML
25 INJECTION INTRAMUSCULAR; INTRAVENOUS; SUBCUTANEOUS
OUTPATIENT
Start: 2025-07-16

## 2025-06-16 RX ORDER — MEPERIDINE HYDROCHLORIDE 25 MG/ML
25 INJECTION INTRAMUSCULAR; INTRAVENOUS; SUBCUTANEOUS
OUTPATIENT
Start: 2025-07-15

## 2025-06-16 RX ORDER — HEPARIN SODIUM,PORCINE 10 UNIT/ML
5-20 VIAL (ML) INTRAVENOUS DAILY PRN
OUTPATIENT
Start: 2025-08-12

## 2025-06-16 RX ORDER — DIPHENHYDRAMINE HYDROCHLORIDE 50 MG/ML
50 INJECTION, SOLUTION INTRAMUSCULAR; INTRAVENOUS
Start: 2025-08-12

## 2025-06-16 RX ORDER — MEPERIDINE HYDROCHLORIDE 25 MG/ML
25 INJECTION INTRAMUSCULAR; INTRAVENOUS; SUBCUTANEOUS
Start: 2025-07-15

## 2025-06-16 RX ORDER — MEPERIDINE HYDROCHLORIDE 25 MG/ML
25 INJECTION INTRAMUSCULAR; INTRAVENOUS; SUBCUTANEOUS
Start: 2025-08-12

## 2025-06-16 RX ORDER — HEPARIN SODIUM,PORCINE 10 UNIT/ML
5-20 VIAL (ML) INTRAVENOUS DAILY PRN
OUTPATIENT
Start: 2025-07-16

## 2025-06-16 RX ORDER — DIPHENHYDRAMINE HYDROCHLORIDE 50 MG/ML
25 INJECTION, SOLUTION INTRAMUSCULAR; INTRAVENOUS
Start: 2025-08-13

## 2025-06-16 RX ORDER — ACETAMINOPHEN 325 MG/1
650 TABLET ORAL ONCE
Start: 2025-08-12

## 2025-06-16 RX ORDER — ACETAMINOPHEN 325 MG/1
650 TABLET ORAL ONCE
Start: 2025-07-15

## 2025-06-16 RX ORDER — EPINEPHRINE 1 MG/ML
0.3 INJECTION, SOLUTION INTRAMUSCULAR; SUBCUTANEOUS EVERY 5 MIN PRN
OUTPATIENT
Start: 2025-08-12

## 2025-06-16 RX ORDER — DIPHENHYDRAMINE HYDROCHLORIDE 50 MG/ML
25 INJECTION, SOLUTION INTRAMUSCULAR; INTRAVENOUS
Start: 2025-07-16

## 2025-06-16 ASSESSMENT — PAIN SCALES - GENERAL: PAINLEVEL_OUTOF10: NO PAIN (0)

## 2025-06-16 NOTE — PROGRESS NOTES
"Virtual Visit Details    Type of service:  Video Visit   Video Start Time: {video visit start/end time for provider to select:407157}  Video End Time:{video visit start/end time for provider to select:980224}    Originating Location (pt. Location): {video visit patient location:827529::\"Home\"}  {PROVIDER LOCATION On-site should be selected for visits conducted from your clinic location or adjoining [facility] hospital, academic office, or other nearby [facility] building. Off-site should be selected for all other provider locations, including home:800174}  Distant Location (provider location):  {virtual location provider:702105}  Platform used for Video Visit: {Virtual Visit Platforms:157458::\"[employer]\"}  "

## 2025-06-16 NOTE — PROGRESS NOTES
[facility] Hematology / Oncology  Progress Note  Name: Bhavesh Landeros  :  1955  MRN:  [id number]    --------------------    Subjective:  Bhavesh returns for follow-up of follicular lymphoma unaccompanied via video visit.  Since our last visit, Bhavesh has continued to do well from a health standpoint.  Continues to have no major side effects from treatment.  Increasingly feels like the swelling in his abdomen and legs is resolving.  Last paracentesis early ; Bhavesh feels like this is likely his last procedure.  --------------------    Objective:  Video visit    We reviewed CBC, CMP, LDH.  We reviewed PET/CT with independent review.    --------------------    Assessment / Plan:  Follicular lymphoma, grade 1-2 with retroperitoneal involvement and possible serous involvement (ascites).  Status post inpatient cycle 1 Bendamustine with rituximab; rituximab complicated by infusion reaction.  Pulmonary embolism secondary to malignancy requiring chronic anticoagulation.  Retroperitoneal biopsy complicated by retroperitoneal hemorrhage in the setting of anticoagulation.  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  Pancytopenia, likely multifactorial secondary to chemotherapy, lymphoma, acute illness.    Continue Bendamustine and rituximab cycle 4.  Clinically, Bhavesh is showing a beautiful response to therapy with resolution o recurrent peritoneal fluid.  Radiographically, Bhavesh and I reviewed what appears to be a near complete remission following 3 cycles of Bendamustine and rituximab.  In light of this beautiful response clinically and radiographically and overall well tolerance, we agreed to proceed with 6 total cycles.  Okay to discontinue allopurinol.  Continue Eliquis with plans for a minimum of 3 months anticoagulation beyond completing treatment.  Stable blood counts; stable chemistries.  If the need for paracentesis persists, I would be suspicious that another process is  ongoing and potentially may reflect cirrhosis.    Patient Instructions   1) Treatment Cycles 4-6.  2) ESTELLE w/ D1 Cycles 5-6.  3) Paracentesis PRN.  4) BJT 6-8 weeks after completing Treatment w/ labs (CBC, CMP, LDH) and PET.    Jason Serra MD.    Video Visit:  Bhavesh is a 69 year old male who is being evaluated via a billable video visit.  }    Video start time: 2:08 PM  Video end time: 2:22 PM  Provider location: -[place]  Patient location: Home  Mode of transmission:  Portal / [employer].

## 2025-06-16 NOTE — PATIENT INSTRUCTIONS
1) Treatment Cycles 4-6.  2) ESTELLE w/ D1 Cycles 5-6.  3) Paracentesis PRN.  4) BJT 6-8 weeks after completing Treatment w/ labs (CBC, CMP, LDH) and PET.    Jason Serra MD.

## 2025-06-16 NOTE — Clinical Note
2025      Bhavesh Landeros  [address]  [place] [address]      Dear Colleague,    Thank you for referring your patient, Bhavesh Landeros, to the [facility]. Please see a copy of my visit note below.    [facility] Hematology / Oncology  Progress Note  Name: Bhavesh Landeros  :  1955  MRN:  [id number]    --------------------    Subjective:  Bhavesh returns for follow-up of follicular lymphoma unaccompanied via video visit.  Since our last visit, Bhavesh has continued to do well from a health standpoint.  Continues to have no major side effects from treatment.  Increasingly feels like the swelling in his abdomen and legs is resolving.  Last paracentesis early ; Bhavesh feels like this is likely his last procedure.  --------------------    Objective:  Video visit    We reviewed CBC, CMP, LDH.  We reviewed PET/CT with independent review.    --------------------    Assessment / Plan:  Follicular lymphoma, grade 1-2 with retroperitoneal involvement and possible serous involvement (ascites).  Status post inpatient cycle 1 Bendamustine with rituximab; rituximab complicated by infusion reaction.  Pulmonary embolism secondary to malignancy requiring chronic anticoagulation.  Retroperitoneal biopsy complicated by retroperitoneal hemorrhage in the setting of anticoagulation.  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  Pancytopenia, likely multifactorial secondary to chemotherapy, lymphoma, acute illness.    Continue Bendamustine and rituximab cycle 4.  Clinically, Bhavesh is showing a beautiful response to therapy with resolution o recurrent peritoneal fluid.  Radiographically, Bhavesh and I reviewed what appears to be a near complete remission following 3 cycles of Bendamustine and rituximab.  In light of this beautiful response clinically and radiographically and overall well tolerance, we agreed to proceed with 6 total cycles.  Okay to  "discontinue allopurinol.  Continue Eliquis with plans for a minimum of 3 months anticoagulation beyond completing treatment.  Stable blood counts; stable chemistries.  If the need for paracentesis persists, I would be suspicious that another process is ongoing and potentially may reflect cirrhosis.    There are no Patient Instructions on file for this visit.    Video Visit:  Bhavesh is a 69 year old male who is being evaluated via a billable video visit.  }    Video start time: {video visit start/end time for provider to select:887704}  Video end time: {video visit start/end time for provider to select:198014}  Provider location: {Blank:19197:o:\"[facility]\",\"[facility]\",\"[facility]\",\"[facility]\",\"Off-site\"}  Patient location: {Blank:19197:o:\"Home\",\"[facility]\",\"[facility]\"}  Mode of transmission: {Blank:19197:o:\"[facility]\",\"Doximity\",\"Google Duo\",\"FaceTime\"}.        Again, thank you for allowing me to participate in the care of your patient.        Sincerely,        Jason Serra MD    Electronically signed"

## 2025-06-16 NOTE — NURSING NOTE
Current patient location: [id number] [address] [address]    Is the patient currently in the state of [place]? YES    Visit mode: VIDEO    If the visit is dropped, the patient can be reconnected by:VIDEO VISIT: Text to cell phone:   Telephone Information:   Mobile [phone]       Will anyone else be joining the visit? NO  (If patient encounters technical issues they should call [phone])    Are changes needed to the allergy or medication list? No    Are refills needed on medications prescribed by this physician? Discuss with provider    Rooming Documentation:  Questionnaire(s) completed    Reason for visit: RECHECK    Tameka Suero VVF  No vitals

## 2025-06-17 ENCOUNTER — INFUSION THERAPY VISIT (OUTPATIENT)
Dept: INFUSION THERAPY | Facility: CLINIC | Age: 70
End: 2025-06-17
Attending: INTERNAL MEDICINE
Payer: COMMERCIAL

## 2025-06-17 ENCOUNTER — LAB (OUTPATIENT)
Dept: LAB | Facility: CLINIC | Age: 70
End: 2025-06-17
Payer: COMMERCIAL

## 2025-06-17 VITALS
SYSTOLIC BLOOD PRESSURE: 117 MMHG | DIASTOLIC BLOOD PRESSURE: 65 MMHG | TEMPERATURE: 97.8 F | WEIGHT: 206.9 LBS | OXYGEN SATURATION: 99 % | BODY MASS INDEX: 31.46 KG/M2 | HEART RATE: 64 BPM | RESPIRATION RATE: 18 BRPM

## 2025-06-17 DIAGNOSIS — C82.18 GRADE 2 FOLLICULAR LYMPHOMA OF LYMPH NODES OF MULTIPLE REGIONS (H): ICD-10-CM

## 2025-06-17 DIAGNOSIS — E78.2 MIXED HYPERLIPIDEMIA: ICD-10-CM

## 2025-06-17 DIAGNOSIS — Z13.6 SCREENING FOR CARDIOVASCULAR CONDITION: Primary | ICD-10-CM

## 2025-06-17 DIAGNOSIS — Z51.11 ENCOUNTER FOR ANTINEOPLASTIC CHEMOTHERAPY: Primary | ICD-10-CM

## 2025-06-17 LAB
ALBUMIN SERPL BCG-MCNC: 3.2 G/DL (ref 3.5–5.2)
ALP SERPL-CCNC: 81 U/L (ref 40–150)
ALT SERPL W P-5'-P-CCNC: 13 U/L (ref 0–70)
ANION GAP SERPL CALCULATED.3IONS-SCNC: 11 MMOL/L (ref 7–15)
AST SERPL W P-5'-P-CCNC: 18 U/L (ref 0–45)
BASOPHILS # BLD AUTO: 0.1 10E3/UL (ref 0–0.2)
BASOPHILS NFR BLD AUTO: 1 %
BILIRUB SERPL-MCNC: 0.3 MG/DL
BUN SERPL-MCNC: 12 MG/DL (ref 8–23)
CALCIUM SERPL-MCNC: 9.1 MG/DL (ref 8.8–10.4)
CHLORIDE SERPL-SCNC: 102 MMOL/L (ref 98–107)
CHOLEST SERPL-MCNC: 111 MG/DL
CREAT SERPL-MCNC: 0.86 MG/DL (ref 0.67–1.17)
EGFRCR SERPLBLD CKD-EPI 2021: >90 ML/MIN/1.73M2
EOSINOPHIL # BLD AUTO: 0.3 10E3/UL (ref 0–0.7)
EOSINOPHIL NFR BLD AUTO: 4 %
ERYTHROCYTE [DISTWIDTH] IN BLOOD BY AUTOMATED COUNT: 14.4 % (ref 10–15)
FASTING STATUS PATIENT QL REPORTED: YES
FASTING STATUS PATIENT QL REPORTED: YES
GLUCOSE SERPL-MCNC: 115 MG/DL (ref 70–99)
HCO3 SERPL-SCNC: 26 MMOL/L (ref 22–29)
HCT VFR BLD AUTO: 35.4 % (ref 40–53)
HDLC SERPL-MCNC: 39 MG/DL
HGB BLD-MCNC: 11.9 G/DL (ref 13.3–17.7)
IMM GRANULOCYTES # BLD: 0.2 10E3/UL
IMM GRANULOCYTES NFR BLD: 2 %
LDLC SERPL CALC-MCNC: 59 MG/DL
LYMPHOCYTES # BLD AUTO: 0.4 10E3/UL (ref 0.8–5.3)
LYMPHOCYTES NFR BLD AUTO: 5 %
MCH RBC QN AUTO: 28.6 PG (ref 26.5–33)
MCHC RBC AUTO-ENTMCNC: 33.6 G/DL (ref 31.5–36.5)
MCV RBC AUTO: 85 FL (ref 78–100)
MONOCYTES # BLD AUTO: 0.8 10E3/UL (ref 0–1.3)
MONOCYTES NFR BLD AUTO: 11 %
NEUTROPHILS # BLD AUTO: 5.3 10E3/UL (ref 1.6–8.3)
NEUTROPHILS NFR BLD AUTO: 77 %
NONHDLC SERPL-MCNC: 72 MG/DL
NRBC # BLD AUTO: 0 10E3/UL
NRBC BLD AUTO-RTO: 0 /100
PLATELET # BLD AUTO: 216 10E3/UL (ref 150–450)
POTASSIUM SERPL-SCNC: 4.2 MMOL/L (ref 3.4–5.3)
PROT SERPL-MCNC: 5.7 G/DL (ref 6.4–8.3)
RBC # BLD AUTO: 4.16 10E6/UL (ref 4.4–5.9)
SODIUM SERPL-SCNC: 139 MMOL/L (ref 135–145)
TRIGL SERPL-MCNC: 66 MG/DL
WBC # BLD AUTO: 7 10E3/UL (ref 4–11)

## 2025-06-17 PROCEDURE — 258N000003 HC RX IP 258 OP 636: Performed by: INTERNAL MEDICINE

## 2025-06-17 PROCEDURE — 250N000013 HC RX MED GY IP 250 OP 250 PS 637: Performed by: INTERNAL MEDICINE

## 2025-06-17 PROCEDURE — 96367 TX/PROPH/DG ADDL SEQ IV INF: CPT

## 2025-06-17 PROCEDURE — 96413 CHEMO IV INFUSION 1 HR: CPT

## 2025-06-17 PROCEDURE — 80061 LIPID PANEL: CPT

## 2025-06-17 PROCEDURE — 85004 AUTOMATED DIFF WBC COUNT: CPT | Performed by: INTERNAL MEDICINE

## 2025-06-17 PROCEDURE — 250N000011 HC RX IP 250 OP 636: Performed by: INTERNAL MEDICINE

## 2025-06-17 PROCEDURE — 96411 CHEMO IV PUSH ADDL DRUG: CPT

## 2025-06-17 PROCEDURE — 36415 COLL VENOUS BLD VENIPUNCTURE: CPT | Performed by: INTERNAL MEDICINE

## 2025-06-17 PROCEDURE — 96375 TX/PRO/DX INJ NEW DRUG ADDON: CPT

## 2025-06-17 PROCEDURE — 96415 CHEMO IV INFUSION ADDL HR: CPT

## 2025-06-17 PROCEDURE — 82310 ASSAY OF CALCIUM: CPT | Performed by: INTERNAL MEDICINE

## 2025-06-17 RX ORDER — DIPHENHYDRAMINE HYDROCHLORIDE 50 MG/ML
50 INJECTION, SOLUTION INTRAMUSCULAR; INTRAVENOUS ONCE
Status: COMPLETED | OUTPATIENT
Start: 2025-06-17 | End: 2025-06-17

## 2025-06-17 RX ORDER — ONDANSETRON 2 MG/ML
8 INJECTION INTRAMUSCULAR; INTRAVENOUS ONCE
Status: COMPLETED | OUTPATIENT
Start: 2025-06-17 | End: 2025-06-17

## 2025-06-17 RX ORDER — ACETAMINOPHEN 325 MG/1
650 TABLET ORAL ONCE
Status: COMPLETED | OUTPATIENT
Start: 2025-06-17 | End: 2025-06-17

## 2025-06-17 RX ADMIN — BENDAMUSTINE HYDROCHLORIDE 200 MG: 25 INJECTION, SOLUTION INTRAVENOUS at 09:07

## 2025-06-17 RX ADMIN — ACETAMINOPHEN 650 MG: 325 TABLET ORAL at 08:36

## 2025-06-17 RX ADMIN — ONDANSETRON 8 MG: 2 INJECTION, SOLUTION INTRAMUSCULAR; INTRAVENOUS at 08:37

## 2025-06-17 RX ADMIN — DIPHENHYDRAMINE HYDROCHLORIDE 50 MG: 50 INJECTION, SOLUTION INTRAMUSCULAR; INTRAVENOUS at 08:38

## 2025-06-17 RX ADMIN — SODIUM CHLORIDE 250 ML: 0.9 INJECTION, SOLUTION INTRAVENOUS at 08:31

## 2025-06-17 RX ADMIN — SODIUM CHLORIDE 250 MG: 9 INJECTION, SOLUTION INTRAVENOUS at 08:45

## 2025-06-17 RX ADMIN — FAMOTIDINE 20 MG: 10 INJECTION, SOLUTION INTRAVENOUS at 08:38

## 2025-06-17 RX ADMIN — RITUXIMAB-ABBS 800 MG: 10 INJECTION, SOLUTION INTRAVENOUS at 09:23

## 2025-06-17 ASSESSMENT — PAIN SCALES - GENERAL: PAINLEVEL_OUTOF10: NO PAIN (0)

## 2025-06-17 NOTE — PROGRESS NOTES
Infusion Nursing Note:  Bhavesh RAMIN Landeros presents today for C4D1 Bendeka/Truxima.    Patient seen by provider today: No. Video visit with Ponce yesterday.   present during visit today: Not Applicable.    Note: Feeling well. Pleased with success of treatments. Hasn't needed abd paracentesis in the past couple of weeks. He does have an umbilical hernia that pops out more frequently ever since she started getting his abd drained.       Intravenous Access:  Peripheral IV placed.    Treatment Conditions:  Lab Results   Component Value Date    HGB 11.9 (L) 06/17/2025    WBC 7.0 06/17/2025    ANEU 5.3 06/17/2025     06/17/2025        Lab Results   Component Value Date     06/17/2025    POTASSIUM 4.2 06/17/2025    MAG 2.2 03/23/2025    CR 0.86 06/17/2025    SHANNON 9.1 06/17/2025    BILITOTAL 0.3 06/17/2025    ALBUMIN 3.2 (L) 06/17/2025    ALT 13 06/17/2025    AST 18 06/17/2025       Results reviewed, labs MET treatment parameters, ok to proceed with treatment.      Post Infusion Assessment:  Patient tolerated infusion without incident.       Discharge Plan:   Patient discharged in stable condition accompanied by: self.  Departure Mode: Ambulatory.      Jenny Sow RN

## 2025-06-18 ENCOUNTER — INFUSION THERAPY VISIT (OUTPATIENT)
Dept: INFUSION THERAPY | Facility: CLINIC | Age: 70
End: 2025-06-18
Attending: INTERNAL MEDICINE
Payer: COMMERCIAL

## 2025-06-18 ENCOUNTER — RESULTS FOLLOW-UP (OUTPATIENT)
Dept: INFUSION THERAPY | Facility: CLINIC | Age: 70
End: 2025-06-18

## 2025-06-18 VITALS
TEMPERATURE: 96.8 F | DIASTOLIC BLOOD PRESSURE: 66 MMHG | RESPIRATION RATE: 16 BRPM | OXYGEN SATURATION: 99 % | BODY MASS INDEX: 32.2 KG/M2 | SYSTOLIC BLOOD PRESSURE: 126 MMHG | WEIGHT: 211.8 LBS | HEART RATE: 68 BPM

## 2025-06-18 DIAGNOSIS — C82.18 GRADE 2 FOLLICULAR LYMPHOMA OF LYMPH NODES OF MULTIPLE REGIONS (H): ICD-10-CM

## 2025-06-18 DIAGNOSIS — Z51.11 ENCOUNTER FOR ANTINEOPLASTIC CHEMOTHERAPY: Primary | ICD-10-CM

## 2025-06-18 PROCEDURE — 258N000003 HC RX IP 258 OP 636: Performed by: INTERNAL MEDICINE

## 2025-06-18 PROCEDURE — 96372 THER/PROPH/DIAG INJ SC/IM: CPT | Performed by: INTERNAL MEDICINE

## 2025-06-18 PROCEDURE — 96409 CHEMO IV PUSH SNGL DRUG: CPT

## 2025-06-18 PROCEDURE — 96367 TX/PROPH/DG ADDL SEQ IV INF: CPT

## 2025-06-18 PROCEDURE — 96375 TX/PRO/DX INJ NEW DRUG ADDON: CPT

## 2025-06-18 PROCEDURE — 96377 APPLICATON ON-BODY INJECTOR: CPT | Mod: XS | Performed by: INTERNAL MEDICINE

## 2025-06-18 PROCEDURE — 250N000011 HC RX IP 250 OP 636: Mod: JZ | Performed by: INTERNAL MEDICINE

## 2025-06-18 RX ORDER — ONDANSETRON 2 MG/ML
8 INJECTION INTRAMUSCULAR; INTRAVENOUS ONCE
Status: COMPLETED | OUTPATIENT
Start: 2025-06-18 | End: 2025-06-18

## 2025-06-18 RX ADMIN — BENDAMUSTINE HYDROCHLORIDE 200 MG: 25 INJECTION, SOLUTION INTRAVENOUS at 10:05

## 2025-06-18 RX ADMIN — SODIUM CHLORIDE 250 ML: 0.9 INJECTION, SOLUTION INTRAVENOUS at 09:30

## 2025-06-18 RX ADMIN — PEGFILGRASTIM 6 MG: KIT SUBCUTANEOUS at 10:23

## 2025-06-18 RX ADMIN — ONDANSETRON 8 MG: 2 INJECTION, SOLUTION INTRAMUSCULAR; INTRAVENOUS at 09:40

## 2025-06-18 RX ADMIN — FAMOTIDINE 20 MG: 10 INJECTION, SOLUTION INTRAVENOUS at 09:42

## 2025-06-18 RX ADMIN — SODIUM CHLORIDE 250 MG: 9 INJECTION, SOLUTION INTRAVENOUS at 09:46

## 2025-06-18 ASSESSMENT — PAIN SCALES - GENERAL: PAINLEVEL_OUTOF10: NO PAIN (0)

## 2025-06-18 NOTE — PROGRESS NOTES
Infusion Nursing Note:  Bhavesh RAMIN Landeros presents today for C4D2 Bendeka with Neulasta On-Pro.    Patient seen by provider today: No   present during visit today: Not Applicable.    Note: Feeling well. Appetite has been good.      Intravenous Access:  Peripheral IV placed.    Treatment Conditions:  Not Applicable.      Post Infusion Assessment:  Patient tolerated infusion without incident.       Your On-body Neulasta injector was applied today at 1023.  The injection will start 27 hours after application, at 1323 tomorrow.  Note: the medication will be delivered over 45 minutes.  Before removing the injector, check to see that the light is either solid green or off and that the indicator line is on empty.  If there is a red light on the injector at any time or wetness on the dressing or under the injector after removal, you must report this information.   Call [facility]: [phone] during business hours.  Please refer to the written information given to you for additional information on the injector.     Discharge Plan:   Patient discharged in stable condition accompanied by: self.  Departure Mode: Ambulatory.      Jenny Sow RN

## 2025-06-23 ENCOUNTER — MYC MEDICAL ADVICE (OUTPATIENT)
Dept: ONCOLOGY | Facility: CLINIC | Age: 70
End: 2025-06-23
Payer: COMMERCIAL

## 2025-06-23 ENCOUNTER — MYC MEDICAL ADVICE (OUTPATIENT)
Dept: FAMILY MEDICINE | Facility: OTHER | Age: 70
End: 2025-06-23
Payer: COMMERCIAL

## 2025-06-23 DIAGNOSIS — G89.29 CHRONIC LOW BACK PAIN WITHOUT SCIATICA, UNSPECIFIED BACK PAIN LATERALITY: ICD-10-CM

## 2025-06-23 DIAGNOSIS — I26.99 OTHER ACUTE PULMONARY EMBOLISM WITHOUT ACUTE COR PULMONALE (H): ICD-10-CM

## 2025-06-23 DIAGNOSIS — M54.50 CHRONIC LOW BACK PAIN WITHOUT SCIATICA, UNSPECIFIED BACK PAIN LATERALITY: ICD-10-CM

## 2025-06-23 RX ORDER — GABAPENTIN 100 MG/1
100 CAPSULE ORAL 3 TIMES DAILY
Qty: 90 CAPSULE | Refills: 2 | Status: SHIPPED | OUTPATIENT
Start: 2025-06-23

## 2025-06-23 RX ORDER — APIXABAN 2.5 MG/1
2.5 TABLET, FILM COATED ORAL 2 TIMES DAILY
Qty: 60 TABLET | Refills: 2 | Status: SHIPPED | OUTPATIENT
Start: 2025-06-23

## 2025-06-23 NOTE — TELEPHONE ENCOUNTER
S-(situation): abdominal pain/pressure x5 days    B-(background): pt being seen for follicular lymphoma, last paracentesis was early June    A-(assessment): Pt reports that over the last week he has noted an increase in abdominal pain/pressure, with no other accompanying GI symptoms. He feels like he may be retaining fluid in his abdomen. He also reports that he has a chronic umbilical hernia that has been protruding more often than it has in the past, and this has also been occuring more regularly over the past week or so. Pain/pressure is not directly associated with whether the hernia is protruding or not. Denies fever, chest pain, or shortness of breath. Pt would like to schedule US Paracentesis. No acute alarm symptoms.    R-(recommendations): Pt has standing orders for US Paracentesis. Assisted pt in scheduling, now scheduled for imaging and procedure if needed on 6/25.

## 2025-06-24 NOTE — TELEPHONE ENCOUNTER
Patient sent same message to Dr. Serra's team and is scheduled for US and paracentesis tomorrow.     Quin FLORESN, RN

## 2025-06-25 ENCOUNTER — HOSPITAL ENCOUNTER (OUTPATIENT)
Dept: ULTRASOUND IMAGING | Facility: CLINIC | Age: 70
Discharge: HOME | End: 2025-06-25
Attending: INTERNAL MEDICINE
Payer: COMMERCIAL

## 2025-06-25 VITALS
RESPIRATION RATE: 16 BRPM | SYSTOLIC BLOOD PRESSURE: 134 MMHG | OXYGEN SATURATION: 99 % | HEART RATE: 74 BPM | DIASTOLIC BLOOD PRESSURE: 81 MMHG | TEMPERATURE: 98.2 F

## 2025-06-25 DIAGNOSIS — R18.8 OTHER ASCITES: ICD-10-CM

## 2025-06-25 PROCEDURE — 250N000009 HC RX 250: Performed by: RADIOLOGY

## 2025-06-25 PROCEDURE — 96372 THER/PROPH/DIAG INJ SC/IM: CPT | Performed by: RADIOLOGY

## 2025-06-25 PROCEDURE — 272N000706 US PARACENTESIS WITHOUT ALBUMIN

## 2025-06-25 RX ORDER — ALBUMIN (HUMAN) 12.5 G/50ML
25-50 SOLUTION INTRAVENOUS ONCE
Status: DISCONTINUED | OUTPATIENT
Start: 2025-06-25 | End: 2025-06-26 | Stop reason: HOSPADM

## 2025-06-25 RX ORDER — LIDOCAINE 40 MG/G
CREAM TOPICAL
OUTPATIENT
Start: 2025-06-25

## 2025-06-25 RX ORDER — LIDOCAINE HYDROCHLORIDE 10 MG/ML
10 INJECTION, SOLUTION EPIDURAL; INFILTRATION; INTRACAUDAL; PERINEURAL ONCE
Status: COMPLETED | OUTPATIENT
Start: 2025-06-25 | End: 2025-06-25

## 2025-06-25 RX ADMIN — LIDOCAINE HYDROCHLORIDE ANHYDROUS 8 ML: 10 INJECTION, SOLUTION INFILTRATION at 11:54

## 2025-06-30 DIAGNOSIS — Z29.9 PREVENTIVE MEASURE: ICD-10-CM

## 2025-06-30 DIAGNOSIS — K21.00 GASTROESOPHAGEAL REFLUX DISEASE WITH ESOPHAGITIS, UNSPECIFIED WHETHER HEMORRHAGE: ICD-10-CM

## 2025-06-30 RX ORDER — OMEPRAZOLE 20 MG/1
20 CAPSULE, DELAYED RELEASE ORAL DAILY
Qty: 90 CAPSULE | Refills: 2 | Status: SHIPPED | OUTPATIENT
Start: 2025-06-30

## 2025-06-30 RX ORDER — ALLOPURINOL 100 MG/1
100 TABLET ORAL DAILY
Qty: 30 TABLET | Refills: 0 | Status: CANCELLED | OUTPATIENT
Start: 2025-06-30

## 2025-06-30 NOTE — TELEPHONE ENCOUNTER
Clinic RN: Please investigate patient's chart or contact patient if the information cannot be found because the medication is listed as historical or discontinued. Confirm patient is taking this medication. Document findings and route refill encounter to provider for approval or denial.    Quin Espinosa RN on 6/30/2025 at 4:14 PM

## 2025-06-30 NOTE — TELEPHONE ENCOUNTER
Pt requesting allopurinol 100 mg     Did not see on pt med list , please contact pt with any questions     Thank you      Tiana Turner  Pharmacy Tech.  [facility]  [phone]

## 2025-07-02 NOTE — TELEPHONE ENCOUNTER
Discontinued by Dr Serra 6/16/25 - see clinic note.    Called and spoke with patient. Patient states he does remember this and completely forgot. Has been taking still but will stop now. Patient reports he feels great, is not having swelling. Patient has no further questions at time of call.    Maddie Perales RN on 7/2/2025 at 10:31 AM

## 2025-07-05 ENCOUNTER — HEALTH MAINTENANCE LETTER (OUTPATIENT)
Age: 70
End: 2025-07-05

## 2025-07-14 NOTE — PROGRESS NOTES
"Oncology Follow-Up Visit: July 15, 2025    Oncologist: Dr. 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  Status post inpatient cycle 1 Bendamustine with rituximab; rituximab complicated by infusion reaction.  Pulmonary embolism secondary to malignancy requiring chronic anticoagulation.  Retroperitoneal biopsy complicated by retroperitoneal hemorrhage in the setting of anticoagulation.  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  Pancytopenia, likely multifactorial secondary to chemotherapy, lymphoma, acute illness.    Interval History:  Pt is seen in-person accompanied by his daughter (Patria) for review prior to treatment. Overall, tolerating well without side effects. Biggest complaint is ongoing fluid accumulation in his abdomen, feels like he needs another paracentesis. Also has an umbilical hernia that pops out frequently, no associated pain or breathing concerns. BLE edema that improves with elevation. Taking furosemide daily. No additional questions.    Patient denies any of the following except if noted above: fevers, chills, difficulty with energy, vision or hearing changes, chest pain, dyspnea, abdominal pain, nausea, vomiting, diarrhea, constipation, urinary concerns, headaches, numbness, tingling, issues with sleep or mood. He also denies lumps, bumps, rashes or skin lesions, bleeding or bruising issues.    Physical Exam:  /70 (BP Location: Right arm, Patient Position: Sitting, Cuff Size: Adult Regular)   Pulse 82   Temp 97.9  F (36.6  C) (Temporal)   Resp 17   Ht 1.702 m (5' 7\")   Wt 96 kg (211 lb 9 oz)   SpO2 99%   BMI 33.14 kg/m       BP Readings from Last 6 Encounters:   07/15/25 110/70   06/25/25 134/81   06/18/25 126/66   06/17/25 117/65   05/21/25 127/70   05/21/25 120/52     Wt Readings from Last 6 " Encounters:   07/15/25 96 kg (211 lb 9 oz)   06/18/25 96.1 kg (211 lb 12.8 oz)   06/17/25 93.8 kg (206 lb 14.4 oz)   06/16/25 93 kg (205 lb)   05/21/25 97.7 kg (215 lb 6.4 oz)   05/20/25 102.7 kg (226 lb 6 oz)      Constitutional: No acute distress, pleasant, appropriately groomed.   ENT: PERRLA, sclera without erythema. Appropriate dentition.  Neck: Trachea midline, no adenopathy.   Resp: CTA, adequate depth and rate of respirations.   Cardiac: S1/S2, RRR, murmur heard loudest at R) sternal border. 2+ edema to BLE.  Abdomen: BS diminished, abdomen nontender, distended and firm.   MS:  5/5 muscle strength, adequate ROM.   Skin: No rashes, lesions, or wounds on exposed skin.  Neuro: A/O x 4, sensation intact.   Lymph: No palpable anterior/posterior cervical, axillary, or supraclavicular nodes.   Psych: Appropriate mentation and affect.    Laboratory Results:   Results for orders placed or performed in visit on 07/15/25   Comprehensive metabolic panel     Status: Abnormal   Result Value Ref Range    Sodium 142 135 - 145 mmol/L    Potassium 3.8 3.4 - 5.3 mmol/L    Carbon Dioxide (CO2) 24 22 - 29 mmol/L    Anion Gap 12 7 - 15 mmol/L    Urea Nitrogen 14.6 8.0 - 23.0 mg/dL    Creatinine 0.83 0.67 - 1.17 mg/dL    GFR Estimate >90 >60 mL/min/1.73m2    Calcium 8.6 (L) 8.8 - 10.4 mg/dL    Chloride 106 98 - 107 mmol/L    Glucose 107 (H) 70 - 99 mg/dL    Alkaline Phosphatase 76 40 - 150 U/L    AST 21 0 - 45 U/L    ALT 12 0 - 70 U/L    Protein Total 5.7 (L) 6.4 - 8.3 g/dL    Albumin 3.2 (L) 3.5 - 5.2 g/dL    Bilirubin Total 0.3 <=1.2 mg/dL   CBC with platelets and differential     Status: Abnormal   Result Value Ref Range    WBC Count 4.5 4.0 - 11.0 10e3/uL    RBC Count 4.02 (L) 4.40 - 5.90 10e6/uL    Hemoglobin 11.7 (L) 13.3 - 17.7 g/dL    Hematocrit 34.4 (L) 40.0 - 53.0 %    MCV 86 78 - 100 fL    MCH 29.1 26.5 - 33.0 pg    MCHC 34.0 31.5 - 36.5 g/dL    RDW 14.6 10.0 - 15.0 %    Platelet Count 223 150 - 450 10e3/uL    %  Neutrophils 63 %    % Lymphocytes 7 %    % Monocytes 20 %    % Eosinophils 6 %    % Basophils 2 %    % Immature Granulocytes 2 %    NRBCs per 100 WBC 0 <1 /100    Absolute Neutrophils 2.9 1.6 - 8.3 10e3/uL    Absolute Lymphocytes 0.3 (L) 0.8 - 5.3 10e3/uL    Absolute Monocytes 0.9 0.0 - 1.3 10e3/uL    Absolute Eosinophils 0.3 0.0 - 0.7 10e3/uL    Absolute Basophils 0.1 0.0 - 0.2 10e3/uL    Absolute Immature Granulocytes 0.1 <=0.4 10e3/uL    Absolute NRBCs 0.0 10e3/uL   CBC with platelets differential     Status: Abnormal    Narrative    The following orders were created for panel order CBC with platelets differential.  Procedure                               Abnormality         Status                     ---------                               -----------         ------                     CBC with platelets and ...[[id number]]  Abnormal            Final result                 Please view results for these tests on the individual orders.   I reviewed the above labs today.      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   - On treatment with Bendamustine and rituximab, has completed 4 of 6 cycles.   - Tolerating well without mentionable side effects.   - Labs reviewed and discussed. Continues to meet treatment goals, will proceed with cycle 5 without changes.   - PET 6/2025 with near complete resolution after 3 cycles of treatment. Repeat PET 11/2025, followed by review with Dr. Serra.  - Hx of transfusion reaction, continue pre-medications per treatment plan (methylpred, pepcid, benadryl, zofran, and acetaminophen).  - Hx of TLS, s/p rasburicase, allopurinol discontinued.  - All questions asked and answered. Patient is agreeable with plan.    - Follow up plan:  1) Treatment Cycles 4-6.  2) ESTELLE w/ D1 Cycles 5-6.  3) Paracentesis PRN.  4) BJT 6-8 weeks after completing Treatment w/ labs (CBC, CMP, LDH) and PET.    Pulmonary  embolism s/t malignancy   - Current use of Eliquis BID, w/plan for a minimum of 3-months of treatment beyond chemotherapy  - Tolerating well without significant bleeding, reviewed alarm symptoms    BLE edema  - Currently on furosemide 20 mg BID  - Reviewed use of compression socks, elevation, and limiting salt  - Reviewed increasing intake of protein to improve total protein counts    Ascites   - Standing orders in place for paracentesis PRN  - Last procedure on 6/25 with 4.8 L of fluid removed  - No s/s of infection at procedural site   - Feels like he needs another paracentesis, will request assistance with scheduling    Pancytopenia, multifactorial (chemotherapy, lymphoma, acute illness)  - Blood counts stable  - Reviewed s/s of infection & neutropenic precautions  - Reviewed s/s of bleeding and excessive bruising  - No indication for blood transfusion at this time      Stephanie DUNCAN, [place] [address]        The longitudinal plan of care for the diagnosis(es)/condition(s) as documented were addressed during this visit. Due to the added complexity in care, I will continue to support Bhavesh in the subsequent management and with ongoing continuity of care.

## 2025-07-15 ENCOUNTER — APPOINTMENT (OUTPATIENT)
Dept: LAB | Facility: CLINIC | Age: 70
End: 2025-07-15
Payer: COMMERCIAL

## 2025-07-15 ENCOUNTER — ONCOLOGY VISIT (OUTPATIENT)
Dept: ONCOLOGY | Facility: CLINIC | Age: 70
End: 2025-07-15
Payer: COMMERCIAL

## 2025-07-15 ENCOUNTER — INFUSION THERAPY VISIT (OUTPATIENT)
Dept: INFUSION THERAPY | Facility: CLINIC | Age: 70
End: 2025-07-15
Attending: INTERNAL MEDICINE
Payer: COMMERCIAL

## 2025-07-15 VITALS
HEART RATE: 82 BPM | WEIGHT: 211.56 LBS | HEIGHT: 67 IN | OXYGEN SATURATION: 99 % | BODY MASS INDEX: 33.2 KG/M2 | SYSTOLIC BLOOD PRESSURE: 110 MMHG | DIASTOLIC BLOOD PRESSURE: 70 MMHG | RESPIRATION RATE: 17 BRPM | TEMPERATURE: 97.9 F

## 2025-07-15 VITALS
RESPIRATION RATE: 16 BRPM | TEMPERATURE: 97.5 F | HEART RATE: 64 BPM | SYSTOLIC BLOOD PRESSURE: 121 MMHG | DIASTOLIC BLOOD PRESSURE: 68 MMHG | OXYGEN SATURATION: 97 % | WEIGHT: 211.9 LBS | BODY MASS INDEX: 33.26 KG/M2 | HEIGHT: 67 IN

## 2025-07-15 DIAGNOSIS — C82.18 GRADE 2 FOLLICULAR LYMPHOMA OF LYMPH NODES OF MULTIPLE REGIONS (H): ICD-10-CM

## 2025-07-15 DIAGNOSIS — C82.18 GRADE 2 FOLLICULAR LYMPHOMA OF LYMPH NODES OF MULTIPLE REGIONS (H): Primary | ICD-10-CM

## 2025-07-15 DIAGNOSIS — Z51.11 ENCOUNTER FOR ANTINEOPLASTIC CHEMOTHERAPY: Primary | ICD-10-CM

## 2025-07-15 DIAGNOSIS — K44.9 HIATAL HERNIA: ICD-10-CM

## 2025-07-15 DIAGNOSIS — R18.8 OTHER ASCITES: ICD-10-CM

## 2025-07-15 DIAGNOSIS — R60.0 LOWER EXTREMITY EDEMA: ICD-10-CM

## 2025-07-15 DIAGNOSIS — R18.0 MALIGNANT ASCITES (H): ICD-10-CM

## 2025-07-15 DIAGNOSIS — Z79.01 CHRONIC ANTICOAGULATION: ICD-10-CM

## 2025-07-15 LAB
ALBUMIN SERPL BCG-MCNC: 3.2 G/DL (ref 3.5–5.2)
ALP SERPL-CCNC: 76 U/L (ref 40–150)
ALT SERPL W P-5'-P-CCNC: 12 U/L (ref 0–70)
ANION GAP SERPL CALCULATED.3IONS-SCNC: 12 MMOL/L (ref 7–15)
AST SERPL W P-5'-P-CCNC: 21 U/L (ref 0–45)
BASOPHILS # BLD AUTO: 0.1 10E3/UL (ref 0–0.2)
BASOPHILS NFR BLD AUTO: 2 %
BILIRUB SERPL-MCNC: 0.3 MG/DL
BUN SERPL-MCNC: 14.6 MG/DL (ref 8–23)
CALCIUM SERPL-MCNC: 8.6 MG/DL (ref 8.8–10.4)
CHLORIDE SERPL-SCNC: 106 MMOL/L (ref 98–107)
CREAT SERPL-MCNC: 0.83 MG/DL (ref 0.67–1.17)
EGFRCR SERPLBLD CKD-EPI 2021: >90 ML/MIN/1.73M2
EOSINOPHIL # BLD AUTO: 0.3 10E3/UL (ref 0–0.7)
EOSINOPHIL NFR BLD AUTO: 6 %
ERYTHROCYTE [DISTWIDTH] IN BLOOD BY AUTOMATED COUNT: 14.6 % (ref 10–15)
GLUCOSE SERPL-MCNC: 107 MG/DL (ref 70–99)
HCO3 SERPL-SCNC: 24 MMOL/L (ref 22–29)
HCT VFR BLD AUTO: 34.4 % (ref 40–53)
HGB BLD-MCNC: 11.7 G/DL (ref 13.3–17.7)
IMM GRANULOCYTES # BLD: 0.1 10E3/UL
IMM GRANULOCYTES NFR BLD: 2 %
LYMPHOCYTES # BLD AUTO: 0.3 10E3/UL (ref 0.8–5.3)
LYMPHOCYTES NFR BLD AUTO: 7 %
MCH RBC QN AUTO: 29.1 PG (ref 26.5–33)
MCHC RBC AUTO-ENTMCNC: 34 G/DL (ref 31.5–36.5)
MCV RBC AUTO: 86 FL (ref 78–100)
MONOCYTES # BLD AUTO: 0.9 10E3/UL (ref 0–1.3)
MONOCYTES NFR BLD AUTO: 20 %
NEUTROPHILS # BLD AUTO: 2.9 10E3/UL (ref 1.6–8.3)
NEUTROPHILS NFR BLD AUTO: 63 %
NRBC # BLD AUTO: 0 10E3/UL
NRBC BLD AUTO-RTO: 0 /100
PLATELET # BLD AUTO: 223 10E3/UL (ref 150–450)
POTASSIUM SERPL-SCNC: 3.8 MMOL/L (ref 3.4–5.3)
PROT SERPL-MCNC: 5.7 G/DL (ref 6.4–8.3)
RBC # BLD AUTO: 4.02 10E6/UL (ref 4.4–5.9)
SODIUM SERPL-SCNC: 142 MMOL/L (ref 135–145)
WBC # BLD AUTO: 4.5 10E3/UL (ref 4–11)

## 2025-07-15 PROCEDURE — 96415 CHEMO IV INFUSION ADDL HR: CPT

## 2025-07-15 PROCEDURE — 250N000011 HC RX IP 250 OP 636: Performed by: INTERNAL MEDICINE

## 2025-07-15 PROCEDURE — G2211 COMPLEX E/M VISIT ADD ON: HCPCS

## 2025-07-15 PROCEDURE — 36415 COLL VENOUS BLD VENIPUNCTURE: CPT | Performed by: INTERNAL MEDICINE

## 2025-07-15 PROCEDURE — 84155 ASSAY OF PROTEIN SERUM: CPT | Performed by: INTERNAL MEDICINE

## 2025-07-15 PROCEDURE — 96367 TX/PROPH/DG ADDL SEQ IV INF: CPT

## 2025-07-15 PROCEDURE — 85025 COMPLETE CBC W/AUTO DIFF WBC: CPT | Performed by: INTERNAL MEDICINE

## 2025-07-15 PROCEDURE — 258N000003 HC RX IP 258 OP 636: Performed by: INTERNAL MEDICINE

## 2025-07-15 PROCEDURE — 96411 CHEMO IV PUSH ADDL DRUG: CPT

## 2025-07-15 PROCEDURE — 96413 CHEMO IV INFUSION 1 HR: CPT

## 2025-07-15 PROCEDURE — 96375 TX/PRO/DX INJ NEW DRUG ADDON: CPT

## 2025-07-15 PROCEDURE — 99214 OFFICE O/P EST MOD 30 MIN: CPT

## 2025-07-15 PROCEDURE — 250N000013 HC RX MED GY IP 250 OP 250 PS 637: Performed by: INTERNAL MEDICINE

## 2025-07-15 RX ORDER — ONDANSETRON 2 MG/ML
8 INJECTION INTRAMUSCULAR; INTRAVENOUS ONCE
Status: COMPLETED | OUTPATIENT
Start: 2025-07-15 | End: 2025-07-15

## 2025-07-15 RX ORDER — ACETAMINOPHEN 325 MG/1
650 TABLET ORAL ONCE
Status: COMPLETED | OUTPATIENT
Start: 2025-07-15 | End: 2025-07-15

## 2025-07-15 RX ORDER — DIPHENHYDRAMINE HYDROCHLORIDE 50 MG/ML
50 INJECTION, SOLUTION INTRAMUSCULAR; INTRAVENOUS EVERY 6 HOURS
Status: DISCONTINUED | OUTPATIENT
Start: 2025-07-15 | End: 2025-07-15 | Stop reason: HOSPADM

## 2025-07-15 RX ADMIN — DIPHENHYDRAMINE HYDROCHLORIDE 50 MG: 50 INJECTION, SOLUTION INTRAMUSCULAR; INTRAVENOUS at 10:05

## 2025-07-15 RX ADMIN — ONDANSETRON 8 MG: 2 INJECTION, SOLUTION INTRAMUSCULAR; INTRAVENOUS at 10:00

## 2025-07-15 RX ADMIN — SODIUM CHLORIDE 250 MG: 9 INJECTION, SOLUTION INTRAVENOUS at 10:08

## 2025-07-15 RX ADMIN — FAMOTIDINE 20 MG: 10 INJECTION, SOLUTION INTRAVENOUS at 10:00

## 2025-07-15 RX ADMIN — SODIUM CHLORIDE 250 ML: 0.9 INJECTION, SOLUTION INTRAVENOUS at 09:56

## 2025-07-15 RX ADMIN — ACETAMINOPHEN 650 MG: 325 TABLET ORAL at 09:46

## 2025-07-15 RX ADMIN — RITUXIMAB-ABBS 800 MG: 10 INJECTION, SOLUTION INTRAVENOUS at 10:49

## 2025-07-15 RX ADMIN — BENDAMUSTINE HYDROCHLORIDE 200 MG: 25 INJECTION, SOLUTION INTRAVENOUS at 10:30

## 2025-07-15 ASSESSMENT — PAIN SCALES - GENERAL: PAINLEVEL_OUTOF10: NO PAIN (0)

## 2025-07-15 NOTE — TELEPHONE ENCOUNTER
Pt stated that someone called his Daughter and the pt was not happy about his and stated that she is to be called only in an emergency situation.  Pt stated he is call his PCP as he has no clue why she is ordering this test. . Thank You Cathy     independent

## 2025-07-15 NOTE — Clinical Note
7/15/2025      Bhavesh Landeros  [address]  [place] [address]      Dear Colleague,    Thank you for referring your patient, Bhavesh Landeors, to the [facility]. Please see a copy of my visit note below.    Oncology Follow-Up Visit: July 15, 2025    Oncologist: Dr. 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  Status post inpatient cycle 1 Bendamustine with rituximab; rituximab complicated by infusion reaction.  Pulmonary embolism secondary to malignancy requiring chronic anticoagulation.  Retroperitoneal biopsy complicated by retroperitoneal hemorrhage in the setting of anticoagulation.  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  Pancytopenia, likely multifactorial secondary to chemotherapy, lymphoma, acute illness.    Interval History:  Pt is seen in-person accompanied by his daughter (Patria) for review prior to treatment. Overall, tolerating well with some fatigue after treatment. No N/V/C/D. Abdominal distention and fullness continues, although slightly improved. Scheduled for paracentesis tomorrow. Breathing becomes more labored with reyna abdomen. Appetite is intact and staying well hydrated. Mild neuropathy to L) leg. BLE edema that improves with compression and elevation. Taking furosemide BID, no disruption in sleep d/t nocturia. No rashes, breathing issues, or chest complaints. No additional questions.    Patient denies any of the following except if noted above: fevers, chills, difficulty with energy, vision or hearing changes, chest pain, dyspnea, abdominal pain, nausea, vomiting, diarrhea, constipation, urinary concerns, headaches, numbness, tingling, issues with sleep or mood. He also denies lumps, bumps, rashes or skin lesions, bleeding or bruising issues.    Physical  "Exam:  /70 (BP Location: Right arm, Patient Position: Sitting, Cuff Size: Adult Regular)   Pulse 82   Temp 97.9  F (36.6  C) (Temporal)   Resp 17   Ht 1.702 m (5' 7\")   Wt 96 kg (211 lb 9 oz)   SpO2 99%   BMI 33.14 kg/m       BP Readings from Last 6 Encounters:   07/15/25 110/70   06/25/25 134/81   06/18/25 126/66   06/17/25 117/65   05/21/25 127/70   05/21/25 120/52     Wt Readings from Last 6 Encounters:   07/15/25 96 kg (211 lb 9 oz)   06/18/25 96.1 kg (211 lb 12.8 oz)   06/17/25 93.8 kg (206 lb 14.4 oz)   06/16/25 93 kg (205 lb)   05/21/25 97.7 kg (215 lb 6.4 oz)   05/20/25 102.7 kg (226 lb 6 oz)      Constitutional: No acute distress, pleasant, appropriately groomed.   ENT: PERRLA, sclera without erythema. Appropriate dentition.  Neck: Trachea midline, no adenopathy.   Resp: CTA, adequate depth and rate of respirations.   Cardiac: S1/S2, RRR, murmur heard loudest at R) sternal border.   Abdomen: BS diminished, abdomen nontender, distended and firm.   MS:  5/5 muscle strength, adequate ROM.   Skin: No rashes, lesions, or wounds on exposed skin.  Neuro: A/O x 4, sensation intact.   Lymph: No palpable anterior/posterior cervical, axillary, or supraclavicular nodes.   Psych: Appropriate mentation and affect.    Laboratory Results:   Results for orders placed or performed in visit on 07/15/25   CBC with platelets and differential     Status: Abnormal   Result Value Ref Range    WBC Count 4.5 4.0 - 11.0 10e3/uL    RBC Count 4.02 (L) 4.40 - 5.90 10e6/uL    Hemoglobin 11.7 (L) 13.3 - 17.7 g/dL    Hematocrit 34.4 (L) 40.0 - 53.0 %    MCV 86 78 - 100 fL    MCH 29.1 26.5 - 33.0 pg    MCHC 34.0 31.5 - 36.5 g/dL    RDW 14.6 10.0 - 15.0 %    Platelet Count 223 150 - 450 10e3/uL    % Neutrophils 63 %    % Lymphocytes 7 %    % Monocytes 20 %    % Eosinophils 6 %    % Basophils 2 %    % Immature Granulocytes 2 %    NRBCs per 100 WBC 0 <1 /100    Absolute Neutrophils 2.9 1.6 - 8.3 10e3/uL    Absolute Lymphocytes 0.3 " (L) 0.8 - 5.3 10e3/uL    Absolute Monocytes 0.9 0.0 - 1.3 10e3/uL    Absolute Eosinophils 0.3 0.0 - 0.7 10e3/uL    Absolute Basophils 0.1 0.0 - 0.2 10e3/uL    Absolute Immature Granulocytes 0.1 <=0.4 10e3/uL    Absolute NRBCs 0.0 10e3/uL   CBC with platelets differential     Status: Abnormal    Narrative    The following orders were created for panel order CBC with platelets differential.  Procedure                               Abnormality         Status                     ---------                               -----------         ------                     CBC with platelets and ...[[id number]]  Abnormal            Final result                 Please view results for these tests on the individual orders.   I reviewed the above labs today.          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   - On treatment with Bendamustine and rituximab, has completed 4 of 6 cycles.   - Tolerating well with manageable ***.   - Labs reviewed and discussed. Continues to meet treatment goals, will proceed with cycle 5 without changes.   - PET 6/2025 with near complete resolution after 3 cycles of treatment. Repeat PET 11/2025, followed by review with Dr. Serra.  - Hx of transfusion reaction, continue pre-medications per treatment plan (methylpred, pepcid, benadryl, zofran, and acetaminophen).  - Hx of TLS, s/p rasburicase, allopurinol discontinued.  - All questions asked and answered. Patient is agreeable with plan.    - Follow up plan:  1) Treatment Cycles 4-6.  2) ESTELLE w/ D1 Cycles 5-6.  3) Paracentesis PRN.  4) BJT 6-8 weeks after completing Treatment w/ labs (CBC, CMP, LDH) and PET.    Pulmonary embolism s/t malignancy   - Current use of Eliquis BID, w/plan for a minimum of 3-months of treatment beyond chemotherapy  - Tolerating well without significant bleeding, reviewed alarm symptoms    BLE edema  - Currently on furosemide 20 mg BID  -  Reviewed use of compression socks, elevation, and limiting salt    Ascites   - Standing orders in place for paracentesis PRN  - Last procedure on 6/25 with 4.8 L of fluid removed  - No s/s of infection at procedural site     Pancytopenia, multifactorial (chemotherapy, lymphoma, acute illness)  - Blood counts stable  - Reviewed s/s of infection & neutropenic precautions  - Reviewed s/s of bleeding and excessive bruising  - No indication for blood transfusion at this time      Stephanie DUNCAN, [place] [address]        The longitudinal plan of care for the diagnosis(es)/condition(s) as documented were addressed during this visit. Due to the added complexity in care, I will continue to support Bhavesh in the subsequent management and with ongoing continuity of care.       Again, thank you for allowing me to participate in the care of your patient.        Sincerely,        PERLA Lopez CNP    Electronically signed

## 2025-07-15 NOTE — PROGRESS NOTES
Infusion Nursing Note:  Bhavesh RAMIN Landeros presents today for C5D1 Bendamustine/Truxima.    Patient seen by provider today: Yes: MIKAEL Kaye   present during visit today: Not Applicable.    Note: Patient arrived from Speciality clinic visit. Labs done prior. VSS. No complaints.      Intravenous Access:  Peripheral IV placed.    Treatment Conditions:  Lab Results   Component Value Date    HGB 11.7 (L) 07/15/2025    WBC 4.5 07/15/2025    ANEU 2.9 07/15/2025     07/15/2025        Lab Results   Component Value Date     07/15/2025    POTASSIUM 3.8 07/15/2025    MAG 2.2 03/23/2025    CR 0.83 07/15/2025    SHANNON 8.6 (L) 07/15/2025    BILITOTAL 0.3 07/15/2025    ALBUMIN 3.2 (L) 07/15/2025    ALT 12 07/15/2025    AST 21 07/15/2025       Results reviewed, labs MET treatment parameters, ok to proceed with treatment.      Post Infusion Assessment:  Patient tolerated infusion without incident.  Blood return noted pre and post infusion.  Site patent and intact, free from redness, edema or discomfort.  No evidence of extravasations.  Access discontinued per protocol.       Discharge Plan:   [facility]  Patient will return tomorrow D2 for next appointment.   Patient discharged in stable condition accompanied by: self.  Departure Mode: Ambulatory.      Rosario Chahal RN

## 2025-07-16 ENCOUNTER — INFUSION THERAPY VISIT (OUTPATIENT)
Dept: INFUSION THERAPY | Facility: CLINIC | Age: 70
End: 2025-07-16
Attending: INTERNAL MEDICINE
Payer: COMMERCIAL

## 2025-07-16 ENCOUNTER — HOSPITAL ENCOUNTER (OUTPATIENT)
Dept: ULTRASOUND IMAGING | Facility: CLINIC | Age: 70
Discharge: HOME | End: 2025-07-16
Attending: INTERNAL MEDICINE
Payer: COMMERCIAL

## 2025-07-16 VITALS
DIASTOLIC BLOOD PRESSURE: 74 MMHG | RESPIRATION RATE: 16 BRPM | SYSTOLIC BLOOD PRESSURE: 136 MMHG | TEMPERATURE: 97.1 F | OXYGEN SATURATION: 99 % | HEART RATE: 73 BPM

## 2025-07-16 VITALS
HEART RATE: 71 BPM | SYSTOLIC BLOOD PRESSURE: 135 MMHG | WEIGHT: 219.7 LBS | TEMPERATURE: 96.9 F | RESPIRATION RATE: 16 BRPM | OXYGEN SATURATION: 99 % | DIASTOLIC BLOOD PRESSURE: 73 MMHG | BODY MASS INDEX: 34.41 KG/M2

## 2025-07-16 DIAGNOSIS — R18.8 OTHER ASCITES: ICD-10-CM

## 2025-07-16 DIAGNOSIS — Z51.11 ENCOUNTER FOR ANTINEOPLASTIC CHEMOTHERAPY: Primary | ICD-10-CM

## 2025-07-16 DIAGNOSIS — C82.18 GRADE 2 FOLLICULAR LYMPHOMA OF LYMPH NODES OF MULTIPLE REGIONS (H): ICD-10-CM

## 2025-07-16 PROCEDURE — 96375 TX/PRO/DX INJ NEW DRUG ADDON: CPT | Mod: XU

## 2025-07-16 PROCEDURE — 258N000003 HC RX IP 258 OP 636: Performed by: INTERNAL MEDICINE

## 2025-07-16 PROCEDURE — 96367 TX/PROPH/DG ADDL SEQ IV INF: CPT | Mod: XU

## 2025-07-16 PROCEDURE — 49083 ABD PARACENTESIS W/IMAGING: CPT

## 2025-07-16 PROCEDURE — 96377 APPLICATON ON-BODY INJECTOR: CPT | Mod: XS | Performed by: INTERNAL MEDICINE

## 2025-07-16 PROCEDURE — 250N000011 HC RX IP 250 OP 636: Performed by: INTERNAL MEDICINE

## 2025-07-16 PROCEDURE — 96372 THER/PROPH/DIAG INJ SC/IM: CPT | Performed by: INTERNAL MEDICINE

## 2025-07-16 PROCEDURE — 250N000009 HC RX 250: Performed by: INTERNAL MEDICINE

## 2025-07-16 PROCEDURE — 96409 CHEMO IV PUSH SNGL DRUG: CPT

## 2025-07-16 RX ORDER — LIDOCAINE HYDROCHLORIDE 10 MG/ML
5 INJECTION, SOLUTION EPIDURAL; INFILTRATION; INTRACAUDAL; PERINEURAL ONCE
Status: COMPLETED | OUTPATIENT
Start: 2025-07-16 | End: 2025-07-16

## 2025-07-16 RX ORDER — ONDANSETRON 2 MG/ML
8 INJECTION INTRAMUSCULAR; INTRAVENOUS ONCE
Status: COMPLETED | OUTPATIENT
Start: 2025-07-16 | End: 2025-07-16

## 2025-07-16 RX ORDER — LIDOCAINE 40 MG/G
CREAM TOPICAL
Status: DISCONTINUED | OUTPATIENT
Start: 2025-07-16 | End: 2025-07-17 | Stop reason: HOSPADM

## 2025-07-16 RX ORDER — ALBUMIN (HUMAN) 12.5 G/50ML
25-50 SOLUTION INTRAVENOUS ONCE
OUTPATIENT
Start: 2025-07-16 | End: 2025-07-16

## 2025-07-16 RX ADMIN — ONDANSETRON 8 MG: 2 INJECTION, SOLUTION INTRAMUSCULAR; INTRAVENOUS at 10:02

## 2025-07-16 RX ADMIN — LIDOCAINE HYDROCHLORIDE 8 ML: 10 INJECTION, SOLUTION EPIDURAL; INFILTRATION; INTRACAUDAL; PERINEURAL at 12:59

## 2025-07-16 RX ADMIN — SODIUM CHLORIDE 250 MG: 9 INJECTION, SOLUTION INTRAVENOUS at 09:46

## 2025-07-16 RX ADMIN — SODIUM CHLORIDE 250 ML: 0.9 INJECTION, SOLUTION INTRAVENOUS at 09:41

## 2025-07-16 RX ADMIN — BENDAMUSTINE HYDROCHLORIDE 200 MG: 25 INJECTION, SOLUTION INTRAVENOUS at 10:19

## 2025-07-16 RX ADMIN — PEGFILGRASTIM 6 MG: KIT SUBCUTANEOUS at 10:45

## 2025-07-16 NOTE — PROGRESS NOTES
Infusion Nursing Note:  Bhavesh FAUSTIN Jenniferjailyn presents today for C5D2 Bendamustine/On Pro Neulasta.    Patient seen by provider today: No   present during visit today: Not Applicable.    Note: Patient arrived ambulatory. C/o unable to sleep well last night. No fever/chills, nausea or pain. VSS.      Intravenous Access:  Peripheral IV placed.    Treatment Conditions:  Not Applicable.      Post Infusion Assessment:  Patient tolerated infusion without incident.  Blood return noted pre and post infusion.  Site patent and intact, free from redness, edema or discomfort.  No evidence of extravasations.  Access discontinued per protocol.       Your On-body Neulasta injector was applied today at 10:45 to left arm.  The injection will start 27 hours after application, at 1:45pm tomorrow.  Note: the medication will be delivered over 45 minutes.  Before removing the injector, check to see that the light is either solid green or off and that the indicator line is on empty.  If there is a red light on the injector at any time or wetness on the dressing or under the injector after removal, you must report this information.   Call [place]: [phone] during business hours.  Please refer to the written information given to you for additional information on the injector.   Discharge Plan:   Discharge instructions reviewed with: Patient.  AVS to patient via [employer].  Patient will return 8/12 for next appointment.   Patient discharged in stable condition accompanied by: self.  Departure Mode: Ambulatory. Patient going to radiology for appt.      Rosario Chahal RN

## 2025-07-23 DIAGNOSIS — E78.2 MIXED HYPERLIPIDEMIA: ICD-10-CM

## 2025-07-24 RX ORDER — ATORVASTATIN CALCIUM 20 MG/1
20 TABLET, FILM COATED ORAL DAILY
Qty: 90 TABLET | Refills: 2 | Status: SHIPPED | OUTPATIENT
Start: 2025-07-24

## 2025-07-30 ENCOUNTER — HOSPITAL ENCOUNTER (OUTPATIENT)
Dept: ULTRASOUND IMAGING | Facility: CLINIC | Age: 70
Discharge: HOME | End: 2025-07-30
Attending: INTERNAL MEDICINE
Payer: COMMERCIAL

## 2025-07-30 VITALS
TEMPERATURE: 97.3 F | SYSTOLIC BLOOD PRESSURE: 133 MMHG | RESPIRATION RATE: 18 BRPM | HEART RATE: 80 BPM | DIASTOLIC BLOOD PRESSURE: 80 MMHG | OXYGEN SATURATION: 100 %

## 2025-07-30 DIAGNOSIS — R18.8 OTHER ASCITES: ICD-10-CM

## 2025-07-30 PROCEDURE — 250N000009 HC RX 250: Performed by: INTERNAL MEDICINE

## 2025-07-30 PROCEDURE — 272N000706 US PARACENTESIS WITHOUT ALBUMIN

## 2025-07-30 PROCEDURE — 96372 THER/PROPH/DIAG INJ SC/IM: CPT | Performed by: INTERNAL MEDICINE

## 2025-07-30 RX ORDER — LIDOCAINE HYDROCHLORIDE 10 MG/ML
10 INJECTION, SOLUTION EPIDURAL; INFILTRATION; INTRACAUDAL; PERINEURAL ONCE
Status: COMPLETED | OUTPATIENT
Start: 2025-07-30 | End: 2025-07-30

## 2025-07-30 RX ORDER — ALBUMIN (HUMAN) 12.5 G/50ML
25-50 SOLUTION INTRAVENOUS ONCE
Status: DISCONTINUED | OUTPATIENT
Start: 2025-07-30 | End: 2025-07-31 | Stop reason: HOSPADM

## 2025-07-30 RX ORDER — LIDOCAINE 40 MG/G
CREAM TOPICAL
Status: DISCONTINUED | OUTPATIENT
Start: 2025-07-30 | End: 2025-07-31 | Stop reason: HOSPADM

## 2025-07-30 RX ADMIN — LIDOCAINE HYDROCHLORIDE 8 ML: 10 INJECTION, SOLUTION EPIDURAL; INFILTRATION; INTRACAUDAL; PERINEURAL at 11:46

## 2025-08-12 ENCOUNTER — APPOINTMENT (OUTPATIENT)
Dept: LAB | Facility: CLINIC | Age: 70
End: 2025-08-12
Payer: COMMERCIAL

## 2025-08-12 ENCOUNTER — INFUSION THERAPY VISIT (OUTPATIENT)
Dept: INFUSION THERAPY | Facility: CLINIC | Age: 70
End: 2025-08-12
Attending: INTERNAL MEDICINE
Payer: COMMERCIAL

## 2025-08-12 ENCOUNTER — ONCOLOGY VISIT (OUTPATIENT)
Dept: ONCOLOGY | Facility: CLINIC | Age: 70
End: 2025-08-12
Payer: COMMERCIAL

## 2025-08-12 VITALS
WEIGHT: 220.31 LBS | DIASTOLIC BLOOD PRESSURE: 64 MMHG | OXYGEN SATURATION: 97 % | TEMPERATURE: 97.7 F | BODY MASS INDEX: 34.58 KG/M2 | SYSTOLIC BLOOD PRESSURE: 114 MMHG | HEART RATE: 77 BPM | RESPIRATION RATE: 15 BRPM | HEIGHT: 67 IN

## 2025-08-12 VITALS — SYSTOLIC BLOOD PRESSURE: 125 MMHG | HEART RATE: 73 BPM | DIASTOLIC BLOOD PRESSURE: 68 MMHG

## 2025-08-12 DIAGNOSIS — R18.8 OTHER ASCITES: ICD-10-CM

## 2025-08-12 DIAGNOSIS — C82.18 GRADE 2 FOLLICULAR LYMPHOMA OF LYMPH NODES OF MULTIPLE REGIONS (H): Primary | ICD-10-CM

## 2025-08-12 DIAGNOSIS — K44.9 HIATAL HERNIA: ICD-10-CM

## 2025-08-12 DIAGNOSIS — Z79.01 CHRONIC ANTICOAGULATION: ICD-10-CM

## 2025-08-12 DIAGNOSIS — R60.0 LOWER EXTREMITY EDEMA: ICD-10-CM

## 2025-08-12 DIAGNOSIS — Z51.11 ENCOUNTER FOR ANTINEOPLASTIC CHEMOTHERAPY: ICD-10-CM

## 2025-08-12 DIAGNOSIS — R18.0 MALIGNANT ASCITES (H): ICD-10-CM

## 2025-08-12 LAB
ALBUMIN SERPL BCG-MCNC: 3.2 G/DL (ref 3.5–5.2)
ALP SERPL-CCNC: 87 U/L (ref 40–150)
ALT SERPL W P-5'-P-CCNC: 12 U/L (ref 0–70)
ANION GAP SERPL CALCULATED.3IONS-SCNC: 9 MMOL/L (ref 7–15)
AST SERPL W P-5'-P-CCNC: 17 U/L (ref 0–45)
BASOPHILS # BLD AUTO: 0.1 10E3/UL (ref 0–0.2)
BASOPHILS NFR BLD AUTO: 3 %
BILIRUB SERPL-MCNC: 0.3 MG/DL
BUN SERPL-MCNC: 11.5 MG/DL (ref 8–23)
CALCIUM SERPL-MCNC: 8.6 MG/DL (ref 8.8–10.4)
CHLORIDE SERPL-SCNC: 105 MMOL/L (ref 98–107)
CREAT SERPL-MCNC: 0.9 MG/DL (ref 0.67–1.17)
EGFRCR SERPLBLD CKD-EPI 2021: >90 ML/MIN/1.73M2
EOSINOPHIL # BLD AUTO: 0.2 10E3/UL (ref 0–0.7)
EOSINOPHIL NFR BLD AUTO: 6 %
ERYTHROCYTE [DISTWIDTH] IN BLOOD BY AUTOMATED COUNT: 14.6 % (ref 10–15)
GLUCOSE SERPL-MCNC: 103 MG/DL (ref 70–99)
HCO3 SERPL-SCNC: 26 MMOL/L (ref 22–29)
HCT VFR BLD AUTO: 33.3 % (ref 40–53)
HGB BLD-MCNC: 11.5 G/DL (ref 13.3–17.7)
IMM GRANULOCYTES # BLD: 0 10E3/UL
IMM GRANULOCYTES NFR BLD: 0 %
LYMPHOCYTES # BLD AUTO: 0.6 10E3/UL (ref 0.8–5.3)
LYMPHOCYTES NFR BLD AUTO: 18 %
MCH RBC QN AUTO: 29.9 PG (ref 26.5–33)
MCHC RBC AUTO-ENTMCNC: 34.5 G/DL (ref 31.5–36.5)
MCV RBC AUTO: 87 FL (ref 78–100)
MONOCYTES # BLD AUTO: 0.9 10E3/UL (ref 0–1.3)
MONOCYTES NFR BLD AUTO: 28 %
NEUTROPHILS # BLD AUTO: 1.5 10E3/UL (ref 1.6–8.3)
NEUTROPHILS NFR BLD AUTO: 45 %
NRBC # BLD AUTO: 0 10E3/UL
NRBC BLD AUTO-RTO: 0 /100
PLATELET # BLD AUTO: 211 10E3/UL (ref 150–450)
POTASSIUM SERPL-SCNC: 3.7 MMOL/L (ref 3.4–5.3)
PROT SERPL-MCNC: 5.5 G/DL (ref 6.4–8.3)
RBC # BLD AUTO: 3.84 10E6/UL (ref 4.4–5.9)
SODIUM SERPL-SCNC: 140 MMOL/L (ref 135–145)
WBC # BLD AUTO: 3.3 10E3/UL (ref 4–11)

## 2025-08-12 PROCEDURE — 258N000003 HC RX IP 258 OP 636: Performed by: INTERNAL MEDICINE

## 2025-08-12 PROCEDURE — 250N000013 HC RX MED GY IP 250 OP 250 PS 637: Performed by: INTERNAL MEDICINE

## 2025-08-12 PROCEDURE — 99214 OFFICE O/P EST MOD 30 MIN: CPT

## 2025-08-12 PROCEDURE — 96411 CHEMO IV PUSH ADDL DRUG: CPT

## 2025-08-12 PROCEDURE — 96367 TX/PROPH/DG ADDL SEQ IV INF: CPT

## 2025-08-12 PROCEDURE — 96413 CHEMO IV INFUSION 1 HR: CPT

## 2025-08-12 PROCEDURE — G2211 COMPLEX E/M VISIT ADD ON: HCPCS

## 2025-08-12 PROCEDURE — 36415 COLL VENOUS BLD VENIPUNCTURE: CPT | Performed by: INTERNAL MEDICINE

## 2025-08-12 PROCEDURE — 96375 TX/PRO/DX INJ NEW DRUG ADDON: CPT

## 2025-08-12 PROCEDURE — 85041 AUTOMATED RBC COUNT: CPT | Performed by: INTERNAL MEDICINE

## 2025-08-12 PROCEDURE — 80053 COMPREHEN METABOLIC PANEL: CPT | Performed by: INTERNAL MEDICINE

## 2025-08-12 PROCEDURE — 96415 CHEMO IV INFUSION ADDL HR: CPT

## 2025-08-12 PROCEDURE — 250N000011 HC RX IP 250 OP 636: Mod: JZ | Performed by: INTERNAL MEDICINE

## 2025-08-12 RX ORDER — ONDANSETRON 2 MG/ML
8 INJECTION INTRAMUSCULAR; INTRAVENOUS ONCE
Status: COMPLETED | OUTPATIENT
Start: 2025-08-12 | End: 2025-08-12

## 2025-08-12 RX ORDER — DIPHENHYDRAMINE HYDROCHLORIDE 50 MG/ML
50 INJECTION, SOLUTION INTRAMUSCULAR; INTRAVENOUS ONCE
Status: COMPLETED | OUTPATIENT
Start: 2025-08-12 | End: 2025-08-12

## 2025-08-12 RX ORDER — ACETAMINOPHEN 325 MG/1
650 TABLET ORAL ONCE
Status: COMPLETED | OUTPATIENT
Start: 2025-08-12 | End: 2025-08-12

## 2025-08-12 RX ADMIN — ONDANSETRON 8 MG: 2 INJECTION INTRAMUSCULAR; INTRAVENOUS at 11:41

## 2025-08-12 RX ADMIN — RITUXIMAB-ABBS 800 MG: 10 INJECTION, SOLUTION INTRAVENOUS at 12:40

## 2025-08-12 RX ADMIN — SODIUM CHLORIDE 250 MG: 9 INJECTION, SOLUTION INTRAVENOUS at 11:51

## 2025-08-12 RX ADMIN — BENDAMUSTINE HYDROCHLORIDE 200 MG: 25 INJECTION, SOLUTION INTRAVENOUS at 12:21

## 2025-08-12 RX ADMIN — DIPHENHYDRAMINE HYDROCHLORIDE 50 MG: 50 INJECTION, SOLUTION INTRAMUSCULAR; INTRAVENOUS at 11:40

## 2025-08-12 RX ADMIN — ACETAMINOPHEN 650 MG: 325 TABLET ORAL at 11:23

## 2025-08-12 RX ADMIN — FAMOTIDINE 20 MG: 10 INJECTION, SOLUTION INTRAVENOUS at 11:38

## 2025-08-12 RX ADMIN — SODIUM CHLORIDE 250 ML: 0.9 INJECTION, SOLUTION INTRAVENOUS at 11:38

## 2025-08-12 ASSESSMENT — PAIN SCALES - GENERAL: PAINLEVEL_OUTOF10: NO PAIN (0)

## 2025-08-13 ENCOUNTER — INFUSION THERAPY VISIT (OUTPATIENT)
Dept: INFUSION THERAPY | Facility: CLINIC | Age: 70
End: 2025-08-13
Attending: INTERNAL MEDICINE
Payer: COMMERCIAL

## 2025-08-13 ENCOUNTER — HOSPITAL ENCOUNTER (OUTPATIENT)
Dept: ULTRASOUND IMAGING | Facility: CLINIC | Age: 70
Discharge: HOME | End: 2025-08-13
Payer: COMMERCIAL

## 2025-08-13 VITALS
SYSTOLIC BLOOD PRESSURE: 99 MMHG | HEART RATE: 74 BPM | DIASTOLIC BLOOD PRESSURE: 56 MMHG | BODY MASS INDEX: 32 KG/M2 | WEIGHT: 204.3 LBS | OXYGEN SATURATION: 99 %

## 2025-08-13 VITALS
OXYGEN SATURATION: 98 % | HEART RATE: 64 BPM | SYSTOLIC BLOOD PRESSURE: 108 MMHG | DIASTOLIC BLOOD PRESSURE: 72 MMHG | RESPIRATION RATE: 16 BRPM

## 2025-08-13 DIAGNOSIS — Z51.11 ENCOUNTER FOR ANTINEOPLASTIC CHEMOTHERAPY: Primary | ICD-10-CM

## 2025-08-13 DIAGNOSIS — C82.18 GRADE 2 FOLLICULAR LYMPHOMA OF LYMPH NODES OF MULTIPLE REGIONS (H): ICD-10-CM

## 2025-08-13 DIAGNOSIS — R18.8 OTHER ASCITES: ICD-10-CM

## 2025-08-13 PROCEDURE — 258N000003 HC RX IP 258 OP 636: Performed by: INTERNAL MEDICINE

## 2025-08-13 PROCEDURE — 96372 THER/PROPH/DIAG INJ SC/IM: CPT | Performed by: INTERNAL MEDICINE

## 2025-08-13 PROCEDURE — 250N000009 HC RX 250: Performed by: RADIOLOGY

## 2025-08-13 PROCEDURE — 250N000011 HC RX IP 250 OP 636: Performed by: RADIOLOGY

## 2025-08-13 PROCEDURE — 96375 TX/PRO/DX INJ NEW DRUG ADDON: CPT

## 2025-08-13 PROCEDURE — 49083 ABD PARACENTESIS W/IMAGING: CPT

## 2025-08-13 PROCEDURE — P9047 ALBUMIN (HUMAN), 25%, 50ML: HCPCS | Performed by: RADIOLOGY

## 2025-08-13 PROCEDURE — 96377 APPLICATON ON-BODY INJECTOR: CPT | Mod: XS | Performed by: RADIOLOGY

## 2025-08-13 PROCEDURE — 96409 CHEMO IV PUSH SNGL DRUG: CPT

## 2025-08-13 PROCEDURE — 96372 THER/PROPH/DIAG INJ SC/IM: CPT | Performed by: RADIOLOGY

## 2025-08-13 PROCEDURE — 250N000011 HC RX IP 250 OP 636: Performed by: INTERNAL MEDICINE

## 2025-08-13 PROCEDURE — 96367 TX/PROPH/DG ADDL SEQ IV INF: CPT | Mod: XS

## 2025-08-13 RX ORDER — ONDANSETRON 2 MG/ML
8 INJECTION INTRAMUSCULAR; INTRAVENOUS ONCE
Status: COMPLETED | OUTPATIENT
Start: 2025-08-13 | End: 2025-08-13

## 2025-08-13 RX ORDER — LIDOCAINE HYDROCHLORIDE 10 MG/ML
10 INJECTION, SOLUTION EPIDURAL; INFILTRATION; INTRACAUDAL; PERINEURAL ONCE
Status: COMPLETED | OUTPATIENT
Start: 2025-08-13 | End: 2025-08-13

## 2025-08-13 RX ORDER — ALBUMIN (HUMAN) 12.5 G/50ML
25 SOLUTION INTRAVENOUS ONCE
Status: COMPLETED | OUTPATIENT
Start: 2025-08-13 | End: 2025-08-13

## 2025-08-13 RX ORDER — LIDOCAINE 40 MG/G
CREAM TOPICAL
Status: DISCONTINUED | OUTPATIENT
Start: 2025-08-13 | End: 2025-08-14 | Stop reason: HOSPADM

## 2025-08-13 RX ADMIN — SODIUM CHLORIDE 250 MG: 9 INJECTION, SOLUTION INTRAVENOUS at 14:30

## 2025-08-13 RX ADMIN — LIDOCAINE HYDROCHLORIDE ANHYDROUS 10 ML: 10 INJECTION, SOLUTION INFILTRATION at 11:42

## 2025-08-13 RX ADMIN — ALBUMIN HUMAN 25 G: 0.25 SOLUTION INTRAVENOUS at 13:00

## 2025-08-13 RX ADMIN — PEGFILGRASTIM 6 MG: KIT SUBCUTANEOUS at 15:18

## 2025-08-13 RX ADMIN — ONDANSETRON 8 MG: 2 INJECTION INTRAMUSCULAR; INTRAVENOUS at 14:03

## 2025-08-13 RX ADMIN — SODIUM CHLORIDE 250 ML: 0.9 INJECTION, SOLUTION INTRAVENOUS at 14:00

## 2025-08-13 RX ADMIN — BENDAMUSTINE HYDROCHLORIDE 200 MG: 25 INJECTION, SOLUTION INTRAVENOUS at 14:57

## 2025-08-13 ASSESSMENT — PAIN SCALES - GENERAL: PAINLEVEL_OUTOF10: NO PAIN (0)

## 2025-08-19 ENCOUNTER — NURSE TRIAGE (OUTPATIENT)
Dept: FAMILY MEDICINE | Facility: OTHER | Age: 70
End: 2025-08-19
Payer: COMMERCIAL

## 2025-08-25 ENCOUNTER — PATIENT OUTREACH (OUTPATIENT)
Dept: CARE COORDINATION | Facility: CLINIC | Age: 70
End: 2025-08-25
Payer: COMMERCIAL

## 2025-08-27 ENCOUNTER — OFFICE VISIT (OUTPATIENT)
Dept: SURGERY | Facility: CLINIC | Age: 70
End: 2025-08-27
Attending: STUDENT IN AN ORGANIZED HEALTH CARE EDUCATION/TRAINING PROGRAM
Payer: COMMERCIAL

## 2025-08-27 ENCOUNTER — HOSPITAL ENCOUNTER (OUTPATIENT)
Dept: ULTRASOUND IMAGING | Facility: CLINIC | Age: 70
Discharge: HOME | End: 2025-08-27
Payer: COMMERCIAL

## 2025-08-27 VITALS
WEIGHT: 217.1 LBS | HEIGHT: 67 IN | BODY MASS INDEX: 34.07 KG/M2 | TEMPERATURE: 98 F | OXYGEN SATURATION: 96 % | SYSTOLIC BLOOD PRESSURE: 118 MMHG | HEART RATE: 80 BPM | DIASTOLIC BLOOD PRESSURE: 64 MMHG

## 2025-08-27 VITALS
SYSTOLIC BLOOD PRESSURE: 107 MMHG | OXYGEN SATURATION: 100 % | DIASTOLIC BLOOD PRESSURE: 60 MMHG | RESPIRATION RATE: 20 BRPM | HEART RATE: 73 BPM

## 2025-08-27 DIAGNOSIS — R18.8 OTHER ASCITES: Primary | ICD-10-CM

## 2025-08-27 DIAGNOSIS — K42.9 UMBILICAL HERNIA WITHOUT OBSTRUCTION AND WITHOUT GANGRENE: ICD-10-CM

## 2025-08-27 DIAGNOSIS — R18.8 OTHER ASCITES: ICD-10-CM

## 2025-08-27 PROCEDURE — 250N000009 HC RX 250

## 2025-08-27 PROCEDURE — 250N000011 HC RX IP 250 OP 636: Performed by: RADIOLOGY

## 2025-08-27 PROCEDURE — 272N000706 US PARACENTESIS WITH ALBUMIN

## 2025-08-27 PROCEDURE — P9047 ALBUMIN (HUMAN), 25%, 50ML: HCPCS | Performed by: RADIOLOGY

## 2025-08-27 RX ORDER — ALBUMIN (HUMAN) 12.5 G/50ML
25-50 SOLUTION INTRAVENOUS ONCE
Status: COMPLETED | OUTPATIENT
Start: 2025-08-27 | End: 2025-08-27

## 2025-08-27 RX ORDER — LIDOCAINE HYDROCHLORIDE 10 MG/ML
10 INJECTION, SOLUTION EPIDURAL; INFILTRATION; INTRACAUDAL; PERINEURAL ONCE
Status: COMPLETED | OUTPATIENT
Start: 2025-08-27 | End: 2025-08-27

## 2025-08-27 RX ADMIN — ALBUMIN HUMAN 25 G: 0.25 SOLUTION INTRAVENOUS at 12:40

## 2025-08-27 RX ADMIN — ALBUMIN HUMAN 25 G: 0.25 SOLUTION INTRAVENOUS at 13:22

## 2025-08-27 RX ADMIN — LIDOCAINE HYDROCHLORIDE ANHYDROUS 10 ML: 10 INJECTION, SOLUTION INFILTRATION at 11:41

## 2025-08-27 ASSESSMENT — PAIN SCALES - GENERAL: PAINLEVEL_OUTOF10: NO PAIN (0)

## 2025-09-02 ENCOUNTER — VIRTUAL VISIT (OUTPATIENT)
Dept: ONCOLOGY | Facility: CLINIC | Age: 70
End: 2025-09-02
Payer: COMMERCIAL

## 2025-09-02 DIAGNOSIS — E78.5 DYSLIPIDEMIA: ICD-10-CM

## 2025-09-02 DIAGNOSIS — C82.18 GRADE 2 FOLLICULAR LYMPHOMA OF LYMPH NODES OF MULTIPLE REGIONS (H): ICD-10-CM

## 2025-09-02 DIAGNOSIS — R18.8 OTHER ASCITES: Primary | ICD-10-CM

## (undated) RX ORDER — FENTANYL CITRATE 50 UG/ML
INJECTION, SOLUTION INTRAMUSCULAR; INTRAVENOUS
Status: DISPENSED
Start: 2025-03-14

## (undated) RX ORDER — ALBUMIN (HUMAN) 12.5 G/50ML
SOLUTION INTRAVENOUS
Status: DISPENSED
Start: 2025-04-09

## (undated) RX ORDER — NALOXONE HYDROCHLORIDE 0.4 MG/ML
INJECTION, SOLUTION INTRAMUSCULAR; INTRAVENOUS; SUBCUTANEOUS
Status: DISPENSED
Start: 2025-03-14

## (undated) RX ORDER — FLUMAZENIL 0.1 MG/ML
INJECTION, SOLUTION INTRAVENOUS
Status: DISPENSED
Start: 2025-03-14

## (undated) RX ORDER — LIDOCAINE HYDROCHLORIDE 10 MG/ML
INJECTION, SOLUTION EPIDURAL; INFILTRATION; INTRACAUDAL; PERINEURAL
Status: DISPENSED
Start: 2025-03-14